# Patient Record
Sex: FEMALE | Race: WHITE | NOT HISPANIC OR LATINO | Employment: OTHER | URBAN - METROPOLITAN AREA
[De-identification: names, ages, dates, MRNs, and addresses within clinical notes are randomized per-mention and may not be internally consistent; named-entity substitution may affect disease eponyms.]

---

## 2023-01-22 PROBLEM — E78.5 DYSLIPIDEMIA: Status: ACTIVE | Noted: 2023-01-22

## 2023-01-22 PROBLEM — N89.8 VAGINAL LESION: Status: ACTIVE | Noted: 2023-01-22

## 2023-01-22 PROBLEM — H91.90 HEARING LOSS: Status: ACTIVE | Noted: 2023-01-22

## 2023-01-22 PROBLEM — R42 DIZZINESS: Status: ACTIVE | Noted: 2023-01-22

## 2023-01-22 PROBLEM — M25.561 RIGHT KNEE PAIN: Status: ACTIVE | Noted: 2023-01-22

## 2023-01-22 PROBLEM — R32 URINARY INCONTINENCE: Status: ACTIVE | Noted: 2023-01-22

## 2023-01-22 PROBLEM — J30.2 SEASONAL ALLERGIES: Status: ACTIVE | Noted: 2023-01-22

## 2023-01-22 PROBLEM — E66.01 CLASS 2 SEVERE OBESITY WITH SERIOUS COMORBIDITY AND BODY MASS INDEX (BMI) OF 35.0 TO 35.9 IN ADULT (MULTI): Status: ACTIVE | Noted: 2023-01-22

## 2023-01-22 PROBLEM — N81.10 VAGINAL PROLAPSE: Status: ACTIVE | Noted: 2023-01-22

## 2023-01-22 PROBLEM — I10 ESSENTIAL HYPERTENSION: Status: ACTIVE | Noted: 2023-01-22

## 2023-01-22 PROBLEM — R12 HEARTBURN: Status: ACTIVE | Noted: 2023-01-22

## 2023-01-22 PROBLEM — C50.919 BREAST CA (MULTI): Status: ACTIVE | Noted: 2023-01-22

## 2023-01-22 PROBLEM — E66.812 CLASS 2 SEVERE OBESITY WITH SERIOUS COMORBIDITY AND BODY MASS INDEX (BMI) OF 35.0 TO 35.9 IN ADULT: Status: ACTIVE | Noted: 2023-01-22

## 2023-01-22 PROBLEM — J39.2 PHARYNGEAL DISORDER: Status: ACTIVE | Noted: 2023-01-22

## 2023-01-22 PROBLEM — H40.9 GLAUCOMA: Status: ACTIVE | Noted: 2023-01-22

## 2023-01-22 PROBLEM — E11.9 CONTROLLED TYPE 2 DIABETES MELLITUS WITHOUT COMPLICATION, WITHOUT LONG-TERM CURRENT USE OF INSULIN (MULTI): Status: ACTIVE | Noted: 2023-01-22

## 2023-01-22 RX ORDER — EZETIMIBE 10 MG/1
10 TABLET ORAL DAILY
COMMUNITY
End: 2023-08-30 | Stop reason: SDUPTHER

## 2023-01-22 RX ORDER — MULTIVITAMIN/IRON/FOLIC ACID 18MG-0.4MG
1 TABLET ORAL DAILY
COMMUNITY
End: 2024-04-10 | Stop reason: ALTCHOICE

## 2023-01-22 RX ORDER — FAMOTIDINE 20 MG/1
1 TABLET, FILM COATED ORAL DAILY PRN
COMMUNITY
End: 2024-04-10 | Stop reason: SDUPTHER

## 2023-01-22 RX ORDER — BRIMONIDINE TARTRATE AND TIMOLOL MALEATE 2; 5 MG/ML; MG/ML
1 SOLUTION OPHTHALMIC 2 TIMES DAILY
COMMUNITY

## 2023-01-22 RX ORDER — CHOLECALCIFEROL (VITAMIN D3) 125 MCG
1 CAPSULE ORAL EVERY 6 HOURS PRN
COMMUNITY
End: 2024-04-10 | Stop reason: ALTCHOICE

## 2023-01-22 RX ORDER — MULTIVITAMIN
1 TABLET ORAL DAILY
COMMUNITY

## 2023-01-22 RX ORDER — BLOOD SUGAR DIAGNOSTIC
STRIP MISCELLANEOUS
COMMUNITY
End: 2024-05-06 | Stop reason: SDUPTHER

## 2023-01-22 RX ORDER — DICLOFENAC SODIUM 10 MG/G
4 GEL TOPICAL 2 TIMES DAILY
COMMUNITY
End: 2023-03-07 | Stop reason: SDUPTHER

## 2023-01-22 RX ORDER — SPIRONOLACTONE 50 MG/1
50 TABLET, FILM COATED ORAL DAILY
COMMUNITY
End: 2024-04-10 | Stop reason: ALTCHOICE

## 2023-02-21 RX ORDER — LIRAGLUTIDE 6 MG/ML
1.8 INJECTION SUBCUTANEOUS
COMMUNITY
Start: 2022-11-21 | End: 2023-04-17 | Stop reason: SDUPTHER

## 2023-03-07 ENCOUNTER — OFFICE VISIT (OUTPATIENT)
Dept: PRIMARY CARE | Facility: CLINIC | Age: 83
End: 2023-03-07
Payer: MEDICARE

## 2023-03-07 VITALS
DIASTOLIC BLOOD PRESSURE: 68 MMHG | WEIGHT: 185 LBS | SYSTOLIC BLOOD PRESSURE: 140 MMHG | HEART RATE: 92 BPM | HEIGHT: 62 IN | BODY MASS INDEX: 34.04 KG/M2

## 2023-03-07 DIAGNOSIS — R26.81 UNSTEADY GAIT: ICD-10-CM

## 2023-03-07 DIAGNOSIS — M25.59 PAIN IN OTHER JOINT: ICD-10-CM

## 2023-03-07 DIAGNOSIS — E11.9 CONTROLLED TYPE 2 DIABETES MELLITUS WITHOUT COMPLICATION, WITHOUT LONG-TERM CURRENT USE OF INSULIN (MULTI): Primary | ICD-10-CM

## 2023-03-07 DIAGNOSIS — I10 ESSENTIAL HYPERTENSION: ICD-10-CM

## 2023-03-07 DIAGNOSIS — Z23 ENCOUNTER FOR IMMUNIZATION: ICD-10-CM

## 2023-03-07 DIAGNOSIS — J39.2 PHARYNGEAL DISORDER: ICD-10-CM

## 2023-03-07 DIAGNOSIS — R42 DIZZINESS: ICD-10-CM

## 2023-03-07 DIAGNOSIS — H90.3 SENSORINEURAL HEARING LOSS (SNHL) OF BOTH EARS: ICD-10-CM

## 2023-03-07 PROCEDURE — 1160F RVW MEDS BY RX/DR IN RCRD: CPT | Performed by: STUDENT IN AN ORGANIZED HEALTH CARE EDUCATION/TRAINING PROGRAM

## 2023-03-07 PROCEDURE — 1036F TOBACCO NON-USER: CPT | Performed by: STUDENT IN AN ORGANIZED HEALTH CARE EDUCATION/TRAINING PROGRAM

## 2023-03-07 PROCEDURE — 3078F DIAST BP <80 MM HG: CPT | Performed by: STUDENT IN AN ORGANIZED HEALTH CARE EDUCATION/TRAINING PROGRAM

## 2023-03-07 PROCEDURE — 3077F SYST BP >= 140 MM HG: CPT | Performed by: STUDENT IN AN ORGANIZED HEALTH CARE EDUCATION/TRAINING PROGRAM

## 2023-03-07 PROCEDURE — G0009 ADMIN PNEUMOCOCCAL VACCINE: HCPCS | Performed by: STUDENT IN AN ORGANIZED HEALTH CARE EDUCATION/TRAINING PROGRAM

## 2023-03-07 PROCEDURE — 1159F MED LIST DOCD IN RCRD: CPT | Performed by: STUDENT IN AN ORGANIZED HEALTH CARE EDUCATION/TRAINING PROGRAM

## 2023-03-07 PROCEDURE — 99214 OFFICE O/P EST MOD 30 MIN: CPT | Performed by: STUDENT IN AN ORGANIZED HEALTH CARE EDUCATION/TRAINING PROGRAM

## 2023-03-07 PROCEDURE — 90677 PCV20 VACCINE IM: CPT | Performed by: STUDENT IN AN ORGANIZED HEALTH CARE EDUCATION/TRAINING PROGRAM

## 2023-03-07 RX ORDER — GLIMEPIRIDE 1 MG/1
1 TABLET ORAL
Qty: 90 TABLET | Refills: 3 | Status: SHIPPED | OUTPATIENT
Start: 2023-03-07 | End: 2024-03-04 | Stop reason: HOSPADM

## 2023-03-07 RX ORDER — DICLOFENAC SODIUM 10 MG/G
4 GEL TOPICAL 2 TIMES DAILY
Qty: 100 G | Refills: 1 | Status: SHIPPED | OUTPATIENT
Start: 2023-03-07 | End: 2023-05-15 | Stop reason: SDUPTHER

## 2023-03-07 RX ORDER — SITAGLIPTIN 50 MG/1
1 TABLET, FILM COATED ORAL DAILY
COMMUNITY
Start: 2022-03-11 | End: 2023-03-07 | Stop reason: SINTOL

## 2023-03-07 ASSESSMENT — ENCOUNTER SYMPTOMS
COUGH: 0
PALPITATIONS: 0
CHILLS: 0
NERVOUS/ANXIOUS: 0
DYSPHORIC MOOD: 0
SHORTNESS OF BREATH: 0
FEVER: 0
LIGHT-HEADEDNESS: 1

## 2023-03-07 NOTE — PROGRESS NOTES
"6 month check.     Subjective   Patient ID: Nancie Du is a 82 y.o. female who presents for Diabetes.    HPI     Regarding DM2, was evaluated by endocrinology (Dr. Meehan) but no longer following. Jardiance was discontinued due to exacerbation of dizziness. She is now managed on Victoza alone. Sometimes still taking glimepiride as needed - states only taking it when FBG is elevated >150. Is taking 1/2 of her 2mg tablet and requests refill of 1mg tablet. BG has improved with dietary modification, deborah cutting back on fruit.    Regarding dizziness, has noticed that she feels lightheaded when her sugar is elevated and when her sugar is low. Overall feeling much better at this time with better control of her DM2.    Regarding gait, states that she feels unsteady on her feet. Has to use a cane and sometimes worries that she might fall, deborah when she moves quickly.    Review of Systems   Constitutional:  Negative for chills and fever.   Respiratory:  Negative for cough and shortness of breath.    Cardiovascular:  Negative for chest pain and palpitations.   Skin:  Negative for rash.   Neurological:  Positive for light-headedness.   Psychiatric/Behavioral:  Negative for dysphoric mood. The patient is not nervous/anxious.        Objective   /68   Pulse 92   Ht 1.575 m (5' 2\")   Wt 83.9 kg (185 lb)   BMI 33.84 kg/m²     Physical Exam  Constitutional:       Appearance: Normal appearance.   HENT:      Head: Normocephalic and atraumatic.   Eyes:      General: No scleral icterus.     Conjunctiva/sclera: Conjunctivae normal.   Cardiovascular:      Rate and Rhythm: Normal rate and regular rhythm.      Heart sounds: No murmur heard.  Pulmonary:      Effort: Pulmonary effort is normal. No respiratory distress.      Breath sounds: Normal breath sounds.   Musculoskeletal:         General: No swelling. Normal range of motion.      Cervical back: Normal range of motion and neck supple.   Skin:     General: Skin is warm and " dry.      Findings: No rash.   Neurological:      General: No focal deficit present.      Mental Status: She is alert.   Psychiatric:         Mood and Affect: Mood normal.         Behavior: Behavior normal.         Assessment/Plan   Problem List Items Addressed This Visit          Circulatory    Essential hypertension     BP just about at goal. No medication changes today. Do not want to exacerbate her lightheadedness.         Relevant Orders    Follow Up In Primary Care       Endocrine/Metabolic    Controlled type 2 diabetes mellitus without complication, without long-term current use of insulin (CMS/[facility]) - Primary     Doing well with Victoza and dietary modification, which we reviewed. Discussed risks of hypoglycemia associated with sulfonylurea use, but patient prefers to take low dose glimepiride as needed if FBG >150 as she feels dizzy when it is above this level. Refill provided. Medication dosing and side effects reviewed. Will obtain labs as below and will fu with results. Follow up in 6mo for recheck, sooner if needed.         Relevant Medications    glimepiride (AmaryL) 1 mg tablet    Other Relevant Orders    Albumin , Urine Random    Comprehensive Metabolic Panel    Hemoglobin A1C    TSH with reflex to Free T4 if abnormal    Lipid Panel    CBC and Auto Differential    Follow Up In Primary Care       Other    Dizziness     Improved with tighter glucose control. Will continue to monitor.         Relevant Orders    Follow Up In Primary Care    Pharyngeal disorder     Due for next ENT eval 5/2023. Pt plans to call to schedule.         Relevant Orders    Follow Up In Primary Care    Hearing loss     Doing well with new hearing aids.         Relevant Orders    Follow Up In Primary Care    Unsteady gait     Having to use a cane. We will proceed with PT eval in hopes that her ambulatory confidence with improve.         Relevant Orders    Referral to Physical Therapy    Follow Up In Primary Care     Other Visit  Diagnoses       Encounter for immunization        Relevant Orders    Pneumococcal conjugate vaccine, 20-valent, adult (PREVNAR 20) (Completed)

## 2023-03-08 NOTE — ASSESSMENT & PLAN NOTE
BP just about at goal. No medication changes today. Do not want to exacerbate her lightheadedness.

## 2023-03-08 NOTE — ASSESSMENT & PLAN NOTE
Doing well with Victoza and dietary modification, which we reviewed. Discussed risks of hypoglycemia associated with sulfonylurea use, but patient prefers to take low dose glimepiride as needed if FBG >150 as she feels dizzy when it is above this level. Refill provided. Medication dosing and side effects reviewed. Will obtain labs as below and will fu with results. Follow up in 6mo for recheck, sooner if needed.

## 2023-03-16 ENCOUNTER — LAB (OUTPATIENT)
Dept: LAB | Facility: LAB | Age: 83
End: 2023-03-16
Payer: MEDICARE

## 2023-03-16 DIAGNOSIS — E11.9 CONTROLLED TYPE 2 DIABETES MELLITUS WITHOUT COMPLICATION, WITHOUT LONG-TERM CURRENT USE OF INSULIN (MULTI): ICD-10-CM

## 2023-03-16 LAB
ALANINE AMINOTRANSFERASE (SGPT) (U/L) IN SER/PLAS: 18 U/L (ref 7–45)
ALBUMIN (G/DL) IN SER/PLAS: 3.9 G/DL (ref 3.4–5)
ALKALINE PHOSPHATASE (U/L) IN SER/PLAS: 55 U/L (ref 33–136)
ANION GAP IN SER/PLAS: 12 MMOL/L (ref 10–20)
ASPARTATE AMINOTRANSFERASE (SGOT) (U/L) IN SER/PLAS: 14 U/L (ref 9–39)
BASOPHILS (10*3/UL) IN BLOOD BY AUTOMATED COUNT: 0.06 X10E9/L (ref 0–0.1)
BASOPHILS/100 LEUKOCYTES IN BLOOD BY AUTOMATED COUNT: 0.8 % (ref 0–2)
BILIRUBIN TOTAL (MG/DL) IN SER/PLAS: 0.8 MG/DL (ref 0–1.2)
CALCIUM (MG/DL) IN SER/PLAS: 9.2 MG/DL (ref 8.6–10.3)
CARBON DIOXIDE, TOTAL (MMOL/L) IN SER/PLAS: 28 MMOL/L (ref 21–32)
CHLORIDE (MMOL/L) IN SER/PLAS: 99 MMOL/L (ref 98–107)
CHOLESTEROL (MG/DL) IN SER/PLAS: 197 MG/DL (ref 0–199)
CHOLESTEROL IN HDL (MG/DL) IN SER/PLAS: 57 MG/DL
CHOLESTEROL/HDL RATIO: 3.5
CREATININE (MG/DL) IN SER/PLAS: 0.56 MG/DL (ref 0.5–1.05)
EOSINOPHILS (10*3/UL) IN BLOOD BY AUTOMATED COUNT: 0.33 X10E9/L (ref 0–0.4)
EOSINOPHILS/100 LEUKOCYTES IN BLOOD BY AUTOMATED COUNT: 4.6 % (ref 0–6)
ERYTHROCYTE DISTRIBUTION WIDTH (RATIO) BY AUTOMATED COUNT: 12.8 % (ref 11.5–14.5)
ERYTHROCYTE MEAN CORPUSCULAR HEMOGLOBIN CONCENTRATION (G/DL) BY AUTOMATED: 31.7 G/DL (ref 32–36)
ERYTHROCYTE MEAN CORPUSCULAR VOLUME (FL) BY AUTOMATED COUNT: 91 FL (ref 80–100)
ERYTHROCYTES (10*6/UL) IN BLOOD BY AUTOMATED COUNT: 4.82 X10E12/L (ref 4–5.2)
ESTIMATED AVERAGE GLUCOSE FOR HBA1C: 166 MG/DL
GFR FEMALE: 90 ML/MIN/1.73M2
GLUCOSE (MG/DL) IN SER/PLAS: 174 MG/DL (ref 74–99)
HEMATOCRIT (%) IN BLOOD BY AUTOMATED COUNT: 43.6 % (ref 36–46)
HEMOGLOBIN (G/DL) IN BLOOD: 13.8 G/DL (ref 12–16)
HEMOGLOBIN A1C/HEMOGLOBIN TOTAL IN BLOOD: 7.4 %
IMMATURE GRANULOCYTES/100 LEUKOCYTES IN BLOOD BY AUTOMATED COUNT: 0.1 % (ref 0–0.9)
LDL: 108 MG/DL (ref 0–99)
LEUKOCYTES (10*3/UL) IN BLOOD BY AUTOMATED COUNT: 7.2 X10E9/L (ref 4.4–11.3)
LYMPHOCYTES (10*3/UL) IN BLOOD BY AUTOMATED COUNT: 1.46 X10E9/L (ref 0.8–3)
LYMPHOCYTES/100 LEUKOCYTES IN BLOOD BY AUTOMATED COUNT: 20.2 % (ref 13–44)
MONOCYTES (10*3/UL) IN BLOOD BY AUTOMATED COUNT: 0.67 X10E9/L (ref 0.05–0.8)
MONOCYTES/100 LEUKOCYTES IN BLOOD BY AUTOMATED COUNT: 9.3 % (ref 2–10)
NEUTROPHILS (10*3/UL) IN BLOOD BY AUTOMATED COUNT: 4.7 X10E9/L (ref 1.6–5.5)
NEUTROPHILS/100 LEUKOCYTES IN BLOOD BY AUTOMATED COUNT: 65 % (ref 40–80)
PLATELETS (10*3/UL) IN BLOOD AUTOMATED COUNT: 231 X10E9/L (ref 150–450)
POTASSIUM (MMOL/L) IN SER/PLAS: 4.1 MMOL/L (ref 3.5–5.3)
PROTEIN TOTAL: 6 G/DL (ref 6.4–8.2)
SODIUM (MMOL/L) IN SER/PLAS: 135 MMOL/L (ref 136–145)
THYROTROPIN (MIU/L) IN SER/PLAS BY DETECTION LIMIT <= 0.05 MIU/L: 2.75 MIU/L (ref 0.44–3.98)
TRIGLYCERIDE (MG/DL) IN SER/PLAS: 161 MG/DL (ref 0–149)
UREA NITROGEN (MG/DL) IN SER/PLAS: 11 MG/DL (ref 6–23)
VLDL: 32 MG/DL (ref 0–40)

## 2023-03-16 PROCEDURE — 85025 COMPLETE CBC W/AUTO DIFF WBC: CPT

## 2023-03-16 PROCEDURE — 84443 ASSAY THYROID STIM HORMONE: CPT

## 2023-03-16 PROCEDURE — 80061 LIPID PANEL: CPT

## 2023-03-16 PROCEDURE — 36415 COLL VENOUS BLD VENIPUNCTURE: CPT

## 2023-03-16 PROCEDURE — 83036 HEMOGLOBIN GLYCOSYLATED A1C: CPT

## 2023-03-16 PROCEDURE — 80053 COMPREHEN METABOLIC PANEL: CPT

## 2023-03-16 PROCEDURE — 82043 UR ALBUMIN QUANTITATIVE: CPT

## 2023-03-16 PROCEDURE — 82570 ASSAY OF URINE CREATININE: CPT

## 2023-03-17 ENCOUNTER — TELEPHONE (OUTPATIENT)
Dept: PRIMARY CARE | Facility: CLINIC | Age: 83
End: 2023-03-17

## 2023-03-17 LAB
ALBUMIN (MG/L) IN URINE: 9 MG/L
ALBUMIN/CREATININE (UG/MG) IN URINE: 25.2 UG/MG CRT (ref 0–30)
CREATININE (MG/DL) IN URINE: 35.7 MG/DL (ref 20–320)

## 2023-03-17 NOTE — TELEPHONE ENCOUNTER
----- Message from Aspen Knapp DO sent at 3/17/2023  1:07 PM EDT -----  Please let patient know that her HbA1c is 7.4%, which is at our goal of <8%. The remainder of her labs (blood counts, electrolytes, liver, kidneys, cholesterol, thyroid) look good. Thank you

## 2023-04-17 DIAGNOSIS — E11.9 CONTROLLED TYPE 2 DIABETES MELLITUS WITHOUT COMPLICATION, WITHOUT LONG-TERM CURRENT USE OF INSULIN (MULTI): Primary | ICD-10-CM

## 2023-04-17 RX ORDER — LIRAGLUTIDE 6 MG/ML
1.8 INJECTION SUBCUTANEOUS
Qty: 3 EACH | Refills: 1 | Status: SHIPPED | OUTPATIENT
Start: 2023-04-17 | End: 2023-06-06 | Stop reason: SDUPTHER

## 2023-05-15 DIAGNOSIS — M25.59 PAIN IN OTHER JOINT: ICD-10-CM

## 2023-05-15 RX ORDER — DICLOFENAC SODIUM 10 MG/G
4 GEL TOPICAL 2 TIMES DAILY
Qty: 100 G | Refills: 1 | Status: SHIPPED | OUTPATIENT
Start: 2023-05-15 | End: 2023-08-02 | Stop reason: SDUPTHER

## 2023-05-17 DIAGNOSIS — E11.9 CONTROLLED TYPE 2 DIABETES MELLITUS WITHOUT COMPLICATION, WITHOUT LONG-TERM CURRENT USE OF INSULIN (MULTI): Primary | ICD-10-CM

## 2023-05-17 RX ORDER — PEN NEEDLE, DIABETIC 30 GX3/16"
1 NEEDLE, DISPOSABLE MISCELLANEOUS DAILY
Qty: 100 EACH | Refills: 0 | Status: SHIPPED | OUTPATIENT
Start: 2023-05-17 | End: 2023-08-30 | Stop reason: SDUPTHER

## 2023-06-06 DIAGNOSIS — E11.9 CONTROLLED TYPE 2 DIABETES MELLITUS WITHOUT COMPLICATION, WITHOUT LONG-TERM CURRENT USE OF INSULIN (MULTI): ICD-10-CM

## 2023-06-06 RX ORDER — LIRAGLUTIDE 6 MG/ML
1.8 INJECTION SUBCUTANEOUS
Qty: 3 EACH | Refills: 5 | Status: SHIPPED | OUTPATIENT
Start: 2023-06-06 | End: 2023-12-07 | Stop reason: SDUPTHER

## 2023-08-02 DIAGNOSIS — M25.59 PAIN IN OTHER JOINT: ICD-10-CM

## 2023-08-02 RX ORDER — DICLOFENAC SODIUM 10 MG/G
4 GEL TOPICAL 2 TIMES DAILY
Qty: 100 G | Refills: 1 | Status: SHIPPED | OUTPATIENT
Start: 2023-08-02 | End: 2023-10-17 | Stop reason: SDUPTHER

## 2023-08-30 DIAGNOSIS — E78.5 DYSLIPIDEMIA: ICD-10-CM

## 2023-08-30 DIAGNOSIS — E11.9 CONTROLLED TYPE 2 DIABETES MELLITUS WITHOUT COMPLICATION, WITHOUT LONG-TERM CURRENT USE OF INSULIN (MULTI): ICD-10-CM

## 2023-08-30 RX ORDER — EZETIMIBE 10 MG/1
10 TABLET ORAL DAILY
Qty: 90 TABLET | Refills: 1 | Status: SHIPPED | OUTPATIENT
Start: 2023-08-30 | End: 2024-02-13 | Stop reason: SDUPTHER

## 2023-08-30 RX ORDER — PEN NEEDLE, DIABETIC 30 GX3/16"
1 NEEDLE, DISPOSABLE MISCELLANEOUS DAILY
Qty: 100 EACH | Refills: 1 | Status: SHIPPED | OUTPATIENT
Start: 2023-08-30 | End: 2023-12-07 | Stop reason: SDUPTHER

## 2023-09-12 ENCOUNTER — OFFICE VISIT (OUTPATIENT)
Dept: PRIMARY CARE | Facility: CLINIC | Age: 83
End: 2023-09-12
Payer: MEDICARE

## 2023-09-12 ENCOUNTER — LAB (OUTPATIENT)
Dept: LAB | Facility: LAB | Age: 83
End: 2023-09-12
Payer: MEDICARE

## 2023-09-12 VITALS
DIASTOLIC BLOOD PRESSURE: 76 MMHG | HEART RATE: 88 BPM | BODY MASS INDEX: 32.61 KG/M2 | WEIGHT: 177.2 LBS | SYSTOLIC BLOOD PRESSURE: 144 MMHG | HEIGHT: 62 IN

## 2023-09-12 DIAGNOSIS — R42 DIZZINESS: ICD-10-CM

## 2023-09-12 DIAGNOSIS — J39.2 PHARYNGEAL DISORDER: ICD-10-CM

## 2023-09-12 DIAGNOSIS — C50.919 MALIGNANT NEOPLASM OF FEMALE BREAST, UNSPECIFIED ESTROGEN RECEPTOR STATUS, UNSPECIFIED LATERALITY, UNSPECIFIED SITE OF BREAST (MULTI): ICD-10-CM

## 2023-09-12 DIAGNOSIS — R09.82 PND (POST-NASAL DRIP): ICD-10-CM

## 2023-09-12 DIAGNOSIS — E11.9 TYPE 2 DIABETES MELLITUS WITHOUT COMPLICATION, WITHOUT LONG-TERM CURRENT USE OF INSULIN (MULTI): Primary | ICD-10-CM

## 2023-09-12 DIAGNOSIS — E11.9 CONTROLLED TYPE 2 DIABETES MELLITUS WITHOUT COMPLICATION, WITHOUT LONG-TERM CURRENT USE OF INSULIN (MULTI): ICD-10-CM

## 2023-09-12 LAB
ALANINE AMINOTRANSFERASE (SGPT) (U/L) IN SER/PLAS: 18 U/L (ref 7–45)
ALBUMIN (G/DL) IN SER/PLAS: 4 G/DL (ref 3.4–5)
ALKALINE PHOSPHATASE (U/L) IN SER/PLAS: 54 U/L (ref 33–136)
ANION GAP IN SER/PLAS: 11 MMOL/L (ref 10–20)
ASPARTATE AMINOTRANSFERASE (SGOT) (U/L) IN SER/PLAS: 14 U/L (ref 9–39)
BASOPHILS (10*3/UL) IN BLOOD BY AUTOMATED COUNT: 0.05 X10E9/L (ref 0–0.1)
BASOPHILS/100 LEUKOCYTES IN BLOOD BY AUTOMATED COUNT: 0.8 % (ref 0–2)
BILIRUBIN TOTAL (MG/DL) IN SER/PLAS: 0.4 MG/DL (ref 0–1.2)
CALCIUM (MG/DL) IN SER/PLAS: 9.1 MG/DL (ref 8.6–10.3)
CARBON DIOXIDE, TOTAL (MMOL/L) IN SER/PLAS: 28 MMOL/L (ref 21–32)
CHLORIDE (MMOL/L) IN SER/PLAS: 99 MMOL/L (ref 98–107)
CREATININE (MG/DL) IN SER/PLAS: 0.51 MG/DL (ref 0.5–1.05)
EOSINOPHILS (10*3/UL) IN BLOOD BY AUTOMATED COUNT: 0.25 X10E9/L (ref 0–0.4)
EOSINOPHILS/100 LEUKOCYTES IN BLOOD BY AUTOMATED COUNT: 3.8 % (ref 0–6)
ERYTHROCYTE DISTRIBUTION WIDTH (RATIO) BY AUTOMATED COUNT: 12.7 % (ref 11.5–14.5)
ERYTHROCYTE MEAN CORPUSCULAR HEMOGLOBIN CONCENTRATION (G/DL) BY AUTOMATED: 31.6 G/DL (ref 32–36)
ERYTHROCYTE MEAN CORPUSCULAR VOLUME (FL) BY AUTOMATED COUNT: 91 FL (ref 80–100)
ERYTHROCYTES (10*6/UL) IN BLOOD BY AUTOMATED COUNT: 4.86 X10E12/L (ref 4–5.2)
ESTIMATED AVERAGE GLUCOSE FOR HBA1C: 163 MG/DL
GFR FEMALE: >90 ML/MIN/1.73M2
GLUCOSE (MG/DL) IN SER/PLAS: 216 MG/DL (ref 74–99)
HEMATOCRIT (%) IN BLOOD BY AUTOMATED COUNT: 44.3 % (ref 36–46)
HEMOGLOBIN (G/DL) IN BLOOD: 14 G/DL (ref 12–16)
HEMOGLOBIN A1C/HEMOGLOBIN TOTAL IN BLOOD: 7.3 %
IMMATURE GRANULOCYTES/100 LEUKOCYTES IN BLOOD BY AUTOMATED COUNT: 0.2 % (ref 0–0.9)
LEUKOCYTES (10*3/UL) IN BLOOD BY AUTOMATED COUNT: 6.7 X10E9/L (ref 4.4–11.3)
LYMPHOCYTES (10*3/UL) IN BLOOD BY AUTOMATED COUNT: 1.36 X10E9/L (ref 0.8–3)
LYMPHOCYTES/100 LEUKOCYTES IN BLOOD BY AUTOMATED COUNT: 20.4 % (ref 13–44)
MONOCYTES (10*3/UL) IN BLOOD BY AUTOMATED COUNT: 0.62 X10E9/L (ref 0.05–0.8)
MONOCYTES/100 LEUKOCYTES IN BLOOD BY AUTOMATED COUNT: 9.3 % (ref 2–10)
NEUTROPHILS (10*3/UL) IN BLOOD BY AUTOMATED COUNT: 4.37 X10E9/L (ref 1.6–5.5)
NEUTROPHILS/100 LEUKOCYTES IN BLOOD BY AUTOMATED COUNT: 65.5 % (ref 40–80)
PLATELETS (10*3/UL) IN BLOOD AUTOMATED COUNT: 254 X10E9/L (ref 150–450)
POTASSIUM (MMOL/L) IN SER/PLAS: 3.9 MMOL/L (ref 3.5–5.3)
PROTEIN TOTAL: 6.2 G/DL (ref 6.4–8.2)
SODIUM (MMOL/L) IN SER/PLAS: 134 MMOL/L (ref 136–145)
THYROTROPIN (MIU/L) IN SER/PLAS BY DETECTION LIMIT <= 0.05 MIU/L: 1.14 MIU/L (ref 0.44–3.98)
UREA NITROGEN (MG/DL) IN SER/PLAS: 13 MG/DL (ref 6–23)

## 2023-09-12 PROCEDURE — 83036 HEMOGLOBIN GLYCOSYLATED A1C: CPT

## 2023-09-12 PROCEDURE — 1160F RVW MEDS BY RX/DR IN RCRD: CPT | Performed by: STUDENT IN AN ORGANIZED HEALTH CARE EDUCATION/TRAINING PROGRAM

## 2023-09-12 PROCEDURE — 36415 COLL VENOUS BLD VENIPUNCTURE: CPT

## 2023-09-12 PROCEDURE — 85025 COMPLETE CBC W/AUTO DIFF WBC: CPT

## 2023-09-12 PROCEDURE — 1159F MED LIST DOCD IN RCRD: CPT | Performed by: STUDENT IN AN ORGANIZED HEALTH CARE EDUCATION/TRAINING PROGRAM

## 2023-09-12 PROCEDURE — 3077F SYST BP >= 140 MM HG: CPT | Performed by: STUDENT IN AN ORGANIZED HEALTH CARE EDUCATION/TRAINING PROGRAM

## 2023-09-12 PROCEDURE — 3078F DIAST BP <80 MM HG: CPT | Performed by: STUDENT IN AN ORGANIZED HEALTH CARE EDUCATION/TRAINING PROGRAM

## 2023-09-12 PROCEDURE — 82570 ASSAY OF URINE CREATININE: CPT

## 2023-09-12 PROCEDURE — 82043 UR ALBUMIN QUANTITATIVE: CPT

## 2023-09-12 PROCEDURE — 1036F TOBACCO NON-USER: CPT | Performed by: STUDENT IN AN ORGANIZED HEALTH CARE EDUCATION/TRAINING PROGRAM

## 2023-09-12 PROCEDURE — 80053 COMPREHEN METABOLIC PANEL: CPT

## 2023-09-12 PROCEDURE — 99213 OFFICE O/P EST LOW 20 MIN: CPT | Performed by: STUDENT IN AN ORGANIZED HEALTH CARE EDUCATION/TRAINING PROGRAM

## 2023-09-12 PROCEDURE — 84443 ASSAY THYROID STIM HORMONE: CPT

## 2023-09-12 ASSESSMENT — ENCOUNTER SYMPTOMS
COUGH: 0
NERVOUS/ANXIOUS: 0
LIGHT-HEADEDNESS: 1
CHILLS: 0
FEVER: 0
SHORTNESS OF BREATH: 0
PALPITATIONS: 0
DYSPHORIC MOOD: 0

## 2023-09-12 NOTE — PROGRESS NOTES
"Subjective   Patient ID: Nancie Du is a 82 y.o. female who presents for 6 month check. Still having a lot of dizziness. Went to massage therapist and it helped back but not the dizziness.    HPI  DM2 - due for labs,  this morning, averaging low to mid 100s, when above 150 takes glimepiride 1mg, last eye check with Dr. Moyer 8/2023    Lightheadedness - \"PT can't do anything more for me\", not interested in seeing neurology, saw massage therapist, helped back but not lightheadedness, feels off balance, using a cane now, not drinking too much water and plans to work on increasing    Breast ca - scheduled for mammogram at [facility] 11/8/2023    Pharyngeal lesion - plan is for follow up with Dr. Lobato as needed    PND - better with otc Flonase, sleeps with humidifier    Review of Systems   Constitutional:  Negative for chills and fever.   Respiratory:  Negative for cough and shortness of breath.    Cardiovascular:  Negative for chest pain and palpitations.   Skin:  Negative for rash.   Neurological:  Positive for light-headedness.   Psychiatric/Behavioral:  Negative for dysphoric mood. The patient is not nervous/anxious.      Objective   /76   Pulse 88   Ht 1.575 m (5' 2\")   Wt 80.4 kg (177 lb 3.2 oz)   BMI 32.41 kg/m²     Physical Exam  Constitutional:       Appearance: Normal appearance.   HENT:      Head: Normocephalic and atraumatic.   Eyes:      General: No scleral icterus.     Conjunctiva/sclera: Conjunctivae normal.   Cardiovascular:      Rate and Rhythm: Normal rate and regular rhythm.      Heart sounds: No murmur heard.  Pulmonary:      Effort: Pulmonary effort is normal. No respiratory distress.      Breath sounds: Normal breath sounds.   Musculoskeletal:         General: No swelling. Normal range of motion.      Cervical back: Normal range of motion and neck supple.   Skin:     General: Skin is warm and dry.      Findings: No rash.   Neurological:      General: No focal deficit present.     "  Mental Status: She is alert.   Psychiatric:         Mood and Affect: Mood normal.         Behavior: Behavior normal.       Assessment/Plan   Problem List Items Addressed This Visit       Type 2 diabetes mellitus without complication, without long-term current use of insulin (CMS/[facility]) - Primary     Due for annual labs - will follow up with results when available. No changes today. Follow up in 3mo for recheck, sooner if needed.          Relevant Orders    CBC and Auto Differential (Completed)    Comprehensive Metabolic Panel (Completed)    TSH with reflex to Free T4 if abnormal (Completed)    Hemoglobin A1C (Completed)    Albumin , Urine Random (Completed)    Follow Up In Primary Care - Medicare Annual    PND (post-nasal drip)     Better with nasal steroid. Will continue.         Pharyngeal disorder     Will follow up with ENT as needed.         Dizziness     No real change with PT. She is not interested in further workup - will focus on BG control and water intake. Will continue to monitor. Return precautions reviewed.         Breast CA (CMS/[facility])     Scheduled for annual eval at [facility] 11/8/2023.

## 2023-09-13 LAB
ALBUMIN (MG/L) IN URINE: 9.1 MG/L
ALBUMIN/CREATININE (UG/MG) IN URINE: 47.4 UG/MG CRT (ref 0–30)
CREATININE (MG/DL) IN URINE: 19.2 MG/DL (ref 20–320)

## 2023-09-17 PROBLEM — E66.09 CLASS 1 OBESITY DUE TO EXCESS CALORIES WITH SERIOUS COMORBIDITY AND BODY MASS INDEX (BMI) OF 32.0 TO 32.9 IN ADULT: Status: ACTIVE | Noted: 2023-01-22

## 2023-09-17 PROBLEM — M25.561 RIGHT KNEE PAIN: Status: RESOLVED | Noted: 2023-01-22 | Resolved: 2023-09-17

## 2023-09-17 PROBLEM — E66.811 CLASS 1 OBESITY DUE TO EXCESS CALORIES WITH SERIOUS COMORBIDITY AND BODY MASS INDEX (BMI) OF 32.0 TO 32.9 IN ADULT: Status: ACTIVE | Noted: 2023-01-22

## 2023-09-17 PROBLEM — R09.82 PND (POST-NASAL DRIP): Status: ACTIVE | Noted: 2023-09-17

## 2023-09-17 NOTE — ASSESSMENT & PLAN NOTE
Due for annual labs - will follow up with results when available. No changes today. Follow up in 3mo for recheck, sooner if needed.

## 2023-09-17 NOTE — ASSESSMENT & PLAN NOTE
No real change with PT. She is not interested in further workup - will focus on BG control and water intake. Will continue to monitor. Return precautions reviewed.

## 2023-09-21 ENCOUNTER — TELEPHONE (OUTPATIENT)
Dept: PRIMARY CARE | Facility: CLINIC | Age: 83
End: 2023-09-21
Payer: MEDICARE

## 2023-09-21 NOTE — TELEPHONE ENCOUNTER
Aspen Knapp,   [id number] Clinical Support Staff  Please let patient know that her HbA1c is down to 7.3% and that the remainder of her labs look nice and stable. Thank you

## 2023-10-17 DIAGNOSIS — M25.59 PAIN IN OTHER JOINT: ICD-10-CM

## 2023-10-17 RX ORDER — DICLOFENAC SODIUM 10 MG/G
4 GEL TOPICAL 2 TIMES DAILY
Qty: 100 G | Refills: 1 | Status: SHIPPED | OUTPATIENT
Start: 2023-10-17 | End: 2024-01-08 | Stop reason: SDUPTHER

## 2023-12-07 DIAGNOSIS — E11.9 CONTROLLED TYPE 2 DIABETES MELLITUS WITHOUT COMPLICATION, WITHOUT LONG-TERM CURRENT USE OF INSULIN (MULTI): ICD-10-CM

## 2023-12-07 RX ORDER — LIRAGLUTIDE 6 MG/ML
1.8 INJECTION SUBCUTANEOUS DAILY
Qty: 3 ML | Refills: 5 | Status: SHIPPED | OUTPATIENT
Start: 2023-12-07

## 2023-12-07 RX ORDER — PEN NEEDLE, DIABETIC 30 GX3/16"
1 NEEDLE, DISPOSABLE MISCELLANEOUS DAILY
Qty: 100 EACH | Refills: 1 | Status: SHIPPED | OUTPATIENT
Start: 2023-12-07

## 2023-12-12 ENCOUNTER — APPOINTMENT (OUTPATIENT)
Dept: PRIMARY CARE | Facility: CLINIC | Age: 83
End: 2023-12-12
Payer: MEDICARE

## 2024-01-03 ENCOUNTER — OFFICE VISIT (OUTPATIENT)
Dept: PRIMARY CARE | Facility: CLINIC | Age: 84
End: 2024-01-03
Payer: MEDICARE

## 2024-01-03 ENCOUNTER — APPOINTMENT (OUTPATIENT)
Dept: PRIMARY CARE | Facility: CLINIC | Age: 84
End: 2024-01-03
Payer: MEDICARE

## 2024-01-03 VITALS
HEIGHT: 62 IN | BODY MASS INDEX: 32.65 KG/M2 | DIASTOLIC BLOOD PRESSURE: 78 MMHG | HEART RATE: 88 BPM | SYSTOLIC BLOOD PRESSURE: 130 MMHG | WEIGHT: 177.4 LBS

## 2024-01-03 DIAGNOSIS — L30.9 DERMATITIS: ICD-10-CM

## 2024-01-03 DIAGNOSIS — R42 DIZZINESS: Primary | ICD-10-CM

## 2024-01-03 PROCEDURE — 1036F TOBACCO NON-USER: CPT | Performed by: STUDENT IN AN ORGANIZED HEALTH CARE EDUCATION/TRAINING PROGRAM

## 2024-01-03 PROCEDURE — 1160F RVW MEDS BY RX/DR IN RCRD: CPT | Performed by: STUDENT IN AN ORGANIZED HEALTH CARE EDUCATION/TRAINING PROGRAM

## 2024-01-03 PROCEDURE — 99213 OFFICE O/P EST LOW 20 MIN: CPT | Performed by: STUDENT IN AN ORGANIZED HEALTH CARE EDUCATION/TRAINING PROGRAM

## 2024-01-03 PROCEDURE — 3078F DIAST BP <80 MM HG: CPT | Performed by: STUDENT IN AN ORGANIZED HEALTH CARE EDUCATION/TRAINING PROGRAM

## 2024-01-03 PROCEDURE — 1159F MED LIST DOCD IN RCRD: CPT | Performed by: STUDENT IN AN ORGANIZED HEALTH CARE EDUCATION/TRAINING PROGRAM

## 2024-01-03 PROCEDURE — 3075F SYST BP GE 130 - 139MM HG: CPT | Performed by: STUDENT IN AN ORGANIZED HEALTH CARE EDUCATION/TRAINING PROGRAM

## 2024-01-03 RX ORDER — TRIAMCINOLONE ACETONIDE 1 MG/G
CREAM TOPICAL 2 TIMES DAILY
Qty: 30 G | Refills: 0 | Status: SHIPPED | OUTPATIENT
Start: 2024-01-03

## 2024-01-03 ASSESSMENT — ENCOUNTER SYMPTOMS
DIZZINESS: 1
CHILLS: 0
NERVOUS/ANXIOUS: 0
DYSPHORIC MOOD: 0
FEVER: 0
LIGHT-HEADEDNESS: 1
SHORTNESS OF BREATH: 0
PALPITATIONS: 0
COUGH: 0

## 2024-01-03 NOTE — PROGRESS NOTES
"Subjective   Patient ID: Nancie Du is a 83 y.o. female who presents for 3 month check with labs. Dizziness has gotten worse over the last few months. Did have fall, didn't hit the ground but did hit the door.     HPI  Dizziness - getting worse, now stumbling to both sides and having to use walker at home and cane when out of the house, fell sideways into glass door last week after catching foot on throw rug at home, denies injury as she was able to catch herself on the doorframe prior to falling, states she is starting to notice some occasional room-spinning dizziness in additional to her lightheadedness, she is trying to stay hydrated but sometimes finds it difficult due to urinary frequency, has been doing neck exercises recommended by ENT but states they seem to make her dizziness worse, feels even worse when she hasn't eaten, eating dose help, she is now rarely taking the Amaryl, only taking when BG >150    Rash - itchy rash on shoulders that she first noticed a few weeks ago, thinks might be reacting to her hair dye, denies other rash, has been applying otc hydrocortisone without much improvement in itch/rash, denies other known new exposures    Review of Systems   Constitutional:  Negative for chills and fever.   Respiratory:  Negative for cough and shortness of breath.    Cardiovascular:  Negative for chest pain and palpitations.   Skin:  Negative for rash.   Neurological:  Positive for dizziness and light-headedness.   Psychiatric/Behavioral:  Negative for dysphoric mood. The patient is not nervous/anxious.      Objective   /78   Pulse 88   Ht 1.575 m (5' 2\")   Wt 80.5 kg (177 lb 6.4 oz)   BMI 32.45 kg/m²     Physical Exam  Constitutional:       Appearance: Normal appearance.   HENT:      Head: Normocephalic.   Eyes:      General: No scleral icterus.     Conjunctiva/sclera: Conjunctivae normal.   Pulmonary:      Effort: Pulmonary effort is normal. No respiratory distress.   Skin:     Findings: " Rash (maculopapular rash with excoriations superior aspect of shoulders L>R) present.   Neurological:      General: No focal deficit present.      Mental Status: She is alert and oriented to person, place, and time.   Psychiatric:         Mood and Affect: Mood normal.         Behavior: Behavior normal.       Assessment/Plan   Problem List Items Addressed This Visit             ICD-10-CM    Dizziness - Primary R42     Worsening with time. Discussed possible etiologies and usual workup. Recommend stopping the Amaryl completely and making sure she is staying hydrated and keeping up with small frequent meals/snacks. Discussed PT, but she declines and states it did not provide much improvement in symptoms in the past. Using shared decision making, we decided to proceed with neurology eval given worsening of symptoms which are now interfering with ADLs. Return precautions reviewed.          Relevant Orders    Referral to Neurology    Follow Up In Primary Care - Medicare Annual    Dermatitis L30.9     Rash on shoulders c/w contact dermatitis. Will tx with topical steroid. Medication dosing and side effects reviewed. Return precautions reviewed.          Relevant Medications    triamcinolone (Kenalog) 0.1 % cream    Other Relevant Orders    Follow Up In Primary Care - Medicare Annual   Follow up in 3mo for recheck, sooner if needed.

## 2024-01-04 NOTE — ASSESSMENT & PLAN NOTE
Worsening with time. Discussed possible etiologies and usual workup. Recommend stopping the Amaryl completely and making sure she is staying hydrated and keeping up with small frequent meals/snacks. Discussed PT, but she declines and states it did not provide much improvement in symptoms in the past. Using shared decision making, we decided to proceed with neurology eval given worsening of symptoms which are now interfering with ADLs. Return precautions reviewed.

## 2024-01-04 NOTE — ASSESSMENT & PLAN NOTE
Rash on shoulders c/w contact dermatitis. Will tx with topical steroid. Medication dosing and side effects reviewed. Return precautions reviewed.

## 2024-01-08 DIAGNOSIS — M25.59 PAIN IN OTHER JOINT: ICD-10-CM

## 2024-01-08 RX ORDER — DICLOFENAC SODIUM 10 MG/G
4 GEL TOPICAL 2 TIMES DAILY
Qty: 100 G | Refills: 1 | Status: SHIPPED | OUTPATIENT
Start: 2024-01-08 | End: 2024-05-10 | Stop reason: SDUPTHER

## 2024-02-13 DIAGNOSIS — E78.5 DYSLIPIDEMIA: ICD-10-CM

## 2024-02-13 RX ORDER — EZETIMIBE 10 MG/1
10 TABLET ORAL DAILY
Qty: 90 TABLET | Refills: 1 | Status: SHIPPED | OUTPATIENT
Start: 2024-02-13

## 2024-02-28 ENCOUNTER — HOSPITAL ENCOUNTER (EMERGENCY)
Facility: HOSPITAL | Age: 84
Discharge: OTHER INSTITUTION | End: 2024-02-29
Attending: EMERGENCY MEDICINE
Payer: MEDICARE

## 2024-02-28 ENCOUNTER — APPOINTMENT (OUTPATIENT)
Dept: RADIOLOGY | Facility: HOSPITAL | Age: 84
End: 2024-02-28
Payer: MEDICARE

## 2024-02-28 VITALS
BODY MASS INDEX: 31.71 KG/M2 | TEMPERATURE: 97.2 F | DIASTOLIC BLOOD PRESSURE: 71 MMHG | OXYGEN SATURATION: 96 % | SYSTOLIC BLOOD PRESSURE: 132 MMHG | RESPIRATION RATE: 18 BRPM | HEIGHT: 63 IN | WEIGHT: 179 LBS | HEART RATE: 98 BPM

## 2024-02-28 DIAGNOSIS — S09.90XA HEAD INJURY, INITIAL ENCOUNTER: ICD-10-CM

## 2024-02-28 DIAGNOSIS — S02.119A CLOSED FRACTURE OF OCCIPITAL BONE, UNSPECIFIED LATERALITY, UNSPECIFIED OCCIPITAL FRACTURE TYPE, INITIAL ENCOUNTER (MULTI): Primary | ICD-10-CM

## 2024-02-28 LAB — GLUCOSE BLD MANUAL STRIP-MCNC: 212 MG/DL (ref 74–99)

## 2024-02-28 PROCEDURE — 70486 CT MAXILLOFACIAL W/O DYE: CPT

## 2024-02-28 PROCEDURE — 90471 IMMUNIZATION ADMIN: CPT | Performed by: EMERGENCY MEDICINE

## 2024-02-28 PROCEDURE — 76377 3D RENDER W/INTRP POSTPROCES: CPT

## 2024-02-28 PROCEDURE — 70450 CT HEAD/BRAIN W/O DYE: CPT | Performed by: RADIOLOGY

## 2024-02-28 PROCEDURE — 70486 CT MAXILLOFACIAL W/O DYE: CPT | Performed by: RADIOLOGY

## 2024-02-28 PROCEDURE — 72125 CT NECK SPINE W/O DYE: CPT

## 2024-02-28 PROCEDURE — 99285 EMERGENCY DEPT VISIT HI MDM: CPT

## 2024-02-28 PROCEDURE — 2500000004 HC RX 250 GENERAL PHARMACY W/ HCPCS (ALT 636 FOR OP/ED): Performed by: EMERGENCY MEDICINE

## 2024-02-28 PROCEDURE — 82947 ASSAY GLUCOSE BLOOD QUANT: CPT

## 2024-02-28 PROCEDURE — 72131 CT LUMBAR SPINE W/O DYE: CPT

## 2024-02-28 PROCEDURE — 70450 CT HEAD/BRAIN W/O DYE: CPT

## 2024-02-28 PROCEDURE — 2500000001 HC RX 250 WO HCPCS SELF ADMINISTERED DRUGS (ALT 637 FOR MEDICARE OP): Performed by: EMERGENCY MEDICINE

## 2024-02-28 PROCEDURE — 72125 CT NECK SPINE W/O DYE: CPT | Performed by: RADIOLOGY

## 2024-02-28 PROCEDURE — 90715 TDAP VACCINE 7 YRS/> IM: CPT | Performed by: EMERGENCY MEDICINE

## 2024-02-28 RX ORDER — ACETAMINOPHEN 325 MG/1
650 TABLET ORAL ONCE
Status: COMPLETED | OUTPATIENT
Start: 2024-02-28 | End: 2024-02-28

## 2024-02-28 RX ORDER — CLONIDINE HYDROCHLORIDE 0.1 MG/1
0.1 TABLET ORAL ONCE
Status: COMPLETED | OUTPATIENT
Start: 2024-02-28 | End: 2024-02-28

## 2024-02-28 RX ADMIN — TETANUS TOXOID, REDUCED DIPHTHERIA TOXOID AND ACELLULAR PERTUSSIS VACCINE, ADSORBED 0.5 ML: 5; 2.5; 8; 8; 2.5 SUSPENSION INTRAMUSCULAR at 21:38

## 2024-02-28 RX ADMIN — ACETAMINOPHEN 650 MG: 325 TABLET ORAL at 20:47

## 2024-02-28 RX ADMIN — CLONIDINE HYDROCHLORIDE 0.1 MG: 0.1 TABLET ORAL at 20:36

## 2024-02-28 ASSESSMENT — PAIN DESCRIPTION - PROGRESSION: CLINICAL_PROGRESSION: GRADUALLY IMPROVING

## 2024-02-28 ASSESSMENT — PAIN SCALES - GENERAL
PAINLEVEL_OUTOF10: 2
PAINLEVEL_OUTOF10: 5 - MODERATE PAIN

## 2024-02-28 ASSESSMENT — COLUMBIA-SUICIDE SEVERITY RATING SCALE - C-SSRS
1. IN THE PAST MONTH, HAVE YOU WISHED YOU WERE DEAD OR WISHED YOU COULD GO TO SLEEP AND NOT WAKE UP?: NO
6. HAVE YOU EVER DONE ANYTHING, STARTED TO DO ANYTHING, OR PREPARED TO DO ANYTHING TO END YOUR LIFE?: NO
2. HAVE YOU ACTUALLY HAD ANY THOUGHTS OF KILLING YOURSELF?: NO

## 2024-02-28 ASSESSMENT — PAIN - FUNCTIONAL ASSESSMENT
PAIN_FUNCTIONAL_ASSESSMENT: 0-10
PAIN_FUNCTIONAL_ASSESSMENT: 0-10

## 2024-02-28 ASSESSMENT — PAIN DESCRIPTION - LOCATION: LOCATION: BACK

## 2024-02-28 NOTE — ED PROVIDER NOTES
HPI   No chief complaint on file.      Patient presents to the emergency department by squad after a mechanical fall.  The patient states that she was bringing in her garbage cans when a michael of wind knocked her to the ground.  She did strike the back of her head but did not lose consciousness.  Presents now secondary to pain to the back of her head as well as to her lower back.  States her last tetanus booster was within 5 years.      History provided by:  Patient and EMS personnel   used: No                        No data recorded                   Patient History   Past Medical History:   Diagnosis Date    Encounter for full-term uncomplicated delivery     Normal vaginal delivery    Other conditions influencing health status     Menstruation     Past Surgical History:   Procedure Laterality Date    OTHER SURGICAL HISTORY  06/15/2021    Ankle surgery    OTHER SURGICAL HISTORY  06/15/2021    Left mastectomy    OTHER SURGICAL HISTORY  09/17/2021    Hysteroscopy     Family History   Problem Relation Name Age of Onset    Heart disease Mother      Hypertension Mother      Hypertension Father       Social History     Tobacco Use    Smoking status: Never    Smokeless tobacco: Never   Substance Use Topics    Alcohol use: Not on file    Drug use: Not on file       Physical Exam   ED Triage Vitals   Temp Pulse Resp BP   -- -- -- --      SpO2 Temp src Heart Rate Source Patient Position   -- -- -- --      BP Location FiO2 (%)     -- --       Physical Exam  Vitals and nursing note reviewed.   Constitutional:       General: She is not in acute distress.     Appearance: Normal appearance. She is obese. She is not ill-appearing, toxic-appearing or diaphoretic.      Comments: Notably hard of hearing.  Able to answer questions appropriately however.   HENT:      Head: Normocephalic.      Comments: Patient has a circumferential area of approximately 4 cm over the posterior scalp just inferior to the cervical  collar.  This area is consistent with a hematoma with dried blood.  This will need to be further cleansed evaluate for laceration once her cervical spine has been cleared.     Right Ear: Tympanic membrane, ear canal and external ear normal. There is no impacted cerumen.      Left Ear: Tympanic membrane, ear canal and external ear normal. There is no impacted cerumen.      Ears:      Comments: No hemotympanum     Nose: Nose normal. No rhinorrhea.      Mouth/Throat:      Mouth: Mucous membranes are moist.      Pharynx: Oropharynx is clear. No oropharyngeal exudate or posterior oropharyngeal erythema.   Eyes:      Extraocular Movements: Extraocular movements intact.      Pupils: Pupils are equal, round, and reactive to light.   Neck:      Comments: Trachea is midline.  Patient arrived in a cervical collar and as I could not clear her by Nexus criteria this remained in place.  Cardiovascular:      Rate and Rhythm: Normal rate and regular rhythm.      Heart sounds: No murmur heard.  Pulmonary:      Effort: Pulmonary effort is normal.      Breath sounds: Normal breath sounds. No wheezing.   Abdominal:      General: Abdomen is flat. Bowel sounds are normal. There is no distension.      Palpations: Abdomen is soft.      Tenderness: There is no abdominal tenderness.   Musculoskeletal:         General: No deformity or signs of injury. Normal range of motion.      Comments: 5/5 muscle strength testing in all 4 extremities without deficit or elicited pain.   Skin:     General: Skin is warm and dry.      Findings: No rash.   Neurological:      General: No focal deficit present.      Mental Status: She is alert and oriented to person, place, and time. Mental status is at baseline.      Cranial Nerves: No cranial nerve deficit.   Psychiatric:         Mood and Affect: Mood normal.         Behavior: Behavior normal.         Thought Content: Thought content normal.         Judgment: Judgment normal.         ED Course & MDM         Medical Decision Making  Imaging studies are pending.  Patient was endorsed to the oncoming provider.  Please see their note for interpretation of the pending studies and final disposition        Procedure  Procedures     Jd Valdes, DO  02/28/24 1955

## 2024-02-29 ENCOUNTER — APPOINTMENT (OUTPATIENT)
Dept: CARDIOLOGY | Facility: HOSPITAL | Age: 84
DRG: 086 | End: 2024-02-29
Payer: MEDICARE

## 2024-02-29 ENCOUNTER — CLINICAL SUPPORT (OUTPATIENT)
Dept: EMERGENCY MEDICINE | Facility: HOSPITAL | Age: 84
DRG: 086 | End: 2024-02-29
Payer: MEDICARE

## 2024-02-29 ENCOUNTER — APPOINTMENT (OUTPATIENT)
Dept: RADIOLOGY | Facility: HOSPITAL | Age: 84
DRG: 086 | End: 2024-02-29
Payer: MEDICARE

## 2024-02-29 ENCOUNTER — HOSPITAL ENCOUNTER (INPATIENT)
Facility: HOSPITAL | Age: 84
LOS: 4 days | Discharge: NURSING, REHAB OR LONG-TERM CARE | DRG: 086 | End: 2024-03-04
Attending: EMERGENCY MEDICINE | Admitting: PHYSICIAN ASSISTANT
Payer: MEDICARE

## 2024-02-29 DIAGNOSIS — S02.119A CLOSED FRACTURE OF OCCIPITAL BONE, UNSPECIFIED LATERALITY, UNSPECIFIED OCCIPITAL FRACTURE TYPE, INITIAL ENCOUNTER (MULTI): ICD-10-CM

## 2024-02-29 DIAGNOSIS — R42 DIZZINESS: ICD-10-CM

## 2024-02-29 DIAGNOSIS — R09.89 OTHER SPECIFIED SYMPTOMS AND SIGNS INVOLVING THE CIRCULATORY AND RESPIRATORY SYSTEMS: ICD-10-CM
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
## 2024-02-29 DIAGNOSIS — R68.89 OTHER GENERAL SYMPTOMS AND SIGNS: ICD-10-CM

## 2024-02-29 LAB
25(OH)D3 SERPL-MCNC: 29 NG/ML (ref 30–100)
ABO GROUP (TYPE) IN BLOOD: NORMAL
ALBUMIN SERPL BCP-MCNC: 3.9 G/DL (ref 3.4–5)
ALP SERPL-CCNC: 53 U/L (ref 33–136)
ALT SERPL W P-5'-P-CCNC: 14 U/L (ref 7–45)
ANION GAP SERPL CALC-SCNC: 15 MMOL/L (ref 10–20)
ANION GAP SERPL CALC-SCNC: 15 MMOL/L (ref 10–20)
ANTIBODY SCREEN: NORMAL
AORTIC VALVE MEAN GRADIENT: 6 MMHG
AORTIC VALVE PEAK VELOCITY: 1.69 M/S
AST SERPL W P-5'-P-CCNC: 13 U/L (ref 9–39)
ATRIAL RATE: 105 BPM
AV PEAK GRADIENT: 11.4 MMHG
AVA (PEAK VEL): 2.14 CM2
AVA (VTI): 2.05 CM2
BASOPHILS # BLD AUTO: 0.04 X10*3/UL (ref 0–0.1)
BASOPHILS NFR BLD AUTO: 0.4 %
BILIRUB SERPL-MCNC: 0.9 MG/DL (ref 0–1.2)
BUN SERPL-MCNC: 10 MG/DL (ref 6–23)
BUN SERPL-MCNC: 13 MG/DL (ref 6–23)
CALCIUM SERPL-MCNC: 9.2 MG/DL (ref 8.6–10.6)
CALCIUM SERPL-MCNC: 9.4 MG/DL (ref 8.6–10.6)
CHLORIDE SERPL-SCNC: 95 MMOL/L (ref 98–107)
CHLORIDE SERPL-SCNC: 96 MMOL/L (ref 98–107)
CO2 SERPL-SCNC: 23 MMOL/L (ref 21–32)
CO2 SERPL-SCNC: 26 MMOL/L (ref 21–32)
CREAT SERPL-MCNC: 0.42 MG/DL (ref 0.5–1.05)
CREAT SERPL-MCNC: 0.56 MG/DL (ref 0.5–1.05)
EGFRCR SERPLBLD CKD-EPI 2021: 90 ML/MIN/1.73M*2
EGFRCR SERPLBLD CKD-EPI 2021: >90 ML/MIN/1.73M*2
EJECTION FRACTION APICAL 4 CHAMBER: 59.1
EJECTION FRACTION: 59 %
EOSINOPHIL # BLD AUTO: 0.11 X10*3/UL (ref 0–0.4)
EOSINOPHIL NFR BLD AUTO: 1.1 %
ERYTHROCYTE [DISTWIDTH] IN BLOOD BY AUTOMATED COUNT: 12.2 % (ref 11.5–14.5)
EST. AVERAGE GLUCOSE BLD GHB EST-MCNC: 163 MG/DL
ETHANOL SERPL-MCNC: <10 MG/DL
GLUCOSE BLD MANUAL STRIP-MCNC: 175 MG/DL (ref 74–99)
GLUCOSE BLD MANUAL STRIP-MCNC: 178 MG/DL (ref 74–99)
GLUCOSE BLD MANUAL STRIP-MCNC: 181 MG/DL (ref 74–99)
GLUCOSE BLD MANUAL STRIP-MCNC: 218 MG/DL (ref 74–99)
GLUCOSE SERPL-MCNC: 167 MG/DL (ref 74–99)
GLUCOSE SERPL-MCNC: 203 MG/DL (ref 74–99)
HBA1C MFR BLD: 7.3 %
HCT VFR BLD AUTO: 42.2 % (ref 36–46)
HGB BLD-MCNC: 14.6 G/DL (ref 12–16)
IMM GRANULOCYTES # BLD AUTO: 0.03 X10*3/UL (ref 0–0.5)
IMM GRANULOCYTES NFR BLD AUTO: 0.3 % (ref 0–0.9)
INR PPP: 1.1 (ref 0.9–1.1)
LACTATE SERPL-SCNC: 1.4 MMOL/L (ref 0.4–2)
LEFT VENTRICLE INTERNAL DIMENSION DIASTOLE: 3.86 CM (ref 3.5–6)
LEFT VENTRICULAR OUTFLOW TRACT DIAMETER: 2 CM
LYMPHOCYTES # BLD AUTO: 1.59 X10*3/UL (ref 0.8–3)
LYMPHOCYTES NFR BLD AUTO: 16.5 %
MCH RBC QN AUTO: 29.3 PG (ref 26–34)
MCHC RBC AUTO-ENTMCNC: 34.6 G/DL (ref 32–36)
MCV RBC AUTO: 85 FL (ref 80–100)
MITRAL VALVE E/A RATIO: 0.61
MITRAL VALVE E/E' RATIO: 1.12
MONOCYTES # BLD AUTO: 1.06 X10*3/UL (ref 0.05–0.8)
MONOCYTES NFR BLD AUTO: 11 %
NEUTROPHILS # BLD AUTO: 6.83 X10*3/UL (ref 1.6–5.5)
NEUTROPHILS NFR BLD AUTO: 70.7 %
NRBC BLD-RTO: 0 /100 WBCS (ref 0–0)
P AXIS: 38 DEGREES
P OFFSET: 191 MS
P ONSET: 133 MS
PLATELET # BLD AUTO: 179 X10*3/UL (ref 150–450)
POTASSIUM SERPL-SCNC: 4 MMOL/L (ref 3.5–5.3)
POTASSIUM SERPL-SCNC: 4.1 MMOL/L (ref 3.5–5.3)
PR INTERVAL: 184 MS
PROT SERPL-MCNC: 6.4 G/DL (ref 6.4–8.2)
PROTHROMBIN TIME: 12 SECONDS (ref 9.8–12.8)
Q ONSET: 225 MS
QRS COUNT: 17 BEATS
QRS DURATION: 90 MS
QT INTERVAL: 362 MS
QTC CALCULATION(BAZETT): 478 MS
QTC FREDERICIA: 436 MS
R AXIS: -7 DEGREES
RBC # BLD AUTO: 4.99 X10*6/UL (ref 4–5.2)
RH FACTOR (ANTIGEN D): NORMAL
RIGHT VENTRICLE FREE WALL PEAK S': 12.6 CM/S
SODIUM SERPL-SCNC: 130 MMOL/L (ref 136–145)
SODIUM SERPL-SCNC: 132 MMOL/L (ref 136–145)
T AXIS: 51 DEGREES
T OFFSET: 406 MS
TRICUSPID ANNULAR PLANE SYSTOLIC EXCURSION: 2 CM
VENTRICULAR RATE: 105 BPM
WBC # BLD AUTO: 9.7 X10*3/UL (ref 4.4–11.3)

## 2024-02-29 PROCEDURE — 80053 COMPREHEN METABOLIC PANEL: CPT | Performed by: STUDENT IN AN ORGANIZED HEALTH CARE EDUCATION/TRAINING PROGRAM

## 2024-02-29 PROCEDURE — 2500000004 HC RX 250 GENERAL PHARMACY W/ HCPCS (ALT 636 FOR OP/ED): Performed by: PHYSICIAN ASSISTANT

## 2024-02-29 PROCEDURE — 2500000005 HC RX 250 GENERAL PHARMACY W/O HCPCS: Performed by: PHYSICIAN ASSISTANT

## 2024-02-29 PROCEDURE — 2500000004 HC RX 250 GENERAL PHARMACY W/ HCPCS (ALT 636 FOR OP/ED)

## 2024-02-29 PROCEDURE — 82947 ASSAY GLUCOSE BLOOD QUANT: CPT

## 2024-02-29 PROCEDURE — 99223 1ST HOSP IP/OBS HIGH 75: CPT | Performed by: INTERNAL MEDICINE

## 2024-02-29 PROCEDURE — 97162 PT EVAL MOD COMPLEX 30 MIN: CPT | Mod: GP | Performed by: PHYSICAL THERAPIST

## 2024-02-29 PROCEDURE — 1200000002 HC GENERAL ROOM WITH TELEMETRY DAILY

## 2024-02-29 PROCEDURE — 72131 CT LUMBAR SPINE W/O DYE: CPT

## 2024-02-29 PROCEDURE — 36415 COLL VENOUS BLD VENIPUNCTURE: CPT | Performed by: STUDENT IN AN ORGANIZED HEALTH CARE EDUCATION/TRAINING PROGRAM

## 2024-02-29 PROCEDURE — 86900 BLOOD TYPING SEROLOGIC ABO: CPT | Mod: 91 | Performed by: STUDENT IN AN ORGANIZED HEALTH CARE EDUCATION/TRAINING PROGRAM

## 2024-02-29 PROCEDURE — 71045 X-RAY EXAM CHEST 1 VIEW: CPT | Performed by: RADIOLOGY

## 2024-02-29 PROCEDURE — 70450 CT HEAD/BRAIN W/O DYE: CPT | Performed by: RADIOLOGY

## 2024-02-29 PROCEDURE — 97530 THERAPEUTIC ACTIVITIES: CPT | Mod: GP | Performed by: PHYSICAL THERAPIST

## 2024-02-29 PROCEDURE — 93010 ELECTROCARDIOGRAM REPORT: CPT | Performed by: NURSE PRACTITIONER

## 2024-02-29 PROCEDURE — 2500000004 HC RX 250 GENERAL PHARMACY W/ HCPCS (ALT 636 FOR OP/ED): Performed by: EMERGENCY MEDICINE

## 2024-02-29 PROCEDURE — 99223 1ST HOSP IP/OBS HIGH 75: CPT | Performed by: PHYSICIAN ASSISTANT

## 2024-02-29 PROCEDURE — 71045 X-RAY EXAM CHEST 1 VIEW: CPT

## 2024-02-29 PROCEDURE — 85610 PROTHROMBIN TIME: CPT | Performed by: STUDENT IN AN ORGANIZED HEALTH CARE EDUCATION/TRAINING PROGRAM

## 2024-02-29 PROCEDURE — 99285 EMERGENCY DEPT VISIT HI MDM: CPT | Mod: 25

## 2024-02-29 PROCEDURE — G0316 PR PROLONGED INPATIENT/OBSERVATION EM SVC EA 15 MIN: HCPCS | Performed by: INTERNAL MEDICINE

## 2024-02-29 PROCEDURE — 80048 BASIC METABOLIC PNL TOTAL CA: CPT | Mod: CCI | Performed by: PHYSICIAN ASSISTANT

## 2024-02-29 PROCEDURE — 83036 HEMOGLOBIN GLYCOSYLATED A1C: CPT | Performed by: PHYSICIAN ASSISTANT

## 2024-02-29 PROCEDURE — 72170 X-RAY EXAM OF PELVIS: CPT

## 2024-02-29 PROCEDURE — 93005 ELECTROCARDIOGRAM TRACING: CPT

## 2024-02-29 PROCEDURE — 96372 THER/PROPH/DIAG INJ SC/IM: CPT | Performed by: PHYSICIAN ASSISTANT

## 2024-02-29 PROCEDURE — 83605 ASSAY OF LACTIC ACID: CPT | Performed by: STUDENT IN AN ORGANIZED HEALTH CARE EDUCATION/TRAINING PROGRAM

## 2024-02-29 PROCEDURE — 74177 CT ABD & PELVIS W/CONTRAST: CPT

## 2024-02-29 PROCEDURE — 82077 ASSAY SPEC XCP UR&BREATH IA: CPT | Performed by: STUDENT IN AN ORGANIZED HEALTH CARE EDUCATION/TRAINING PROGRAM

## 2024-02-29 PROCEDURE — 82306 VITAMIN D 25 HYDROXY: CPT | Performed by: PHYSICIAN ASSISTANT

## 2024-02-29 PROCEDURE — 72128 CT CHEST SPINE W/O DYE: CPT | Mod: RCN

## 2024-02-29 PROCEDURE — 93306 TTE W/DOPPLER COMPLETE: CPT

## 2024-02-29 PROCEDURE — 99285 EMERGENCY DEPT VISIT HI MDM: CPT | Performed by: EMERGENCY MEDICINE

## 2024-02-29 PROCEDURE — 2550000001 HC RX 255 CONTRASTS: Performed by: EMERGENCY MEDICINE

## 2024-02-29 PROCEDURE — 93306 TTE W/DOPPLER COMPLETE: CPT | Performed by: STUDENT IN AN ORGANIZED HEALTH CARE EDUCATION/TRAINING PROGRAM

## 2024-02-29 PROCEDURE — 72125 CT NECK SPINE W/O DYE: CPT

## 2024-02-29 PROCEDURE — 82947 ASSAY GLUCOSE BLOOD QUANT: CPT | Mod: 91

## 2024-02-29 PROCEDURE — 85025 COMPLETE CBC W/AUTO DIFF WBC: CPT | Performed by: STUDENT IN AN ORGANIZED HEALTH CARE EDUCATION/TRAINING PROGRAM

## 2024-02-29 PROCEDURE — G0390 TRAUMA RESPONS W/HOSP CRITI: HCPCS

## 2024-02-29 PROCEDURE — 70450 CT HEAD/BRAIN W/O DYE: CPT

## 2024-02-29 PROCEDURE — 72170 X-RAY EXAM OF PELVIS: CPT | Performed by: RADIOLOGY

## 2024-02-29 PROCEDURE — 72125 CT NECK SPINE W/O DYE: CPT | Performed by: RADIOLOGY

## 2024-02-29 RX ORDER — TRIAMCINOLONE ACETONIDE 1 MG/G
CREAM TOPICAL 2 TIMES DAILY PRN
Status: CANCELLED | OUTPATIENT
Start: 2024-02-29

## 2024-02-29 RX ORDER — INSULIN LISPRO 100 [IU]/ML
0-5 INJECTION, SOLUTION INTRAVENOUS; SUBCUTANEOUS
Status: DISCONTINUED | OUTPATIENT
Start: 2024-02-29 | End: 2024-03-01

## 2024-02-29 RX ORDER — ONDANSETRON HYDROCHLORIDE 2 MG/ML
INJECTION, SOLUTION INTRAVENOUS
Status: COMPLETED
Start: 2024-02-29 | End: 2024-02-29

## 2024-02-29 RX ORDER — LIDOCAINE 560 MG/1
1 PATCH PERCUTANEOUS; TOPICAL; TRANSDERMAL DAILY
Status: DISCONTINUED | OUTPATIENT
Start: 2024-02-29 | End: 2024-03-04 | Stop reason: HOSPADM

## 2024-02-29 RX ORDER — MECLIZINE HYDROCHLORIDE 25 MG/1
12.5 TABLET ORAL
COMMUNITY
End: 2024-04-10 | Stop reason: ALTCHOICE

## 2024-02-29 RX ORDER — DEXTROSE MONOHYDRATE 100 MG/ML
0.3 INJECTION, SOLUTION INTRAVENOUS ONCE AS NEEDED
Status: DISCONTINUED | OUTPATIENT
Start: 2024-02-29 | End: 2024-03-04 | Stop reason: HOSPADM

## 2024-02-29 RX ORDER — BISMUTH SUBSALICYLATE 262 MG
1 TABLET,CHEWABLE ORAL DAILY
Status: CANCELLED | OUTPATIENT
Start: 2024-02-29

## 2024-02-29 RX ORDER — DEXTROSE 50 % IN WATER (D50W) INTRAVENOUS SYRINGE
25
Status: DISCONTINUED | OUTPATIENT
Start: 2024-02-29 | End: 2024-03-04 | Stop reason: HOSPADM

## 2024-02-29 RX ORDER — ACETAMINOPHEN 325 MG/1
975 TABLET ORAL EVERY 8 HOURS
Status: DISCONTINUED | OUTPATIENT
Start: 2024-02-29 | End: 2024-03-03

## 2024-02-29 RX ORDER — SPIRONOLACTONE 25 MG/1
50 TABLET ORAL DAILY
Status: CANCELLED | OUTPATIENT
Start: 2024-02-29

## 2024-02-29 RX ORDER — ONDANSETRON HYDROCHLORIDE 2 MG/ML
4 INJECTION, SOLUTION INTRAVENOUS ONCE
Status: COMPLETED | OUTPATIENT
Start: 2024-02-29 | End: 2024-02-29

## 2024-02-29 RX ORDER — MECLIZINE HCL 12.5 MG 12.5 MG/1
12.5 TABLET ORAL DAILY
Status: CANCELLED | OUTPATIENT
Start: 2024-02-29

## 2024-02-29 RX ORDER — EZETIMIBE 10 MG/1
10 TABLET ORAL DAILY
Status: CANCELLED | OUTPATIENT
Start: 2024-02-29

## 2024-02-29 RX ORDER — POLYETHYLENE GLYCOL 3350 17 G/17G
17 POWDER, FOR SOLUTION ORAL DAILY
Status: DISCONTINUED | OUTPATIENT
Start: 2024-02-29 | End: 2024-03-03

## 2024-02-29 RX ORDER — AMOXICILLIN 250 MG
2 CAPSULE ORAL 2 TIMES DAILY
Status: DISCONTINUED | OUTPATIENT
Start: 2024-02-29 | End: 2024-03-03

## 2024-02-29 RX ORDER — DICLOFENAC SODIUM 10 MG/G
4 GEL TOPICAL 2 TIMES DAILY PRN
Status: CANCELLED | OUTPATIENT
Start: 2024-02-29

## 2024-02-29 RX ORDER — BRIMONIDINE TARTRATE AND TIMOLOL MALEATE 2; 5 MG/ML; MG/ML
1 SOLUTION OPHTHALMIC 2 TIMES DAILY
Status: CANCELLED | OUTPATIENT
Start: 2024-02-29

## 2024-02-29 RX ORDER — ENOXAPARIN SODIUM 100 MG/ML
30 INJECTION SUBCUTANEOUS EVERY 12 HOURS
Status: DISCONTINUED | OUTPATIENT
Start: 2024-02-29 | End: 2024-03-04 | Stop reason: HOSPADM

## 2024-02-29 RX ORDER — FAMOTIDINE 40 MG/1
20 TABLET, FILM COATED ORAL DAILY PRN
Status: CANCELLED | OUTPATIENT
Start: 2024-02-29

## 2024-02-29 RX ADMIN — LIDOCAINE 1 PATCH: 4 PATCH TOPICAL at 18:18

## 2024-02-29 RX ADMIN — ENOXAPARIN SODIUM 30 MG: 100 INJECTION SUBCUTANEOUS at 20:42

## 2024-02-29 RX ADMIN — PERFLUTREN 3 ML OF DILUTION: 6.52 INJECTION, SUSPENSION INTRAVENOUS at 14:05

## 2024-02-29 RX ADMIN — ONDANSETRON 4 MG: 2 INJECTION INTRAMUSCULAR; INTRAVENOUS at 02:22

## 2024-02-29 RX ADMIN — ONDANSETRON 4 MG: 2 INJECTION INTRAMUSCULAR; INTRAVENOUS at 04:00

## 2024-02-29 RX ADMIN — ONDANSETRON 4 MG: 2 INJECTION, SOLUTION INTRAMUSCULAR; INTRAVENOUS at 02:47

## 2024-02-29 RX ADMIN — ONDANSETRON HYDROCHLORIDE 4 MG: 2 INJECTION, SOLUTION INTRAVENOUS at 02:22

## 2024-02-29 RX ADMIN — ACETAMINOPHEN 975 MG: 325 TABLET ORAL at 18:18

## 2024-02-29 RX ADMIN — ONDANSETRON HYDROCHLORIDE 4 MG: 2 INJECTION, SOLUTION INTRAVENOUS at 04:00

## 2024-02-29 RX ADMIN — ENOXAPARIN SODIUM 30 MG: 100 INJECTION SUBCUTANEOUS at 07:52

## 2024-02-29 RX ADMIN — ONDANSETRON HYDROCHLORIDE 4 MG: 2 INJECTION, SOLUTION INTRAVENOUS at 02:47

## 2024-02-29 RX ADMIN — IOHEXOL 100 ML: 350 INJECTION, SOLUTION INTRAVENOUS at 03:02

## 2024-02-29 SDOH — SOCIAL STABILITY: SOCIAL INSECURITY: HAVE YOU HAD THOUGHTS OF HARMING ANYONE ELSE?: NO

## 2024-02-29 SDOH — SOCIAL STABILITY: SOCIAL INSECURITY: ARE THERE ANY APPARENT SIGNS OF INJURIES/BEHAVIORS THAT COULD BE RELATED TO ABUSE/NEGLECT?: NO

## 2024-02-29 SDOH — SOCIAL STABILITY: SOCIAL INSECURITY: HAS ANYONE EVER THREATENED TO HURT YOUR FAMILY OR YOUR PETS?: NO

## 2024-02-29 SDOH — SOCIAL STABILITY: SOCIAL INSECURITY: ABUSE: ADULT

## 2024-02-29 SDOH — SOCIAL STABILITY: SOCIAL INSECURITY: DO YOU FEEL UNSAFE GOING BACK TO THE PLACE WHERE YOU ARE LIVING?: NO

## 2024-02-29 SDOH — SOCIAL STABILITY: SOCIAL INSECURITY: WERE YOU ABLE TO COMPLETE ALL THE BEHAVIORAL HEALTH SCREENINGS?: YES

## 2024-02-29 SDOH — SOCIAL STABILITY: SOCIAL INSECURITY: ARE YOU OR HAVE YOU BEEN THREATENED OR ABUSED PHYSICALLY, EMOTIONALLY, OR SEXUALLY BY ANYONE?: NO

## 2024-02-29 SDOH — SOCIAL STABILITY: SOCIAL INSECURITY: DO YOU FEEL ANYONE HAS EXPLOITED OR TAKEN ADVANTAGE OF YOU FINANCIALLY OR OF YOUR PERSONAL PROPERTY?: NO

## 2024-02-29 SDOH — SOCIAL STABILITY: SOCIAL INSECURITY: DOES ANYONE TRY TO KEEP YOU FROM HAVING/CONTACTING OTHER FRIENDS OR DOING THINGS OUTSIDE YOUR HOME?: NO

## 2024-02-29 ASSESSMENT — ENCOUNTER SYMPTOMS
LIGHT-HEADEDNESS: 1
WHEEZING: 0
HEADACHES: 1
CHEST TIGHTNESS: 0
BRUISES/BLEEDS EASILY: 0
VOMITING: 1
NECK STIFFNESS: 0
CHILLS: 0
BACK PAIN: 1
FREQUENCY: 0
SHORTNESS OF BREATH: 0
SEIZURES: 0
DIZZINESS: 1
JOINT SWELLING: 0
NAUSEA: 1
NERVOUS/ANXIOUS: 0
NECK PAIN: 1
WEAKNESS: 0
ACTIVITY CHANGE: 0
DIARRHEA: 0
WOUND: 0
FACIAL SWELLING: 0
EYE PAIN: 0
ABDOMINAL PAIN: 0
FEVER: 0
HALLUCINATIONS: 0
DIFFICULTY URINATING: 0
APPETITE CHANGE: 0
COUGH: 0
MYALGIAS: 0
ABDOMINAL DISTENTION: 0

## 2024-02-29 ASSESSMENT — ACTIVITIES OF DAILY LIVING (ADL)
ASSISTIVE_DEVICE: HEARING AID - RIGHT;HEARING AID - LEFT
HEARING - LEFT EAR: HEARING AID
HEARING - RIGHT EAR: HEARING AID

## 2024-02-29 ASSESSMENT — COLUMBIA-SUICIDE SEVERITY RATING SCALE - C-SSRS
6. HAVE YOU EVER DONE ANYTHING, STARTED TO DO ANYTHING, OR PREPARED TO DO ANYTHING TO END YOUR LIFE?: NO
2. HAVE YOU ACTUALLY HAD ANY THOUGHTS OF KILLING YOURSELF?: NO
1. IN THE PAST MONTH, HAVE YOU WISHED YOU WERE DEAD OR WISHED YOU COULD GO TO SLEEP AND NOT WAKE UP?: NO

## 2024-02-29 ASSESSMENT — LIFESTYLE VARIABLES
SUBSTANCE_ABUSE_PAST_12_MONTHS: NO
HOW OFTEN DO YOU HAVE A DRINK CONTAINING ALCOHOL: NEVER
EVER HAD A DRINK FIRST THING IN THE MORNING TO STEADY YOUR NERVES TO GET RID OF A HANGOVER: NO
HAVE YOU EVER FELT YOU SHOULD CUT DOWN ON YOUR DRINKING: NO
HOW OFTEN DO YOU HAVE 6 OR MORE DRINKS ON ONE OCCASION: NEVER
HOW MANY STANDARD DRINKS CONTAINING ALCOHOL DO YOU HAVE ON A TYPICAL DAY: PATIENT DOES NOT DRINK
SKIP TO QUESTIONS 9-10: 1
EVER FELT BAD OR GUILTY ABOUT YOUR DRINKING: NO
PRESCIPTION_ABUSE_PAST_12_MONTHS: NO
AUDIT-C TOTAL SCORE: 0
AUDIT-C TOTAL SCORE: 0
HAVE PEOPLE ANNOYED YOU BY CRITICIZING YOUR DRINKING: NO

## 2024-02-29 ASSESSMENT — PAIN SCALES - GENERAL
PAINLEVEL_OUTOF10: 4
PAINLEVEL_OUTOF10: 5 - MODERATE PAIN

## 2024-02-29 ASSESSMENT — COGNITIVE AND FUNCTIONAL STATUS - GENERAL
MOVING FROM LYING ON BACK TO SITTING ON SIDE OF FLAT BED WITH BEDRAILS: A LOT
MOVING TO AND FROM BED TO CHAIR: A LITTLE
MOBILITY SCORE: 15
CLIMB 3 TO 5 STEPS WITH RAILING: A LOT
TURNING FROM BACK TO SIDE WHILE IN FLAT BAD: A LOT
WALKING IN HOSPITAL ROOM: A LITTLE
STANDING UP FROM CHAIR USING ARMS: A LITTLE

## 2024-02-29 ASSESSMENT — PATIENT HEALTH QUESTIONNAIRE - PHQ9
SUM OF ALL RESPONSES TO PHQ9 QUESTIONS 1 & 2: 2
1. LITTLE INTEREST OR PLEASURE IN DOING THINGS: SEVERAL DAYS
2. FEELING DOWN, DEPRESSED OR HOPELESS: SEVERAL DAYS

## 2024-02-29 ASSESSMENT — PAIN DESCRIPTION - PAIN TYPE: TYPE: ACUTE PAIN

## 2024-02-29 ASSESSMENT — PAIN - FUNCTIONAL ASSESSMENT
PAIN_FUNCTIONAL_ASSESSMENT: 0-10

## 2024-02-29 NOTE — H&P
[facility]  TRAUMA SERVICE - HISTORY AND PHYSICAL / CONSULT    Patient Name: Nancie Du  MRN: [id number]  Admit Date: 229  : 1940  AGE: 83 y.o.   GENDER: female  ==============================================================================  MECHANISM OF INJURY / CHIEF COMPLAINT:   Ground level fall    82 yo F presenting as a LIMITED activation after sustaining a fall from ground level. Was taking out the trash when a michael of wind knocked her to the ground. Struck her head but denies LOC. Has been having episodes of dizziness at home for quite some time. Has been seeing a neurologist.     Initially presented to [facility] whereupon she underwent CT Head, C-Spine, Face, and L-Spine and was found to have an occipital non-displaced fracture. Transferred to [facility] for further evaluation. Here, mildly tachycardic. Reports neck and low back pain, though her low back pain has been ongoing.    LOC (yes/no?): Denies  Anticoagulant / Anti-platelet Rx? (for what dx?): Denies  Referring Facility Name (N/A for scene EMR run): Transfer from [facility]    INJURIES:   Non-displaced occipital fracture    OTHER MEDICAL PROBLEMS:  Near Syncope; dizziness  T2DM  HLD      INCIDENTAL FINDINGS:  Degenerative lumbar spine changes  Heavily calcified neck vasculature    ==============================================================================  ADMISSION PLAN OF CARE:  -admit to trauma floor   - maintain cervical collar due to ongoing midline tenderness  - consult Neurosurgery for occipital fracture  -->NTD    ==============================================================================  PAST MEDICAL HISTORY:   PMH:  T2DM  Dizziness, ?vertigo  Breast cancer    PSH:  Ankle surgery  L mastectomy  Hysteroscopy  Facial basal cell carcinoma resection    FH:  HTN - mother, father  Heart disease - mother    SOCIAL HISTORY:    Smoking: never    Alcohol: denies    Drug use:  "denies    MEDICATIONS:  Prior to Admission medications    Medication Sig Start Date End Date Taking? Authorizing Provider   b complex 0.4 mg tablet Super    Historical Provider, MD   blood sugar diagnostic (Accu-Chek Sheree Plus test strp) strip In vitro; Use 1 strip twice a day    Historical Provider, MD   brimonidine-timoloL (Combigan) 0.2-0.5 % ophthalmic solution Administer 1 drop into both eyes 2 times a day.    Historical Provider, MD   diclofenac sodium (Voltaren) 1 % gel gel Apply 1 Application topically 2 times a day. Apply to knee 1/8/24   Aspen Knapp, DO   ezetimibe (Zetia) 10 mg tablet Take 1 tablet (10 mg) by mouth once daily. 2/13/24   Aspen Knapp DO   famotidine (Pepcid) 20 mg tablet     Historical Provider, MD   glimepiride (AmaryL) 1 mg tablet Take 1 tablet (1 mg) by mouth once daily in the morning. Take before meals. 3/7/23 3/6/24  sApen Knapp DO   lifitegrast (XIIDRA OPHT) Administer 1 drop into both eyes in the morning and at bedtime. 5%    Historical Provider, MD   liraglutide (Victoza 2-Christian) 0.6 mg/0.1 mL (18 mg/3 mL) injection Inject 0.3 mL (1.8 mg) under the skin once daily. 12/7/23   Aspen Knapp, DO   multivitamin tablet Take 1 tablet by mouth once daily.    Historical Provider, MD   naproxen sodium 220 mg capsule     Historical Provider, MD   pen needle, diabetic (BD Ultra-Fine Mini Pen Needle) 31 gauge x 3/16\" needle 1 each once daily. 31G x 5mm; Inject 1 each as directed daily 12/7/23   Aspen Knapp DO   spironolactone (Aldactone) 50 mg tablet Take 1 tablet (50 mg) by mouth once daily.    Historical Provider, MD   triamcinolone (Kenalog) 0.1 % cream Apply topically 2 times a day. Apply to affected area 1-2 times daily as needed. Avoid face and groin. 1/3/24   Aspen Knapp DO     ALLERGIES:  Allergies   Allergen Reactions    Adhesive Tape-Silicones Unknown and Hives     Steri strips    Fosamax [Alendronate] Unknown    Statins-Hmg-Coa Reductase Inhibitors Unknown    "  Muscle weakness and pain       REVIEW OF SYSTEMS:  Review of Systems   Constitutional:  Negative for activity change, appetite change, chills and fever.   HENT:  Negative for congestion, ear pain and facial swelling.    Eyes:  Negative for pain.   Respiratory:  Negative for cough, chest tightness, shortness of breath and wheezing.    Cardiovascular:  Negative for chest pain.   Gastrointestinal:  Positive for nausea and vomiting. Negative for abdominal distention, abdominal pain and diarrhea.        En route from [facility]    Genitourinary:  Negative for difficulty urinating and frequency.   Musculoskeletal:  Positive for back pain and neck pain. Negative for joint swelling, myalgias and neck stiffness.        Chronic   Skin:  Negative for wound.   Neurological:  Positive for dizziness, light-headedness and headaches. Negative for seizures, syncope and weakness.   Hematological:  Does not bruise/bleed easily.   Psychiatric/Behavioral:  Negative for hallucinations. The patient is not nervous/anxious.      PHYSICAL EXAM:  PRIMARY SURVEY:  Airway  Airway is patent.     Breathing  Breathing is normal. Right breath sounds include wheezes. Left breath sounds include wheezes.     Circulation  Cardiac rhythm is regular. Rate is regular. There is no murmur present. There is no pericardial rub present.   Pulses  Radial: 2+ on the right; 2+ on the left.  Femoral: 2+ on the right; 2+ on the left.  Pedal: 2+ on the right; 2+ on the left.    Disability  Jose Coma Score  Eye:4   Verbal:5   Motor:6      15  Pupils  Right Pupil:   round and reactive        Left Pupil:   round and reactive           Motor Strength   strength:  5/5 on the right  5/5 on the left  Dorsiflex strength:  5/5 on the right  5/5 on the left  Plantarflex strength:  5/5 on the right  5/5 on the left  The patient does not have a sensory deficit.       SECONDARY SURVEY/PHYSICAL EXAM:  Physical Exam  Constitutional:       General: She is not in acute  distress.     Appearance: She is not ill-appearing or toxic-appearing.   HENT:      Head: Normocephalic.      Comments: Abrasions and ecchymosis to occiput    Surgical incision to R mandible c/d/i     Left Ear: External ear normal.      Ears:      Comments: R ear with abrasion to helix     Nose: Nose normal.      Mouth/Throat:      Mouth: Mucous membranes are moist.   Eyes:      Extraocular Movements: Extraocular movements intact.      Pupils: Pupils are equal, round, and reactive to light.   Cardiovascular:      Rate and Rhythm: Normal rate and regular rhythm.      Pulses: Normal pulses.      Heart sounds: Normal heart sounds. No murmur heard.     No friction rub. No gallop.   Pulmonary:      Effort: Pulmonary effort is normal. No respiratory distress.      Breath sounds: Wheezing present. No rhonchi or rales.   Chest:      Chest wall: No tenderness.      Comments: L mastectomy  Abdominal:      General: There is no distension.      Palpations: Abdomen is soft.      Tenderness: There is no abdominal tenderness. There is no guarding.   Musculoskeletal:         General: No swelling, tenderness, deformity or signs of injury. Normal range of motion.      Cervical back: Normal range of motion. No tenderness.   Skin:     General: Skin is warm and dry.      Capillary Refill: Capillary refill takes less than 2 seconds.   Neurological:      General: No focal deficit present.      Mental Status: She is alert and oriented to person, place, and time.      Motor: No weakness.   Psychiatric:         Mood and Affect: Mood normal.         Behavior: Behavior normal.       IMAGING SUMMARY:    CT Head/Face: non displaced occipital fx  CT C-Spine: neg  CT Chest/Abd/Pelvis: Endometrial thickening measuring up to 9 mm. Prominent aortic valve calcifications   CXR/PXR: -  Other(s): r wrist    LABS:  Results from last 7 days   Lab Units 02/29/24  0223   WBC AUTO x10*3/uL 9.7   HEMOGLOBIN g/dL 14.6   HEMATOCRIT % 42.2   PLATELETS AUTO  x10*3/uL 179   NEUTROS PCT AUTO % 70.7   LYMPHS PCT AUTO % 16.5   MONOS PCT AUTO % 11.0   EOS PCT AUTO % 1.1     Results from last 7 days   Lab Units 02/29/24 0223   INR  1.1     Results from last 7 days   Lab Units 02/29/24 0223   SODIUM mmol/L 132*   POTASSIUM mmol/L 4.1   CHLORIDE mmol/L 95*   CO2 mmol/L 26   BUN mg/dL 10   CREATININE mg/dL 0.42*   CALCIUM mg/dL 9.4   PROTEIN TOTAL g/dL 6.4   BILIRUBIN TOTAL mg/dL 0.9   ALK PHOS U/L 53   ALT U/L 14   AST U/L 13   GLUCOSE mg/dL 167*     Results from last 7 days   Lab Units 02/29/24 0223   BILIRUBIN TOTAL mg/dL 0.9           I have reviewed all laboratory and imaging results ordered/pertinent for this encounter.       Pt discussed with Dr. Coburn.    Jakob Barrios PA-C  Trauma, Critical Care, and Acute Care Surgery  Ext. Floor [id number]; ICU 22899

## 2024-02-29 NOTE — PROGRESS NOTES
Limited Trauma: Fall    Pt is an 83 year old female presenting to the ED following a fall. Per report, pt was taking her trash cans out when  the wind blew, causing pt to fall onto the concrete. Pt did strike her head, -LOC. Pt was found to have a an occipital fracture at the OSH. Pt presenting with an abrasion to the back of the head and lower back pain.   Pt lives at home alone. Pt's daughter lives about 15 minutes away from pt. Pt ambulates with a cane daily and is able to drive short distances. Pt has two steps to enter the home. Pt has no active home care serves and pt's children and grandchildren help at home and in the community. Pt's daughter Orquidea Walker [phone] is aware of pt's transfer.     Plan: pending    Jessica Weaver [facility]     Secure Chat  [phone]

## 2024-02-29 NOTE — PROGRESS NOTES
Pharmacy Medication History Review    Nancie Du is a 83 y.o. female admitted for Fall, initial encounter. Pharmacy reviewed the patient's [id number] medications and allergies for accuracy.    The list below reflects the updated PTA list. Comments regarding how patient may be taking medications differently can be found in the Admit Orders Activity  Prior to Admission Medications   Prescriptions Last Dose Informant Patient Reported?   b complex 0.4 mg tablet 2/28/2024 Self Yes   Sig: Take 1 tablet by mouth daily   blood sugar diagnostic (Accu-Chek Sheree Plus test strp) strip Unknown Self Yes   Sig: In vitro; Use 1 strip twice a day   brimonidine-timoloL (Combigan) 0.2-0.5 % ophthalmic solution 2/28/2024 Self Yes   Sig: Administer 1 drop into both eyes 2 times a day.   diclofenac sodium (Voltaren) 1 % gel gel 2/28/2024 Self No   Sig: Apply 1 Application topically 2 times a day. Apply to knee   ezetimibe (Zetia) 10 mg tablet 2/28/2024 Self No   Sig: Take 1 tablet (10 mg) by mouth once daily.   famotidine (Pepcid) 20 mg tablet  Sig: Take 1 tablet by mouth once daily as needed Past Month Self Yes   glimepiride (AmaryL) 1 mg tablet  at patient only takes this medication when blood sugar is over 150 Self No   Sig: Take 1 tablet (1 mg) by mouth once daily in the morning. Take before meals.   lifitegrast (XIIDRA OPHT) 2/28/2024 Self Yes   Sig: Administer 1 drop into both eyes in the morning and at bedtime. 5%   liraglutide (Victoza 2-Christian) 0.6 mg/0.1 mL (18 mg/3 mL) injection 2/28/2024 Self No   Sig: Inject 0.3 mL (1.8 mg) under the skin once daily.   meclizine (Antivert) 25 mg tablet 2/28/2024 Self Yes   Sig: Take 0.5 tablets (12.5 mg) by mouth once daily for 14 days.   multivitamin tablet 2/28/2024 Self Yes   Sig: Take 1 tablet by mouth once daily.   naproxen sodium 220 mg capsule  Sig: Take 1 tablet by mouth every 6 hours as needed 2/28/2024 Self Yes   pen needle, diabetic (BD Ultra-Fine Mini Pen Needle) 31 gauge  "x 3/16\" needle Unknown Self No   Si each once daily. 31G x 5mm; Inject 1 each as directed daily   spironolactone (Aldactone) 50 mg tablet Past Month at patient has surplus of this medication - patient takes this medication prn Self Yes   Sig: Take 1 tablet (50 mg) by mouth once daily.   triamcinolone (Kenalog) 0.1 % cream 2024 Self No   Sig: Apply topically 2 times a day. Apply to affected area 1-2 times daily as needed. Avoid face and groin.      Facility-Administered Medications: None        The list below reflects the updated allergy list. Please review each documented allergy for additional clarification and justification.  Allergies  Reviewed by Serjio Simental CPhT on 2024        Severity Reactions Comments    Adhesive Tape-silicones Not Specified Unknown, Hives Steri strips    Fosamax [alendronate] Not Specified Unknown     Statins-hmg-coa Reductase Inhibitors Not Specified Unknown Muscle weakness and pain            Patient accepts M2B at discharge. Pharmacy has been updated to [facility].    Sources used to complete the med history include   Allergy list from epic   Epic dispense history  [facility] ( none )   Patient interview   2024 office visit progress note ( ahmad )    Below are additional concerns with the patient's PTA list.  Patient is a good historian - patient knows medication name, dose, and frequency   The patient takes spironolactone and glimepiride as needed; no fill history for spironolactone, but patient confirms she has a surplus at home    Serjio Simental CPhT  Transitions of Care Pharmacy Technician  [facility]  Please reach out via [employer] Secure Chat for questions, or if no response call [phone] or [employer] “MedRec”      "

## 2024-02-29 NOTE — CONSULTS
"Consults    Reason For Consult  RO fracture    History Of Present Illness  Nancie Du is a 83 y.o. female with h/o breast ca, HTN, HLD, T2DM, glaucoma, hearing loss, s/p GLF, CTH non displaced RO fracture, CT C spine neg    Denies headache, change in vision, weakness, change in sensation, or any other focal neurologic deficits.    Patient is not on any AC/AP.    Imaging is personally reviewed and the is notable for CT head with nondisplaced right occipital fracture, negative for acute intracranial pathology, including bleeding.  CT C-spine is negative for acute fracture.     Past Medical History  She has a past medical history of Diabetes mellitus (CMS/[facility]), Encounter for full-term uncomplicated delivery, and Other conditions influencing health status.    Surgical History  She has a past surgical history that includes Other surgical history (06/15/2021); Other surgical history (06/15/2021); and Other surgical history (09/17/2021).     Social History  She reports that she has never smoked. She has never used smokeless tobacco. No history on file for alcohol use and drug use.    Family History  Family History   Problem Relation Name Age of Onset    Heart disease Mother      Hypertension Mother      Hypertension Father          Allergies  Adhesive tape-silicones, Fosamax [alendronate], and Statins-hmg-coa reductase inhibitors    Review of Systems   Review of systems was reviewed and otherwise negative other than what was listed in the HPI.    Physical Exam  NAD, A&Ox3  PERRL, EOMI, Face symmetric, Facial SILT, Palate/Tongue midline and symmetric, shoulder shrugs symmetric, hearing intact to finger rubs bilaterally  Motor: 5/5, no pronator drift  Sensation: SILT throughout all extremities  DTRS: 2+ Throughout, No Hoffmans or Clonus    Last Recorded Vitals  Blood pressure 135/75, pulse 96, temperature 36.5 °C (97.7 °F), temperature source Temporal, resp. rate 18, height 1.6 m (5' 3\"), weight 81.2 kg (179 lb 0.2 oz), " SpO2 96 %.    Relevant Results  Results for orders placed or performed during the hospital encounter of 02/29/24 (from the past 24 hour(s))   CBC and Auto Differential   Result Value Ref Range    WBC 9.7 4.4 - 11.3 x10*3/uL    nRBC 0.0 0.0 - 0.0 /100 WBCs    RBC 4.99 4.00 - 5.20 x10*6/uL    Hemoglobin 14.6 12.0 - 16.0 g/dL    Hematocrit 42.2 36.0 - 46.0 %    MCV 85 80 - 100 fL    MCH 29.3 26.0 - 34.0 pg    MCHC 34.6 32.0 - 36.0 g/dL    RDW 12.2 11.5 - 14.5 %    Platelets 179 150 - 450 x10*3/uL    Neutrophils % 70.7 40.0 - 80.0 %    Immature Granulocytes %, Automated 0.3 0.0 - 0.9 %    Lymphocytes % 16.5 13.0 - 44.0 %    Monocytes % 11.0 2.0 - 10.0 %    Eosinophils % 1.1 0.0 - 6.0 %    Basophils % 0.4 0.0 - 2.0 %    Neutrophils Absolute 6.83 (H) 1.60 - 5.50 x10*3/uL    Immature Granulocytes Absolute, Automated 0.03 0.00 - 0.50 x10*3/uL    Lymphocytes Absolute 1.59 0.80 - 3.00 x10*3/uL    Monocytes Absolute 1.06 (H) 0.05 - 0.80 x10*3/uL    Eosinophils Absolute 0.11 0.00 - 0.40 x10*3/uL    Basophils Absolute 0.04 0.00 - 0.10 x10*3/uL   Comprehensive Metabolic Panel   Result Value Ref Range    Glucose 167 (H) 74 - 99 mg/dL    Sodium 132 (L) 136 - 145 mmol/L    Potassium 4.1 3.5 - 5.3 mmol/L    Chloride 95 (L) 98 - 107 mmol/L    Bicarbonate 26 21 - 32 mmol/L    Anion Gap 15 10 - 20 mmol/L    Urea Nitrogen 10 6 - 23 mg/dL    Creatinine 0.42 (L) 0.50 - 1.05 mg/dL    eGFR >90 >60 mL/min/1.73m*2    Calcium 9.4 8.6 - 10.6 mg/dL    Albumin 3.9 3.4 - 5.0 g/dL    Alkaline Phosphatase 53 33 - 136 U/L    Total Protein 6.4 6.4 - 8.2 g/dL    AST 13 9 - 39 U/L    Bilirubin, Total 0.9 0.0 - 1.2 mg/dL    ALT 14 7 - 45 U/L   Lactate   Result Value Ref Range    Lactate 1.4 0.4 - 2.0 mmol/L   Protime-INR   Result Value Ref Range    Protime 12.0 9.8 - 12.8 seconds    INR 1.1 0.9 - 1.1   POCT GLUCOSE   Result Value Ref Range    POCT Glucose 178 (H) 74 - 99 mg/dL            Assessment/Plan     Nancie Du is a 83 y.o. female with h/o breast  ca, HTN, HLD, T2DM, glaucoma, hearing loss, s/p GLF, CTH non displaced RO fracture, CT C spine neg    Patient has stable neuro exam. No acute intracranial pathology. The fracture is nondisplaced.  No acute neurosurgical interventions needed. No follow up needed with neurosurgery. Neurosurgery is signing off       Note not final until signed by attending physician

## 2024-02-29 NOTE — ED PROVIDER NOTES
History of Present Illness     History provided by: Patient and EMS  Limitations to History: Trauma Activation    HPI:  Nancie Du is a 83 y.o. female presenting to the ED as a Limited Trauma Activation after fall from ground-level.  She was taking out the trash, was blown over by the wind, struck her head but did not lose consciousness.  To presented to outside hospital where CT head demonstrated nondisplaced occipital fracture with no intracranial pathology.  Transferred here for further evaluation.    Physical Exam   Arrival Vitals:  T 36.5 °C (97.7 °F)    /76  RR 18  O2 96 % Supplemental oxygen    Primary Survey:  Airway: Intact & patent  Breathing: Equal breath sounds bilaterally  Circulation: 2+ radial and DP pulses bilaterally  Disability: GCS 15.  Gross motor and sensation intact in the bilateral upper and lower extremities.  Exposure: Patient fully exposed, warm blankets applied    Secondary Survey:  HEENT: Abrasions and ecchymoses to the occiput no orbital ridge bony step-offs, or tenderness.  Midface is stable.  No mandibular tenderness or dental malocclusion's.  There is no nasal bone tenderness or deformity.  Pupils equal, round, and reactive to light bilaterally.  Neck: Cervical collar in place. There is C-spine midline tenderness to palpation without step-offs, or deformities.  Trachea is midline.  Chest: Scattered wheezing bilaterally.  No chest wall tenderness palpation, crepitus, flail segments noted.  No bruising or abrasions noted to the anterior chest wall.  Cardiovascular: Regular rate, rhythm  Abdomen: Soft, nontender, nondistended.  No bruising or lacerations noted.  Not peritonitic.  Pelvis: Stable to compression  : Normal external genitalia, no blood at the urethral meatus  Back/spine: No midline T or L-spine tenderness palpation, step-offs, or deformities.  No lacerations, abrasions, or bruising noted.  Extremities: No gross bony deformities, no bony tenderness  palpation.  Full range of motion all in 4 extremities.  Skin: No lacerations, bruises, abrasions noted.  Neuro: Alert and oriented to person, place, time.  Face symmetric, speech fluent.  Gross motor and sensory function intact in the bilateral upper and lower extremities.    ED Course/Treatment/Medical Decision Making     ED Course:  ED Course as of 03/01/24 1722   Thu Feb 29, 2024   0235 POCT GLUCOSE(!)  Normal BGL [SH]   0237 CBC and Auto Differential(!)  Normal WBC, hemoglobin, platelets [SH]   0245 XR chest 1 view  Chest x-ray reviewed in trauma bay, no evidence of acute infiltrate, no pneumothorax. [SH]   0245 XR pelvis 1-2 views  Pelvis x-ray reviewed in trauma bay, pelvic ring intact, no visible fracture. [SH]   0248 Protime-INR  INR normal [SH]   0444 Comprehensive Metabolic Panel(!)  Slight hyponatremia, hypochloremia, otherwise slight hyperglycemia, normal renal function, normal LFTs [SH]   0445 Lactate  Normal lactate [SH]   0445 Alcohol  Normal ethanol [SH]   0448 CT chest abdomen pelvis w IV contrast  CT chest on pelvis acute traumatic injury [SH]   0448 CT head W O contrast trauma protocol  CT head with known occipital skull fracture.  No other changes compared to prior. [SH]      ED Course User Index  [SH] Martinez De Leon MD         Diagnoses as of 03/01/24 1722   Fall, initial encounter   Closed fracture of occipital bone, unspecified laterality, unspecified occipital fracture type, initial encounter (CMS/[facility])       MDM:  83 y.o. female presenting to the ED as a Limited Trauma Activation after ground-level fall and being found to have an occipital skull fracture at outside hospital..  On arrival to the ED, the patient was immediately brought to the resuscitation bay where the trauma and the emergency department teams were awaiting the patient.  Tenderness over cervical spinal tenderness.  Patient in cervical collar which was maintained.  Chest and pelvis x-rays obtained in trauma bay, largely within  normal limits.  Taken to CT scanner for completion of pan scan.    Patient remained stable in the ED.  Did not require any interventions for analgesia.  We maintained her in a cervical collar due to persistent midline C-spine tenderness despite negative imaging.  Discussion with trauma team, we will admit her for further management and possible syncope workup.    Disposition   As a result of their workup, the patient will require admission to the hospital.  The patient was informed of their diagnosis.  Patient was given the opportunity to ask questions and answered them.  Patient agreed to be admitted to the hospital.    Procedures   Procedures    Patient seen and discussed with ED attending physician.    Zackary De Leon MD  PGY 3 Emergency Medicine           Martinez De Leon MD  Resident  03/01/24 0308

## 2024-02-29 NOTE — ED TRIAGE NOTES
Patient presents to the ED as a limited trauma activation as a transfer from [facility]. Patient was taking her trash cans in and the wind caused her to fall. She did hit the back of her head. She denies any LOC or blood thinner usage. Patients GCS is 15.

## 2024-02-29 NOTE — CONSULTS
Consults    Primary Team: Trauma    Admit Date: 2/29/2024    Emergency Contact:   Extended Emergency Contact Information  Primary Emergency Contact: LEONARDO FRANCIS  Address: [address] [place] [id number] [place]  Home Phone: [phone]  Mobile Phone: [phone]  Relation: Daughter     Reason For Consult: Fall    History Of Present Illness:  Nancie Du is a 83 y.o. 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 She was taking out the trash when a michael of wind knocked her to the ground. She struck the back of her head but denies LOC. Notably she has been having episodes of dizziness at home for quite some time and been seeing a neurologist for this issue.    History per patient:  The patient reports that while she was taking out the trash, a michael of wind knocked her over, causing her to fall flat on her rear and subsequently backward, resulting in hitting her head. She reports feeling slightly dizzy at this time but is not sure if this was the primary cause of her fall. She mentions that her neighbors assisted her back into the house and called for an ambulance. She confirms no loss of consciousness during the incident. Additionally, she mentions taking meclizine the night before and wonders if it might have played a role in her fall. She denies experiencing any other falls of similar nature but acknowledges persistent dizziness over the past four years. Importantly, she denies any history of hypoglycemia and states she has not recently taken glimepiride, and only takes it when her BG is >150.      History per family/caregiver: (obtained in addition due to patient’s fall)  The patient's youngest daughter, Mikal Francis, who is also the POA, corroborats her mother's story and does not express overt concern regarding her mother having memory loss or an inability to perform her ADL's.  However, she does express concerns about her mother's depressive symptoms since her father's passing (the patient's late ) four years ago. She has previously discussed this concern with her mother, who is hesitant to consider any pharmacological interventions.      What matters most to the patient:  Per Ms. Du, what matters most to her is her continued independence. She desires to continue living on her own and wishes she could drive but is unable to because of her dizziness/vertigo.    Prior to Admission Meds  Prior to Admission Medications   Prescriptions Last Dose Informant Patient Reported? Taking?   b complex 0.4 mg tablet 2024 Self Yes No   Sig: Take 1 tablet by mouth once daily. Super   blood sugar diagnostic (Accu-Chek Sheree Plus test strp) strip Unknown Self Yes No   Sig: In vitro; Use 1 strip twice a day   brimonidine-timoloL (Combigan) 0.2-0.5 % ophthalmic solution 2024 Self Yes No   Sig: Administer 1 drop into both eyes 2 times a day.   diclofenac sodium (Voltaren) 1 % gel gel 2024 Self No No   Sig: Apply 1 Application topically 2 times a day. Apply to knee   ezetimibe (Zetia) 10 mg tablet 2024 Self No No   Sig: Take 1 tablet (10 mg) by mouth once daily.   famotidine (Pepcid) 20 mg tablet Past Month Self Yes No   Sig: Take 1 tablet (20 mg) by mouth once daily as needed for heartburn or indigestion.   glimepiride (AmaryL) 1 mg tablet  at patient only takes this medication when blood sugar is over 150 Self No No   Sig: Take 1 tablet (1 mg) by mouth once daily in the morning. Take before meals.   lifitegrast (XIIDRA OPHT) 2024 Self Yes No   Sig: Administer 1 drop into both eyes in the morning and at bedtime. 5%   liraglutide (Victoza 2-Christian) 0.6 mg/0.1 mL (18 mg/3 mL) injection 2024 Self No No   Sig: Inject 0.3 mL (1.8 mg) under the skin once daily.   meclizine (Antivert) 25 mg tablet 2024 Self Yes No   Si.5 tablets (12.5 mg). Take by mouth once daily for 14  "days.   multivitamin tablet 2024 Self Yes No   Sig: Take 1 tablet by mouth once daily.   naproxen sodium 220 mg capsule 2024 Self Yes No   Sig: Take 1 tablet by mouth every 6 hours if needed (pain).   pen needle, diabetic (BD Ultra-Fine Mini Pen Needle) 31 gauge x 3/16\" needle Unknown Self No No   Si each once daily. 31G x 5mm; Inject 1 each as directed daily   spironolactone (Aldactone) 50 mg tablet Past Month at patient has surplus of this medication - patient takes this medication prn Self Yes No   Sig: Take 1 tablet (50 mg) by mouth once daily.   triamcinolone (Kenalog) 0.1 % cream 2024 Self No No   Sig: Apply topically 2 times a day. Apply to affected area 1-2 times daily as needed. Avoid face and groin.      Facility-Administered Medications: None        Current Meds in Hospital  Current Facility-Administered Medications   Medication Dose Route Frequency Provider Last Rate Last Admin    acetaminophen (Tylenol) tablet 975 mg  975 mg oral q8h Jakob Barrios PA-C        dextrose 10 % in water (D10W) infusion  0.3 g/kg/hr intravenous Once PRN Delilah Higginbotham PA-C        dextrose 50 % injection 25 g  25 g intravenous q15 min PRN Delilah Higginbotham PA-C        enoxaparin (Lovenox) syringe 30 mg  30 mg subcutaneous q12h Jakob Barrios PA-C   30 mg at 24 0752    glucagon (Glucagen) injection 1 mg  1 mg intramuscular q15 min PRN Delilah Higginbotham PA-C        insulin lispro (HumaLOG) injection 0-5 Units  0-5 Units subcutaneous TID with meals Delilah Higginbotham PA-C        iohexol (OMNIPaque) 350 mg iodine/mL solution 100 mL  100 mL intravenous Once in imaging Rogelio Mclaughlin MD [facility]        polyethylene glycol (Glycolax, Miralax) packet 17 g  17 g oral Daily Jakob Barrios PA-C        sennosides-docusate sodium (Sara-Colace) 8.6-50 mg per tablet 2 tablet  2 tablet oral BID Jakob Barrios PA-C         Current Outpatient Medications   Medication Sig Dispense Refill    b complex 0.4 mg tablet " "Take 1 tablet by mouth once daily. Super      blood sugar diagnostic (Accu-Chek Sheree Plus test strp) strip In vitro; Use 1 strip twice a day      brimonidine-timoloL (Combigan) 0.2-0.5 % ophthalmic solution Administer 1 drop into both eyes 2 times a day.      diclofenac sodium (Voltaren) 1 % gel gel Apply 1 Application topically 2 times a day. Apply to knee 100 g 1    ezetimibe (Zetia) 10 mg tablet Take 1 tablet (10 mg) by mouth once daily. 90 tablet 1    famotidine (Pepcid) 20 mg tablet Take 1 tablet (20 mg) by mouth once daily as needed for heartburn or indigestion.      glimepiride (AmaryL) 1 mg tablet Take 1 tablet (1 mg) by mouth once daily in the morning. Take before meals. 90 tablet 3    lifitegrast (XIIDRA OPHT) Administer 1 drop into both eyes in the morning and at bedtime. 5%      liraglutide (Victoza 2-Christian) 0.6 mg/0.1 mL (18 mg/3 mL) injection Inject 0.3 mL (1.8 mg) under the skin once daily. 3 mL 5    meclizine (Antivert) 25 mg tablet 0.5 tablets (12.5 mg). Take by mouth once daily for 14 days.      multivitamin tablet Take 1 tablet by mouth once daily.      naproxen sodium 220 mg capsule Take 1 tablet by mouth every 6 hours if needed (pain).      pen needle, diabetic (BD Ultra-Fine Mini Pen Needle) 31 gauge x 3/16\" needle 1 each once daily. 31G x 5mm; Inject 1 each as directed daily 100 each 1    spironolactone (Aldactone) 50 mg tablet Take 1 tablet (50 mg) by mouth once daily.      triamcinolone (Kenalog) 0.1 % cream Apply topically 2 times a day. Apply to affected area 1-2 times daily as needed. Avoid face and groin. 30 g 0        The patient's outpatient electronic medical record (EMR) is reviewed:  -Appears to have been receiving majority of care at [facility]  -Has -associated PCP in [place] (Dr. Aspen Knapp), last saw in January of 2024  -Recently referred to community neurologist in [facility] (Dr. Tito Ahmad) for ongoing vertigo  -Per PCP notes, concern for possible orthostatic hypotension, as well " as vertigo thought 2/2 BPPV. Neurologist documents that patient had in-clinic [place]-Hallpike maneuver and Epley maneuver in past, which she seems to have benefited from. Prescribed meclizine at this visit (2/19/24)      Past Medical History  Past Medical History:   Diagnosis Date    Diabetes mellitus (CMS/[facility])     Encounter for full-term uncomplicated delivery     Normal vaginal delivery    Other conditions influencing health status     Menstruation        Surgical History  Past Surgical History:   Procedure Laterality Date    OTHER SURGICAL HISTORY  06/15/2021    Ankle surgery    OTHER SURGICAL HISTORY  06/15/2021    Left mastectomy    OTHER SURGICAL HISTORY  09/17/2021    Hysteroscopy        Family History  Her family history includes Heart disease in her mother; Hypertension in her father and mother. A fall with a hip fracture in her brother who may have also been diagnosed with dementia.     Social History  She reports that she has never smoked. She has never used smokeless tobacco. No history on file for alcohol use and drug use.  -Alcohol use: No  -Tobacco use: No  -Illicit drug use: No  -Exercise: Walks outside and gardens when the weather permits.  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  Highest Level of Education: College Graduate  Community Resources: none  : No  Current living environment: One story house with a basement, two stairs up to front door with railing (reports that she never goes into basement, she has all her necessary amenities on the 1st floor)    Activities of Daily Living:  Basic ADLs: (I= independent, A= assistance, D= dependent)  Bathing: I, Dressing: I, Toileting: I, Transferring: I, Continence:  Urge incontinence , Feeding: I    Alvarez Index: 5    Instrumental ADLs: (I= independent, A= assistance, D= dependent)  Ability to use  phone: I, Shopping: D  (daughter purchases groceries for patient as she has not been driving), Cooking: I (patient does not frequently cook large meals but able to prep things for herself), Housekeeping: I, Laundry: I, Transportation: D  (previously able to drive, denies any prior accidents; currently hasn't been driving due to dizziness), Medications: I (though, of note, last PCP note states that patient should stop taking her sulfonylurea-- however, she appears to still be doing so), Handle Finances: I- daughter manages savings from 's life insurance, but patient independent in writing checks and paying bills on time     Roland Scale:  6    Allergies  Adhesive tape-silicones, Fosamax [alendronate], and Statins-hmg-coa reductase inhibitors    Review of Systems  Review of System:  Constitutional:   -Night sweats/fever: No     -Weight change: Lost 20 lbs since her  passed away 4 years ago, attributes to decrease in appetite    Integumentary: recent BCC removal on R. lower cheek    Ears, Nose, Throat:  -Hearing loss: Bilateral hearing aids, reports declining function greater in R. ear        Eyes:  -Vision loss: Endorses hx of glaucoma but no recent change in vision    Cardiovascular:  -Chest Pain: denies  -Orthopnea: denies      -Paroxysmal nocturnal dyspnea: denies  -Edema: Occasional, uses spironolactone    Respiratory:   -Dyspnea: no  -Wheezing: no    Gastrointestinal:           -Appetite: Has an established daily eating routine/diet  -Difficulty chewing/ swallowing: no  -Heartburn: no  -Bowels: occasional diarrhea (last 2 days ago), otherwise regular every couple of days; denies hematochezia or melena    Genitourinary:   -Incontinence: reports occasional urge incontinence  -Nocturia: 2x per night    Musculoskeletal:  -Mobility: Endorses use of cane and walker when having dizzy spells  -Pain: Chronic lower back/lumbar pain    Neurological:  -Confusion: No  -Falls: None aside from current  presentation  -Gait: Intact, cane/walker use with dizziness  -Memory: Occasionally forgets things, no short term memory loss  -Tremor: No    Psychiatric:  -Mood: Sad at times when reminded of , wishes she cuold go to her timeshare in [place] but has no one to go with her, otherwise reports good mood  -Sleep: Good sleep 1-2am - 11am    Endocrine: Denies heat or cold intolerance    Hematologic/ Lymphatic: none    Allergic/ Immunologic: none     Documents on file and valid:  Advance Directive/Living Will: Daughter reports she does  Health Care Power of :  Yes, youngest daughter, Orquidea Francis  Other: n/a  Code Status: DNR/DNI      Confusion Assessment Method (CAM)  Not on file.    [place] Cognitive Assessment (MoCA)  Not on file.    Geriatric Depression Scale (GDS)  Not on file.    Mini-Cog (Early Dementia Detection)  Not on file.    F[place]stein Mini-Mental State Exam (MMSE)  Not on file.    Patient Health Questionnaire (PHQ-9)  Not on file.     Physical Exam  Constitutional:       General: She is awake.      Appearance: Normal appearance. She is obese.   Eyes:      Extraocular Movements: Extraocular movements intact.      Conjunctiva/sclera: Conjunctivae normal.      Pupils: Pupils are equal, round, and reactive to light.      Comments: No saccades appreciated with EOMI   Cardiovascular:      Rate and Rhythm: Normal rate and regular rhythm.      Pulses: Normal pulses.      Heart sounds: Normal heart sounds.   Pulmonary:      Effort: Pulmonary effort is normal.      Breath sounds: Normal breath sounds.   Abdominal:      General: Bowel sounds are normal.      Palpations: Abdomen is soft.   Musculoskeletal:         General: Normal range of motion.      Right lower leg: No edema.      Left lower leg: No edema.   Skin:     General: Skin is warm and dry.      Findings: No rash.      Comments: Bandage in place over right cheek where patient reportedly had BCC removed ~1 week ago   Neurological:       "General: No focal deficit present.      Mental Status: She is alert and oriented to person, place, and time. Mental status is at baseline.      Cranial Nerves: Cranial nerves 2-12 are intact.      Sensory: Sensation is intact.      Comments: 5/5 bilateral upper extremity flexion and extension, 4-5/5 bilateral hip flexion, 5/5 bilateral knee flexion/extension, 5/5 bilateral plantar/dorsiflexion. 5/ 5  strength   Psychiatric:         Attention and Perception: Attention normal.         Mood and Affect: Mood normal.         Speech: Speech normal.         Behavior: Behavior normal. Behavior is cooperative.         Thought Content: Thought content normal.         Last Recorded Vitals      2/29/2024     2:14 AM 2/29/2024     2:20 AM 2/29/2024     2:22 AM 2/29/2024     2:27 AM 2/29/2024     5:00 AM 2/29/2024     7:15 AM 2/29/2024     7:30 AM   Vitals   Systolic 168  179 135 153 131 133   Diastolic 99  106 75 86 73 76   Heart Rate 100  107 96 96 98 100   Resp 18  18 18  9 14   Height (in)  1.6 m (5' 3\")        Weight (lb)  179.01        BMI  31.71 kg/m2        BSA (m2)  1.9 m2           Vitals:    02/29/24 0220   Weight: 81.2 kg (179 lb 0.2 oz)        Confusion Assessment Method(CAM) for diagnosis of delirium:    1.  Acute onset or fluctuating course: absent/present: Absent  2.  Inattention: absent/present: Absent  3.  Disorganized thinking: absent/present: Absent  4.  Altered level of consciousness: absent/present: Absent  CAM: negative    AT Score For Assessment of Delirium and Cognitive Impairment:    Alertness: 4  Normal(fully alert,but not agitated, throughout assessment)=0  Mild sleepiness for <10 seconds after walking, then normal=0  Clearly abnormal=4  2.  AMT4: 0  No mistakes=0  One mistake=1  Two or more mistakes/untestable=2  3.  Attention: 0  Achieves seven months or more correctly=0  Starts but scores <7 months/ refuses to start=1  Untestable(cannot start because unwell, drowsy, inattentive)=2  4.  Acute: " 0  No=0  Yes=4    Total Score: 0  4 or above: Possible delirium +/- cognitive impairment  1-3: Possible cognitive impairment  0: Delirium or severe cognitive impairment unlikely(but delirium still possible if (4) information incomplete)     Relevant Results  Lab Results   Component Value Date    TSH 1.14 09/12/2023    HGBA1C 7.3 (A) 09/12/2023     Results from last 7 days   Lab Units 02/29/24  0223   WBC AUTO x10*3/uL 9.7   HEMOGLOBIN g/dL 14.6   HEMATOCRIT % 42.2   ALT U/L 14   AST U/L 13   SODIUM mmol/L 132*   POTASSIUM mmol/L 4.1   CHLORIDE mmol/L 95*   CREATININE mg/dL 0.42*   BUN mg/dL 10   CO2 mmol/L 26   INR  1.1       Recent Imaging Results      CT chest abdomen pelvis w IV contrast, CT thoracic spine wo IV contrast, CT lumbar spine wo IV contrast  Narrative: Interpreted By:  Parminder Villanueva and Liller Gregory   STUDY:  CT CHEST ABDOMEN PELVIS W IV CONTRAST; CT LUMBAR SPINE WO IV  CONTRAST; CT THORACIC SPINE WO IV CONTRAST;  2/29/2024 3:01 am      INDICATION:  Signs/Symptoms:Trauma; Signs/Symptoms:Fall      COMPARISON:  None.      ACCESSION NUMBER(S):  [id number]; [id number]; [id number]      ORDERING CLINICIAN:  TITI STAHL      TECHNIQUE:  CT of the chest, abdomen, and pelvis was performed.  Contiguous axial images were obtained at  5 mm slice thickness  through the chest, and at  3 mm through the abdomen and pelvis.  Coronal and sagittal reconstructions at  3 mm slice thickness were  performed.  100 ml of contrast material Omnipaque 350 were administered  intravenously without immediate complication.      FINDINGS:  CHEST:      LUNG/PLEURA/LARGE AIRWAYS:  No air space opacity, focal consolidation, pleural  effusion/hemothorax, or pneumothorax are appreciated. Biapical  scarring is noted. Bibasilar linear atelectasis/scarring. There are  no discrete pulmonary nodules.      VESSELS:  No traumatic aortic injury is appreciated within the limitations of  this non-EKG gated study.  The thoracic aorta is  of normal course and  caliber.  Main pulmonary artery and its branches are normal in  caliber.  Severe coronary artery calcifications are seen. The study  is not optimized for evaluation of coronary arteries.      HEART:  The heart is diffusely enlarged. There is no pericardial effusion.  Aortic valve calcifications are noted correlate with concern for  aortic valve stenosis. Mitral annulus calcifications noted.      MEDIASTINUM AND KALEN:  No pneumomediastinum, abnormal mediastinal fluid collection or  mediastinal hematoma are appreciated.  No mediastinal, hilar or  biaxillary adenopathy is present.  Debris is noted within the  esophagus.      CHEST WALL AND LOWER NECK:  No acute fracture or dislocation of the included osseous structures  are appreciated.  No suspicious osseous lesions are identified.  The  thoracic wall soft tissues are within normal limits. Patient is  status post left hemithyroidectomy.      ABDOMEN:      LIVER:  No focal perfusion abnormality of the liver is appreciated to suggest  contusion or laceration. There is no subcapsular hematoma, no  perihepatic fluid collection.      GALLBLADDER:  The gallbladder is nondistended without evidence of radiopaque stone.      BILE DUCTS:  The intahepatic and extrahepatic bile ducts are not dilated.      PANCREAS:  There is mild fatty infiltration and atrophy of the pancreas. No  focal lesion or ductal dilation. No traumatic injury.      SPLEEN:  No parenchymal perfusion deficit of the spleen is appreciated to  suggest contusion or laceration. There is no subcapsular hematoma, no  perisplenic fluid collection.      ADRENAL GLANDS:  The bilateral adrenal glands are unremarkable in appearance.      KIDNEYS AND URETERS:  No parenchymal perfusion deficit is appreciated in bilateral kidneys  to suggest contusion or laceration. There is no subcapsular hematoma,  no perinephric fluid collection.  No hydroureteronephrosis or  nephroureterolithiasis is present.  There is mild nonspecific  bilateral perinephric fat stranding.      PELVIS:      BLADDER:  The urinary bladder appears within normal limits.      REPRODUCTIVE ORGANS:  Uterus is present. There is a small amount of fluid within the  endometrial canal..      BOWEL:  The stomach is unremarkable.  The small bowel is normal in caliber  without evidence of focal wall thickening or obstruction.  There is  no evidence of focal wall thickening or dilatation of the large  bowel.  The appendix is not definitely visualized, although there are  no pericecal inflammatory changes to suggest acute appendicitis.      VESSELS:  The aorta and IVC are within normal limits.  The principal  vasculature of the abdomen and pelvis is patent.    There is moderate  to severe atherosclerotic calcification of the abdominal aorta and  its branches.      PERITONEUM/RETROPERITONEUM/LYMPH NODES:  There is no evidence of intra- or retroperitoneal hematoma.  There is  no free or loculated fluid collection, no free intraperitoneal air.  No abdominopelvic lymphadenopathy is present.      BONES AND ABDOMINAL WALL:  No evidence of acute fracture or dislocation of the included osseous  structures.  No suspicious osseous lesions are identified.  There is  diastasis of the rectus abdominus musculature measuring up to 6.3 cm  with mesenteric fat containing ventral wall hernia.      Thoracic spine:  Prevertebral soft tissues are unremarkable.  Alignment is maintained.  There is mild wedging and irregularity of the anterior inferior  endplate of a T12. No surrounding soft tissue edema. There is  anteriorly flowing osteophytosis spanning more than 4 vertebral  segments likely indicating component of diffuse idiopathic skeletal  hyperostosis. Intervertebral disc heights are diffusely narrowed.  No high-grade spinal canal or neural foraminal stenosis.      Lumbar spine:  Moderate dextroscoliotic curve of the lumbar spine. There is grade 1  anterolisthesis of L4  on L5. Prevertebral soft tissues are  unremarkable. No acute fracture.  Diffuse loss of intervertebral disc height most significant in the  superior lumbar spine where there is severe endplate sclerosis and  osteophytosis. Bulky anteriorly projecting osteophytosis is noted.  Posterior projecting disc osteophyte complexes at L1-L2, L2-L3, and  L3-L4 of abuts the ventral thecal sac resulting in at least mild  spinal canal stenosis. At least moderate spinal canal stenosis at  L3-L4. Moderate neural foraminal stenosis at L2-L3 and L3-L4 on the  left. Mild neural foraminal stenosis on the right at L1-L2.      Impression: 1. No acute traumatic pathology noted within the chest, abdomen, or  pelvis.  2. Anteroinferior endplate irregularity at T12 which is age  indeterminate. This may represent sequela of degenerative change,  fracture, or possibly chronic disc osteomyelitis. Correlate for point  tenderness on exam. MRI may be performed for further assessment as  clinically indicated.  3. Multilevel spondylitic changes as described above with at least  moderate spinal canal stenosis at L3-L4.  4. There is a small amount of fluid within the endometrial canal.  Pelvic ultrasound may be performed for further assessment as  indicated.  5. Prominent aortic valve calcifications noted. Correlate with  concern for aortic valve stenosis.  6. Additional findings as above.      I personally reviewed the images/study and I agree with the findings  as stated above by resident physician, Dr. Shahid Matt.      MACRO:  None      Signed by: Parminder Villanueva 2/29/2024 3:58 AM  Dictation workstation:   [id number]  CT head W O contrast trauma protocol, CT cervical spine wo IV contrast  Narrative: Interpreted By:  Parminder Villanueva and Liller Gregory   STUDY:  CT HEAD W/O CONTRAST TRAUMA PROTOCOL; CT CERVICAL SPINE WO IV  CONTRAST;  2/29/2024 2:59 am      INDICATION:  Signs/Symptoms:occipital fx; Signs/Symptoms:trauma, fall       COMPARISON:  Same-day CT head and cervical spine 6:38 p.m.      ACCESSION NUMBER(S):  [id number]; [id number]      ORDERING CLINICIAN:  DOM RABAGO      TECHNIQUE:  Axial noncontrast CT images of head with coronal and sagittal  reconstructed images. Axial noncontrast CT images of the cervical  spine with coronal and sagittal reconstructed images.      FINDINGS:  CT HEAD:      BRAIN PARENCHYMA: Assessment of the posterior fossa and occipital  lobes limited by streak artifact from dental amalgam. No evidence of  acute intraparenchymal hemorrhage or parenchymal evidence of acute  large territory ischemic infarct. No mass-effect, midline shift or  effacement of cerebral sulci. Gray-white matter distinction is  preserved. Periventricular and subcortical white matter hypodensities  are seen and favored to represent sequela of chronic microvascular  ischemic change.      VENTRICLES and EXTRA-AXIAL SPACES:  No acute extra-axial or  intraventricular hemorrhage within the above limitations. Ventricles  and sulci are age-concordant.      PARANASAL SINUSES/MASTOIDS:  No hemorrhage or air-fluid levels within  the visualized paranasal sinuses. The mastoid air cells are  well-aerated.      CALVARIUM/ORBITS: Redemonstration of nondisplaced occipital bone  fracture extending inferiorly to the level of the foramen magnum.  The orbits and globes are intact to the extent visualized.      EXTRACRANIAL SOFT TISSUES: Small soft tissue swelling/scalp hematoma  overlying the fracture site.          CT CERVICAL SPINE:      PREVERTEBRAL SOFT TISSUES: Within normal limits.      CRANIOCERVICAL JUNCTION: Intact.      ALIGNMENT:  No traumatic malalignment or traumatic facet widening.      VERTEBRAE:  No acute fracture. Vertebral body heights are maintained.      SPINAL CANAL/INTERVERTEBRAL DISCS: No high-grade spinal canal  stenosis. No significant disc height loss. Varying degrees of  degenerative disc disease, notable for anterior  projecting  osteophytes at C2-C3 and C3-C4.      NEURAL FORAMINA: No high-grade neural foraminal stenosis.      OTHER: Mild interlobular septal and apical scarring are noted.  Otherwise, the lungs are clear. Bulky atherosclerotic calcification  of the carotid arteries. Nodular appearance of the right lobe of the  thyroid gland.      Impression: CT HEAD:  1. Unchanged nondisplaced occipital bone fracture extending to the  foramen magnum without associated intracranial pathology. Please note  assessment of the posterior fossa and occipital lobes is somewhat  limited by streak artifact from dental amalgam.  2. Similar small overlying scalp hematoma/swelling.      CT CERVICAL SPINE:  1. No acute fracture or traumatic malalignment of the cervical spine.  2. Multilevel spondylotic changes of the cervical spine as detailed  above.      I personally reviewed the images/study and I agree with the findings  as stated above by resident physician, Dr. Shahid Matt.      MACRO:  none      Signed by: Parminder Villanueva 2/29/2024 3:41 AM  Dictation workstation:   [id number]  XR chest 1 view, XR pelvis 1-2 views  Narrative: Interpreted By:  Parminder Villanueva and Liller Gregory   STUDY:  Chest, single portable AP view.  Pelvis, single portable view.      INDICATION:  Signs/Symptoms:Trauma.      COMPARISON:  None.      ACCESSION NUMBER(S):  [id number]; [id number]      ORDERING CLINICIAN:  TITI STAHL      FINDINGS:  Chest:  Heart is normal in size.  Calcifications of the aortic arch and tortuosity of the descending  thoracic aorta likely accentuated by patient rotation. Interstitial  prominence. No focal consolidation, large pleural fluid, or  discernible pneumothorax. Elevation of the right hemidiaphragm.  No displaced fracture identified.      Pelvis:  Of note the examination is limited due to only partial exclusion of  the right hip from the field of view. No acute fracture or  malalignment. Enthesopathy of the left iliac  crest.  No radiopaque foreign body or soft tissue gas      Impression: 1. Mild interstitial prominence which may reflect chronic parenchymal  changes or mild edema.  2. No acute traumatic osseous abnormality identified in the chest or  pelvis within the above limitations.      I personally reviewed the images/study and I agree with the findings  as stated above by resident physician, Dr. Shahid Matt.      MACRO:  none      Signed by: Parminder Villanueva 2/29/2024 2:42 AM  Dictation workstation:   [id number]      Head/Brain Imaging  === Results for orders placed during the hospital encounter of 02/28/24 ===    CT head wo IV contrast [LNC307] 02/28/2024    Status: Normal  Findings compatible with a nondisplaced calvarial fracture seen in  the occipital bone. No depression. Volume loss seen in the brain. No  definite intracranial hemorrhage. Given skull fracture consider  short-term CT follow-up    No evidence of acute cervical spine fracture. Robust vascular  calcification. No acute facial bone fracture detected.    Signed by: Oumar Caballero 2/28/2024 7:44 PM  Dictation workstation:   [id number]  No results found for this or any previous visit.        DATA:  EKG: QTC  No results found for this or any previous visit (from the past 4464 hour(s)).  Anti-psychotics in 48 hours:  Opioids/Benzodiazepines in 48 hours:  Anticholinergics on board:No  Restraints:No  Indwelling catheters:No (external catheter)  Last BM: 2 days ago  UO in 24 hours: not recorded   Activity in the past 24 hours: in chair  Need for ambulatory devices: yes    Assessment/Plan   This is a/an 83 y.o. year old female, with past medical history relevant for ongoing dizziness/vertigo for the past four years, T2DM, bilateral hearing loss, and HLD, presenting after a fall from standing w/o LOC, in which she sustained a closed occipital skull fracture.  She is being seen in geriatric consultation for evaluation after her fall.    Problem list:    Vertigo, suspected BPPV  Concern for orthostatic hypotension  HLD  T2DM  Bilateral hearing loss  Acute fall c/b occipital fracture    1. Acute fall  -Unclear etiology, may be related to underlying vertigo/orthostatic hypotension  -Recommend obtaining orthostatic vitals and add-on vitamin D level  -Recommend vestibular therapy with PT/OT (both inpatient and on discharge)  -If no evidence of cervical fracture and otherwise safe to do so, would remove cervical collar for patient comfort     2. Occipital fx, lumbar pain  -Pain management: agree with scheduled Tylenol, recommend lidocaine patch to lower back    3. Polypharmacy  -Please continue to hold, and do NOT resume on discharge, home meclizine and glimepiride. Patient at high risk for both hypoglycemia and anti-cholinergic side-effects and hasn't noticed in improvement in her dizziness with the meclizine.     4. Diabetes  -Last A1C 7.3 (9/2023)  -Recommend add-on HbA1C     5. Concern for possible mood disorder  -Per daughter, patient's mood and level of engagement in her hobbies has been low since the patient's  passed four years ago. States that the patient has been evaluated for depression/anxiety before but declined any pharmacotherapy  -Will plan for bedside geriatric depression tomorrow      Medication Evaluation:   High risk medications: Meclizine (anti-cholinergic, pt report no benefit and hx of dizziness/vertigo), Glimepiride (concern for hypoglycemia)  Other concerning issues regarding medications: PCP recommended stopping glimeperide during their last visit, though appears patient has still been taking it    Care Transitions:  -Recommended level for discharge: PT/OT following  -Home going considerations: pending PT/OT  -Primary care physician: Aspen Knapp, DO     Goals of Care:  -Primary goals: continued independent living  -Health care power of : daughter Orqu[place] (paperwork not on file, daughter to bring a copy later this  afternoon)  -Living will: daughter to bring copy later this afternoon  Code status: DNR/DNI    4M AGE-FRIENDLY INITIATIVE:  What matters most to patient: Continued independent living  Medications: meclizine and glimepiride high-risk, holding; recommend discontinuation on discharge  Mentation: A&O x5  Mobility: Intact w/ assistive device      Geriatric medicine will continue to follow the patient. Thank you for allowing geriatric medicine to be involved in the care of your patient. Geriatric medicine consultation team is available during work hours Monday through Friday. For any emergency issues requiring immediate assistance over the weekend, please page Geriatrics pager [phone]        Consult Billing Time  Prep time on date of patient encounter(minutes): 30  Time directly with patient/family/caregiver(minutes): 45  Documentation time(minutes): 45  TOTAL TIME(minutes): 120    Michael Pardo MS3, [facility]    I saw and evaluated the patient.  I personally obtained the key and critical portions of the history and physical exam or was physically present for key and critical portions performed by the resident. I reviewed the resident’s documentation and discussed the patient with the resident.  I agree with the resident’s medical decision making as documented in their note    Ghislaine Gutierrez MD

## 2024-02-29 NOTE — PROGRESS NOTES
[facility]  TRAUMA SERVICE - PROGRESS NOTE    Patient Name: Nancie Du  MRN: [id number]  Admit Date: 229  : 1940  AGE: 83 y.o.   GENDER: female  ==============================================================================  MECHANISM OF INJURY:   Ground level fall, recent episodes of syncope and h/o vertigo    LOC (yes/no?): Denies  Anticoagulant / Anti-platelet Rx? (for what dx?): Denies  Referring Facility Name (N/A for scene EMR run): Transfer from [facility]     INJURIES:   Non-displaced occipital fracture     OTHER MEDICAL PROBLEMS:  Near Syncope  Vertigo  Hyponatremia  H/o T2DM, HLD    INCIDENTAL FINDINGS:  Degenerative lumbar spine changes  T12 subacute fracture   Heavily calcified neck vasculature and aortic valve  Endometrial fluid    PROCEDURES:  N/A    ==============================================================================  TODAY'S ASSESSMENT AND PLAN OF CARE:  #Occipital fracture, C-spine pain, degenerative spine changes  -NSGY consulted  -->no acute interventions  -Repeat CT head/spine unchanged  -APAP 975mg q8hrs for acute pain  -C-collar removed with negative CT scan and muscular tenderness, none midline  -Q4hr neurochecks  -PT/OT    #Syncope vs. vertigo #aortic valve calcifications   -Echocardiogram ordered  -EKG showing sinus tachycardia, otherwise unremarkable  -Orthostatic vitals when able  -Stop home meclizine    #Hyponatremia  -132 upon admission, has been mildly hyponatremic in the past  -Repeat BMP daily    #Comorbidities  -Continue home Combigan eye drops, ezetimibe, famotidine, spironolactone  -Holding home lifitegrast eye drops (not in formulary)  -Consult Geriatrics team and appreciate recs    #FEN/GI  -SSI #1  -Holding home glimepiride (do not resume upon discharge), liraglutide  -Carb controlled diet  -BR with senna, docusate    #DVT Ppx  -Enoxaparin 30mg BID  -SCDs    Seen and discussed with Dr. Busch.    25 minutes spent with  "patient reviewing VSS/medications, obtaining subjective information, performing physical exam, discussing plan of care;  with greater than 50% of that time spent in patient room.    Delilah Higginbotham PA-C  Trauma Surgery  Team Phone: [phone]    ==============================================================================  CHIEF COMPLAINT / OVERNIGHT EVENTS:   84 y/o s/p near syncopal fall with nondisplaced occipital fracture    MEDICAL HISTORY / ROS:  Admission history and ROS reviewed. Pertinent changes as follows:  Pt does note mid back pain but notes this is her baseline and she uses voltaren gel over this area at home. Neck pain is to lateral sternocleidomastoid.     PHYSICAL EXAM:  Heart Rate:  []   Temperature:  [36.2 °C (97.2 °F)-36.5 °C (97.7 °F)]   Respirations:  [9-18]   BP: (131-179)/()   Height:  [160 cm (5' 3\")]   Weight:  [81.2 kg (179 lb)-81.2 kg (179 lb 0.2 oz)]   Pulse Ox:  [93 %-97 %]   Physical Exam  Vitals reviewed.   Constitutional:       General: She is awake. She is not in acute distress.     Appearance: She is well-developed. She is not ill-appearing or toxic-appearing.      Comments: Elderly female sitting up in chair in NAD   HENT:      Head: Normocephalic and atraumatic.      Comments: Bandaid from home to right cheek     Nose: Nose normal.      Mouth/Throat:      Mouth: Mucous membranes are moist.   Eyes:      Extraocular Movements:      Right eye: Normal extraocular motion.      Left eye: Normal extraocular motion.      Pupils: Pupils are equal, round, and reactive to light.   Cardiovascular:      Rate and Rhythm: Regular rhythm. Tachycardia present.      Pulses:           Radial pulses are 2+ on the right side and 2+ on the left side.        Dorsalis pedis pulses are 2+ on the right side and 2+ on the left side.   Pulmonary:      Effort: Pulmonary effort is normal.      Breath sounds: Normal breath sounds. No decreased breath sounds.   Abdominal:      General: There is no " distension.      Tenderness: There is no abdominal tenderness.   Musculoskeletal:      Cervical back: Normal range of motion. Tenderness present. Muscular tenderness (left paraspinal) present. Normal range of motion.      Thoracic back: Tenderness (mid, baseline) present. Normal range of motion.      Lumbar back: No tenderness.      Right lower leg: No edema.      Left lower leg: No edema.   Skin:     General: Skin is warm and dry.      Capillary Refill: Capillary refill takes less than 2 seconds.      Comments: Thin hair   Neurological:      General: No focal deficit present.      Mental Status: She is alert and oriented to person, place, and time.      GCS: GCS eye subscore is 4. GCS verbal subscore is 5. GCS motor subscore is 6.      Sensory: Sensation is intact.      Motor: Motor function is intact.      Comments: Hard of hearing   Psychiatric:         Mood and Affect: Mood and affect normal.         Behavior: Behavior is cooperative.         IMAGING SUMMARY:  (summary of new imaging findings, not a copy of dictation)  No new images    LABS:  Results from last 7 days   Lab Units 02/29/24 0223   WBC AUTO x10*3/uL 9.7   HEMOGLOBIN g/dL 14.6   HEMATOCRIT % 42.2   PLATELETS AUTO x10*3/uL 179   NEUTROS PCT AUTO % 70.7   LYMPHS PCT AUTO % 16.5   MONOS PCT AUTO % 11.0   EOS PCT AUTO % 1.1     Results from last 7 days   Lab Units 02/29/24 0223   INR  1.1     Results from last 7 days   Lab Units 02/29/24 0223   SODIUM mmol/L 132*   POTASSIUM mmol/L 4.1   CHLORIDE mmol/L 95*   CO2 mmol/L 26   BUN mg/dL 10   CREATININE mg/dL 0.42*   CALCIUM mg/dL 9.4   PROTEIN TOTAL g/dL 6.4   BILIRUBIN TOTAL mg/dL 0.9   ALK PHOS U/L 53   ALT U/L 14   AST U/L 13   GLUCOSE mg/dL 167*     Results from last 7 days   Lab Units 02/29/24 0223   BILIRUBIN TOTAL mg/dL 0.9           I have reviewed all medications, laboratory results, and imaging pertinent for today's encounter.

## 2024-02-29 NOTE — PROGRESS NOTES
Physical Therapy    Physical Therapy Evaluation    Patient Name: Nancie Du  MRN: [id number]  Today's Date: 2/29/2024   Time Calculation  Start Time: 1030  Stop Time: 1100  Time Calculation (min): 30 min    Assessment/Plan   PT Assessment  PT Assessment Results: Pain, Decreased strength, Decreased range of motion, Decreased endurance, Impaired balance, Decreased mobility, Decreased coordination  Rehab Prognosis: Good  End of Session Communication: Bedside nurse  Assessment Comment: pt is an 83 year old admitted after fall with non-displaced occipital fx. pt with some imbalance in standing and would benefit from skilled services to help pt return to PLOF  End of Session Patient Position: Up in chair  IP OR SWING BED PT PLAN  Inpatient or Swing Bed: Inpatient  PT Plan  PT Plan: Skilled PT  PT Frequency: 5 times per week  PT Discharge Recommendations: Other (comment) (pt would benefit from high intensity therapy but refusing. pt stated that she does not want to go anywhere but home. Pt instructed to use ww and to be very careful. will reccomend low intensity therapy for home going.)  PT Recommended Transfer Status: Assist x1  PT - OK to Discharge: Yes      Subjective   General Visit Information:  General  Reason for Referral: pt admitted for fall with non-displaced occipital fx.  Past Medical History Relevant to Rehab: pt with h/o syncope, DM2, HLD.  Family/Caregiver Present: No  Prior to Session Communication: Bedside nurse  Patient Position Received: Bed, 2 rail up (with cervical collar (aspen on))  General Comment: pt supine with aspen collar on and purwick . pt willing and cooperative as back hurting from lying down.  Home Living:  Home Living  Type of Home: House  Lives With: Alone (with not much help)  Home Adaptive Equipment: Cane, Walker rolling or standard  Home Layout: One level  Home Access: Stairs to enter with rails  Entrance Stairs-Number of Steps: 2 in  Prior Level of Function:  Prior Function Per  Pt/Caregiver Report  Level of Stone: Independent with ADLs and functional transfers, Independent with homemaking with ambulation (was bringing the garbage in when she fell)  Precautions:  Precautions  Medical Precautions: Fall precautions, Spinal precautions  Vital Signs:  Vital Signs  Heart Rate: (!) 106  Heart Rate Source: Monitor  Pulse Ox: 96 %  BP: 132/79 (134/78 supine< 126/76  sitting)  BP Location: Left arm  BP Method: Automatic  Patient Position: Sitting    Objective   Pain:  Pain Assessment  Pain Assessment: 0-10  Pain Score:  (pt would not rate pain. but complaining of pain in her lower back)  Cognition:  Cognition  Overall Cognitive Status: Within Functional Limits  Orientation Level: Oriented X4    General Assessments:  Activity Tolerance  Endurance: Tolerates 10 - 20 min exercise with multiple rests    Sensation  Light Touch: No apparent deficits    Postural Control  Postural Control: Within Functional Limits    Static Sitting Balance  Static Sitting-Balance Support: Feet supported  Static Sitting-Level of Assistance: Contact guard    Static Standing Balance  Static Standing-Balance Support: Bilateral upper extremity supported  Static Standing-Level of Assistance: Contact guard  Functional Assessments:  Bed Mobility  Bed Mobility: Yes  Bed Mobility 1  Bed Mobility 1: Supine to sitting  Level of Assistance 1: Moderate assistance    Transfers  Transfer: Yes  Transfer 1  Transfer From 1: Sit to, Stand to  Transfer to 1: Stand, Sit  Technique 1: Sit to stand, Stand to sit  Transfer Device 1: Walker  Transfer Level of Assistance 1: Minimum assistance  Trials/Comments 1: 3 trials  Transfers 2  Transfer From 2: Bed to (bed pan in chair)  Transfer to 2:  (bedpain in chair to stand)  Technique 2: Sit to stand, Stand to sit  Transfer Device 2: Walker  Transfer Level of Assistance 2: Minimum assistance  Trials/Comments 2: pt required assist due to dizziness but wanting to pee in the commode  Transfers  3  Transfer From 3: Commode-standard to  Transfer to 3: Chair with arms  Technique 3: Sit to stand, Stand to sit  Transfer Device 3: Walker  Transfer Level of Assistance 3: Minimum assistance    Ambulation/Gait Training  Ambulation/Gait Training Performed: Yes  Ambulation/Gait Training 1  Surface 1: Level tile  Device 1: Rolling walker  Assistance 1: Contact guard  Quality of Gait 1: Narrow base of support, Inconsistent stride length, Decreased step length  Comments/Distance (ft) 1: pt able to ambulate 5 ft backwards and then 10 ft forward to the chair. pt with slight posterior lean       Extremity/Trunk Assessments:  RLE   RLE : Exceptions to WFL  Strength RLE  RLE Overall Strength: Greater than or equal to 3/5 as evidenced by functional mobility  LLE   LLE : Exceptions to WFL  Strength LLE  LLE Overall Strength: Greater than or equal to 3/5 as evidenced by functional mobility  Outcome Measures:  [facility] Basic Mobility  Turning from your back to your side while in a flat bed without using bedrails: A lot  Moving from lying on your back to sitting on the side of a flat bed without using bedrails: A lot  Moving to and from bed to chair (including a wheelchair): A little  Standing up from a chair using your arms (e.g. wheelchair or bedside chair): A little  To walk in hospital room: A little  Climbing 3-5 steps with railing: A lot  Basic Mobility - Total Score: 15    Encounter Problems       Encounter Problems (Active)       Balance       STG - Maintains dynamic standing balance with upper extremity support with ww and SBA for 2 mins without LOB  (Progressing)       Start:  02/29/24    Expected End:  03/14/24       INTERVENTIONS:  1. Practice standing with minimal support.  2. Educate patient about standing tolerance.  3. Educate patient about independence with gait, transfers, and ADL's.  4. Educate patient about use of assistive device.  5. Educate patient about self-directed care.            Mobility       STG -  Patient will ambulate 100 ft with ww and SBA  (Progressing)       Start:  02/29/24    Expected End:  03/14/24            STG - Patient will ascend and descend two stairs with CGA  (Progressing)       Start:  02/29/24    Expected End:  03/14/24               Transfers       STG - Transfer from bed to chair with CGA and ww  (Progressing)       Start:  02/29/24    Expected End:  03/14/24            STG - Patient will perform bed mobility with SBA  (Progressing)       Start:  02/29/24    Expected End:  03/14/24                   Education Documentation  Precautions, taught by Lisa Scales PT at 2/29/2024 12:14 PM.  Learner: Patient  Readiness: Acceptance  Method: Explanation  Response: Needs Reinforcement  Comment: pt educated on importance of rehab also spinal precuations    Home Exercise Program, taught by Lisa Scales PT at 2/29/2024 12:14 PM.  Learner: Patient  Readiness: Acceptance  Method: Explanation  Response: Needs Reinforcement  Comment: pt educated on importance of rehab also spinal precuations    Mobility Training, taught by Lisa Scales PT at 2/29/2024 12:14 PM.  Learner: Patient  Readiness: Acceptance  Method: Explanation  Response: Needs Reinforcement  Comment: pt educated on importance of rehab also spinal precuations    Education Comments  No comments found.

## 2024-02-29 NOTE — CARE PLAN
The patient's goals for the shift include pain control    The clinical goals for the shift include pain control

## 2024-02-29 NOTE — ED PROVIDER NOTES
Emergency Medicine Transition of Care Note.    I received Nancie Du in signout from Dr. Valdes.  Please see the previous ED provider note for all HPI, PE and MDM up to the time of signout at 1900. This is in addition to the primary record.  The patient states that she feels the wind blew her down causing the injury.  I did go over the CT findings with the patient.  Patient is requesting to go to a  facility in [place].  Patient has remained stable while here in the department.  The patient was given Catapres 0.1 mg p.o. for her hypertension.  I did speak to the trauma attending Dr. Coburn at [facility] [place] [facility] and he will accept the patient.  I also spoke to the ED attending about the patient.  Patient is stable upon discharge.    In brief Nancie Du is an 83 y.o. female presenting for   Chief Complaint   Patient presents with    Fall     Brought to ED per [place] squad with C-collar in place from home after a fall in driveway while bringing in her trash can. She fell backward and hit her head. Denies any LOC and is not on any blood thinners. She c/o low back pain and lac to back of head.      At the time of signout we were awaiting: CT scans.    Diagnoses as of 02/28/24 2007   Closed fracture of occipital bone, unspecified laterality, unspecified occipital fracture type, initial encounter (CMS/[facility])   Head injury, initial encounter     CT maxillofacial bones wo IV contrast   Final Result   Findings compatible with a nondisplaced calvarial fracture seen in   the occipital bone. No depression. Volume loss seen in the brain. No   definite intracranial hemorrhage. Given skull fracture consider   short-term CT follow-up        No evidence of acute cervical spine fracture. Robust vascular   calcification. No acute facial bone fracture detected.        Signed by: Oumar Caballero 2/28/2024 7:44 PM   Dictation workstation:   [id number]      CT head wo IV contrast   Final Result   Findings compatible with a nondisplaced calvarial  fracture seen in   the occipital bone. No depression. Volume loss seen in the brain. No   definite intracranial hemorrhage. Given skull fracture consider   short-term CT follow-up        No evidence of acute cervical spine fracture. Robust vascular   calcification. No acute facial bone fracture detected.        Signed by: Oumar Caballero 2/28/2024 7:44 PM   Dictation workstation:   [id number]      CT cervical spine wo IV contrast   Final Result   Findings compatible with a nondisplaced calvarial fracture seen in   the occipital bone. No depression. Volume loss seen in the brain. No   definite intracranial hemorrhage. Given skull fracture consider   short-term CT follow-up        No evidence of acute cervical spine fracture. Robust vascular   calcification. No acute facial bone fracture detected.        Signed by: Oumar Caballero 2/28/2024 7:44 PM   Dictation workstation:   [id number]      CT lumbar spine wo IV contrast   Final Result   1. Moderate to advanced multilevel degenerative changes with areas of   moderate to advanced central canal stenosis and neural foraminal   narrowing. No acute fracture seen.   2. Endplate irregularities across the L1-2 level with subchondral   sclerosis probably degenerative in nature. Chronic discitis could have   a similar appearance but less likely.   Signed by Alon Oscar MD      CT 3D reconstruction   Final Result   1. Moderate to advanced multilevel degenerative changes with areas of   moderate to advanced central canal stenosis and neural foraminal   narrowing. No acute fracture seen.   2. Endplate irregularities across the L1-2 level with subchondral   sclerosis probably degenerative in nature. Chronic discitis could have   a similar appearance but less likely.   Signed by Alon Oscar MD         Medical Decision Making      Final diagnoses:   [S02.119A] Closed fracture of occipital bone, unspecified laterality, unspecified occipital fracture type, initial  encounter (CMS/[facility])   [S09.90XA] Head injury, initial encounter           Procedure  Procedures  [place] [facility]  [place] Brandon Galindo DO  03/02/24 1925

## 2024-03-01 ENCOUNTER — APPOINTMENT (OUTPATIENT)
Dept: RADIOLOGY | Facility: HOSPITAL | Age: 84
DRG: 086 | End: 2024-03-01
Payer: MEDICARE

## 2024-03-01 ENCOUNTER — APPOINTMENT (OUTPATIENT)
Dept: VASCULAR MEDICINE | Facility: HOSPITAL | Age: 84
DRG: 086 | End: 2024-03-01
Payer: MEDICARE

## 2024-03-01 LAB
ALBUMIN SERPL BCP-MCNC: 3.2 G/DL (ref 3.4–5)
ANION GAP SERPL CALC-SCNC: 15 MMOL/L (ref 10–20)
BUN SERPL-MCNC: 17 MG/DL (ref 6–23)
CALCIUM SERPL-MCNC: 8.9 MG/DL (ref 8.6–10.6)
CHLORIDE SERPL-SCNC: 99 MMOL/L (ref 98–107)
CO2 SERPL-SCNC: 24 MMOL/L (ref 21–32)
CREAT SERPL-MCNC: 0.63 MG/DL (ref 0.5–1.05)
EGFRCR SERPLBLD CKD-EPI 2021: 88 ML/MIN/1.73M*2
GLUCOSE BLD MANUAL STRIP-MCNC: 128 MG/DL (ref 74–99)
GLUCOSE BLD MANUAL STRIP-MCNC: 146 MG/DL (ref 74–99)
GLUCOSE BLD MANUAL STRIP-MCNC: 269 MG/DL (ref 74–99)
GLUCOSE BLD MANUAL STRIP-MCNC: 283 MG/DL (ref 74–99)
GLUCOSE SERPL-MCNC: 112 MG/DL (ref 74–99)
PHOSPHATE SERPL-MCNC: 3.9 MG/DL (ref 2.5–4.9)
POTASSIUM SERPL-SCNC: 3.5 MMOL/L (ref 3.5–5.3)
SODIUM SERPL-SCNC: 134 MMOL/L (ref 136–145)

## 2024-03-01 PROCEDURE — 2500000005 HC RX 250 GENERAL PHARMACY W/O HCPCS: Performed by: PHYSICIAN ASSISTANT

## 2024-03-01 PROCEDURE — 97530 THERAPEUTIC ACTIVITIES: CPT | Mod: GP,CQ

## 2024-03-01 PROCEDURE — 70496 CT ANGIOGRAPHY HEAD: CPT | Performed by: RADIOLOGY

## 2024-03-01 PROCEDURE — 97116 GAIT TRAINING THERAPY: CPT | Mod: GP,CQ

## 2024-03-01 PROCEDURE — 82947 ASSAY GLUCOSE BLOOD QUANT: CPT

## 2024-03-01 PROCEDURE — 70498 CT ANGIOGRAPHY NECK: CPT

## 2024-03-01 PROCEDURE — 97165 OT EVAL LOW COMPLEX 30 MIN: CPT | Mod: GO

## 2024-03-01 PROCEDURE — 2550000001 HC RX 255 CONTRASTS: Performed by: EMERGENCY MEDICINE

## 2024-03-01 PROCEDURE — 2500000004 HC RX 250 GENERAL PHARMACY W/ HCPCS (ALT 636 FOR OP/ED): Performed by: PHYSICIAN ASSISTANT

## 2024-03-01 PROCEDURE — 99233 SBSQ HOSP IP/OBS HIGH 50: CPT | Performed by: PHYSICIAN ASSISTANT

## 2024-03-01 PROCEDURE — 80069 RENAL FUNCTION PANEL: CPT | Performed by: PHYSICIAN ASSISTANT

## 2024-03-01 PROCEDURE — 1200000002 HC GENERAL ROOM WITH TELEMETRY DAILY

## 2024-03-01 PROCEDURE — 99232 SBSQ HOSP IP/OBS MODERATE 35: CPT | Performed by: INTERNAL MEDICINE

## 2024-03-01 PROCEDURE — 93880 EXTRACRANIAL BILAT STUDY: CPT | Performed by: INTERNAL MEDICINE

## 2024-03-01 PROCEDURE — 36415 COLL VENOUS BLD VENIPUNCTURE: CPT | Performed by: PHYSICIAN ASSISTANT

## 2024-03-01 PROCEDURE — 2500000002 HC RX 250 W HCPCS SELF ADMINISTERED DRUGS (ALT 637 FOR MEDICARE OP, ALT 636 FOR OP/ED): Performed by: PHYSICIAN ASSISTANT

## 2024-03-01 PROCEDURE — 70498 CT ANGIOGRAPHY NECK: CPT | Performed by: RADIOLOGY

## 2024-03-01 PROCEDURE — 93880 EXTRACRANIAL BILAT STUDY: CPT

## 2024-03-01 RX ORDER — INSULIN LISPRO 100 [IU]/ML
0-10 INJECTION, SOLUTION INTRAVENOUS; SUBCUTANEOUS
Status: DISCONTINUED | OUTPATIENT
Start: 2024-03-02 | End: 2024-03-04 | Stop reason: HOSPADM

## 2024-03-01 RX ORDER — POTASSIUM CHLORIDE 20 MEQ/1
20 TABLET, EXTENDED RELEASE ORAL ONCE
Status: COMPLETED | OUTPATIENT
Start: 2024-03-01 | End: 2024-03-01

## 2024-03-01 RX ADMIN — ACETAMINOPHEN 975 MG: 325 TABLET ORAL at 15:38

## 2024-03-01 RX ADMIN — ACETAMINOPHEN 975 MG: 325 TABLET ORAL at 08:18

## 2024-03-01 RX ADMIN — ENOXAPARIN SODIUM 30 MG: 100 INJECTION SUBCUTANEOUS at 08:18

## 2024-03-01 RX ADMIN — LIDOCAINE 1 PATCH: 4 PATCH TOPICAL at 08:18

## 2024-03-01 RX ADMIN — POTASSIUM CHLORIDE 20 MEQ: 1500 TABLET, EXTENDED RELEASE ORAL at 11:07

## 2024-03-01 RX ADMIN — ACETAMINOPHEN 975 MG: 325 TABLET ORAL at 23:58

## 2024-03-01 RX ADMIN — ENOXAPARIN SODIUM 30 MG: 100 INJECTION SUBCUTANEOUS at 20:57

## 2024-03-01 RX ADMIN — IOHEXOL 90 ML: 350 INJECTION, SOLUTION INTRAVENOUS at 14:43

## 2024-03-01 ASSESSMENT — ACTIVITIES OF DAILY LIVING (ADL)
ADL_ASSISTANCE: INDEPENDENT
LACK_OF_TRANSPORTATION: NO

## 2024-03-01 ASSESSMENT — COGNITIVE AND FUNCTIONAL STATUS - GENERAL
CLIMB 3 TO 5 STEPS WITH RAILING: TOTAL
HELP NEEDED FOR BATHING: A LITTLE
DRESSING REGULAR LOWER BODY CLOTHING: A LITTLE
STANDING UP FROM CHAIR USING ARMS: A LITTLE
DRESSING REGULAR UPPER BODY CLOTHING: A LITTLE
DRESSING REGULAR UPPER BODY CLOTHING: A LITTLE
WALKING IN HOSPITAL ROOM: A LOT
EATING MEALS: A LITTLE
DAILY ACTIVITIY SCORE: 19
MOVING TO AND FROM BED TO CHAIR: A LITTLE
MOVING FROM LYING ON BACK TO SITTING ON SIDE OF FLAT BED WITH BEDRAILS: A LITTLE
MOVING TO AND FROM BED TO CHAIR: A LITTLE
WALKING IN HOSPITAL ROOM: A LITTLE
TURNING FROM BACK TO SIDE WHILE IN FLAT BAD: A LITTLE
MOBILITY SCORE: 15
DRESSING REGULAR LOWER BODY CLOTHING: A LITTLE
STANDING UP FROM CHAIR USING ARMS: A LITTLE
DAILY ACTIVITIY SCORE: 17
CLIMB 3 TO 5 STEPS WITH RAILING: A LOT
MOBILITY SCORE: 17
TURNING FROM BACK TO SIDE WHILE IN FLAT BAD: A LITTLE
HELP NEEDED FOR BATHING: A LOT
TOILETING: A LITTLE
TOILETING: A LITTLE
MOVING FROM LYING ON BACK TO SITTING ON SIDE OF FLAT BED WITH BEDRAILS: A LITTLE
PERSONAL GROOMING: A LITTLE
PERSONAL GROOMING: A LITTLE

## 2024-03-01 ASSESSMENT — PAIN - FUNCTIONAL ASSESSMENT
PAIN_FUNCTIONAL_ASSESSMENT: 0-10

## 2024-03-01 ASSESSMENT — PAIN SCALES - GENERAL
PAINLEVEL_OUTOF10: 0 - NO PAIN

## 2024-03-01 NOTE — PROGRESS NOTES
Subjective   Patient was awake and standing with physical therapy in the room when I saw her. She was pleasant and cooperative. Today denies pain and reports the lidocaine patches are working. This morning she refused tylenol because she did not have any pain. Patient denies CP, SOB or mood changes, although she sometimes gets sad for brief moments when she thinks about her late  who  4 years ago.      Objective     Current Facility-Administered Medications   Medication Dose Route Frequency Provider Last Rate Last Admin    acetaminophen (Tylenol) tablet 975 mg  975 mg oral q8h Jakob Barrios PA-C   975 mg at 24 0818    dextrose 10 % in water (D10W) infusion  0.3 g/kg/hr intravenous Once PRN Sabael NeftalyAMAYA perez        dextrose 50 % injection 25 g  25 g intravenous q15 min PRN Delilah Higginbotham PA-C        enoxaparin (Lovenox) syringe 30 mg  30 mg subcutaneous q12h Jakob Barrios PA-C   30 mg at 24 0818    glucagon (Glucagen) injection 1 mg  1 mg intramuscular q15 min PRN Delilah Higginbotham PA-C        insulin lispro (HumaLOG) injection 0-5 Units  0-5 Units subcutaneous TID with meals Delilah Higginbotham PA-C        iohexol (OMNIPaque) 350 mg iodine/mL solution 100 mL  100 mL intravenous Once in imaging Rogelio Mclaughlin MD [facility]        lidocaine 4 % patch 1 patch  1 patch transdermal Daily Delilahgabriel Higginbotham PA-C   1 patch at 24 0818    polyethylene glycol (Glycolax, Miralax) packet 17 g  17 g oral Daily Jakob Barrios PA-C        sennosides-docusate sodium (Sara-Colace) 8.6-50 mg per tablet 2 tablet  2 tablet oral BID Jakob Barrios PA-C         Physical Exam  Constitutional:       Appearance: Normal appearance.   HENT:      Head: Normocephalic and atraumatic.   Cardiovascular:      Rate and Rhythm: Normal rate and regular rhythm.   Pulmonary:      Effort: Pulmonary effort is normal.      Breath sounds: Normal breath sounds.   Abdominal:      General: Bowel sounds are normal.       Palpations: Abdomen is soft.   Neurological:      Mental Status: She is alert and oriented to person, place, and time.   Psychiatric:         Mood and Affect: Mood normal.         Behavior: Behavior normal.         Thought Content: Thought content normal.       Confusion Assessment Method(CAM) for diagnosis of delirium:    1.  Acute onset or fluctuating course: absent/present: Absent  2.  Inattention: absent/present: Absent  3.  Disorganized thinking: absent/present: Absent  4.  Altered level of consciousness: absent/present: Absent  CAM: negative    AT Score For Assessment of Delirium and Cognitive Impairment:    Alertness: 0  Normal(fully alert,but not agitated, throughout assessment)=0  Mild sleepiness for <10 seconds after walking, then normal=0  Clearly abnormal=4  2.  AMT4: 0  No mistakes=0  One mistake=1  Two or more mistakes/untestable=2  3.  Attention: 0  Achieves seven months or more correctly=0  Starts but scores <7 months/ refuses to start=1  Untestable(cannot start because unwell, drowsy, inattentive)=2  4.  Acute: 0  No=0  Yes=4    Total Score: 0  4 or above: Possible delirium +/- cognitive impairment  1-3: Possible cognitive impairment  0: Delirium or severe cognitive impairment unlikely(but delirium still possible if (4) information incomplete)    Last Recorded Vitals      2/29/2024    10:30 AM 2/29/2024     3:00 PM 2/29/2024     5:39 PM 2/29/2024     9:00 PM 2/29/2024    11:00 PM 3/1/2024     3:00 AM 3/1/2024     7:55 AM   Vitals   Systolic 132 138 133 106 101 104 115   Diastolic 79 72 75 66 61 63 57   Heart Rate 106 98 91 91 87 79 80   Temp   36.7 °C (98.1 °F) 37.2 °C (99 °F) 38.5 °C (101.3 °F) 36.5 °C (97.7 °F) 36.9 °C (98.4 °F)   Resp  33 18 17 18 17 16      Vitals:    02/29/24 0220   Weight: 81.2 kg (179 lb 0.2 oz)      Relevant Results  Lab Results   Component Value Date    TSH 1.14 09/12/2023    VITD25 29 (L) 02/29/2024    HGBA1C 7.3 (H) 02/29/2024     Results from last 7 days   Lab Units  03/01/24  0804 02/29/24  1636 02/29/24  0223   WBC AUTO x10*3/uL  --   --  9.7   HEMOGLOBIN g/dL  --   --  14.6   HEMATOCRIT %  --   --  42.2   ALT U/L  --   --  14   AST U/L  --   --  13   SODIUM mmol/L 134* 130* 132*   POTASSIUM mmol/L 3.5 4.0 4.1   CHLORIDE mmol/L 99 96* 95*   CREATININE mg/dL 0.63 0.56 0.42*   BUN mg/dL 17 13 10   CO2 mmol/L 24 23 26   INR   --   --  1.1   HEMOGLOBIN A1C %  --   --  7.3*     DATA:  EKG: QTC  Encounter Date: 02/29/24   ECG 12 Lead   Result Value    Ventricular Rate 105    Atrial Rate 105    UT Interval 184    QRS Duration 90    QT Interval 362    QTC Calculation(Bazett) 478    P Axis 38    R Axis -7    T Axis 51    QRS Count 17    Q Onset 225    P Onset 133    P Offset 191    T Offset 406    QTC Fredericia 436    Narrative    Sinus tachycardia  Otherwise normal ECG  No previous ECGs available      See ED provider note for full interpretation and clinical correlation  Confirmed by Emili Cage ([id number]) on 2/29/2024 10:11:57 PM      Anti-psychotics in 48 hours: none  Opioids/Benzodiazepines in 48 hours: none  Anticholinergics on board:No  Restraints:No  Indwelling catheters:No  Last BM: not recorded  UO in 24 hours: not recorded  Activity in the past 24 hours: none  Need for ambulatory devices: yes          Assessment/Plan   This is a/an 83 y.o. year old female, with past medical history relevant for ongoing dizziness/vertigo for the past four years, T2DM, bilateral hearing loss, and HLD, presenting after a fall from standing w/o LOC, in which she sustained a closed occipital skull fracture.  She is being seen in geriatric consultation for evaluation after her fall.     Principal Problem:    Fall, initial encounter    # acute fall - unclear mechanism, may be related to orthostatic hypotension or h/o BPPV.  - Orthostatic vitals normal on 3/1/2024. Subjectively felt dizzy this morning when standing up.   - Recommend vestibular therapy with PT/OT (both inpatient and on discharge)      #occipital skull fracture/lumbar pain - pain under control today, refused AM scheduled tylenol.    - Continue lidocaine patches for lumbar pain and scheduled Tylenol for pain control.     #polypharmacy   - Continue to hold home meclizine.     #diabetes   - Hemoglobin A1C 7.3 on 2/29/24. Unchanged from 9/12/23.               -Glucose 269 today, start ISS. If blood sugar over the next days continue to be   elevated, consider starting low dose lantus with ISS.     - On home victoza, can continue at discharge.     #concern for possible mood disorder.    - GDS score today was 2. Low concern for depression.     Medication Evaluation:  High risk medications: none  Other concerning issues regarding medications: none            4M AGE-FRIENDLY INITIATIVE:  What matters most to patient: to get better and be discharged.   Medications: none  Mentation: CAM-neg 4AT-0, oriented x3.   Mobility: PT/OT rec high intensity therapy, but patient refused. Rec low intensity therapy assist x1 with wheeled walker.     Geriatric medicine will continue to follow the patient. Thank you for allowing geriatric medicine to be involved in the care of your patient. Geriatric medicine consultation team is available during work hours Monday through Friday. For any emergency issues requiring immediate assistance over the weekend, please page Geriatrics pager [phone]    MARY ROSA, MS4    I saw and evaluated the patient.  I personally obtained the key and critical portions of the history and physical exam or was physically present for key and critical portions performed by the medical student. I reviewed the medical student’s documentation and discussed the patient with the medical student.  I agree with the medical student’s medical decision making as documented in their note    Ghislaine Gutierrez MD

## 2024-03-01 NOTE — CONSULTS
[facility]  Vascular Surgery  H&P/Consult Note  Nancie Du  [id number]  1940      Reson for Admission:  GLF    Reason For Consult:  Incidental finding - possible L ICA plaque ulceration     HPI  Ms. Nancie Du is a 82 YO female with PMH of T2DM, bilateral hearing loss, HLD, 4-year history of indeterminant dizziness/vertigo who presented to the [facility] as a trauma patient following ground-level fall from standing.  She sustained a closed occipital skull fracture.  Trauma team is evaluating for possible syncope, ongoing workup for history of dizziness/vertigo.        Vascular surgery was consulted for incidental finding of possible L ICA plaque ulceration on carotid duplex.    Patient denies any history of stroke, TIA like symptoms, amaurosis fugax.       Review of Systems  No headaches, lightheadedness, dizziness, amaurosis, chest pain, shortness of breath, abdominal pain, extremity weakness/pain/sensory loss, no fevers or chills.  Remainder of 12 point ROS are negative.    PMH:  Past Medical History:   Diagnosis Date    Diabetes mellitus (CMS/[facility])     Encounter for full-term uncomplicated delivery     Normal vaginal delivery    Other conditions influencing health status     Menstruation       Shx:  Past Surgical History:   Procedure Laterality Date    OTHER SURGICAL HISTORY  06/15/2021    Ankle surgery    OTHER SURGICAL HISTORY  06/15/2021    Left mastectomy    OTHER SURGICAL HISTORY  09/17/2021    Hysteroscopy        FMHx:  family history includes Heart disease in her mother; Hypertension in her father and mother.    Social Hx:  Smoker: Lifelong non-smoker  Alcohol consumption: None  Illicit drug use: None  Occupation: Retired nurse  Living Situation: , lives at home alone, has a cat, adult children in the area  POA-daughter Mikal Francis     Objective:     Last Recorded Vitals  Vitals:    03/01/24 1500   BP: (!) 149/93   Pulse: 75   Resp: 17   Temp: 36.8 °C (98.2 °F)    SpO2: 96%         I/O last 2 completed shifts:  In: 120 (1.5 mL/kg) [P.O.:120]  Out: - (0 mL/kg)   Weight: 81.2 kg      Physical Exam  General: NAD. Nontoxic, frail  HEENT: Atraumatic. MMM. EOMI. 2+ palpable carotids bilaterally, no discernible bruit auscultated.  Band-Aid over right lower cheek.  Card: RRR  Pulm: Nonlabored breathing on room air  Chest: No bruising or seatbelt sign. Chest is nontender.  Equal chest wall rise  Abdomen: Soft, nontender, nondistended. No bruising.  Extremities: MAEx4. Warm and well-perfused.  2+ pitting edema bilaterally.  Neuro: No focal deficits.  Pulses:  Globally 2+ palpable    Relevant Results  Lab Results   Component Value Date    GLUCOSE 112 (H) 03/01/2024    CALCIUM 8.9 03/01/2024     (L) 03/01/2024    K 3.5 03/01/2024    CO2 24 03/01/2024    CL 99 03/01/2024    BUN 17 03/01/2024    CREATININE 0.63 03/01/2024     Lab Results   Component Value Date    GLUCOSE 112 (H) 03/01/2024    CALCIUM 8.9 03/01/2024     (L) 03/01/2024    K 3.5 03/01/2024    CO2 24 03/01/2024    CL 99 03/01/2024    BUN 17 03/01/2024    CREATININE 0.63 03/01/2024     (03/01/240 Bilateral Carotid Artery Duplex  CONCLUSIONS:  Right Carotid: Findings are consistent with less than 50% stenosis of the right proximal internal carotid artery. Right external carotid artery appears patent with no evidence of stenosis. No evidence of hemodynamically significant stenosis of the right common carotid artery. The right vertebral artery is patent with antegrade flow. No evidence of hemodynamically significant stenosis in the right subclavian artery.  Left Carotid: Findings are consistent with less than 50% stenosis of the left proximal internal carotid artery. Left external carotid artery appears patent with no evidence of stenosis. No evidence of hemodynamically significant stenosis of the left common carotid artery. The left vertebral artery is patent with antegrade flow. No evidence of hemodynamically  significant stenosis in the left subclavian artery.     Imaging & Doppler Findings:  Right Plaque Morph: The proximal right internal carotid artery demonstrates heterogenous and irregular plaque. The right carotid bulb demonstrates heterogenous and irregular plaque.  Left Plaque Morph: The proximal left internal carotid artery demonstrates heterogenous and highly,irregular/ ?ulcerated plaque that might need further imaging. The left carotid bulb demonstrates calcified plaque.      Right                        Left    PSV      EDV                PSV      EDV  82 cm/s            CCA P    75 cm/s  65 cm/s            CCA D    60 cm/s  102 cm/s 18 cm/s   ICA P    60 cm/s  12 cm/s  116 cm/s 21 cm/s   ICA M    70 cm/s  23 cm/s  69 cm/s  13 cm/s   ICA D    63 cm/s  19 cm/s  111 cm/s            ECA     97 cm/s  37 cm/s  7 cm/s  Vertebral  37 cm/s  9 cm/s  119 cm/s         Subclavian 102 cm/s                   Right Left  ICA/CCA Ratio  1.6  1.0    --------------------------------  (03/01/2024) CTA head and Neck:  IMPRESSION:  Atherosclerotic disease of bilateral intracranial internal carotid  arteries with mild narrowing of the right ophthalmic segment. There  is no evidence of major vessel cut off.      Atherosclerotic disease of bilateral carotid bifurcations and  proximal internal carotid arteries in the neck, right greater than  left. The right-sided carotid bifurcation and proximal internal  carotid artery plaque is heavily calcified and irregular and causes  up to 50% stenosis as per the NASCET criteria. The left-sided plaque  is not associated with hemodynamically significant stenosis. If there  is need for assessment of plaque morphology, high-resolution plaque  imaging with contrast-enhanced MRI for vessel wall imaging is  recommended.     Assessment/Plan   Ms. Nancie Du is a 82 YO female with PMH of T2DM, bilateral hearing loss, HLD, 4-year history of indeterminant dizziness/vertigo who presented to the [facility] as  a trauma patient following ground-level fall from standing.  Patient is not symptomatic from carotid stenosis was noted on imaging.  No surgical intervention required at this time for questionable plaque ulceration seen on duplex.     Plan/Recommendations:  -No surgical intervention from vascular surgery at this time  -Educated patient on signs and symptoms of symptomatic carotid stenosis        Discussed with fellow Dr. Bradley.     Tory Webber MD  Vascular Surgery, PGY1  Team Pager: [phone]  Available via [employer]      Fellow Addendum:    Patient was seen and examined. I aggree with the above documentation, except as modified.    In brief, Ms. Nancie Du is a 83 y.o. female presenting after a ground level fall and incidentally found to have bilateral carotid artery stenosis on duplex and CTA. Patient has 50% CASEY on CTA and <50% based on duplex. She has no history of TIA, amaurosis fugax or stroke.  Dizziness is an uncommon presentation for symptomatic carotid disease, and furthermore she has less than 50% stenosis by CTA and duplex.    No vascular surgical intervention is warranted at this time.  The patient can follow-up with as an outpatient in 1 year with a repeat carotid artery duplex.  The patient should be on 81 mg of aspirin daily and high intensity statin for risk factor modification and best medical therapy.    Vascular surgery will sign off at this time. Please do not hesitate to contact us with any questions, concerns, or changes in the patient's clinical condition. Thank you for the consultation and the opportunity to participate in the patient's care.     This patient was discussed with the attending of record, Dr. Cristina.    Rosendo Bradley MD  Vascular Surgery Fellow  Team Pager [phone]  Available on [employer]

## 2024-03-01 NOTE — PROGRESS NOTES
Physical Therapy    Physical Therapy Treatment    Patient Name: Nancie Du  MRN: [id number]  Today's Date: 3/1/2024  Time Calculation  Start Time: 0938  Stop Time: 0956  Time Calculation (min): 18 min       Assessment/Plan   PT Assessment  PT Assessment Results: Decreased strength, Decreased endurance, Impaired balance, Decreased safety awareness  Rehab Prognosis: Good  Strengths: Attitude of self, Housing layout, Living arrangement secure  Barriers to Participation: Insight into problems, Support of Caregivers  End of Session Communication: Bedside nurse  Assessment Comment: Patient required increased cues for safety during trans and gait. Per patient she lives alone and would not be able to have family assist during the day due to family members working. Would benefit from HIGH intensity PT as patient remains a falls risk. Per patient she will be returning home and is refusing placement. If home going patient will need home PT arranged.  End of Session Patient Position: Up in chair, Alarm on  PT Plan  Inpatient/Swing Bed or Outpatient: Inpatient  PT Plan  PT Plan: Skilled PT  PT Frequency: 5 times per week  PT Discharge Recommendations: Other (comment) (pt would benefit from high intensity therapy but refusing. pt stated that she does not want to go anywhere but home. Pt instructed to use ww and to be very careful. will reccomend low intensity therapy for home going.)  PT Recommended Transfer Status: Assist x1  PT - OK to Discharge: Yes      General Visit Information:   PT  Visit  PT Received On: 03/01/24  Response to Previous Treatment: Patient with no complaints from previous session.  General  Family/Caregiver Present: No  Co-Treatment: OT  Co-Treatment Reason: Partial cotx with OT in order to facilitate safe d/c planning.  Prior to Session Communication: Bedside nurse  Patient Position Received: Bed, 3 rail up, Alarm on  Preferred Learning Style: verbal, visual  General Comment: Patient supine in bed upon  therapy arrival. Motivated to participate in therapy session. (MD entered room post amb to chair at bedside. Therapist stepped out and returned 9:38-9:56.)    Subjective   Precautions:  Precautions  Hearing/Visual Limitations: [place] (hayley hearing aids in place)  Medical Precautions: Fall precautions, Spinal precautions  Vital Signs:  Vital Signs  Heart Rate:  (supine:82bpm, sittinbpm)  Heart Rate Source: Monitor  SpO2:  (supine: 94%, sitting EOB: 94%)  BP:  (supine: 130/73, sitting EOB: 143/77, standin/72)  BP Location: Left arm  BP Method: Automatic    Objective   Pain:  Pain Assessment  Pain Assessment: 0-10  Pain Score: 0 - No pain  Cognition:  Cognition  Overall Cognitive Status: Within Functional Limits  Orientation Level: Oriented X4  Postural Control:  Static Sitting Balance  Static Sitting-Balance Support: Feet supported, No upper extremity supported  Static Sitting-Level of Assistance: Close supervision  Dynamic Sitting Balance  Dynamic Sitting-Balance Support: Feet supported  Dynamic Sitting-Balance: Forward lean, Reaching across midline  Dynamic Sitting-Comments: close sup  Static Standing Balance  Static Standing-Balance Support: Bilateral upper extremity supported (on FWW)  Static Standing-Level of Assistance: Contact guard  Dynamic Standing Balance  Dynamic Standing-Balance Support: No upper extremity supported  Dynamic Standing-Balance: Turning  Dynamic Standing-Comments: Min A x 1 due to unsteadiness with turns in hallway  Treatments:  Therapeutic Exercise  Therapeutic Exercise Performed: Yes  Therapeutic Exercise Activity 1: Standing hayley LE: heel raises x 10, marches in place x 12 total         Bed Mobility  Bed Mobility: Yes  Bed Mobility 1  Bed Mobility 1: Supine to sitting  Level of Assistance 1: Contact guard  Bed Mobility Comments 1: HOB elevated    Ambulation/Gait Training  Ambulation/Gait Training Performed: Yes  Ambulation/Gait Training 1  Surface 1: Level tile  Device 1: Rolling  walker  Assistance 1: Contact guard, Maximum verbal cues (required Min A x 1 during turns and increased cues for FWW safety and sequencing with poor carryover)  Quality of Gait 1: Narrow base of support, Inconsistent stride length, Decreased step length  Comments/Distance (ft) 1: 5 ft, 10 ft, 40 ft  Transfers  Transfer: Yes  Transfer 1  Transfer From 1: Bed to  Transfer to 1: Stand  Technique 1: Sit to stand  Transfer Device 1: Walker  Transfer Level of Assistance 1: Contact guard, Moderate verbal cues (cues for hayley UE hand placement)  Transfers 2  Transfer From 2: Stand to  Transfer to 2: Chair with arms  Technique 2: Stand to sit  Transfer Device 2: Walker  Transfer Level of Assistance 2: Contact guard, Moderate verbal cues (cues for hayley UE placement)  Trials/Comments 2: x2 trials  Transfers 3  Transfer From 3: Chair with arms to  Transfer to 3: Stand  Technique 3: Sit to stand  Transfer Device 3: Walker  Transfer Level of Assistance 3: Contact guard, Moderate verbal cues (cues for hand placement)  Transfers 4  Transfer From 4: Stand to  Transfer to 4: Toilet  Technique 4: Stand to sit  Transfer Device 4: Walker  Transfer Level of Assistance 4: Contact guard, Moderate verbal cues (cues for hand placement)  Transfers 5  Transfer From 5: Toilet to  Transfer to 5: Stand  Technique 5: Sit to stand  Transfer Device 5: Walker  Transfer Level of Assistance 5: Contact guard, Moderate verbal cues (required x 2 attempts to arise)    Outcome Measures:  [facility] Basic Mobility  Turning from your back to your side while in a flat bed without using bedrails: A little  Moving from lying on your back to sitting on the side of a flat bed without using bedrails: A little  Moving to and from bed to chair (including a wheelchair): A little  Standing up from a chair using your arms (e.g. wheelchair or bedside chair): A little  To walk in hospital room: A little  Climbing 3-5 steps with railing: A lot  Basic Mobility - Total Score:  17    Education Documentation  Precautions, taught by Deja Araujo PTA at 3/1/2024 10:58 AM.  Learner: Patient  Readiness: Eager  Method: Explanation  Response: Needs Reinforcement    Home Exercise Program, taught by Deja Araujo PTA at 3/1/2024 10:58 AM.  Learner: Patient  Readiness: Eager  Method: Explanation  Response: Needs Reinforcement    Mobility Training, taught by Deja Araujo PTA at 3/1/2024 10:58 AM.  Learner: Patient  Readiness: Eager  Method: Explanation  Response: Needs Reinforcement    Education Comments  No comments found.        OP EDUCATION:       Encounter Problems       Encounter Problems (Active)       Balance       STG - Maintains dynamic standing balance with upper extremity support with ww and SBA for 2 mins without LOB  (Progressing)       Start:  02/29/24    Expected End:  03/14/24       INTERVENTIONS:  1. Practice standing with minimal support.  2. Educate patient about standing tolerance.  3. Educate patient about independence with gait, transfers, and ADL's.  4. Educate patient about use of assistive device.  5. Educate patient about self-directed care.            Mobility       STG - Patient will ambulate 100 ft with ww and SBA  (Progressing)       Start:  02/29/24    Expected End:  03/14/24            STG - Patient will ascend and descend two stairs with CGA  (Progressing)       Start:  02/29/24    Expected End:  03/14/24               Transfers       STG - Transfer from bed to chair with CGA and ww  (Progressing)       Start:  02/29/24    Expected End:  03/14/24            STG - Patient will perform bed mobility with SBA  (Progressing)       Start:  02/29/24    Expected End:  03/14/24

## 2024-03-01 NOTE — CARE PLAN
The patient's goals for the shift include safety and pain control    The clinical goals for the shift include patient will remain HDS and pain will be controlled throughout the shift.      Problem: Pain  Goal: My pain/discomfort is manageable  Outcome: Progressing     Problem: Safety  Goal: Patient will be injury free during hospitalization  Outcome: Progressing  Goal: I will remain free of falls  Outcome: Progressing     Problem: Daily Care  Goal: Daily care needs are met  Outcome: Progressing     Problem: Psychosocial Needs  Goal: Demonstrates ability to cope with hospitalization/illness  Outcome: Progressing  Goal: Collaborate with me, my family, and caregiver to identify my specific goals  Outcome: Progressing

## 2024-03-01 NOTE — NURSING NOTE
Orthostatics done at 0902 by PT/OT    supine: 130/73, sitting EOB: 143/77, standin/72     See flowsheet

## 2024-03-01 NOTE — PROGRESS NOTES
Patient seen by PT and was told she could benefit from high intensity therapy upon discharge.  Patient refused and PT agreed to home with low intensity therapy with wheeled walker.  SW will continue to follow to assist with safe discharge plan.

## 2024-03-01 NOTE — PROGRESS NOTES
[facility]  TRAUMA SERVICE - PROGRESS NOTE    Patient Name: Nancie Du  MRN: [id number]  Admit Date: 229  : 1940  AGE: 83 y.o.   GENDER: female  ==============================================================================  MECHANISM OF INJURY:   Ground level fall, recent episodes of syncope and h/o vertigo    LOC (yes/no?): Denies  Anticoagulant / Anti-platelet Rx? (for what dx?): Denies  Referring Facility Name (N/A for scene EMR run): Transfer from [facility]     INJURIES:   Non-displaced occipital fracture     OTHER MEDICAL PROBLEMS:  Near Syncope  Vertigo  Hyponatremia  H/o T2DM, HLD    INCIDENTAL FINDINGS:  Degenerative lumbar spine changes  T12 subacute fracture   Heavily calcified neck vasculature and aortic valve  Endometrial fluid    PROCEDURES:  N/A    ==============================================================================  TODAY'S ASSESSMENT AND PLAN OF CARE:  #Occipital fracture, degenerative spine changes  -NSGY consulted  -->no acute interventions  -Repeat CT head/spine unchanged  -APAP 975mg q8hrs for acute pain  -PT/OT    #Syncope vs. vertigo #aortic valve calcifications   -Echocardiogram : EF 55 to 60%, impaired left ventricular diastolic filling  -EKG showing sinus tachycardia, otherwise unremarkable  -Orthostatics negative  -Stop home meclizine   -Geriatrics recommends outpatient vestibular therapy  -Carotid duplex 3/1: b/l plaque buildup with ulcerated plaque to LCA   -->CTA neck 3/1: b/l atherosclerotic disease with up to 50% stenosis   -->Vascular surgery consulted: No acute intervention. Will f/up outpatient    #Hyponatremia  -132 upon admission, has been mildly hyponatremic in the past  -Repeat BMP daily    #Comorbidities  -Continue home Combigan eye drops, ezetimibe, famotidine, spironolactone  -Holding home lifitegrast eye drops (not in formulary)  -Geriatrics team following     #FEN/GI  -SSI increased to #2  -Holding home  glimepiride (do not resume upon discharge, per Es), liraglutide  -Carb controlled diet  -BR with senna, docusate    #DVT Ppx  -Enoxaparin 30mg BID  -SCDs    Seen and discussed with Dr. Castrejon.     45 minutes spent with patient reviewing VSS/medications, obtaining subjective information, performing physical exam, discussing plan of care;  with greater than 50% of that time spent in patient room.    Delilah Higginbotham PA-C  Trauma Surgery  Team Phone: [phone]    ==============================================================================  CHIEF COMPLAINT / OVERNIGHT EVENTS:   82 y/o s/p near syncopal fall with nondisplaced occipital fracture. No acute overnight events.     MEDICAL HISTORY / ROS:  Admission history and ROS reviewed. Pertinent changes as follows:  No new complaints.     PHYSICAL EXAM:  Heart Rate:  [75-91]   Temp:  [36.5 °C (97.7 °F)-38.5 °C (101.3 °F)]   Resp:  [16-18]   BP: (101-149)/(57-93)   SpO2:  [92 %-96 %]   Physical Exam  Vitals reviewed.   Constitutional:       General: She is awake. She is not in acute distress.     Appearance: She is well-developed. She is not ill-appearing or toxic-appearing.      Comments: Elderly female sitting up in chair in NAD   HENT:      Head: Normocephalic and atraumatic.      Comments: Bandaid to right cheek     Nose: Nose normal.      Mouth/Throat:      Mouth: Mucous membranes are moist.   Eyes:      Extraocular Movements:      Right eye: Normal extraocular motion.      Left eye: Normal extraocular motion.      Pupils: Pupils are equal, round, and reactive to light.   Cardiovascular:      Rate and Rhythm: Normal rate and regular rhythm.      Pulses:           Radial pulses are 2+ on the right side and 2+ on the left side.        Dorsalis pedis pulses are 2+ on the right side and 2+ on the left side.   Pulmonary:      Effort: Pulmonary effort is normal.      Breath sounds: Normal breath sounds. No decreased breath sounds.   Abdominal:      General: There is no  distension.      Tenderness: There is no abdominal tenderness.   Musculoskeletal:      Cervical back: Normal range of motion. Tenderness present. Muscular tenderness present. Normal range of motion.      Thoracic back: Tenderness (mid, baseline) present. Normal range of motion.      Lumbar back: No tenderness.      Right lower leg: No edema.      Left lower leg: No edema.   Skin:     General: Skin is warm and dry.      Capillary Refill: Capillary refill takes less than 2 seconds.      Comments: Thin hair   Neurological:      General: No focal deficit present.      Mental Status: She is alert and oriented to person, place, and time.      GCS: GCS eye subscore is 4. GCS verbal subscore is 5. GCS motor subscore is 6.      Sensory: Sensation is intact.      Motor: Motor function is intact.      Comments: Hard of hearing   Psychiatric:         Mood and Affect: Mood and affect normal.         Behavior: Behavior is cooperative.         IMAGING SUMMARY:  (summary of new imaging findings, not a copy of dictation)  Echocardiogram 2/29: EF 55 to 60%, impaired left ventricular diastolic filling    LABS:  Results from last 7 days   Lab Units 02/29/24 0223   WBC AUTO x10*3/uL 9.7   HEMOGLOBIN g/dL 14.6   HEMATOCRIT % 42.2   PLATELETS AUTO x10*3/uL 179   NEUTROS PCT AUTO % 70.7   LYMPHS PCT AUTO % 16.5   MONOS PCT AUTO % 11.0   EOS PCT AUTO % 1.1     Results from last 7 days   Lab Units 02/29/24 0223   INR  1.1     Results from last 7 days   Lab Units 03/01/24  0804 02/29/24  1636 02/29/24 0223   SODIUM mmol/L 134* 130* 132*   POTASSIUM mmol/L 3.5 4.0 4.1   CHLORIDE mmol/L 99 96* 95*   CO2 mmol/L 24 23 26   BUN mg/dL 17 13 10   CREATININE mg/dL 0.63 0.56 0.42*   CALCIUM mg/dL 8.9 9.2 9.4   PROTEIN TOTAL g/dL  --   --  6.4   BILIRUBIN TOTAL mg/dL  --   --  0.9   ALK PHOS U/L  --   --  53   ALT U/L  --   --  14   AST U/L  --   --  13   GLUCOSE mg/dL 112* 203* 167*     Results from last 7 days   Lab Units 02/29/24 0223    BILIRUBIN TOTAL mg/dL 0.9           I have reviewed all medications, laboratory results, and imaging pertinent for today's encounter.

## 2024-03-01 NOTE — PROGRESS NOTES
03/01/24 1431   Discharge Planning   Living Arrangements Alone   Support Systems Children   Assistance Needed Per patient independent in ALDs prior to admission   Type of Residence Private residence   Number of Stairs to Enter Residence 2   Number of Stairs Within Residence 0  (Ranch)   Do you have animals or pets at home? Yes   Type of Animals or Pets 1 cat   Who is requesting discharge planning? Provider   Home or Post Acute Services Post acute facilities (Rehab/SNF/etc)   Type of Post Acute Facility Services Rehab   Patient expects to be discharged to: Acute Rehab vs home with family support   Does the patient need discharge transport arranged? Yes   Financial Resource Strain   How hard is it for you to pay for the very basics like food, housing, medical care, and heating? Not hard   Housing Stability   In the last 12 months, was there a time when you were not able to pay the mortgage or rent on time? N   In the last 12 months, how many places have you lived? 1   In the last 12 months, was there a time when you did not have a steady place to sleep or slept in a shelter (including now)? N   Transportation Needs   In the past 12 months, has lack of transportation kept you from medical appointments or from getting medications? no   In the past 12 months, has lack of transportation kept you from meetings, work, or from getting things needed for daily living? No   Patient Choice   Provider Choice list and CMS website (https://medicare.gov/care-compare#search) for post-acute Quality and Resource Measure Data were provided and reviewed with: Patient;Family   Patient / Family choosing to utilize agency / facility established prior to hospitalization No         Transitional Care Coordinator Note: TCC met with patient introduced self and role to complete assessment (see above) and discuss discharge plan. Patient confirmed demographics:     Address: [address] [place] [address] [place] [address]  Alternate/Emergency Contact:  "Orquidea Francis (daughter) [phone] and/or Vickie Du (grand daughter) [phone]  Patient Contact: [phone]  Insurance: Medicare     Per patient lives alone in ranch style home. Patient owns cane (uses daily), walker and wheelchair. Per patient granddaughter come by weekly to clean home and children stop by through out the week. Per medical team patient is not medically ready, pending syncope work up. Patient evaluated by therapy rec High Intensity. TCC informed and educated patient on therapy recommendations. Patient stated she wanted to discharge home. TCC inquired if family may be able to assist with 24/7 care at home post discharge. Patient stated \"I don't think so\". TCC inquired if TCC could call patient's daughter to discuss discharge plan for home with 24/7 support or AR. Patient agreeable to discharge planning team calling her daughter Orquidea.     Trauma SW contacted patient's daughter. Per SW family and patient are agreeable to discharge plan of AR. Choices provided to SW, SANDI to build and send referral.       Byron Cedeno RN BSN   Transitional Care Coordinator     "

## 2024-03-01 NOTE — PROGRESS NOTES
SW called daughter Orquidea to determine family supports at time of dc as patient is refusing AR. Left message for return call. SW will continue to follow for dc planning needs.     Sw spoke with daughter Orquidea- daughter gave several dc scenarios. [facility] or patient dc to daughters home for 24/7 supervision. She said patient lives alone. She is alone all day while family is at work. They are only able to assist in the evening. Daughter to call patient to discuss options. SW will continue to follow for dc planning assistance.     SANDI built and sent referral to [facility] to determine if facility can accept for home with family vs AR dc.

## 2024-03-01 NOTE — PROGRESS NOTES
Occupational Therapy    Evaluation    Patient Name: Nancie Du  MRN: [id number]  Today's Date: 3/1/2024  Time Calculation  Start Time: 0938  Stop Time: 0956  Time Calculation (min): 18 min    Assessment  IP OT Assessment  OT Assessment: Pt present with decreased balance, endurance, and poor safety awareness increasing fall risk and impairing occupational performance of ADL and functional mobility  Evaluation/Treatment Tolerance: Patient tolerated treatment well  End of Session Communication: Bedside nurse  End of Session Patient Position: Up in chair, Alarm on  Plan:  Treatment Interventions: ADL retraining, Functional transfer training  OT Frequency: 3 times per week  OT Discharge Recommendations: High intensity level of continued care  OT - OK to Discharge: Yes (indicates completed eval and d/c recommendation)    Subjective   Current Problem:  1. Fall, initial encounter  Vascular US carotid artery duplex bilateral    Vascular US carotid artery duplex bilateral      2. Closed fracture of occipital bone, unspecified laterality, unspecified occipital fracture type, initial encounter (CMS/[facility])        3. Dizziness  Transthoracic Echo (TTE) Complete    Transthoracic Echo (TTE) Complete    Vascular US carotid artery duplex bilateral    Vascular US carotid artery duplex bilateral      4. Other general symptoms and signs  Transthoracic Echo (TTE) Complete    Transthoracic Echo (TTE) Complete      5. Other specified symptoms and signs involving the circulatory and respiratory systems  Vascular US carotid artery duplex bilateral        General:  Reason for Referral: s/p GLF with Non-displaced occipital fracture  Past Medical History Relevant to Rehab: h/o breast ca, HTN, HLD, T2DM, glaucoma, hearing loss, syncope  Co-Treatment: PT  Co-Treatment Reason: facilitate safe d/c planning  Prior to Session Communication: Bedside nurse  Patient Position Received: Up in chair, Alarm on  General Comment: pleasant and cooperative,  "agreeable to OT   Precautions:  Hearing/Visual Limitations: [place]  Medical Precautions: Fall precautions, Spinal precautions    Pain:  Pain Assessment  Pain Assessment: 0-10  Pain Score: 0 - No pain  Lines/Tubes/Drains:  External Urinary Catheter Female (Active)   Number of days: 1         Objective   Cognition:  Overall Cognitive Status: Within Functional Limits  Orientation Level: Oriented X4  Safety Judgment: Decreased awareness of need for safety precautions (Pt required continuous VC for safety awareness throughout functional tasks during session)  Memory Comments: Pt reported \"I am forgetful of things\"  Complex Functional Tasks: Moderate  Routine Tasks: Moderate  Unable to Self-Monitor and Self-Correct Consistently: Moderate  Insight: Mild  Impulsive: Moderately  Planning: Reduced planning skills  Cognition Test Scores  Cognition Tests: Cognition Test Performed  SBT Test Score: 4/28 indicates normal cognition (Pt with mild deficits in attention and memory recall)        Home Living:  Type of Home: House  Lives With: Alone  Home Adaptive Equipment: Cane, Walker rolling or standard  Home Layout: One level  Home Access: Level entry  Bathroom Shower/Tub: Tub/shower unit  Bathroom Toilet: Standard  Bathroom Equipment: Grab bars in shower, Shower chair with back   Prior Function:  Level of [place]: Independent with ADLs and functional transfers, Needs assistance with homemaking  Receives Help From: Family  ADL Assistance: Independent  Homemaking Assistance:  (adult children assist with grocery shopping, driving, and light housework)  Ambulatory Assistance:  (MOD I cane)  Hand Dominance: Right  IADL History:  Current License: No  Mode of Transportation: Family  Occupation: Retired  ADL:  Eating Deficit:  (setup only)  Grooming Deficit:  (CGA standing while performing grooming tasks at sink)  Bathing Deficit:  (CGA at least)  UE Dressing Deficit:  (SBA with setup seated at least)  LE Dressing Deficit:  (CGA at " least 2/2 clothing management and functional mobility)  Toileting Deficit:  (Pt performed toileting CGA, setup up for pericare care required)  Functional Deficit:  (Pt performed functional mobility mod household distance from chair to bathroom to 1/4 hallway length back to chair with FWW CGA, min A for turns; max VC for safety, AE positioning,)  Activity Tolerance:  Endurance: Decreased tolerance for upright activites    Bed Mobility/Transfers: Bed Mobility  Bed Mobility: No   and Transfers  Transfer: Yes  Transfer 1  Transfer From 1: Chair with arms to  Transfer to 1: Stand  Technique 1: Sit to stand  Transfer Device 1: Walker  Transfer Level of Assistance 1: Contact guard, Moderate verbal cues  Trials/Comments 1: 1x; mod VC for safety  Transfers 2  Transfer From 2: Stand to  Transfer to 2: Toilet  Technique 2: Stand to sit  Transfer Device 2: Walker  Transfer Level of Assistance 2: Contact guard, Moderate verbal cues  Trials/Comments 2: 1x; VC for hand placement, safety  Transfers 3  Transfer From 3: Toilet to  Transfer to 3: Stand  Technique 3: Stand to sit  Transfer Device 3: Walker  Transfer Level of Assistance 3: Contact guard, Moderate verbal cues  Trials/Comments 3: 1x; VC for hand placement and safety  Transfers 4  Transfer From 4: Stand to  Transfer to 4: Chair with arms  Technique 4: Stand to sit  Transfer Device 4: Walker  Transfer Level of Assistance 4: Contact guard, Moderate verbal cues  Trials/Comments 4: 1x; VC for safety  IADL's:   Current License: No  Mode of Transportation: Family  Occupation: Retired  Vision: Vision - Basic Assessment  Current Vision: Wears glasses all the time  Visual History: Glaucoma    Sensation:  Light Touch: No apparent deficits     Hand Function:  Hand Function  Gross Grasp: Functional  Coordination: Functional  Extremities: RUE   RUE : Within Functional Limits, LUE   LUE: Within Functional Limits,  , and      Outcome Measures: [facility] Daily Activity  Putting on and taking  off regular lower body clothing: A little  Bathing (including washing, rinsing, drying): A lot  Putting on and taking off regular upper body clothing: A little  Toileting, which includes using toilet, bedpan or urinal: A little  Taking care of personal grooming such as brushing teeth: A little  Eating Meals: A little  Daily Activity - Total Score: 17         ,     OT Adult Other Outcome Measures  4AT: 4AT-    Education Documentation  Precautions, taught by Sabine Zavala OT at 3/1/2024  1:10 PM.  Learner: Patient  Readiness: Acceptance  Method: Explanation  Response: Needs Reinforcement    ADL Training, taught by Sabine Zavala OT at 3/1/2024  1:10 PM.  Learner: Patient  Readiness: Acceptance  Method: Explanation  Response: Needs Reinforcement    Goals:     Encounter Problems       Encounter Problems (Active)       ADLs       Patient with complete lower body dressing with independent level of assistance  (Progressing)       Start:  03/01/24    Expected End:  03/15/24            Patient will complete toileting including hygiene clothing management/hygiene with modified independent level of assistance and DME prn. (Progressing)       Start:  03/01/24    Expected End:  03/15/24               BALANCE       Pt will maintain dynamic standing balance during ADL task with modified independent level of assistance in order to demonstrate decreased risk of falling and improved postural control. (Progressing)       Start:  03/01/24    Expected End:  03/15/24               COGNITION/SAFETY       Pt will perform all ADLs and functional mobility with good safety awareness and no VC (Progressing)       Start:  03/01/24    Expected End:  03/15/24               MOBILITY       Patient will perform Functional mobility max Household distances/Community Distances with modified independent level of assistance and least restrictive device in order to improve safety and functional mobility. (Progressing)       Start:  03/01/24     Expected End:  03/15/24               TRANSFERS       Patient will perform bed mobility independent level of assistance in order to improve safety and independence with mobility (Progressing)       Start:  03/01/24    Expected End:  03/15/24            Patient will complete functional transfers with least restrictive device with modified independent level of assistance. (Progressing)       Start:  03/01/24    Expected End:  03/15/24                   03/01/24 at 1:10 PM   Sabine Zavala, OT   Rehab Office: [phone]

## 2024-03-02 LAB
ANION GAP SERPL CALC-SCNC: 14 MMOL/L (ref 10–20)
BUN SERPL-MCNC: 12 MG/DL (ref 6–23)
CALCIUM SERPL-MCNC: 9.1 MG/DL (ref 8.6–10.6)
CHLORIDE SERPL-SCNC: 101 MMOL/L (ref 98–107)
CO2 SERPL-SCNC: 23 MMOL/L (ref 21–32)
CREAT SERPL-MCNC: 0.4 MG/DL (ref 0.5–1.05)
EGFRCR SERPLBLD CKD-EPI 2021: >90 ML/MIN/1.73M*2
GLUCOSE BLD MANUAL STRIP-MCNC: 150 MG/DL (ref 74–99)
GLUCOSE BLD MANUAL STRIP-MCNC: 264 MG/DL (ref 74–99)
GLUCOSE SERPL-MCNC: 151 MG/DL (ref 74–99)
MAGNESIUM SERPL-MCNC: 1.88 MG/DL (ref 1.6–2.4)
POTASSIUM SERPL-SCNC: 4 MMOL/L (ref 3.5–5.3)
SODIUM SERPL-SCNC: 134 MMOL/L (ref 136–145)

## 2024-03-02 PROCEDURE — 83735 ASSAY OF MAGNESIUM: CPT | Performed by: PHYSICIAN ASSISTANT

## 2024-03-02 PROCEDURE — 2500000004 HC RX 250 GENERAL PHARMACY W/ HCPCS (ALT 636 FOR OP/ED): Performed by: PHYSICIAN ASSISTANT

## 2024-03-02 PROCEDURE — 1200000002 HC GENERAL ROOM WITH TELEMETRY DAILY

## 2024-03-02 PROCEDURE — 82947 ASSAY GLUCOSE BLOOD QUANT: CPT

## 2024-03-02 PROCEDURE — 2500000005 HC RX 250 GENERAL PHARMACY W/O HCPCS: Performed by: PHYSICIAN ASSISTANT

## 2024-03-02 PROCEDURE — 36415 COLL VENOUS BLD VENIPUNCTURE: CPT | Performed by: PHYSICIAN ASSISTANT

## 2024-03-02 PROCEDURE — 80048 BASIC METABOLIC PNL TOTAL CA: CPT | Performed by: PHYSICIAN ASSISTANT

## 2024-03-02 PROCEDURE — 99232 SBSQ HOSP IP/OBS MODERATE 35: CPT | Performed by: PHYSICIAN ASSISTANT

## 2024-03-02 RX ADMIN — ENOXAPARIN SODIUM 30 MG: 100 INJECTION SUBCUTANEOUS at 08:09

## 2024-03-02 RX ADMIN — ENOXAPARIN SODIUM 30 MG: 100 INJECTION SUBCUTANEOUS at 20:26

## 2024-03-02 RX ADMIN — ACETAMINOPHEN 975 MG: 325 TABLET ORAL at 17:17

## 2024-03-02 RX ADMIN — LIDOCAINE 1 PATCH: 4 PATCH TOPICAL at 08:12

## 2024-03-02 ASSESSMENT — PAIN SCALES - GENERAL
PAINLEVEL_OUTOF10: 0 - NO PAIN
PAINLEVEL_OUTOF10: 2

## 2024-03-02 ASSESSMENT — PAIN - FUNCTIONAL ASSESSMENT: PAIN_FUNCTIONAL_ASSESSMENT: 0-10

## 2024-03-02 NOTE — CARE PLAN
The patient's goals for the shift include pain management     The clinical goals for the shift include pt will be free from falls

## 2024-03-02 NOTE — PROGRESS NOTES
TCC spoke to pt's dtr, Orquidea and made aware [facility] can accept pt. Per Orquidea would like referral sent to [facility] as well and if they can accept then [facility] would be [facility]. Pt's dtr also would like a medical update-TCC will make medical team aware.

## 2024-03-02 NOTE — CARE PLAN
The patient's goals for the shift include pain control    The clinical goals for the shift include pt will remain free from falls throughout the shift      Problem: Pain  Goal: My pain/discomfort is manageable  Outcome: Progressing     Problem: Safety  Goal: Patient will be injury free during hospitalization  Outcome: Progressing  Goal: I will remain free of falls  Outcome: Progressing     Problem: Daily Care  Goal: Daily care needs are met  Outcome: Progressing     Problem: Psychosocial Needs  Goal: Demonstrates ability to cope with hospitalization/illness  Outcome: Progressing  Goal: Collaborate with me, my family, and caregiver to identify my specific goals  Outcome: Progressing

## 2024-03-03 LAB
ALBUMIN SERPL BCP-MCNC: 3.3 G/DL (ref 3.4–5)
ANION GAP SERPL CALC-SCNC: 12 MMOL/L (ref 10–20)
BUN SERPL-MCNC: 12 MG/DL (ref 6–23)
CALCIUM SERPL-MCNC: 9 MG/DL (ref 8.6–10.6)
CHLORIDE SERPL-SCNC: 102 MMOL/L (ref 98–107)
CO2 SERPL-SCNC: 26 MMOL/L (ref 21–32)
CREAT SERPL-MCNC: 0.53 MG/DL (ref 0.5–1.05)
EGFRCR SERPLBLD CKD-EPI 2021: >90 ML/MIN/1.73M*2
GLUCOSE BLD MANUAL STRIP-MCNC: 136 MG/DL (ref 74–99)
GLUCOSE BLD MANUAL STRIP-MCNC: 153 MG/DL (ref 74–99)
GLUCOSE BLD MANUAL STRIP-MCNC: 263 MG/DL (ref 74–99)
GLUCOSE BLD MANUAL STRIP-MCNC: 267 MG/DL (ref 74–99)
GLUCOSE SERPL-MCNC: 142 MG/DL (ref 74–99)
MAGNESIUM SERPL-MCNC: 1.81 MG/DL (ref 1.6–2.4)
PHOSPHATE SERPL-MCNC: 3.7 MG/DL (ref 2.5–4.9)
POTASSIUM SERPL-SCNC: 4.4 MMOL/L (ref 3.5–5.3)
SODIUM SERPL-SCNC: 136 MMOL/L (ref 136–145)

## 2024-03-03 PROCEDURE — 2500000004 HC RX 250 GENERAL PHARMACY W/ HCPCS (ALT 636 FOR OP/ED): Performed by: PHYSICIAN ASSISTANT

## 2024-03-03 PROCEDURE — 83735 ASSAY OF MAGNESIUM: CPT | Performed by: PHYSICIAN ASSISTANT

## 2024-03-03 PROCEDURE — 1200000002 HC GENERAL ROOM WITH TELEMETRY DAILY

## 2024-03-03 PROCEDURE — 36415 COLL VENOUS BLD VENIPUNCTURE: CPT | Performed by: PHYSICIAN ASSISTANT

## 2024-03-03 PROCEDURE — 99232 SBSQ HOSP IP/OBS MODERATE 35: CPT | Performed by: PHYSICIAN ASSISTANT

## 2024-03-03 PROCEDURE — 2500000001 HC RX 250 WO HCPCS SELF ADMINISTERED DRUGS (ALT 637 FOR MEDICARE OP): Performed by: PHYSICIAN ASSISTANT

## 2024-03-03 PROCEDURE — 2500000005 HC RX 250 GENERAL PHARMACY W/O HCPCS: Performed by: PHYSICIAN ASSISTANT

## 2024-03-03 PROCEDURE — 82947 ASSAY GLUCOSE BLOOD QUANT: CPT

## 2024-03-03 PROCEDURE — 2500000002 HC RX 250 W HCPCS SELF ADMINISTERED DRUGS (ALT 637 FOR MEDICARE OP, ALT 636 FOR OP/ED): Performed by: PHYSICIAN ASSISTANT

## 2024-03-03 PROCEDURE — 80069 RENAL FUNCTION PANEL: CPT | Performed by: PHYSICIAN ASSISTANT

## 2024-03-03 RX ORDER — MAGNESIUM CHLORIDE 64 MG
64 TABLET, DELAYED RELEASE (ENTERIC COATED) ORAL DAILY
Status: DISCONTINUED | OUTPATIENT
Start: 2024-03-03 | End: 2024-03-04 | Stop reason: HOSPADM

## 2024-03-03 RX ORDER — NAPROXEN SODIUM 220 MG/1
81 TABLET, FILM COATED ORAL DAILY
Status: DISCONTINUED | OUTPATIENT
Start: 2024-03-03 | End: 2024-03-04 | Stop reason: HOSPADM

## 2024-03-03 RX ORDER — ACETAMINOPHEN 325 MG/1
650 TABLET ORAL EVERY 6 HOURS PRN
Status: DISCONTINUED | OUTPATIENT
Start: 2024-03-03 | End: 2024-03-04 | Stop reason: HOSPADM

## 2024-03-03 RX ADMIN — INSULIN LISPRO 6 UNITS: 100 INJECTION, SOLUTION INTRAVENOUS; SUBCUTANEOUS at 14:01

## 2024-03-03 RX ADMIN — ENOXAPARIN SODIUM 30 MG: 100 INJECTION SUBCUTANEOUS at 08:22

## 2024-03-03 RX ADMIN — INSULIN LISPRO 2 UNITS: 100 INJECTION, SOLUTION INTRAVENOUS; SUBCUTANEOUS at 09:26

## 2024-03-03 RX ADMIN — ACETAMINOPHEN 650 MG: 325 TABLET ORAL at 22:21

## 2024-03-03 RX ADMIN — LIDOCAINE 1 PATCH: 4 PATCH TOPICAL at 08:22

## 2024-03-03 RX ADMIN — MAGNESIUM 64 MG (MAGNESIUM CHLORIDE) TABLET,DELAYED RELEASE 64 MG: at 12:16

## 2024-03-03 RX ADMIN — ENOXAPARIN SODIUM 30 MG: 100 INJECTION SUBCUTANEOUS at 22:21

## 2024-03-03 RX ADMIN — ACETAMINOPHEN 975 MG: 325 TABLET ORAL at 01:05

## 2024-03-03 ASSESSMENT — PAIN - FUNCTIONAL ASSESSMENT
PAIN_FUNCTIONAL_ASSESSMENT: 0-10
PAIN_FUNCTIONAL_ASSESSMENT: 0-10

## 2024-03-03 ASSESSMENT — PAIN SCALES - GENERAL
PAINLEVEL_OUTOF10: 0 - NO PAIN
PAINLEVEL_OUTOF10: 0 - NO PAIN
PAINLEVEL_OUTOF10: 6

## 2024-03-03 NOTE — CARE PLAN
Problem: Pain  Goal: My pain/discomfort is manageable  Outcome: Progressing     Problem: Safety  Goal: Patient will be injury free during hospitalization  Outcome: Progressing

## 2024-03-03 NOTE — HOSPITAL COURSE
Nancie Du is a 82 yo F presenting as a LIMITED activation after sustaining a fall from ground level on 2/29. Was taking out the trash when a michael of wind knocked her to the ground. Struck her head but denies LOC. Has been having episodes of dizziness at home for quite some time. Has been seeing a neurologist. Pt was transferred to the [facility] 3/1, no acute interventions for her occipital fx, and undergoing workup for syncope/vertigo. PT/OT recommending high intensity therapy on discharge however pt states she only wants to go home at discharge at that time 3/1. 3/2 pt remained HDS, Cta of the neck had show bilaterally  atherosclerotic disease, vascular surgery involved and rec'd outpatient follow up.  Pt remained stable on 3/3, and plan to take aspirin 81mg daily for management.    At the time of discharge, patient's pain was controlled with oral analgesia, patient was urinating, having BMs, sleeping, and eating well. Based on PT/OT's recommendation, patient was discharged to SNF on 03/04/2024  with scripts and follow up appointments. Discharge plan was discussed with the patient and all of the patient's questions were answered.

## 2024-03-03 NOTE — PROGRESS NOTES
[facility]  TRAUMA SERVICE - PROGRESS NOTE    Patient Name: Nancie Du  MRN: [id number]  Admit Date: 229  : 1940  AGE: 83 y.o.   GENDER: female  ==============================================================================  MECHANISM OF INJURY:   Ground level fall, recent episodes of syncope and h/o vertigo     INJURIES:   Non-displaced occipital fracture     OTHER MEDICAL PROBLEMS:  Near Syncope  Vertigo  Hypomagnesemia  H/o T2DM, HLD    INCIDENTAL FINDINGS:  Degenerative lumbar spine changes  T12 subacute fracture   Heavily calcified neck vasculature and aortic valve  Endometrial fluid    PROCEDURES:  N/A    ==============================================================================  TODAY'S ASSESSMENT AND PLAN OF CARE:  #Occipital fracture, degenerative spine changes  -NSGY consulted  -->no acute interventions  -APAP 975mg q8hrs PRN for acute pain  -PT/OT recommend SNF    #Syncope vs. vertigo #aortic valve calcifications   -Echocardiogram : EF 55 to 60%, impaired left ventricular diastolic filling  -EKG showing sinus tachycardia, otherwise unremarkable  -Orthostatics negative  -Stop home meclizine   -Geriatrics recommends outpatient vestibular therapy  -Carotid duplex 3/1: b/l plaque buildup with ulcerated plaque to LCA   -->CTA neck 3/1: b/l atherosclerotic disease with up to 50% stenosis   -->Vascular surgery consulted: The patient should be on 81 mg of aspirin daily and high intensity statin for risk factor modification and best medical therapy. Will f/up outpatient    #Comorbidities  -Continue home Combigan eye drops, ezetimibe, famotidine, spironolactone  -Holding home lifitegrast eye drops (not in formulary)  -Geriatrics team following     #FEN/GI #Hypomagnesemia  -SSI increased to #2  -Holding home glimepiride (do not resume upon discharge, per Es), liraglutide  -Carb controlled diet  -BR with senna, docusate  -Replete electrolytes as indicated      #DVT Ppx  -Enoxaparin 30mg BID  -SCDs    Seen and discussed with Dr. Castrejon.     30 minutes spent with patient reviewing VSS/medications, obtaining subjective information, performing physical exam, discussing plan of care;  with greater than 50% of that time spent in patient room.    Delilah Higginbotham PA-C  Trauma Surgery  Team Phone: [phone]    ==============================================================================  CHIEF COMPLAINT / OVERNIGHT EVENTS:   82 y/o s/p near syncopal fall with nondisplaced occipital fracture. No acute overnight events.     MEDICAL HISTORY / ROS:  Admission history and ROS reviewed. Pertinent changes as follows:  No new complaints.     PHYSICAL EXAM:  Heart Rate:  [82-91]   Temp:  [36.3 °C (97.3 °F)-37.2 °C (99 °F)]   Resp:  [18]   BP: (118-145)/(58-77)   SpO2:  [95 %-96 %]   Physical Exam  Vitals reviewed.   Constitutional:       General: She is awake. She is not in acute distress.     Appearance: She is well-developed. She is not ill-appearing or toxic-appearing.      Comments: Elderly female sitting up in chair in NAD   HENT:      Head: Normocephalic and atraumatic.      Comments: Bandaid to right cheek     Nose: Nose normal.      Mouth/Throat:      Mouth: Mucous membranes are moist.   Eyes:      Extraocular Movements:      Right eye: Normal extraocular motion.      Left eye: Normal extraocular motion.      Pupils: Pupils are equal, round, and reactive to light.   Cardiovascular:      Rate and Rhythm: Normal rate and regular rhythm.      Pulses:           Radial pulses are 2+ on the right side and 2+ on the left side.        Dorsalis pedis pulses are 2+ on the right side and 2+ on the left side.   Pulmonary:      Effort: Pulmonary effort is normal.      Breath sounds: Normal breath sounds. No decreased breath sounds.   Abdominal:      General: There is no distension.      Tenderness: There is no abdominal tenderness.   Musculoskeletal:      Cervical back: Normal range of  motion. Normal range of motion.      Thoracic back: Normal range of motion.      Right lower leg: No edema.      Left lower leg: No edema.   Skin:     General: Skin is warm and dry.      Capillary Refill: Capillary refill takes less than 2 seconds.      Comments: Thin hair   Neurological:      General: No focal deficit present.      Mental Status: She is alert and oriented to person, place, and time.      GCS: GCS eye subscore is 4. GCS verbal subscore is 5. GCS motor subscore is 6.      Sensory: Sensation is intact.      Motor: Motor function is intact.      Comments: Hard of hearing   Psychiatric:         Mood and Affect: Mood and affect normal.         Behavior: Behavior is cooperative.         IMAGING SUMMARY:  (summary of new imaging findings, not a copy of dictation)  N/A    LABS:  Results from last 7 days   Lab Units 02/29/24 0223   WBC AUTO x10*3/uL 9.7   HEMOGLOBIN g/dL 14.6   HEMATOCRIT % 42.2   PLATELETS AUTO x10*3/uL 179   NEUTROS PCT AUTO % 70.7   LYMPHS PCT AUTO % 16.5   MONOS PCT AUTO % 11.0   EOS PCT AUTO % 1.1     Results from last 7 days   Lab Units 02/29/24 0223   INR  1.1     Results from last 7 days   Lab Units 03/03/24  0652 03/02/24  0947 03/01/24  0804 02/29/24  1636 02/29/24 0223   SODIUM mmol/L 136 134* 134*   < > 132*   POTASSIUM mmol/L 4.4 4.0 3.5   < > 4.1   CHLORIDE mmol/L 102 101 99   < > 95*   CO2 mmol/L 26 23 24   < > 26   BUN mg/dL 12 12 17   < > 10   CREATININE mg/dL 0.53 0.40* 0.63   < > 0.42*   CALCIUM mg/dL 9.0 9.1 8.9   < > 9.4   PROTEIN TOTAL g/dL  --   --   --   --  6.4   BILIRUBIN TOTAL mg/dL  --   --   --   --  0.9   ALK PHOS U/L  --   --   --   --  53   ALT U/L  --   --   --   --  14   AST U/L  --   --   --   --  13   GLUCOSE mg/dL 142* 151* 112*   < > 167*    < > = values in this interval not displayed.     Results from last 7 days   Lab Units 02/29/24  0223   BILIRUBIN TOTAL mg/dL 0.9           I have reviewed all medications, laboratory results, and imaging pertinent  for today's encounter.

## 2024-03-03 NOTE — PROGRESS NOTES
[facility]  TRAUMA SERVICE - PROGRESS NOTE    Patient Name: Nancie Du  MRN: [id number]  Admit Date: 229  : 1940  AGE: 83 y.o.   GENDER: female  ==============================================================================  MECHANISM OF INJURY:   Ground level fall, recent episodes of syncope and h/o vertigo    LOC (yes/no?): Denies  Anticoagulant / Anti-platelet Rx? (for what dx?): Denies  Referring Facility Name (N/A for scene EMR run): Transfer from [facility]     INJURIES:   Non-displaced occipital fracture     OTHER MEDICAL PROBLEMS:  Near Syncope  Vertigo  Hyponatremia  H/o T2DM, HLD    INCIDENTAL FINDINGS:  Degenerative lumbar spine changes  T12 subacute fracture   Heavily calcified neck vasculature and aortic valve  Endometrial fluid    PROCEDURES:  N/A    ==============================================================================  TODAY'S ASSESSMENT AND PLAN OF CARE:  #Occipital fracture, degenerative spine changes  -NSGY consulted  -->no acute interventions  -Repeat CT head/spine unchanged  -APAP 975mg q8hrs for acute pain  -PT/OT    #Syncope vs. vertigo #aortic valve calcifications   -Echocardiogram : EF 55 to 60%, impaired left ventricular diastolic filling  -EKG showing sinus tachycardia, otherwise unremarkable  -Orthostatics negative  -Stop home meclizine   -Geriatrics recommends outpatient vestibular therapy  -Carotid duplex 3/1: b/l plaque buildup with ulcerated plaque to LCA   -->CTA neck 3/1: b/l atherosclerotic disease with up to 50% stenosis   -->Vascular surgery consulted: No acute intervention. Will f/up outpatient    #Hyponatremia  -132 upon admission, has been mildly hyponatremic in the past  -Repeat BMP daily    #Comorbidities  -Continue home Combigan eye drops, ezetimibe, famotidine, spironolactone  -Holding home lifitegrast eye drops (not in formulary)  -Geriatrics team following     #FEN/GI  -SSI increased to #2  -Holding home  glimepiride (do not resume upon discharge, per Es), liraglutide  -Carb controlled diet  -BR with senmarlee docusate    #DVT Ppx  -Enoxaparin 30mg BID  -SCDs    Seen and discussed with Dr. Castrejon.     20 minutes spent with patient reviewing VSS/medications, obtaining subjective information, performing physical exam, discussing plan of care;  with greater than 50% of that time spent in patient room.    Delilah Higginbotham PA-C  Trauma Surgery  Team Phone: [phone]    ==============================================================================  CHIEF COMPLAINT / OVERNIGHT EVENTS:   84 y/o s/p near syncopal fall with nondisplaced occipital fracture. No acute overnight events.     MEDICAL HISTORY / ROS:  Admission history and ROS reviewed. Pertinent changes as follows:  No new complaints.     PHYSICAL EXAM:  Heart Rate:  [84-89]   Temp:  [36.3 °C (97.3 °F)-37.2 °C (99 °F)]   Resp:  [16-18]   BP: (121-145)/(56-77)   SpO2:  [92 %-96 %]   Physical Exam  Vitals reviewed.   Constitutional:       General: She is awake. She is not in acute distress.     Appearance: She is well-developed. She is not ill-appearing or toxic-appearing.      Comments: Elderly female sitting up in chair in NAD   HENT:      Head: Normocephalic and atraumatic.      Comments: Bandaid to right cheek     Nose: Nose normal.      Mouth/Throat:      Mouth: Mucous membranes are moist.   Eyes:      Extraocular Movements:      Right eye: Normal extraocular motion.      Left eye: Normal extraocular motion.      Pupils: Pupils are equal, round, and reactive to light.   Cardiovascular:      Rate and Rhythm: Normal rate and regular rhythm.      Pulses:           Radial pulses are 2+ on the right side and 2+ on the left side.        Dorsalis pedis pulses are 2+ on the right side and 2+ on the left side.   Pulmonary:      Effort: Pulmonary effort is normal.      Breath sounds: Normal breath sounds. No decreased breath sounds.   Abdominal:      General: There is no  distension.      Tenderness: There is no abdominal tenderness.   Musculoskeletal:      Cervical back: Normal range of motion. Normal range of motion.      Thoracic back: Normal range of motion.      Right lower leg: No edema.      Left lower leg: No edema.   Skin:     General: Skin is warm and dry.      Capillary Refill: Capillary refill takes less than 2 seconds.      Comments: Thin hair   Neurological:      General: No focal deficit present.      Mental Status: She is alert and oriented to person, place, and time.      GCS: GCS eye subscore is 4. GCS verbal subscore is 5. GCS motor subscore is 6.      Sensory: Sensation is intact.      Motor: Motor function is intact.      Comments: Hard of hearing   Psychiatric:         Mood and Affect: Mood and affect normal.         Behavior: Behavior is cooperative.         IMAGING SUMMARY:  (summary of new imaging findings, not a copy of dictation)  N/A    LABS:  Results from last 7 days   Lab Units 02/29/24 0223   WBC AUTO x10*3/uL 9.7   HEMOGLOBIN g/dL 14.6   HEMATOCRIT % 42.2   PLATELETS AUTO x10*3/uL 179   NEUTROS PCT AUTO % 70.7   LYMPHS PCT AUTO % 16.5   MONOS PCT AUTO % 11.0   EOS PCT AUTO % 1.1     Results from last 7 days   Lab Units 02/29/24 0223   INR  1.1     Results from last 7 days   Lab Units 03/02/24  0947 03/01/24  0804 02/29/24  1636 02/29/24 0223   SODIUM mmol/L 134* 134* 130* 132*   POTASSIUM mmol/L 4.0 3.5 4.0 4.1   CHLORIDE mmol/L 101 99 96* 95*   CO2 mmol/L 23 24 23 26   BUN mg/dL 12 17 13 10   CREATININE mg/dL 0.40* 0.63 0.56 0.42*   CALCIUM mg/dL 9.1 8.9 9.2 9.4   PROTEIN TOTAL g/dL  --   --   --  6.4   BILIRUBIN TOTAL mg/dL  --   --   --  0.9   ALK PHOS U/L  --   --   --  53   ALT U/L  --   --   --  14   AST U/L  --   --   --  13   GLUCOSE mg/dL 151* 112* 203* 167*     Results from last 7 days   Lab Units 02/29/24 0223   BILIRUBIN TOTAL mg/dL 0.9           I have reviewed all medications, laboratory results, and imaging pertinent for today's  encounter.

## 2024-03-03 NOTE — CARE PLAN
The patient's goals for the shift include discharge planning     The clinical goals for the shift include pt will be HD stable

## 2024-03-04 ENCOUNTER — HOSPITAL ENCOUNTER (INPATIENT)
Age: 84
LOS: 12 days | Discharge: HOME HEALTH | DRG: 560 | End: 2024-03-16
Payer: MEDICARE

## 2024-03-04 VITALS — BODY MASS INDEX: 30.9 KG/M2 | BODY MASS INDEX: 29.6 KG/M2 | BODY MASS INDEX: 31.1 KG/M2

## 2024-03-04 VITALS
SYSTOLIC BLOOD PRESSURE: 137 MMHG | HEART RATE: 95 BPM | RESPIRATION RATE: 18 BRPM | TEMPERATURE: 97.52 F | OXYGEN SATURATION: 97 % | DIASTOLIC BLOOD PRESSURE: 74 MMHG

## 2024-03-04 VITALS
SYSTOLIC BLOOD PRESSURE: 133 MMHG | DIASTOLIC BLOOD PRESSURE: 72 MMHG | OXYGEN SATURATION: 96 % | TEMPERATURE: 97.88 F | RESPIRATION RATE: 16 BRPM | HEART RATE: 91 BPM

## 2024-03-04 VITALS
TEMPERATURE: 98.6 F | HEIGHT: 63 IN | WEIGHT: 179.01 LBS | SYSTOLIC BLOOD PRESSURE: 133 MMHG | BODY MASS INDEX: 31.72 KG/M2 | HEART RATE: 87 BPM | RESPIRATION RATE: 18 BRPM | OXYGEN SATURATION: 95 % | DIASTOLIC BLOOD PRESSURE: 70 MMHG

## 2024-03-04 DIAGNOSIS — Z79.899: ICD-10-CM

## 2024-03-04 DIAGNOSIS — G47.33: ICD-10-CM

## 2024-03-04 DIAGNOSIS — G89.29: ICD-10-CM

## 2024-03-04 DIAGNOSIS — E86.0: ICD-10-CM

## 2024-03-04 DIAGNOSIS — E11.39: ICD-10-CM

## 2024-03-04 DIAGNOSIS — Z79.82: ICD-10-CM

## 2024-03-04 DIAGNOSIS — K58.2: ICD-10-CM

## 2024-03-04 DIAGNOSIS — B95.7: ICD-10-CM

## 2024-03-04 DIAGNOSIS — F32.A: ICD-10-CM

## 2024-03-04 DIAGNOSIS — N30.00: ICD-10-CM

## 2024-03-04 DIAGNOSIS — Z91.81: ICD-10-CM

## 2024-03-04 DIAGNOSIS — H91.90: ICD-10-CM

## 2024-03-04 DIAGNOSIS — E78.5: ICD-10-CM

## 2024-03-04 DIAGNOSIS — E11.59: ICD-10-CM

## 2024-03-04 DIAGNOSIS — Z23: ICD-10-CM

## 2024-03-04 DIAGNOSIS — R09.02: ICD-10-CM

## 2024-03-04 DIAGNOSIS — Z79.84: ICD-10-CM

## 2024-03-04 DIAGNOSIS — C44.309: ICD-10-CM

## 2024-03-04 DIAGNOSIS — H40.9: ICD-10-CM

## 2024-03-04 DIAGNOSIS — I65.23: ICD-10-CM

## 2024-03-04 DIAGNOSIS — S02.119D: Primary | ICD-10-CM

## 2024-03-04 DIAGNOSIS — R42: ICD-10-CM

## 2024-03-04 DIAGNOSIS — M48.061: ICD-10-CM

## 2024-03-04 DIAGNOSIS — I87.2: ICD-10-CM

## 2024-03-04 DIAGNOSIS — E55.9: ICD-10-CM
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
## 2024-03-04 DIAGNOSIS — E11.65: ICD-10-CM

## 2024-03-04 DIAGNOSIS — S32.009D: ICD-10-CM

## 2024-03-04 LAB
GLUCOSE BLD MANUAL STRIP-MCNC: 164 MG/DL (ref 74–99)
GLUCOSE BLD MANUAL STRIP-MCNC: 287 MG/DL (ref 74–99)

## 2024-03-04 PROCEDURE — 99239 HOSP IP/OBS DSCHRG MGMT >30: CPT | Performed by: STUDENT IN AN ORGANIZED HEALTH CARE EDUCATION/TRAINING PROGRAM

## 2024-03-04 PROCEDURE — 97166 OT EVAL MOD COMPLEX 45 MIN: CPT

## 2024-03-04 PROCEDURE — 84100 ASSAY OF PHOSPHORUS: CPT

## 2024-03-04 PROCEDURE — 97116 GAIT TRAINING THERAPY: CPT

## 2024-03-04 PROCEDURE — 94762 N-INVAS EAR/PLS OXIMTRY CONT: CPT

## 2024-03-04 PROCEDURE — 80053 COMPREHEN METABOLIC PANEL: CPT

## 2024-03-04 PROCEDURE — 97530 THERAPEUTIC ACTIVITIES: CPT

## 2024-03-04 PROCEDURE — 85025 COMPLETE CBC W/AUTO DIFF WBC: CPT

## 2024-03-04 PROCEDURE — 97162 PT EVAL MOD COMPLEX 30 MIN: CPT

## 2024-03-04 PROCEDURE — 87186 SC STD MICRODIL/AGAR DIL: CPT

## 2024-03-04 PROCEDURE — 2500000004 HC RX 250 GENERAL PHARMACY W/ HCPCS (ALT 636 FOR OP/ED): Performed by: PHYSICIAN ASSISTANT

## 2024-03-04 PROCEDURE — 82962 GLUCOSE BLOOD TEST: CPT

## 2024-03-04 PROCEDURE — 82947 ASSAY GLUCOSE BLOOD QUANT: CPT

## 2024-03-04 PROCEDURE — 82306 VITAMIN D 25 HYDROXY: CPT

## 2024-03-04 PROCEDURE — 36415 COLL VENOUS BLD VENIPUNCTURE: CPT

## 2024-03-04 PROCEDURE — 87086 URINE CULTURE/COLONY COUNT: CPT

## 2024-03-04 PROCEDURE — 87088 URINE BACTERIA CULTURE: CPT

## 2024-03-04 PROCEDURE — 2500000001 HC RX 250 WO HCPCS SELF ADMINISTERED DRUGS (ALT 637 FOR MEDICARE OP): Performed by: PHYSICIAN ASSISTANT

## 2024-03-04 PROCEDURE — 80048 BASIC METABOLIC PNL TOTAL CA: CPT

## 2024-03-04 PROCEDURE — 97802 MEDICAL NUTRITION INDIV IN: CPT

## 2024-03-04 PROCEDURE — 97535 SELF CARE MNGMENT TRAINING: CPT

## 2024-03-04 PROCEDURE — 92523 SPEECH SOUND LANG COMPREHEN: CPT

## 2024-03-04 PROCEDURE — 85027 COMPLETE CBC AUTOMATED: CPT

## 2024-03-04 PROCEDURE — 87077 CULTURE AEROBIC IDENTIFY: CPT

## 2024-03-04 PROCEDURE — 2500000005 HC RX 250 GENERAL PHARMACY W/O HCPCS: Performed by: PHYSICIAN ASSISTANT

## 2024-03-04 PROCEDURE — 97803 MED NUTRITION INDIV SUBSEQ: CPT

## 2024-03-04 PROCEDURE — 97130 THER IVNTJ EA ADDL 15 MIN: CPT

## 2024-03-04 PROCEDURE — 92507 TX SP LANG VOICE COMM INDIV: CPT

## 2024-03-04 PROCEDURE — 97129 THER IVNTJ 1ST 15 MIN: CPT

## 2024-03-04 PROCEDURE — 97110 THERAPEUTIC EXERCISES: CPT

## 2024-03-04 PROCEDURE — 83735 ASSAY OF MAGNESIUM: CPT

## 2024-03-04 PROCEDURE — G0008 ADMIN INFLUENZA VIRUS VAC: HCPCS

## 2024-03-04 PROCEDURE — 81001 URINALYSIS AUTO W/SCOPE: CPT

## 2024-03-04 RX ORDER — LIDOCAINE 560 MG/1
1 PATCH PERCUTANEOUS; TOPICAL; TRANSDERMAL DAILY
Qty: 20 PATCH | Refills: 0
Start: 2024-03-05 | End: 2024-03-15

## 2024-03-04 RX ORDER — NAPROXEN SODIUM 220 MG/1
81 TABLET, FILM COATED ORAL DAILY
Qty: 30 TABLET | Refills: 1
Start: 2024-03-04 | End: 2024-05-03

## 2024-03-04 RX ORDER — ACETAMINOPHEN 325 MG/1
650 TABLET ORAL EVERY 6 HOURS PRN
Qty: 30 TABLET | Refills: 0
Start: 2024-03-04 | End: 2024-03-18

## 2024-03-04 RX ADMIN — INSULIN LISPRO 6 UNITS: 100 INJECTION, SOLUTION INTRAVENOUS; SUBCUTANEOUS at 14:05

## 2024-03-04 RX ADMIN — INSULIN LISPRO 2 UNITS: 100 INJECTION, SOLUTION INTRAVENOUS; SUBCUTANEOUS at 09:49

## 2024-03-04 RX ADMIN — LIDOCAINE 1 PATCH: 4 PATCH TOPICAL at 08:05

## 2024-03-04 RX ADMIN — ACETAMINOPHEN 650 MG: 325 TABLET ORAL at 15:51

## 2024-03-04 RX ADMIN — ENOXAPARIN SODIUM 30 MG: 100 INJECTION SUBCUTANEOUS at 08:04

## 2024-03-04 RX ADMIN — MAGNESIUM 64 MG (MAGNESIUM CHLORIDE) TABLET,DELAYED RELEASE 64 MG: at 08:04

## 2024-03-04 ASSESSMENT — PAIN SCALES - GENERAL
PAINLEVEL_OUTOF10: 3
PAINLEVEL_OUTOF10: 0 - NO PAIN
PAINLEVEL_OUTOF10: 2

## 2024-03-04 ASSESSMENT — PAIN - FUNCTIONAL ASSESSMENT
PAIN_FUNCTIONAL_ASSESSMENT: 0-10
PAIN_FUNCTIONAL_ASSESSMENT: 0-10

## 2024-03-04 ASSESSMENT — PAIN DESCRIPTION - LOCATION: LOCATION: HEAD

## 2024-03-04 NOTE — CARE PLAN
The patient's goals for the shift include pain control    The clinical goals for the shift include no falls

## 2024-03-04 NOTE — PROGRESS NOTES
Patient will dc to [facility]. # for N2N [phone]. Daughter Orquidea aware of dc. IMM completed. Copy signed and in paper chart

## 2024-03-04 NOTE — DISCHARGE SUMMARY
Discharge Diagnosis  Fall, initial encounter    Issues Requiring Follow-Up  - routine post-admission follow-up with trauma surgery  - routine follow-up in one year for surveillance of 50% carotid artery stenosis.    Test Results Pending At Discharge  Pending Labs       No current pending labs.        Hospital Course  Nancie Du is a 84 yo F presenting as a LIMITED activation after sustaining a fall from ground level on 2/29. Was taking out the trash when a michael of wind knocked her to the ground. Struck her head but denies LOC. Has been having episodes of dizziness at home for quite some time. Has been seeing a neurologist. Pt was transferred to the [facility] 3/1, no acute interventions for her occipital fx, and undergoing workup for syncope/vertigo. PT/OT recommending high intensity therapy on discharge however pt states she only wants to go home at discharge at that time 3/1. 3/2 pt remained HDS, Cta of the neck had show bilaterally  atherosclerotic disease, vascular surgery involved and rec'd outpatient follow up.  Pt remained stable on 3/3, and plan to take aspirin 81mg daily for management.    At the time of discharge, patient's pain was controlled with oral analgesia, patient was urinating, having BMs, sleeping, and eating well. Based on PT/OT's recommendation, patient was discharged to SNF on 03/04/2024  with scripts and follow up appointments. Discharge plan was discussed with the patient and all of the patient's questions were answered.       Pertinent Physical Exam At Time of Discharge  Vitals:    03/04/24 1159   BP: 131/78   Pulse: 95   Resp: 18   Temp: 37.2 °C (99 °F)   SpO2: 96%     Physical Exam  Vitals reviewed.   Constitutional:       General: She is awake. She is not in acute distress.     Appearance: She is well-developed. She is not ill-appearing or toxic-appearing.      Comments: Elderly female sitting up in chair in NAD   HENT:      Head: Normocephalic and atraumatic.      Comments: Bandaid to right  cheek     Nose: Nose normal.      Mouth/Throat:      Mouth: Mucous membranes are moist.   Eyes:      Extraocular Movements:      Right eye: Normal extraocular motion.      Left eye: Normal extraocular motion.      Pupils: Pupils are equal, round, and reactive to light.   Cardiovascular:      Rate and Rhythm: Normal rate and regular rhythm.      Pulses:           Radial pulses are 2+ on the right side and 2+ on the left side.        Dorsalis pedis pulses are 2+ on the right side and 2+ on the left side.   Pulmonary:      Effort: Pulmonary effort is normal.      Breath sounds: Normal breath sounds. No decreased breath sounds.   Abdominal:      General: There is no distension.      Tenderness: There is no abdominal tenderness.   Musculoskeletal:      Cervical back: Normal range of motion. Normal range of motion.      Thoracic back: Normal range of motion.      Right lower leg: No edema.      Left lower leg: No edema.   Skin:     General: Skin is warm and dry.      Capillary Refill: Capillary refill takes less than 2 seconds.      Comments: Thin hair   Neurological:      General: No focal deficit present.      Mental Status: She is alert and oriented to person, place, and time.      GCS: GCS eye subscore is 4. GCS verbal subscore is 5. GCS motor subscore is 6.      Sensory: Sensation is intact.      Motor: Motor function is intact.      Comments: Hard of hearing   Psychiatric:         Mood and Affect: Mood and affect normal.         Behavior: Behavior is cooperative.     Home Medications     Medication List      START taking these medications     acetaminophen 325 mg tablet; Commonly known as: Tylenol; Take 2 tablets   (650 mg) by mouth every 6 hours if needed for mild pain (1 - 3) for up to   14 days.   aspirin 81 mg chewable tablet; Chew 1 tablet (81 mg) once daily.   lidocaine 4 % patch; Place 1 patch over 12 hours on the skin once daily   for 10 days. Remove & discard patch within 12 hours or as directed by MD.   Do  "not start before March 5, 2024.; Start taking on: March 5, 2024     CONTINUE taking these medications     Accu-Chek Sheree Plus test strp strip; Generic drug: blood sugar   diagnostic   b complex 0.4 mg tablet   brimonidine-timoloL 0.2-0.5 % ophthalmic solution; Commonly known as:   Combigan   diclofenac sodium 1 % gel; Commonly known as: Voltaren; Apply 1   Application topically 2 times a day. Apply to knee   ezetimibe 10 mg tablet; Commonly known as: Zetia; Take 1 tablet (10 mg)   by mouth once daily.   famotidine 20 mg tablet; Commonly known as: Pepcid   meclizine 25 mg tablet; Commonly known as: Antivert   multivitamin tablet   naproxen sodium 220 mg capsule   pen needle, diabetic 31 gauge x 3/16\" needle; Commonly known as: BD   Ultra-Fine Mini Pen Needle; 1 each once daily. 31G x 5mm; Inject 1 each as   directed daily   spironolactone 50 mg tablet; Commonly known as: Aldactone   triamcinolone 0.1 % cream; Commonly known as: Kenalog; Apply topically 2   times a day. Apply to affected area 1-2 times daily as needed. Avoid face   and groin.   Victoza 2-Christian 0.6 mg/0.1 mL (18 mg/3 mL) injection; Generic drug:   liraglutide; Inject 0.3 mL (1.8 mg) under the skin once daily.   XIIDRA OPHT     STOP taking these medications     glimepiride 1 mg tablet; Commonly known as: AmaryL       Outpatient Follow-Up  Future Appointments   Date Time Provider Department Center   4/3/2024  1:20 PM Aspen Knapp DO [id number] Akin Webber MD  PGY1 Trauma Surgery  [id number]  Available via secure chat  "

## 2024-03-04 NOTE — DISCHARGE INSTRUCTIONS
[facility]  DISCHARGE INSTRUCTIONS    You were admitted to [facility] from 02/29/24 - 03/04/24 for treatment and evaluation after a fall. You sustained the following injuries: non-displaced occipital fracture. You did not require surgery during this hospital stay.    GENERAL INSTRUCTIONS  1) Diet: You may resume your regular home diet     2) Activity:   - Move around as you are able  - Avoiding driving when your pain is not well controlled. If your pain would hinder you from slamming the breaks or turning your head from side to side, avoid driving  - Refrain from driving if you have used any narcotic medication in the last 48-72 hours. These medications can slow down your reaction time and impair your judgment.  -Avoid strenuous activities including     3) Medications:   - Take tylenol 650 mg every 6 hours as needed for your pain.   - You can apply 1 lidocaine patches  as needed for pain. Remember to have a period of 12 hours where you ARE NOT wearing a lidocaine patch BEFORE  you reapply another patch.   - You will now start taking Asprin 81 mg daily  - you will resume all of your other home medications that you were taking before being admitted to the hospital  - Stop taking Glimepiride.    4) Return precautions --   - Fever > 100.5 F (>38 C), chills  -uncontrolled nausea and/or vomiting  -Chest pain  -new or worsening shortness of breathe  -uncontrolled diarrhea   -you stop passing gas   -have new bruising, rashes, blistering on your body  -new numbness/tingling, loss of feeling in any part of your body  -new or worsening swelling  -uncontrolled pain  - feeling of black curtain blocking your vision in 1 eye      IF YOU THINK THIS IS AN EMERGENCY, PLEASE CALL 911 OR VISIT YOUR NEAREST EMERGENCY DEPARTMENT.    5) Incentive spirometer use: please try to use the incentive spirometer every hour that you are awake.  A good idea is to use this device  10-15 times each hour (if the TV is on, 5-6 times per commercial will help you meet this goal). Your goal should be to reach 2000 on the spirometer over the week following your discharge.      ADDITIONAL INSTRUCTIONS    FOLLOW-UP APPOINTMENTS:  Trauma Surgery Clinic  You will follow-up with this clinic in 1 month after discharge. If you do not hear from them within 1 week of discharge with appointment information, please call [phone] to schedule your appointment.      Vascular Surgery Clinic  You will follow-up with this clinic in 1 year after discharge. If you do not hear from them within 1 week of discharge with appointment information, please call [phone] to schedule your appointment. This is follow-up on the narrowing of arteries in your neck (carotid arteries)    - primary care physician follow-up  Please set up a follow-up visit with you PCP. Also ask them about a good medication option for alternatives to a statin given you have an allergy to this class of medication.

## 2024-03-05 VITALS — RESPIRATION RATE: 17 BRPM | OXYGEN SATURATION: 98 % | HEART RATE: 97 BPM

## 2024-03-05 VITALS
DIASTOLIC BLOOD PRESSURE: 75 MMHG | OXYGEN SATURATION: 981 % | HEART RATE: 97 BPM | RESPIRATION RATE: 17 BRPM | SYSTOLIC BLOOD PRESSURE: 144 MMHG | TEMPERATURE: 97.9 F

## 2024-03-05 VITALS — OXYGEN SATURATION: 97 % | HEART RATE: 85 BPM

## 2024-03-05 VITALS
HEART RATE: 82 BPM | OXYGEN SATURATION: 96 % | RESPIRATION RATE: 16 BRPM | DIASTOLIC BLOOD PRESSURE: 73 MMHG | TEMPERATURE: 97.7 F | SYSTOLIC BLOOD PRESSURE: 138 MMHG

## 2024-03-05 VITALS — DIASTOLIC BLOOD PRESSURE: 61 MMHG | HEART RATE: 84 BPM | SYSTOLIC BLOOD PRESSURE: 134 MMHG

## 2024-03-05 LAB
ALANINE AMINOTRANSFER ALT/SGPT: 20 U/L (ref 13–56)
ALBUMIN SERPL-MCNC: 3.1 G/DL (ref 3.2–5)
ALKALINE PHOSPHATASE: 55 U/L (ref 45–117)
ANION GAP: 8 (ref 5–15)
AST(SGOT): 12 U/L (ref 15–37)
BUN SERPL-MCNC: 11 MG/DL (ref 7–18)
BUN/CREAT RATIO: 18.3 RATIO (ref 10–20)
CALCIUM SERPL-MCNC: 9.1 MG/DL (ref 8.5–10.1)
CARBON DIOXIDE: 28 MMOL/L (ref 21–32)
CHLORIDE: 102 MMOL/L (ref 98–107)
DEPRECATED RDW RBC: 41.6 FL (ref 35.1–43.9)
ERYTHROCYTE [DISTWIDTH] IN BLOOD: 12.7 % (ref 11.6–14.6)
EST GLOM FILT RATE - AFR AMER: 122 ML/MIN (ref 60–?)
GLOBULIN: 3.1 G/DL (ref 2.2–4.2)
GLUCOSE: 168 MG/DL (ref 74–106)
HCT VFR BLD AUTO: 39.6 % (ref 37–47)
HGB BLD-MCNC: 13 G/DL (ref 12–15)
IMMATURE GRANULOCYTES COUNT: 0.02 X10^3/UL (ref 0–0)
MAGNESIUM: 2 MG/DL (ref 1.6–2.6)
MCV RBC: 89.6 FL (ref 81–99)
MEAN CORP HGB CONC: 32.8 G/DL (ref 32–36)
MEAN PLATELET VOL.: 10.4 FL (ref 6.2–12)
NRBC FLAGGED BY ANALYZER: 0 % (ref 0–5)
PLATELET # BLD: 225 K/MM3 (ref 150–450)
POTASSIUM: 3.8 MMOL/L (ref 3.5–5.1)
RBC # BLD AUTO: 4.42 M/MM3 (ref 4.2–5.4)
VITAMIN D,25 HYDROXY: 23.2 NG/ML
WBC # BLD AUTO: 7.3 K/MM3 (ref 4.4–11)

## 2024-03-05 NOTE — REHABEVAL_ITS
Admission Information    
Primary Diagnosis:: Skull fracture    
Status Changes from Prescreening?: No changes Identified    
Actual Problem List:: Falls, Skin Intergrity, Pain, ALteration in Cmfrt,   
Mobility Impaired, Self Care Deficit, Diabetes, Hyperglycemia and Alteration-  
Leisure Activ.    
Potential Problem List:: DVT, Bleeding, Infection, UTI, Aspiration, Falls, Skin   
Integrity and Depression    
    
Risk of Complications    
DVT: LMWH and NEO Hose    
Bleeding: Monitor Lab Values, Nursing to Teach Precautions for anti-coagulation   
therapy., Wound, if applicable, to be assessed every shift. and Stroke patients   
assessed for lethargy or change in status.    
Infection: Clinical Staff to Monitor for S/S of infection: and S/S of infection   
include fever, redness, warmth, etc.    
Urinary Tract Infection: Monitor for frequency, burning, discomfort, or   
incontinence. and Nursing will obtain urine sample for urinalysis and C&S when   
ordered.    
Aspiration: Clinical staff will monitor for coughing, drooling, congestion.,   
Speech will evaluate swallowing and dsyphasia. and Nursing will monitor patient   
swallowing during meals.    
Falls: Patient will be evaluated for Fall Precautions and Patient will be placed  
on Fall Precautions as indicated per protocol.    
Skin Breakdown: Nursing will assess skin daily using assessment tool. and   
Nursing will place on Skin Breakdown Precautions as indicated.    
Pain: Clinical staff will assess patient's pain level per protocol., Medications  
will be given, if needed, and the pain level reassessed. and Other methods:   
Massage, distraction, decrease stimulus, etc. used PRN.    
    
Plan of Care    
Patient requires physician specializing in physical medicine and rehab oversight  
to provide close medical supervision of rehab issues including: Pain Management,  
Sleep Problems, Bowel and Bladder, Medical and co-morbidity Management, DVT   
prophylaxis, Rehabilitation Leadership and Coordination of treatment team    
Patient needs Physical Therapy: For a minimum of 1 hour and At least 5 out of 7   
days    
Patient needs Physical Therapy to improve:: Mobility, Strengthening, Transfers,   
Stretching, ROM, Endurance, Stairs, Gait and Balance    
Patient needs Occupational Therapy: For a minimum of 1 hour and At least 5 out   
of 7 days    
Patient needs Occupational Therapy to improve ADL's incl.: Eating, Grooming,   
Bathing, Dressing, Toileting, Toilet transfers, Community Reintegration, Higher   
functioning activities, Household tasks, Adaptive Equipment, Splinting and Other  
activities as determined    
Patient requires 24/7 Rehabilitation Nursing for: Pain Issues, Identifying and   
preventing risk factors, Monitoring and reporting current medical conditions,   
Assisting with ambulation, transfer, and all ADL's, Teaching patients about   
disease process and medications, Family teaching, Providing safe environment,   
Bowel and Bladder Issues, Skin integrity and Medication Management    
Patient needs / Case Management for: Discharge Planning,   
Arranging Home Equipment or Services and Family Interventions    
Patient needs Dietary and Nutrition Services for: Adequate Nutrition,   
Nutritional Supplements and Nutritional Education

## 2024-03-05 NOTE — HP.PCM_ITS
HPI - General    
General    
Date of Admission: 24    
Date of Service: 24    
Chief Complaint: Skull fracture due to fall    
HPI Narrative    
     CATHI TAMEZ, is a 84 YO F with a PMH of DM II, HLD, BPPV (X4 years), DJD,   
presbycusis (has bilateral hearing aids), glaucoma, chronic back pain,   
gallbladder dysfunction and hx of breast CA who had a ground level fall on   
24.  She struck her head but, she did not lose consciousness.  She   
sustained a non-displaced occipital fx of the skull.  She was admitted to   
[facility].  C spine imaging was negative for fracture or   
dislocation. CT of C/A/P showed endometrial thickening measuring up to 9 mm with  
no other pathology.  CTA of the neck on 3/1/2024 showed bilateral atheroscle  
rotic disease up to 50% stenosis.     
CT scan of the lumbar spine shows multilevel spondylitic changes with at least   
moderate spinal canal stenosis at L3-L4.  There was an ulcerated plaque in the   
left carotid artery.  Vascular surgery recommended 81 mg of aspirin daily and a   
high intensity statin for risk factor modification.  Lab at admission to    
showed a low sodium at 132 and low chloride at 95.  The BUN was 10 with a   
creatinine of 0.42.  Glucose was elevated at 167.  TSH was normal.  Hemoglobin   
A1c is 7.3%.  She is on Victoza for the past 3 years and says she takes 1.8 mg   
weekly.   While at  she was seen by PT/OT and acute rehab was recommended.    
Cathi was transferred to the acute inpt rehab unit at [facility] on 3/4/24 for 3 hours   
of therapy daily to restore function/independence at or near her level prior to   
the fall.      
    
    
She sees a neurologist for vertigo, Dr. Rivera Henderson    
PCP is Dr. Zulay Michael in [place]       
she has declined therapy for anxiety/depression in the past.   passed 4   
years ago........interesting that this is when the vertigo started.     
    
Cathi tells me that she had vertigo after her   and she went to   
therapy and it resolved.  Recently she has been c/o a  head sensation  that   
seems to get worse when she is up and walking about after her breakfast.  She   
denies having vertigo and denies spinning.  She only recently got back on   
Meclizine, from the neurologist, and took it for 1 week and it did not   
help......it actually made her worse.  She tells me that she had depression   
after her  passed but, she went to counselling at [facility] and she says it   
helped.  She denies feeling depressed at this time and she also denies anxiety.   
She denies problems sleeping.  she falls asleep on the couch at night and then   
gets up after a while and goes to the kitchen for a snack of a pudding cup and   
some fruit and then she goes to bed.......this is usually around 2 AM.  She   
denies ever being diagnosed with sleep apnea.    
She has had a hysteroscopy within the past 4 years because she had some vaginal   
bleeding.  She is not able to recall the name of the doctor she saw but, she had  
a hysteroscopy and it was negative for malignancy.  She tells me that she had an  
IUD for many years.      
    
Cathi tells me that she takes Aldactone only when she has increased swelling in   
her legs.....she does not often take this because  it makes me pee too much .      
    
All lab work done this morning was personally reviewed.  CBC is unremarkable.    
Sodium is 138 and the potassium is 3.8.  BUN is 11 with a creatinine of 0.6.    
LFTs are unremarkable.  Magnesium, phosphorus and calcium are within normal   
limits.  Vitamin D was ordered and is pending.    
    
Echocardiogram shows ejection fraction of 55 to 60%.  There is impaired   
relaxation pattern of the left ventricular diastolic filling.  The atria are   
normal size.  The aortic valve is mildly thickened.  The mitral valve is mildly   
thickened.  She has a trace of tricuspid and mitral regurgitation.      
    
    
    
    
[facility]    
Medical History (Updated 24 @ 12:55 by Dr. Dionne Valencia DO)    
    
Cancer    
Cancer    
Chronic back pain    
Diabetes mellitus, type 2    
Dysfunctional gallbladder    
Episodic recurrent vertigo    
Glaucoma    
Hyperlipidemia    
Lumbar canal stenosis    
Presbycusis of both ears    
Skull fracture without loss of consciousness    
Thickened endometrium    
Vertebral fracture, closed    
    
    
                                Home Medications    
    
acetaminophen 500 mg capsule 1,000 mg PO Q8H PRN pain 24 [History Last   
Taken Unknown]    
aspirin 81 mg capsule 81 mg PO DAILY heart health 24 [History Last Taken   
Unknown]    
brimonidine 0.2 %-timolol 0.5 % eye drops drp ophthalmic (eye) BID glaucoma   
24 [History Last Taken Unknown]    
diclofenac sodium 1 % topical gel topical BID pain to knee 24 [History   
Last Taken Unknown]    
ezetimibe 10 mg tablet 10 mg PO DAILY cholesterol inhibitor 24 [History   
Last Taken Unknown]    
lidocaine 4 % topical patch (Lidocaine Pain Relief) 1 patch topical Q12H pain to  
lower mid back 24 [History Last Taken Unknown]    
multivitamin (Daily Multi-Vitamin tablet) 1 tab PO DAILY supplement 24   
[History Last Taken Unknown]    
naproxen 500 mg tablet,delayed release (EC-Naprosyn) 250 mg PO .Q6H/PRN PAIN   
24 [History Last Taken Unknown]    
spironolactone 50 mg tablet (Aldactone) 50 mg PO DAILY HTN 24 [History   
Last Taken Unknown]    
liraglutide 0.6 mg/0.1 mL (18 mg/3 mL) subcutaneous pen injector (Victoza 2-Jose Juan)  
0.3 mg subcut DAILY glucose 24 [History Last Taken Unknown]    
    
    
                                            
    
    
    
Allergy/AdvReac Type Severity Reaction Status Date / Time    
     
Statins-HMG-CoA Reductase Allergy Mild unknown Verified 24 20:24    
    
Inhibitor         
     
alendronate sodium AdvReac Intermediate burning in Verified 24 20:24    
    
[From Fosamax]   stomach      
     
adhesive tape AdvReac Mild blisters Verified 24 20:24    
    
    
    
Family History (Updated 24 @ 10:15 by Dr. Dionne Valencia DO)    
Mother   Heart disease    
   Hypertension    
Father   Hypertension    
    
    
Surgical History (Updated 24 @ 10:20 by Dr. Dionne Valencia DO)    
    
H/O left mastectomy    
History of ankle surgery    
History of hysteroscopy    
    
    
Social History (Updated 24 @ 11:38 by Dr. Dionne Valencia DO)    
household members:  none     
housing:  house     
number of children:  3     
current occupational status:  retired and other details: Retired RN.     
pets and animals:  Yes (1 cat Named Danielle) pets and animals: cat(s)     
leisure activities:  reading     
history of recent travel:  No     
sexually active:  No     
Smoking Status:  Never smoker     
alcohol intake:  never     
substance use type:  does not use     
    
    
    
ROS    
Review of Systems    
ROS Unobtainable: Denies due to encephalopathy, due to endotracheal tube, due to  
mental condition or due to mental status    
Constitutional    
Constitutional: Denies anorexia, change in weight, chills, fatigue, fever(s),   
headache(s), night sweats, poor appetite or weakness    
Eyes    
Eyes: Reports dry eyes; Denies blurry vision, change in vision, diplopia,   
discharge from eye(s), eye pain or loss of vision    
ENT    
HEENT: Reports abnormal hearing and hearing loss; Denies dysphagia, headache(s),  
nasal congestion or sore throat    
Cardiovascular    
Cardiovascular: Reports edema and lightheadedness; Denies chest pain, dyspnea on  
exertion, orthopnea, palpitations, paroxysmal nocturnal dyspnea or syncope    
Respiratory/Chest    
Respiratory/Chest: Denies cough, dyspnea, shortness of breath at rest, shortness  
of breath with exertion or wheezing    
Gastrointestinal    
Gastrointestinal: Reports diarrhea, dyspepsia and other Details: Diarrhea is   
occasional and the dyspepsia happens only with  fatty foods .   ;     
Denies abdominal pain, constipation, hematemesis, hematochezia, nausea or   
vomiting    
Genitourinary    
Genitourinary: Reports urinary incontinence and other Details: Occasional urge   
incontinence.    
Musculoskeletal    
Musculoskeletal: Reports back pain, joint pain, joint stiffness and other   
Details: she has difficulty ambulating and uses a device but, this is due to   
loss of balance and not so much weakness.   ;     
Denies joint swelling, neck pain, numbness or tingling    
Integumentary    
Integumentary: Reports alopecia and other Details: she has a healing wound aolng  
the R jaw line from recent excision of a skin cancer.  ;     
Denies jaundice    
Neurologic    
Neurologic: Denies confusion, disequilibrium, dizziness, focal weakness,   
headache(s), paresthesias, radicular pain, seizures or tremor(s)    
Psychiatric    
Psychiatric: Denies abnormal sleep pattern, anhedonia, anxiety, change in   
appetite, depression, homicidal ideation, hopelessness, irritability, mood   
swings or suicidal ideation    
Endocrine    
Endocrinology: Denies change in body appearance, polydipsia or polyuria    
Hematologic/Lymphatic    
Hematologic/Lymphatic: Reports easy bruising; Denies easy bleeding or   
lymphadenopathy    
Allergic/Immunologic    
Allergic/Immunologic: Denies rhinitis, eczemia or asthma    
    
Vital Signs    
Vital Signs    
Vital Signs:     
                                            
    
    
    
 24    
20:00 24    
22:00 24    
22:00    
     
Temperature 97.5 F L 97.9 F     
     
Temperature Source Temporal Temporal     
     
Pulse Rate 95 91 91    
     
Pulse Strength       
     
Respiratory Rate 18 16 15    
     
Respiratory Effort   Normal    
Non-Labored    
     
Respiratory Depth   Normal    
     
Respiratory Pattern   Normal    
     
Blood Pressure 137/74 H 133/72 H     
     
Blood Pressure Mean 95 92     
     
Blood Pressure Source Monitor Monitor     
     
Blood Pressure Position Semi-Fowlers Semi-Fowlers     
     
Blood Pressure Location Right Arm Right Arm     
     
Pulse Ox 97 95 96    
     
Oxygen Delivery Method Room Air Room Air Room Air    
    
    
    
    
 24    
08:42 24    
08:43 24    
08:40    
     
Temperature   97.7 F L    
     
Temperature Source   Temporal    
     
Pulse Rate   82    
     
Pulse Strength Normal (2+)      
     
Respiratory Rate   16    
     
Respiratory Effort  Normal    
Non-Labored     
     
Respiratory Depth  Normal     
     
Respiratory Pattern  Normal     
     
Blood Pressure   138/73 H    
     
Blood Pressure Mean   94    
     
Blood Pressure Source   Monitor    
     
Blood Pressure Position   Semi-Fowlers    
     
Blood Pressure Location   Right Arm    
     
Pulse Ox   96    
     
Oxygen Delivery Method  Room Air Room Air    
    
    
                                     Weight    
    
    
    
Weight:                        161 lb                                             
    
     
Body Mass Index (BMI)          29.6                                               
    
    
    
    
    
    
Physical Exam    
Const    
alert and no apparent distress    
Constitutional Narrative:     
forgetful   [place]....I have to repeat myself even though she has her hearing aids   
in     
General Appearance: cooperative, comfortable, well kempt and well developed    
HEENT    
normocephalic    
HEENT Narrative:     
Mucous membranes are dry.. She has Bandaid over the R side of the jaw.....she   
had recent excision of a sking cancer.  There is no franck-incisional erythema, no  
discharge from the wound and no significant swelling around the incision.  No   
cervical adenopathy    
Nose: external nose normal    
External Ear: external ears normal and other Other Details: Bilateral hearing   
aids are present    
Eyes    
PERRL, EOMs intact bilaterally, conjunctivae normal and no scleral icterus    
Eyes Narrative:     
No mattering of the eyelashes or discharge from the eye.    
General Eye: normal appearance of both eyes    
Neck    
supple, no JVD, No nodes and no carotid bruits    
Chest    
Chest Narrative:     
Has had a left mastectomy.    
Chest: symmetrical chest wall rise    
Resp    
normal respiratory effort, normal air movement and clear to auscultation   
bilaterally    
Effort and Inspection: able to speak in complete sentences    
Cardio    
regular rate, regular rhythm, no murmurs, no rub and no gallops    
Cardio Narrative:     
No ectopy    
GI    
normal to inspection, nondistended, normoactive bowel sounds, soft to palpation   
and non-tender    
GI Narrative:     
No guarding with palpation.  No organomegaly appreciated and no masses.    
Back/Spine    
Lumbar Spine / Lower Back: normal to inspection    
Extremity    
no calf tenderness    
Extremity Narrative:     
Trace pitting edema of the ankles.     
Skin    
no jaundice    
General Skin Exam: no breakdown    
Wound Narrative:     
The fascial incision over the right jaw is healing and there is no franck-  
incisional erythema, no discharge and no significant swelling around the inc  
ision.    
Neuro    
oriented x3, CN's II-XII intact bilaterally, moves all extremities and no   
sensory deficits noted    
Neuro Narrative:     
Forgetful    
Psych    
mental status grossly normal, cooperative, affect normal, denies hallucinations   
and denies suicidal ideation    
Appearance: grossly normal, appropriate and well kempt    
Attitude: calm    
Activity / Motor Behavior: appropriate eye contact    
Speech: normal speech    
    
Results    
Lab / Micro Data    
    
                                                        24 05:44              
    
                                                        24 05:44              
    
Labs:     
                         Laboratory Results - last 24 hr    
    
24 21:47: POC Glucose 267 H    
24 05:44: WBC 7.3, RBC 4.42, Hgb 13.0, Hct 39.6, MCV 89.6, MCH 29.4, MCHC   
32.8, RDW Std Deviation 41.6, RDW Coeff of [place] 12.7, Plt Count 225, MPV 10.4,   
Immature Gran % (Auto) 0.300, Neut % (Auto) 59.9, Lymph % (Auto) 18.8 L, Mono %   
(Auto) 13.3 H, Eos % (Auto) 6.9 H, Baso % (Auto) 0.8, Absolute Neuts (auto) 4.4,  
Absolute Lymphs (auto) 1.37, Nucleated RBC % 0, Sodium 138, Potassium 3.8, C  
hloride 102, Carbon Dioxide 28.0, Anion Gap 8, BUN 11, Creatinine 0.60, Est GFR   
(MDRD) Af Amer 122, Est GFR (MDRD) Non-Af 101, BUN/Creatinine Ratio 18.3,   
Glucose 168 H, Calcium 9.1, Phosphorus 3.7, Magnesium 2.0, Total Bilirubin 0.50,  
AST 12 L, ALT 20, Alkaline Phosphatase 55, Total Protein 6.2 L, Albumin 3.1 L,   
Globulin 3.1, Albumin/Globulin Ratio 1.0    
24 07:07: POC Glucose 171 H    
24 08:30: POC Glucose 245 H    
    
    
    
Assessment & Plan    
Assessment/Plan    
(1) Physical debility:     
(2) History of recent fall:     
(3) Skull fracture without loss of consciousness:     
QUALIFIERS:               Encounter type: subsequent encounter  Fracture type:   
closed    
(4) Episodic recurrent vertigo:     
PLAN: Not really having vertigo.  What she is c/o is LOB and feeling lightheaded  
when standing.  she was dehydrated with a low sodium at the time of presentation  
to the ED.  She was taking Aldactone 50 mg PRN for ankle swelling prior to the   
fall.  EF is normal and no dx of pulmonary HTN.  Likely etiology of the ankle   
edema is obesity and venous insufficiency. Will continue with NEO hose.      
(5) Diabetes mellitus, type 2:     
PLAN:     
Plan    
PLAN    
    
PT for gait stability    
OT for ADL's    
Speech therapy for cognitive assessment due to poor memory and recent head   
trauma    
Analgesics as needed    
Bowel protocol    
Fall precautions    
Assess for Anxiety/Depression    
GI prophylaxis with famotidine-ordered as needed once daily for dyspepsia    
DVT prophylaxis with Lovenox 40 mg subcu daily    
Follow up  with PCP, vascular surgery (to monitor for progression of carotid   
stenosis) and neurology following DC from [facility]    
AM lab including CMP, CBC, Mag and Phos - personally reviewed.      
    
Overnight trending pulse ox    
Check a vitamin D level    
Obtain copy of TTE done at [facility]    
Discontinue daily spironolactone    
Her daughter Aracelis is bringing in Victoza which we will start this afternoon.    
Famotidine 20 mg daily as needed dyspepsia/nausea    
Lovenox 40 mg subcu daily    
Orthostatic vital signs    
Obtain records from PCP and neurology    
    
    
    
Charges/Coding    
Visit Charges    
Inpatient E&M: 44529 Init Hosp L2

## 2024-03-05 NOTE — PCM.HP.STD
HPI - General
General
Date of Admission: 24
Date of Service: 24
Chief Complaint: Skull fracture due to fall
HPI Narrative
     CATHI TAMEZ, is a 82 YO F with a PMH of DM II, HLD, BPPV (X4 years), DJD, presbycusis (has bilateral hearing aids), glaucoma, chronic back pain, gallbladder dysfunction and hx of breast CA who had a ground level fall on 24.  She struck her 
head but, she did not lose consciousness.  She sustained a non-displaced occipital fx of the skull.  She was admitted to [facility].  C spine imaging was negative for fracture or dislocation. CT of C/A/P showed endometrial 
thickening measuring up to 9 mm with no other pathology.  CTA of the neck on 3/1/2024 showed bilateral atherosclerotic disease up to 50% stenosis. 
CT scan of the lumbar spine shows multilevel spondylitic changes with at least moderate spinal canal stenosis at L3-L4.  There was an ulcerated plaque in the left carotid artery.  Vascular surgery recommended 81 mg of aspirin daily and a high 
intensity statin for risk factor modification.  Lab at admission to  showed a low sodium at 132 and low chloride at 95.  The BUN was 10 with a creatinine of 0.42.  Glucose was elevated at 167.  TSH was normal.  Hemoglobin A1c is 7.3%.  She is on 
Victoza for the past 3 years and says she takes 1.8 mg weekly.   While at  she was seen by PT/OT and acute rehab was recommended.  Cathi was transferred to the acute inpt rehab unit at [facility] on 3/4/24 for 3 hours of therapy daily to restore 
function/independence at or near her level prior to the fall.  


She sees a neurologist for vertigo, Dr. Rivera Henderson
PCP is Dr. Zulay Michael in [place]   
she has declined therapy for anxiety/depression in the past.   passed 4 years ago........interesting that this is when the vertigo started. 

Cathi tells me that she had vertigo after her   and she went to therapy and it resolved.  Recently she has been c/o a  head sensation  that seems to get worse when she is up and walking about after her breakfast.  She denies having 
vertigo and denies spinning.  She only recently got back on Meclizine, from the neurologist, and took it for 1 week and it did not help......it actually made her worse.  She tells me that she had depression after her  passed but, she went to 
counselling at [facility] and she says it helped.  She denies feeling depressed at this time and she also denies anxiety.  She denies problems sleeping.  she falls asleep on the couch at night and then gets up after a while and goes to the kitchen for a 
snack of a pudding cup and some fruit and then she goes to bed.......this is usually around 2 AM.  She denies ever being diagnosed with sleep apnea.
She has had a hysteroscopy within the past 4 years because she had some vaginal bleeding.  She is not able to recall the name of the doctor she saw but, she had a hysteroscopy and it was negative for malignancy.  She tells me that she had an IUD for 
many years.  

Cathi tells me that she takes Aldactone only when she has increased swelling in her legs.....she does not often take this because  it makes me pee too much .  

All lab work done this morning was personally reviewed.  CBC is unremarkable.  Sodium is 138 and the potassium is 3.8.  BUN is 11 with a creatinine of 0.6.  LFTs are unremarkable.  Magnesium, phosphorus and calcium are within normal limits.  Vitamin 
D was ordered and is pending.

Echocardiogram shows ejection fraction of 55 to 60%.  There is impaired relaxation pattern of the left ventricular diastolic filling.  The atria are normal size.  The aortic valve is mildly thickened.  The mitral valve is mildly thickened.  She has 
a trace of tricuspid and mitral regurgitation.  




Formerly [facility], later [facility]
Medical History (Updated 24 @ 12:55 by Dr. Dionne Valencia, )

Cancer
Cancer
Chronic back pain
Diabetes mellitus, type 2
Dysfunctional gallbladder
Episodic recurrent vertigo
Glaucoma
Hyperlipidemia
Lumbar canal stenosis
Presbycusis of both ears
Skull fracture without loss of consciousness
Thickened endometrium
Vertebral fracture, closed


Home Medications

acetaminophen 500 mg capsule 1,000 mg PO Q8H PRN pain 24 [History Last Taken Unknown]
aspirin 81 mg capsule 81 mg PO DAILY heart health 24 [History Last Taken Unknown]
brimonidine 0.2 %-timolol 0.5 % eye drops drp ophthalmic (eye) BID glaucoma 24 [History Last Taken Unknown]
diclofenac sodium 1 % topical gel topical BID pain to knee 24 [History Last Taken Unknown]
ezetimibe 10 mg tablet 10 mg PO DAILY cholesterol inhibitor 24 [History Last Taken Unknown]
lidocaine 4 % topical patch (Lidocaine Pain Relief) 1 patch topical Q12H pain to lower mid back 24 [History Last Taken Unknown]
multivitamin (Daily Multi-Vitamin tablet) 1 tab PO DAILY supplement 24 [History Last Taken Unknown]
naproxen 500 mg tablet,delayed release (EC-Naprosyn) 250 mg PO .Q6H/PRN PAIN 24 [History Last Taken Unknown]
spironolactone 50 mg tablet (Aldactone) 50 mg PO DAILY HTN 24 [History Last Taken Unknown]
liraglutide 0.6 mg/0.1 mL (18 mg/3 mL) subcutaneous pen injector (Victoza 2-Jose Juan) 0.3 mg subcut DAILY glucose 24 [History Last Taken Unknown]




Allergy/AdvReac Type Severity Reaction Status Date / Time
Statins-HMG-CoA Reductase Allergy Mild unknown Verified 24 20:24
Inhibitor     
alendronate sodium AdvReac Intermediate burning in Verified 24 20:24
[From Fosamax]   stomach  
adhesive tape AdvReac Mild blisters Verified 24 20:24


Family History (Updated 24 @ 10:15 by Dr. Dionne Valencia DO)
Mother
 Heart disease
 Hypertension
Father
 Hypertension


Surgical History (Updated 24 @ 10:20 by Dr. Dionne Valencia DO)

H/O left mastectomy
History of ankle surgery
History of hysteroscopy


Social History (Updated 24 @ 11:38 by Dr. Dionne Valencia DO)
household members:  none 
housing:  house 
number of children:  3 
current occupational status:  retired and other details: Retired RN. 
pets and animals:  Yes (1 cat Named Danielle) pets and animals: cat(s) 
leisure activities:  reading 
history of recent travel:  No 
sexually active:  No 
Smoking Status:  Never smoker 
alcohol intake:  never 
substance use type:  does not use 



ROS
Review of Systems
ROS Unobtainable: Denies due to encephalopathy, due to endotracheal tube, due to mental condition or due to mental status
Constitutional
Constitutional: Denies anorexia, change in weight, chills, fatigue, fever(s), headache(s), night sweats, poor appetite or weakness
Eyes
Eyes: Reports dry eyes; Denies blurry vision, change in vision, diplopia, discharge from eye(s), eye pain or loss of vision
ENT
HEENT: Reports abnormal hearing and hearing loss; Denies dysphagia, headache(s), nasal congestion or sore throat
Cardiovascular
Cardiovascular: Reports edema and lightheadedness; Denies chest pain, dyspnea on exertion, orthopnea, palpitations, paroxysmal nocturnal dyspnea or syncope
Respiratory/Chest
Respiratory/Chest: Denies cough, dyspnea, shortness of breath at rest, shortness of breath with exertion or wheezing
Gastrointestinal
Gastrointestinal: Reports diarrhea, dyspepsia and other Details: Diarrhea is occasional and the dyspepsia happens only with  fatty foods .   ; 
Denies abdominal pain, constipation, hematemesis, hematochezia, nausea or vomiting
Genitourinary
Genitourinary: Reports urinary incontinence and other Details: Occasional urge incontinence.
Musculoskeletal
Musculoskeletal: Reports back pain, joint pain, joint stiffness and other Details: she has difficulty ambulating and uses a device but, this is due to loss of balance and not so much weakness.   ; 
Denies joint swelling, neck pain, numbness or tingling
Integumentary
Integumentary: Reports alopecia and other Details: she has a healing wound aolng the R jaw line from recent excision of a skin cancer.  ; 
Denies jaundice
Neurologic
Neurologic: Denies confusion, disequilibrium, dizziness, focal weakness, headache(s), paresthesias, radicular pain, seizures or tremor(s)
Psychiatric
Psychiatric: Denies abnormal sleep pattern, anhedonia, anxiety, change in appetite, depression, homicidal ideation, hopelessness, irritability, mood swings or suicidal ideation
Endocrine
Endocrinology: Denies change in body appearance, polydipsia or polyuria
Hematologic/Lymphatic
Hematologic/Lymphatic: Reports easy bruising; Denies easy bleeding or lymphadenopathy
Allergic/Immunologic
Allergic/Immunologic: Denies rhinitis, eczemia or asthma

Vital Signs
Vital Signs
Vital Signs: 


 24
20:00 24
22:00 24
22:00
Temperature 97.5 F L 97.9 F 
Temperature Source Temporal Temporal 
Pulse Rate 95 91 91
Pulse Strength   
Respiratory Rate 18 16 15
Respiratory Effort   Normal
Non-Labored
Respiratory Depth   Normal
Respiratory Pattern   Normal
Blood Pressure 137/74 H 133/72 H 
Blood Pressure Mean 95 92 
Blood Pressure Source Monitor Monitor 
Blood Pressure Position Semi-Fowlers Semi-Fowlers 
Blood Pressure Location Right Arm Right Arm 
Pulse Ox 97 95 96
Oxygen Delivery Method Room Air Room Air Room Air

 24
08:42 24
08:43 24
08:40
Temperature   97.7 F L
Temperature Source   Temporal
Pulse Rate   82
Pulse Strength Normal (2+)  
Respiratory Rate   16
Respiratory Effort  Normal
Non-Labored 
Respiratory Depth  Normal 
Respiratory Pattern  Normal 
Blood Pressure   138/73 H
Blood Pressure Mean   94
Blood Pressure Source   Monitor
Blood Pressure Position   Semi-Fowlers
Blood Pressure Location   Right Arm
Pulse Ox   96
Oxygen Delivery Method  Room Air Room Air

Weight

Weight:                        161 lb                                            
Body Mass Index (BMI)          29.6                                              




Physical Exam
Const
alert and no apparent distress
Constitutional Narrative: 
forgetful   [place]....I have to repeat myself even though she has her hearing aids in 
General Appearance: cooperative, comfortable, well kempt and well developed
HEENT
normocephalic
HEENT Narrative: 
Mucous membranes are dry.. She has Bandaid over the R side of the jaw.....she had recent excision of a sking cancer.  There is no franck-incisional erythema, no discharge from the wound and no significant swelling around the incision.  No cervical 
adenopathy
Nose: external nose normal
External Ear: external ears normal and other Other Details: Bilateral hearing aids are present
Eyes
PERRL, EOMs intact bilaterally, conjunctivae normal and no scleral icterus
Eyes Narrative: 
No mattering of the eyelashes or discharge from the eye.
General Eye: normal appearance of both eyes
Neck
supple, no JVD, No nodes and no carotid bruits
Chest
Chest Narrative: 
Has had a left mastectomy.
Chest: symmetrical chest wall rise
Resp
normal respiratory effort, normal air movement and clear to auscultation bilaterally
Effort and Inspection: able to speak in complete sentences
Cardio
regular rate, regular rhythm, no murmurs, no rub and no gallops
Cardio Narrative: 
No ectopy
GI
normal to inspection, nondistended, normoactive bowel sounds, soft to palpation and non-tender
GI Narrative: 
No guarding with palpation.  No organomegaly appreciated and no masses.
Back/Spine
Lumbar Spine / Lower Back: normal to inspection
Extremity
no calf tenderness
Extremity Narrative: 
Trace pitting edema of the ankles. 
Skin
no jaundice
General Skin Exam: no breakdown
Wound Narrative: 
The fascial incision over the right jaw is healing and there is no franck-incisional erythema, no discharge and no significant swelling around the incision.
Neuro
oriented x3, CN's II-XII intact bilaterally, moves all extremities and no sensory deficits noted
Neuro Narrative: 
Forgetful
Psych
mental status grossly normal, cooperative, affect normal, denies hallucinations and denies suicidal ideation
Appearance: grossly normal, appropriate and well kempt
Attitude: calm
Activity / Motor Behavior: appropriate eye contact
Speech: normal speech

Results
Lab / Micro Data

24 05:44          

24 05:44          

Labs: 
Laboratory Results - last 24 hr

24 21:47: POC Glucose 267 H
24 05:44: WBC 7.3, RBC 4.42, Hgb 13.0, Hct 39.6, MCV 89.6, MCH 29.4, MCHC 32.8, RDW Std Deviation 41.6, RDW Coeff of [place] 12.7, Plt Count 225, MPV 10.4, Immature Gran % (Auto) 0.300, Neut % (Auto) 59.9, Lymph % (Auto) 18.8 L, Mono % (Auto) 13.3 
H, Eos % (Auto) 6.9 H, Baso % (Auto) 0.8, Absolute Neuts (auto) 4.4, Absolute Lymphs (auto) 1.37, Nucleated RBC % 0, Sodium 138, Potassium 3.8, Chloride 102, Carbon Dioxide 28.0, Anion Gap 8, BUN 11, Creatinine 0.60, Est GFR (MDRD) Af Amer 122, Est 
GFR (MDRD) Non-Af 101, BUN/Creatinine Ratio 18.3, Glucose 168 H, Calcium 9.1, Phosphorus 3.7, Magnesium 2.0, Total Bilirubin 0.50, AST 12 L, ALT 20, Alkaline Phosphatase 55, Total Protein 6.2 L, Albumin 3.1 L, Globulin 3.1, Albumin/Globulin Ratio 1.0
24 07:07: POC Glucose 171 H
24 08:30: POC Glucose 245 H



Assessment & Plan
Assessment/Plan
(1) Physical debility: 
(2) History of recent fall: 
(3) Skull fracture without loss of consciousness: 
QUALIFIERS:               Encounter type: subsequent encounter  Fracture type: closed
(4) Episodic recurrent vertigo: 
PLAN: Not really having vertigo.  What she is c/o is LOB and feeling lightheaded when standing.  she was dehydrated with a low sodium at the time of presentation to the ED.  She was taking Aldactone 50 mg PRN for ankle swelling prior to the fall.  
EF is normal and no dx of pulmonary HTN.  Likely etiology of the ankle edema is obesity and venous insufficiency. Will continue with NEO hose.  
(5) Diabetes mellitus, type 2: 
PLAN: 
Plan
PLAN

PT for gait stability
OT for ADL's
Speech therapy for cognitive assessment due to poor memory and recent head trauma
Analgesics as needed
Bowel protocol
Fall precautions
Assess for Anxiety/Depression
GI prophylaxis with famotidine-ordered as needed once daily for dyspepsia
DVT prophylaxis with Lovenox 40 mg subcu daily
Follow up  with PCP, vascular surgery (to monitor for progression of carotid stenosis) and neurology following DC from [facility]
AM lab including CMP, CBC, Mag and Phos - personally reviewed.  

Overnight trending pulse ox
Check a vitamin D level
Obtain copy of TTE done at [facility]
Discontinue daily spironolactone
Her daughter Aracelis is bringing in Victoza which we will start this afternoon.
Famotidine 20 mg daily as needed dyspepsia/nausea
Lovenox 40 mg subcu daily
Orthostatic vital signs
Obtain records from PCP and neurology



Charges/Coding
Visit Charges
Inpatient E&M: 38735 Init Hosp L2

## 2024-03-05 NOTE — PCM.RU.PYE
Admission Information
Primary Diagnosis:: Skull fracture
Status Changes from Prescreening?: No changes Identified
Actual Problem List:: Falls, Skin Intergrity, Pain, ALteration in Cmfrt, Mobility Impaired, Self Care Deficit, Diabetes, Hyperglycemia and Alteration-Leisure Activ.
Potential Problem List:: DVT, Bleeding, Infection, UTI, Aspiration, Falls, Skin Integrity and Depression

Risk of Complications
DVT: LMWH and NEO Hose
Bleeding: Monitor Lab Values, Nursing to Teach Precautions for anti-coagulation therapy., Wound, if applicable, to be assessed every shift. and Stroke patients assessed for lethargy or change in status.
Infection: Clinical Staff to Monitor for S/S of infection: and S/S of infection include fever, redness, warmth, etc.
Urinary Tract Infection: Monitor for frequency, burning, discomfort, or incontinence. and Nursing will obtain urine sample for urinalysis and C&S when ordered.
Aspiration: Clinical staff will monitor for coughing, drooling, congestion., Speech will evaluate swallowing and dsyphasia. and Nursing will monitor patient swallowing during meals.
Falls: Patient will be evaluated for Fall Precautions and Patient will be placed on Fall Precautions as indicated per protocol.
Skin Breakdown: Nursing will assess skin daily using assessment tool. and Nursing will place on Skin Breakdown Precautions as indicated.
Pain: Clinical staff will assess patient's pain level per protocol., Medications will be given, if needed, and the pain level reassessed. and Other methods: Massage, distraction, decrease stimulus, etc. used PRN.

Plan of Care
Patient requires physician specializing in physical medicine and rehab oversight to provide close medical supervision of rehab issues including: Pain Management, Sleep Problems, Bowel and Bladder, Medical and co-morbidity Management, DVT 
prophylaxis, Rehabilitation Leadership and Coordination of treatment team
Patient needs Physical Therapy: For a minimum of 1 hour and At least 5 out of 7 days
Patient needs Physical Therapy to improve:: Mobility, Strengthening, Transfers, Stretching, ROM, Endurance, Stairs, Gait and Balance
Patient needs Occupational Therapy: For a minimum of 1 hour and At least 5 out of 7 days
Patient needs Occupational Therapy to improve ADL's incl.: Eating, Grooming, Bathing, Dressing, Toileting, Toilet transfers, Community Reintegration, Higher functioning activities, Household tasks, Adaptive Equipment, Splinting and Other activities 
as determined
Patient requires 24/7 Rehabilitation Nursing for: Pain Issues, Identifying and preventing risk factors, Monitoring and reporting current medical conditions, Assisting with ambulation, transfer, and all ADL's, Teaching patients about disease process 
and medications, Family teaching, Providing safe environment, Bowel and Bladder Issues, Skin integrity and Medication Management
Patient needs / Case Management for: Discharge Planning, Arranging Home Equipment or Services and Family Interventions
Patient needs Dietary and Nutrition Services for: Adequate Nutrition, Nutritional Supplements and Nutritional Education

Goals
Goals
Patient will remain: free from falls
Patient will perform eating at: MOD I level of assist.
Patient will perform bed mobility at: MOD I level of assist.
Patient will complete transfers from bed to chair at: MOD I level of assist.
Patient will ambulate: - (350 feet with least restrictive device at standby assist on various surfaces to allow her to return home/community)
Patient will complete upper body dressing at: MOD I level of assist.
Patient will complete lower body dressing at: MOD I level of assist.
Patient will complete toilet transfer at: MOD I level of assist.
Patient will complete toileting at: MOD I level of assist.
Patient will perform bathing at: MOD I level of assist.
Patient will perform Tub/Shower transfer at: MOD I level of assist.
Patient will complete grooming at: MOD I level of assist.
Patient will complete home management skills at: MOD I level of assist.
Patient will achieve: - (2 steps with 1 handrail at standby assist to allow entrance to her home.)
Patient will have pain level of: of 3 or less
Patient's skin will: remain intact
Patient will receive: adequate nutrition.

Discharge Planning
Estimated Length of stay (days): 21
Anticipated D/C Destination: Home (Home health care versus outpatient therapy to be determined closer to the discharge date.)
Was Preadmission Assessment Accurate?: Yes

## 2024-03-06 VITALS
HEART RATE: 79 BPM | SYSTOLIC BLOOD PRESSURE: 126 MMHG | DIASTOLIC BLOOD PRESSURE: 60 MMHG | OXYGEN SATURATION: 97 % | TEMPERATURE: 98.24 F | RESPIRATION RATE: 16 BRPM

## 2024-03-06 VITALS
SYSTOLIC BLOOD PRESSURE: 138 MMHG | OXYGEN SATURATION: 96 % | TEMPERATURE: 97.7 F | HEART RATE: 87 BPM | RESPIRATION RATE: 20 BRPM | DIASTOLIC BLOOD PRESSURE: 62 MMHG

## 2024-03-06 VITALS — HEART RATE: 87 BPM | RESPIRATION RATE: 20 BRPM | OXYGEN SATURATION: 96 %

## 2024-03-06 LAB
LEUKOCYTE ESTERASE UR QL STRIP: 100 /UL
MUCOUS THREADS URNS QL MICRO: (no result) /HPF
RBC UR QL: (no result) /HPF (ref 0–5)
SP GR UR: 1 (ref 1–1.03)
SQUAMOUS URNS QL MICRO: (no result) /HPF (ref 5–10)
URINE PRESERVATIVE: (no result)

## 2024-03-06 NOTE — PN_ITS
Subjective    
Subjective    
Afebrile    
VSS-systolic blood pressure is a tad high but the blood pressure this a.m. was   
126/60.  Heart rate is within normal limits.  Orthostatic vital signs done   
yesterday were normal however there was only 1 minute allowed between but just   
some changes.    
Maintaining appropriate oxygen saturation on RA-95 to 98%.    
Oral intake -  FOOD good    FLUIDS fair    
    
Discussed with nursing - no problems that need addressed.  Slept well las night.    
Reviewed the THERAPY  notes    
Medication list reviewed.    
    
vitamin D level is low at 23.      
The overnight trending pulse ox was abnormal with hypoxemia and 1 desaturation   
event.       
    
    
Objective Data    
Objective Data    
Vital Signs:     
Vital Signs    
    
    
    
Temp Pulse Resp BP Pulse Ox O2 Del Method FiO2    
     
 98.3 F   79   16   126/60 H  97   Room Air   21     
     
 03/06/24 10:00  03/06/24 10:00  03/06/24 10:00  03/06/24 10:00  03/06/24 10:00   
03/06/24 10:00  03/05/24 22:20    
    
    
    
    
Oxygen Delivery Method           Room Air                                         
       
Weight:                          168 lb 3.015 oz                                  
       
Body Mass Index (BMI)            30.9                                             
       
    
    
Intake & Output:     
                       Intake and Output for Last 24 Hours    
    
    
    
 03/04/24 03/05/24 03/06/24    
    
 23:59 23:59 23:59    
     
Intake Total  1200 / 1200 780 / 780    
     
Output Total  2050 / 2050 600 / 600    
     
Balance  -850 / -850 180 / 180    
    
    
    
Lab / Micro Data    
    
                                                        03/05/24 05:44              
    
                                                        03/05/24 05:44              
    
Labs:     
                         Laboratory Results - last 24 hr    
    
03/05/24 16:00: POC Glucose 210 H    
03/05/24 22:12: POC Glucose 203 H    
03/06/24 06:33: POC Glucose 159 H    
03/06/24 11:59: POC Glucose 187 H

## 2024-03-06 NOTE — CASEMGMT
Sw met with patient to complete initial assessment. Introduced self and role. Sw verified patient's contacts. Patient confirmed code status to be full code. Patient states that she has completed advanced directives, and has asked her daughter to 
bring in copies to be scanned into her file. Patient was educated to insurance benefits. Patient states that her goal is to be discharged to home when medically ready. Sw will remain involved and available throughout current hospitalization and will 
assist with discharge planning. 

Kavita Fuller, MSW, LSW

## 2024-03-06 NOTE — PCM.PROGNOTE
Subjective
Subjective
Afebrile
VSS-systolic blood pressure is a tad high but the blood pressure this a.m. was 126/60.  Heart rate is within normal limits.  Orthostatic vital signs done yesterday were normal however there was only 1 minute allowed between but just some changes.
Maintaining appropriate oxygen saturation on RA-95 to 98%.
Oral intake -  FOOD good    FLUIDS fair
Has been incontinent of urine
The blood sugar record was reviewed.  She took her first dose of Victoza this morning.  Fasting was 159 and the blood sugar prior to lunch was 187. Refused the SSI in the morning.  

Discussed with nursing - no problems that need addressed.  Slept well las night.
Reviewed the THERAPY  notes
Medication list reviewed.

vitamin D level is low at 23.  
The overnight trending pulse ox was abnormal with hypoxemia and 1 desaturation event.   Pulse ox < 90% 4.8% of the time with `1 desaturation event.  neck circumference is 38 cm.  She snores and has daytime somnolence.  Can fall asleep in the car if 
driving for more than 1 hour and routinely falls asleep watching TV.  Having memory difficulties. Naps during the day.  Has never had a sleep study.  

Cathi tells me that she slept well last night.  She denies cephalgia, neck pain, chest pain, shortness of breath, cough, palpitations, N/V/abd pain. calf tenderness and dysuria.  she denies restless legs at night but, she tells me that the SCD's 
were bothersome last night.   


Objective Data
Objective Data
Vital Signs: 
Vital Signs

Temp Pulse Resp BP Pulse Ox O2 Del Method FiO2
 98.3 F   79   16   126/60 H  97   Room Air   21 
 03/06/24 10:00 03/06/24 10:00 03/06/24 10:00 03/06/24 10:00 03/06/24 10:00 03/06/24 10:00 03/05/24 22:20



Oxygen Delivery Method         Room Air                                         
Weight:                        168 lb 3.015 oz                                  
Body Mass Index (BMI)          30.9                                             


Intake & Output: 
Intake and Output for Last 24 Hours

 03/04/24 03/05/24 03/06/24
 23:59 23:59 23:59
Intake Total  1200 / 1200 780 / 780
Output Total  2050 / 2050 600 / 600
Balance  -850 / -850 180 / 180


Lab / Micro Data

03/05/24 05:44          

03/05/24 05:44          

Labs: 
Laboratory Results - last 24 hr

03/05/24 16:00: POC Glucose 210 H
03/05/24 22:12: POC Glucose 203 H
03/06/24 06:33: POC Glucose 159 H
03/06/24 11:59: POC Glucose 187 H



Physical Exam
Const
alert and no apparent distress
Constitutional Narrative: 
forgetful   [place]....I have to repeat myself even though she has her hearing aids in 
HEENT
HEENT Narrative: 
Mucous membranes are dry.. She has Bandaid over the R side of the jaw.....she had recent excision of a sking cancer.  There is no franck-incisional erythema, no discharge from the wound and no significant swelling around the incision.  No cervical 
adenopathy
Resp
normal respiratory effort, normal air movement and clear to auscultation bilaterally
Effort and Inspection: able to speak in complete sentences
Cardio
regular rate, regular rhythm, no murmurs, no rub and no gallops
Cardio Narrative: 
No ectopy
GI
normal to inspection, nondistended, normoactive bowel sounds, soft to palpation and non-tender
Extremity
no calf tenderness
Extremity Narrative: 
the ankle edema she had yesterday has resolved with NEO hose. 
Skin
no jaundice
General Skin Exam: no breakdown
Wound Narrative: 
The fascial incision over the right jaw is healing and there is no franck-incisional erythema, no discharge and no significant swelling around the incision.

Assessment & Plan
Assessment/Plan
(1) Vitamin D deficiency: 
(2) Physical debility: 
(3) Skull fracture without loss of consciousness: 
QUALIFIERS:               Encounter type: subsequent encounter  Fracture type: closed
(4) Memory loss: 
PLAN: Chronic? Acute? or acute on chronic due to recent fall/TBI.  She realizes that her memory is not what it used to be.  Will need to discuss with her dtr Aracelis to see if she has noticed any memory problems prior to the fall/skull fx/TBI
(5) Diabetes mellitus, type 2: 
PLAN: Will continue to monitor the BS's AC and HS for a few more days.  Would like to see the BS's consistently < 180 with no hypoglycemia.  
(6) Episodic recurrent vertigo: 
PLAN: Orthostatics were negative yesterday however there was only 1 minute allowed between position changes.  The  head sensation  only seems to happen when she is standing.
PLAN: 
Plan
1.  Continue therapy
2.  Continue ACHS blood sugars.  Dc the SSI since she had her Victoza today.  If the BS's are > 180 will need to add and oral agent.  did not tolerate 1,000 mg of Glucophage in the past but, may tolerate a lower dose and there would be less risk of 
hypoglycemia than Amaryl
3.  Start a vitamin D supplement
4.  Consult speech therapy for cognitive evaluation.
5.  Recommend an overnight sleep study at [place].  
6.  Encouraged increased fluid intake

Charges/Coding
Visit Charges
Inpatient E&M: 63959 Subs Hosp L1

## 2024-03-07 VITALS
OXYGEN SATURATION: 94 % | TEMPERATURE: 98.6 F | SYSTOLIC BLOOD PRESSURE: 128 MMHG | HEART RATE: 92 BPM | DIASTOLIC BLOOD PRESSURE: 65 MMHG | RESPIRATION RATE: 17 BRPM

## 2024-03-07 VITALS
SYSTOLIC BLOOD PRESSURE: 135 MMHG | HEART RATE: 80 BPM | TEMPERATURE: 97.9 F | OXYGEN SATURATION: 94 % | RESPIRATION RATE: 17 BRPM | DIASTOLIC BLOOD PRESSURE: 72 MMHG

## 2024-03-07 NOTE — NURSING
Patient c/o itching since   I got that new med today , mostly ears, legs and back. Pt did state that she has sensitive skin and maybe the sheets are causing it.  notified and 1x order for benadryl obtained.

## 2024-03-07 NOTE — PCM.PROGNOTE
Subjective
Subjective
Cathi was seen on team rounds.  Her daughter Aracelis participated by phone.

Afebrile
VSS -systolics are more often than not mildly elevated and ranged from 134-144.  Heart rate is within normal limits.
Maintaining appropriate oxygen saturation on RA
Oral intake -  FOOD good    FLUIDS poor..... She only took 980 yesterday and the fluid balance was -980.  Overnight she was -410.
The blood sugar record was reviewed.  Blood sugar at 4 PM yesterday was 235.  At at bedtime it was 160 and this morning it was 162.

Discussed with nursing -she reported to night nursing that she got dizzy when getting up from a chair or laying down.  Nurses notes say that oxygen was applied but this is not reflected in the graphic chart.
Reviewed the THERAPY  notes
Medication list reviewed.

Urine yesterday showed 0 RBCs per high-power field and 0-5 WBCs per high-powered field.  There was 1+ bacteria.  It was nitrite negative.  She is afebrile with a normal white blood cell count.  She denies dysuria.  No need for a urine culture at 
this time.

Still c/o  dizziness.  She also admits to feeling anxious....this likely does not help with the dizziness.  She denies cephalgia, chest pain, shortness of breath, cough, nausea/vomiting/abdominal pain, dysuria and calf pain.

Objective Data
Objective Data
Vital Signs: 
Vital Signs

Temp Pulse Resp BP Pulse Ox O2 Del Method FiO2
 97.9 F   80   17   135/72 H  94   Room Air   21 
 03/07/24 08:55  03/07/24 08:55  03/07/24 08:55  03/07/24 08:55  03/07/24 08:55  03/07/24 08:55  03/05/24 22:20



Oxygen Delivery Method         Room Air                                         
Weight:                        168 lb 3.015 oz                                  
Body Mass Index (BMI)          30.9                                             


Intake & Output: 
Intake and Output for Last 24 Hours

 03/05/24 03/06/24 03/07/24
 23:59 23:59 23:59
Intake Total 1200 / 1200 980 / 980 340 / 340
Output Total 2050 / 2050 1960 / 1960 750 / 750
Balance -850 / -850 -980 / -980 -410 / -410


Lab / Micro Data

03/05/24 05:44          

03/05/24 05:44          

Labs: 
Laboratory Results - last 24 hr

03/06/24 11:59: POC Glucose 187 H
03/06/24 16:05: POC Glucose 235 H
03/06/24 21:15: Urine Color Straw, Urine Clarity Clear, Urine pH 7.0, Ur Specific Gravity 1.005, Urine Protein Negative, Urine Glucose (UA) Normal, Urine Ketones Negative, Urine Occult Blood Negative, Urine Nitrite Negative, Urine Bilirubin 
Negative, Urine Urobilinogen Normal, Ur Leukocyte Esterase 100 H, Urine RBC 0 SEEN, Urine WBC 0-5 SEEN, Ur Squamous Epith Cells 0 SEEN, Urine Bacteria 1+, Urine Mucus 0 SEEN
03/06/24 21:28: POC Glucose 160 H
03/07/24 06:17: POC Glucose 162 H



Physical Exam
Const
alert
Constitutional Narrative: 
Hard of hearing, even with the hearing aids in
General Appearance: cooperative
HEENT
normocephalic
Mouth: dry mucous membranes
Eyes
PERRL, EOMs intact bilaterally, conjunctivae normal and no scleral icterus
Eyes Narrative: 
No mattering of the eyelashes or discharge from the eye.
General Eye: normal appearance of both eyes
Neck
supple
Chest
Chest Narrative: 
Has had a left mastectomy.
Chest: symmetrical chest wall rise
Resp
normal respiratory effort and clear to auscultation bilaterally
Resp Narrative: 
Able to speak in complete sentences.
Effort and Inspection: able to speak in complete sentences
Cardio
regular rate, regular rhythm, no murmurs and no gallops
Cardio Narrative: 
No ectopy
GI
normal to inspection, nondistended, normoactive bowel sounds, soft to palpation and non-tender
GI Narrative: 
No guarding with palpation.  No organomegaly appreciated and no masses.
Back/Spine
Lumbar Spine / Lower Back: normal to inspection
Extremity
no calf tenderness
Extremity Narrative: 
Ankle edema is controlled with NEO hose.
Skin
no jaundice
General Skin Exam: no breakdown
Rashes: no rashes
Wound Narrative: 
The fascial incision over the right jaw is healing and there is no franck-incisional erythema, no discharge and no significant swelling around the incision.
Neuro
oriented x3, CN's II-XII intact bilaterally, moves all extremities and no sensory deficits noted
Neuro Narrative: 
Forgetful
Psych
mental status grossly normal, cooperative, affect normal, denies hallucinations and denies suicidal ideation
Appearance: grossly normal, appropriate and well kempt
Attitude: calm
Activity / Motor Behavior: appropriate eye contact
Speech: normal speech

Assessment & Plan
Assessment/Plan
(1) Vitamin D deficiency: 
(2) Physical debility: 
(3) Skull fracture without loss of consciousness: 
QUALIFIERS:               Encounter type: subsequent encounter  Fracture type: closed
(4) Memory loss: 
(5) Diabetes mellitus, type 2: 
(6) Episodic recurrent vertigo: 
PLAN: 
Plan
1.  Continue therapy
2.  Start Cozaar 25 mg p.o. daily
3.  Start Glucophage 500 mg daily........may need to hold stool softeners. 
4.  PT mentioned that her shoulders and neck muscles are very tight.  She holds her head at an angle when walking and working with therapy.  She suggested trying to treat the musculoskeletal problems to see if the  dizziness  improves.  Still no 
nystagmus.  She does have hearing loss and occasional ringing in the ears. Meniere's is a possibility BUT, staring a diuretic with her is not going to be good ....... she is chronically volume depleted due to poor oral intake.  I suspect the 
 dizziness  is multifactorial. Will try treating the musculoskeletal dysfunction in the next and shoulders with a cream containing Voltaren, baclofen and lidocaine.
5.  Start sertraline 25 mg daily for anxiety/depression.  Cathi is agreeable to this.  

Patient has been driving despite having  dizziness .  Will need to address restricting her driving privileges if she remains  dizzy  at Dc from rehab.  

Charges/Coding
Visit Charges
Inpatient E&M: 25846 Subs Hosp L2

## 2024-03-07 NOTE — CASEMGMT
Social Work

IDT met with patient and dtr via conference call for Team meeting. Discussed patient's progress in PT/OT/ST/SN. Educated to Medicare approval of 13 days with DC 3/17. Pt's goal is to return home alone. Pt did agree to Dr to start pt on antianxiety 
medication. Pt is also started on overnight O2. Will ReTeam next week. SW will continue to follow for DC planning.

Liana Barnes, MSW LSW

## 2024-03-07 NOTE — PN_ITS
Subjective    
Subjective    
Cathi was seen on team rounds.  Her daughter Aracelis participated by phone.    
    
Afebrile    
VSS -systolics are more often than not mildly elevated and ranged from 134-144.   
Heart rate is within normal limits.    
Maintaining appropriate oxygen saturation on RA    
Oral intake -  FOOD good    FLUIDS poor..... She only took 980 yesterday and the  
fluid balance was -980.  Overnight she was -410.    
The blood sugar record was reviewed.  Blood sugar at 4 PM yesterday was 235.  At  
at bedtime it was 160 and this morning it was 162.    
    
Discussed with nursing -she reported to night nursing that she got dizzy when   
getting up from a chair or laying down.  Nurses notes say that oxygen was   
applied but this is not reflected in the graphic chart.    
Reviewed the THERAPY  notes    
Medication list reviewed.    
    
Urine yesterday showed 0 RBCs per high-power field and 0-5 WBCs per high-powered  
field.  There was 1+ bacteria.  It was nitrite negative.  She is afebrile with a  
normal white blood cell count.  She denies dysuria.  No need for a urine culture  
at this time.    
    
Still c/o  dizziness.  The     
    
Objective Data    
Objective Data    
Vital Signs:     
Vital Signs    
    
    
    
Temp Pulse Resp BP Pulse Ox O2 Del Method FiO2    
     
 97.9 F   80   17   135/72 H  94   Room Air   21     
     
 03/07/24 08:55  03/07/24 08:55  03/07/24 08:55  03/07/24 08:55  03/07/24 08:55   
03/07/24 08:55  03/05/24 22:20    
    
    
    
    
Oxygen Delivery Method           Room Air                                         
       
Weight:                          168 lb 3.015 oz                                  
       
Body Mass Index (BMI)            30.9                                             
       
    
    
Intake & Output:     
                       Intake and Output for Last 24 Hours    
    
    
    
 03/05/24 03/06/24 03/07/24    
    
 23:59 23:59 23:59    
     
Intake Total 1200 / 1200 980 / 980 340 / 340    
     
Output Total 2050 / 2050 1960 / 1960 750 / 750    
     
Balance -850 / -850 -980 / -980 -410 / -410    
    
    
    
Lab / Micro Data    
    
                                                        03/05/24 05:44              
    
                                                        03/05/24 05:44              
    
Labs:     
                         Laboratory Results - last 24 hr    
    
03/06/24 11:59: POC Glucose 187 H    
03/06/24 16:05: POC Glucose 235 H    
03/06/24 21:15: Urine Color Straw, Urine Clarity Clear, Urine pH 7.0, Ur   
Specific Gravity 1.005, Urine Protein Negative, Urine Glucose (UA) Normal, Urine  
Ketones Negative, Urine Occult Blood Negative, Urine Nitrite Negative, Urine   
Bilirubin Negative, Urine Urobilinogen Normal, Ur Leukocyte Esterase 100 H,   
Urine RBC 0 SEEN, Urine WBC 0-5 SEEN, Ur Squamous Epith Cells 0 SEEN, Urine   
Bacteria 1+, Urine Mucus 0 SEEN    
03/06/24 21:28: POC Glucose 160 H    
03/07/24 06:17: POC Glucose 162 H    
    
    
    
Assessment & Plan    
Assessment/Plan    
PLAN:     
Plan    
1.  Continue therapy    
2.  Start Cozaar 25 mg p.o. daily    
3.  Start Glucophage 500 mg daily........may need to hold stool softeners.     
4.  PT mentioned that her shoulders and neck muscles are very tight.  She holds   
her head at an angle when walking and working with therapy.  She suggested   
trying to treat the musculoskeletal problems to see if the  dizziness  improves.  
 Still no nystagmus.  She does have hearing loss and occasional ringing in the   
ears. Meniere's is a possibility BUT, staring a diuretic with her is not going   
to be good ....... she is chronically volume depleted due to poor oral intake.    
I suspect the  dizziness  is multifactorial. Will try treating the   
musculoskeletal dysfunction in the next and shoulders with a cream containing   
Voltaren, baclofen and lidocaine.

## 2024-03-08 VITALS
OXYGEN SATURATION: 92 % | TEMPERATURE: 98.42 F | HEART RATE: 83 BPM | RESPIRATION RATE: 16 BRPM | SYSTOLIC BLOOD PRESSURE: 146 MMHG | DIASTOLIC BLOOD PRESSURE: 70 MMHG

## 2024-03-08 VITALS
TEMPERATURE: 98.24 F | SYSTOLIC BLOOD PRESSURE: 128 MMHG | HEART RATE: 84 BPM | RESPIRATION RATE: 15 BRPM | OXYGEN SATURATION: 96 % | DIASTOLIC BLOOD PRESSURE: 67 MMHG

## 2024-03-08 NOTE — PCM.PROGNOTE
Subjective
Subjective
Afebrile
VSS-she was started on Cozaar 25 mg daily yesterday and the blood pressure this morning is 128/67.  Blood pressure at at bedtime was 128/65.  Heart rate is within normal limits.
Maintaining appropriate oxygen saturation on RA
Oral intake -  FOOD good    FLUIDS she did better with fluids yesterday was able to take 1560 p.o.
The blood sugar record was reviewed.  Blood sugar was 198 at suppertime yesterday and 176 at at bedtime.  The fasting sugar today is 129.  No hypoglycemia.  She was started on Glucophage 500 mg once daily and she received her first dose yesterday.

Discussed with nursing -   She had  itching  of the back and arms and ears last night.  She got 1 dose of Benadryl 12.5 mg at 22:40 last evening and today she denies itching.  She tells me that she thinks the pruritus was related to the hospital 
gown and when she changed out of the gown the pruritus resolved.  She denies any pruritus today.  
Reviewed the THERAPY  notes
Medication list reviewed.

Cathi tells me that she is less  dizzy  today.  She has no vertigo.  She tells me when she first sits up in bed in the morning she feels lightheaded but after she is up and moving about it seems to improve.  She denies cephalgia, palpitations, chest 
pain, shortness of breath, nausea/vomiting/abdominal pain/diarrhea, dysuria and calf pain.  She is very cooperative with the therapists.  Her only real complaint is that her hearing aids are not working properly and she is not hearing as well as she 
used to.  She has been adjusting them with no success.  

Objective Data
Objective Data
Vital Signs: 
Vital Signs

Temp Pulse Resp BP Pulse Ox O2 Del Method FiO2
 98.3 F   84   15   128/67 H  96   Room Air   21 
 03/08/24 08:43  03/08/24 08:43  03/08/24 08:43  03/08/24 08:43  03/08/24 08:43  03/08/24 08:43  03/05/24 22:20



Oxygen Delivery Method         Room Air                                         
Weight:                        168 lb 3.015 oz                                  
Body Mass Index (BMI)          30.9                                             


Intake & Output: 
Intake and Output for Last 24 Hours

 03/06/24 03/07/24 03/08/24
 23:59 23:59 23:59
Intake Total 980 / 980 1560 / 1560 480 / 480
Output Total 1960 / 1960 1850 / 1850 1100 / 1100
Balance -980 / -980 -290 / -290 -620 / -620


Lab / Micro Data

03/05/24 05:44          

03/05/24 05:44          

Labs: 
Laboratory Results - last 24 hr

03/07/24 11:48: POC Glucose 176 H
03/07/24 16:43: POC Glucose 190 H
03/07/24 22:30: POC Glucose 176 H
03/08/24 07:11: POC Glucose 129 H


Micro: 
Microbiology

03/06/24 21:15   Urine Catheter - Catheter   Urine Culture - Preliminary
                            Staphylococcus species



Physical Exam
Const
alert
Constitutional Narrative: 
forgetful and repeats herself a lot.  
General Appearance: cooperative
HEENT
HEENT Narrative: 
No pain with palpation of the occiput.  Neck is supple.  MM are more moist today 
Resp
normal respiratory effort and clear to auscultation bilaterally
Resp Narrative: 
Able to speak in complete sentences.
Effort and Inspection: Negative for tachypneic
Cardio
regular rate, regular rhythm, no murmurs and no gallops
GI
normal to inspection, nondistended, normoactive bowel sounds, soft to palpation and non-tender
GI Narrative: 
mildly tympanic.  No guarding with palpation.  No cramping and no heartburn. 
Extremity
no calf tenderness
Extremity Narrative: 
Jg hose are in place.  
General Extremity: Negative for edema
Skin
General Skin Exam: no breakdown
Rashes: no rashes
Psych
Psych Narrative: 

Gets anxious at times.  Started  on Sertraline 25 mg daily.  DTR Aracelis feels that she still has some depression ever since her  passed away 4 years ago.  Has some tension with her oldest dtr whom Cathi feels treats her like a child.  She is 
very talkative.  
Appearance: appropriate

Assessment & Plan
Assessment/Plan
(1) Physical debility: 
(2) Skull fracture without loss of consciousness: 
QUALIFIERS:               Encounter type: subsequent encounter  Fracture type: closed
(3) Memory loss: 
(4) Diabetes mellitus, type 2: 
(5) Episodic recurrent vertigo: 
PLAN: Not really having vertigo.  No saccades.  More of a lightheadedness........this is likely multifactorial and also likely recently exacerbated due to head trauma/TBI
(6) Closed TBI (traumatic brain injury): 
PLAN: Due to a fall resulting in a occipital skull fracture.  
(7) Carotid stenosis, non-symptomatic: 
PLAN: 
Plan
1.  Continue therapy
2.  No changes to the drug regimen today.  Continue to monitor blood pressure closely.  Goal blood pressure is less than 130/80.
3.  She has disease and narrowing in the BL carotids but, it is less than 50%.  continue with risk factor management.  Goal BP < 130/80, LDL < 70, HGBA1C < 7.5 (avoid hypoglycemia).   Continue ASA 81 mg daily.  Should follow up with vascular surgery 
yearly to monitor for progression of carotid stenosis.  
4.  she is forgetful and having difficulty with higher level executive functioning.  I suspect some of this is chronic but, recent TBI is likely exacerbating.  This coupled with the  dizziness  concerns me because she does still drive, when I am not 
dizzy .  Will recommend to Cathi and her family that she not be allowed to drive until she attends a driving school for evaluation to determine if it is safe for her to drive.  

Charges/Coding
Visit Charges
Inpatient E&M: 74986 Subs Hosp L1

## 2024-03-08 NOTE — PN_ITS
Subjective    
Subjective    
Afebrile    
VSS-she was started on Cozaar 25 mg daily yesterday and the blood pressure this   
morning is 128/67.  Blood pressure at at bedtime was 128/65.  Heart rate is   
within normal limits.    
Maintaining appropriate oxygen saturation on RA    
Oral intake -  FOOD good    FLUIDS she did better with fluids yesterday was able  
to take 1560 p.o.    
The blood sugar record was reviewed.  Blood sugar was 198 at suppertime   
yesterday and 176 at at bedtime.  The fasting sugar today is 129.  No   
hypoglycemia.  She was started on Glucophage 500 mg once daily and she received   
her first dose yesterday.    
    
Discussed with nursing -   She had  itching  of the back and arms and ears last   
night.  She got 1 dose of Benadryl 12.5 mg at 22:40 last evening and today she   
denies itching.  She tells me that she thinks the pruritus was related to the   
hospital gown and when she changed out of the gown the pruritus resolved.  She   
denies any pruritus today.      
Reviewed the THERAPY  notes    
Medication list reviewed.    
    
Cathi tells me that she is less  dizzy  today.  She has no vertigo.  She tells   
me when she first sits up in bed in the morning she feels lightheaded but after   
she is up and moving about it seems to improve.  She denies cephalgia, pal  
pitations, chest pain, shortness of breath, nausea/vomiting/abdominal   
pain/diarrhea, dysuria and calf pain.  She is very cooperative with the   
therapists.  Her only real complaint is that her hearing aids are not working   
properly and she is not hearing as well as she used to.  She has been adjusting   
them with no success.      
    
Objective Data    
Objective Data    
Vital Signs:     
Vital Signs    
    
    
    
Temp Pulse Resp BP Pulse Ox O2 Del Method FiO2    
     
 98.3 F   84   15   128/67 H  96   Room Air   21     
     
 03/08/24 08:43  03/08/24 08:43  03/08/24 08:43  03/08/24 08:43  03/08/24 08:43   
03/08/24 08:43  03/05/24 22:20    
    
    
    
    
Oxygen Delivery Method           Room Air                                         
       
Weight:                          168 lb 3.015 oz                                  
       
Body Mass Index (BMI)            30.9                                             
       
    
    
Intake & Output:     
                       Intake and Output for Last 24 Hours    
    
    
    
 03/06/24 03/07/24 03/08/24    
    
 23:59 23:59 23:59    
     
Intake Total 980 / 980 1560 / 1560 480 / 480    
     
Output Total 1960 / 1960 1850 / 1850 1100 / 1100    
     
Balance -980 / -980 -290 / -290 -620 / -620    
    
    
    
Lab / Micro Data    
    
                                                        03/05/24 05:44              
    
                                                        03/05/24 05:44              
    
Labs:     
                         Laboratory Results - last 24 hr    
    
03/07/24 11:48: POC Glucose 176 H    
03/07/24 16:43: POC Glucose 190 H    
03/07/24 22:30: POC Glucose 176 H    
03/08/24 07:11: POC Glucose 129 H    
    
    
Micro:     
                                  Microbiology    
    
03/06/24 21:15   Urine Catheter - Catheter   Urine Culture - Preliminary    
                            Staphylococcus species    
    
    
    
Physical Exam    
Const    
alert    
Constitutional Narrative:     
forgetful and repeats herself a lot.      
General Appearance: cooperative    
HEENT    
HEENT Narrative:     
No pain with palpation of the occiput.  Neck is supple.  MM are more moist today  
    
Resp    
normal respiratory effort and clear to auscultation bilaterally    
Resp Narrative:     
Able to speak in complete sentences.    
Effort and Inspection: Negative for tachypneic    
Cardio    
regular rate, regular rhythm, no murmurs and no gallops    
GI    
normal to inspection, nondistended, normoactive bowel sounds, soft to palpation   
and non-tender    
GI Narrative:     
mildly tympanic.  No guarding with palpation.  No cramping and no heartburn.     
Extremity    
no calf tenderness    
Extremity Narrative:     
Jg hose are in place.      
General Extremity: Negative for edema    
Skin    
General Skin Exam: no breakdown    
Rashes: no rashes    
Psych    
Psych Narrative:     
    
Gets anxious at times.  Started  on Sertraline 25 mg daily.  DTR Aracelis feels   
that she still has some depression ever since her  passed away 4 years   
ago.  Has some tension with her oldest dtr whom Cathi feels treats her like a   
child.  She is very talkative.      
Appearance: appropriate    
    
Assessment & Plan    
Assessment/Plan    
(1) Physical debility:     
(2) Skull fracture without loss of consciousness:     
QUALIFIERS:               Encounter type: subsequent encounter  Fracture type:   
closed    
(3) Memory loss:     
(4) Diabetes mellitus, type 2:     
(5) Episodic recurrent vertigo:     
PLAN: Not really having vertigo.  No saccades.  More of a   
lightheadedness........this is likely multifactorial and also likely recently   
exacerbated due to head trauma/TBI    
(6) Closed TBI (traumatic brain injury):     
PLAN: Due to a fall resulting in a occipital skull fracture.      
(7) Carotid stenosis, non-symptomatic:     
PLAN:     
Plan    
1.  Continue therapy    
2.  No changes to the drug regimen today.  Continue to monitor blood pressure   
closely.  Goal blood pressure is less than 130/80.    
3.  She has disease and narrowing in the BL carotids but, it is less than 50%.    
continue with risk factor management.  Goal BP < 130/80, LDL < 70, HGBA1C < 7.5   
(avoid hypoglycemia).   Continue ASA 81 mg daily.  Should follow up with   
vascular surgery yearly to monitor for progression of carotid stenosis.      
4.  she is forgetful and having difficulty with higher level executive   
functioning.  I suspect some of this is chronic but, recent TBI is likely   
exacerbating.  This coupled with the  dizziness  concerns me because she does   
still drive, when I am not dizzy .  Will recommend to Cathi and her family that   
she not be allowed to drive until she attends a driving school for evaluation to  
determine if it is safe for her to drive.      
    
Charges/Coding    
Visit Charges    
Inpatient E&M: 45141 Subs Hosp L1

## 2024-03-09 VITALS
RESPIRATION RATE: 16 BRPM | SYSTOLIC BLOOD PRESSURE: 144 MMHG | HEART RATE: 82 BPM | OXYGEN SATURATION: 94 % | TEMPERATURE: 97.16 F | DIASTOLIC BLOOD PRESSURE: 70 MMHG

## 2024-03-09 VITALS — OXYGEN SATURATION: 94 % | RESPIRATION RATE: 16 BRPM | HEART RATE: 82 BPM

## 2024-03-09 VITALS
HEART RATE: 83 BPM | OXYGEN SATURATION: 94 % | SYSTOLIC BLOOD PRESSURE: 128 MMHG | RESPIRATION RATE: 16 BRPM | TEMPERATURE: 98.06 F | DIASTOLIC BLOOD PRESSURE: 67 MMHG

## 2024-03-10 VITALS
OXYGEN SATURATION: 93 % | DIASTOLIC BLOOD PRESSURE: 69 MMHG | RESPIRATION RATE: 17 BRPM | TEMPERATURE: 97.88 F | HEART RATE: 84 BPM | SYSTOLIC BLOOD PRESSURE: 147 MMHG

## 2024-03-10 VITALS
HEART RATE: 80 BPM | OXYGEN SATURATION: 95 % | SYSTOLIC BLOOD PRESSURE: 131 MMHG | DIASTOLIC BLOOD PRESSURE: 67 MMHG | TEMPERATURE: 98.42 F | RESPIRATION RATE: 16 BRPM

## 2024-03-11 VITALS
RESPIRATION RATE: 17 BRPM | SYSTOLIC BLOOD PRESSURE: 119 MMHG | TEMPERATURE: 97.4 F | OXYGEN SATURATION: 94 % | HEART RATE: 76 BPM | DIASTOLIC BLOOD PRESSURE: 52 MMHG

## 2024-03-11 VITALS
HEART RATE: 86 BPM | SYSTOLIC BLOOD PRESSURE: 150 MMHG | OXYGEN SATURATION: 95 % | TEMPERATURE: 98.1 F | DIASTOLIC BLOOD PRESSURE: 64 MMHG | RESPIRATION RATE: 15 BRPM

## 2024-03-11 NOTE — PCM.PROGNOTE
Subjective
Subjective
Day 2 of 7 for urinary tract infection/cystitis.
Cathi was seen on TEAM rounds today.  Her dtr Aracelis participated by phone.  All questions were answered to their satisfaction.  

Afebrile
VSS-blood pressure the past 3 days has ranged from 119/52 to 147/69.
Maintaining appropriate oxygen saturation on RA
Oral intake -  FOOD good    FLUIDS fair to good
The blood sugar record was reviewed.  Blood sugars are under excellent control with no hypoglycemia.  No blood sugars over 200 and most are less than 180 with rare exception and then it was 190.  

Discussed with nursing - no problems that need addressed
Reviewed the THERAPY  notes
Medication list reviewed.  Was started on Cozaar 25 mg daily last Thursday for mildly elevated blood pressure and to preserve kidney function.

Denies dysuria, lightheadedness, chest pain, shortness of breath, cough, sore throat, nausea/vomiting/abdominal pain, calf pain.

Objective Data
Objective Data
Vital Signs: 
Vital Signs

Temp Pulse Resp BP Pulse Ox O2 Del Method FiO2
 97.4 F L  76   17   119/52 L  94   Room Air   21 
 03/11/24 07:37  03/11/24 07:37  03/11/24 07:37  03/11/24 07:37  03/11/24 07:37  03/11/24 07:37  03/05/24 22:20



Oxygen Delivery Method         Room Air                                         
Weight:                        168 lb 3.015 oz                                  
Body Mass Index (BMI)          30.9                                             


Intake & Output: 
Intake and Output for Last 24 Hours

 03/09/24 03/10/24 03/11/24
 22:59 23:59 23:59
Intake Total   540 / 540
Output Total   200 / 200
Balance   340 / 340


Lab / Micro Data

03/05/24 05:44          

03/05/24 05:44          

Labs: 
Laboratory Results - last 24 hr

03/10/24 11:41: POC Glucose 138 H
03/10/24 17:22: POC Glucose 139 H
03/10/24 21:48: POC Glucose 156 H
03/11/24 06:34: POC Glucose 143 H


Micro: 
Microbiology

03/06/24 21:15   Urine Catheter - Catheter   Urine Culture - Final
                            Staphylococcus hominis hominis



Physical Exam
Const
alert
General Appearance: cooperative
Resp
normal respiratory effort and clear to auscultation bilaterally
Resp Narrative: 
Able to speak in complete sentences.
Effort and Inspection: Negative for tachypneic
Cardio
regular rate, regular rhythm, no murmurs and no gallops
GI
normal to inspection, nondistended, normoactive bowel sounds, soft to palpation and non-tender
Extremity
no calf tenderness
Extremity Narrative: 
Jg hose are in place.  
General Extremity: Negative for edema
Skin
General Skin Exam: no breakdown
Rashes: no rashes

Assessment & Plan
Assessment/Plan
(1) Physical debility: 
(2) Skull fracture without loss of consciousness: 
QUALIFIERS:               Encounter type: subsequent encounter  Fracture type: closed
(3) Memory loss: 
(4) Diabetes mellitus, type 2: 
(5) Episodic recurrent vertigo: 
(6) Closed TBI (traumatic brain injury): 
(7) Carotid stenosis, non-symptomatic: 
PLAN: 
Plan
1.  Continue therapy
2.  BMP Thursday a.m.
3.  Decrease Accu-Cheks to twice daily


Charges/Coding
Visit Charges
Inpatient E&M: 34289 Subs Hosp L2

## 2024-03-11 NOTE — PN_ITS
Subjective    
Subjective    
Day 2 of 7 for urinary tract infection/cystitis.    
Cathi was seen on TEAM rounds today.  Her dtr Aracelis participated by phone.  All  
questions were answered to their satisfaction.      
    
Afebrile    
VSS-blood pressure the past 3 days has ranged from 119/52 to 147/69.    
Maintaining appropriate oxygen saturation on RA    
Oral intake -  FOOD good    FLUIDS fair to good    
The blood sugar record was reviewed.  Blood sugars are under excellent control   
with no hypoglycemia.  No blood sugars over 200 and most are less than 180 with   
rare exception and then it was 190.      
    
Discussed with nursing - no problems that need addressed    
Reviewed the THERAPY  notes    
Medication list reviewed.  Was started on Cozaar 25 mg daily last Thursday for   
mildly elevated blood pressure and to preserve kidney function.    
    
Denies dysuria, lightheadedness, chest pain, shortness of breath, cough, sore   
throat, nausea/vomiting/abdominal pain, calf pain.    
    
Objective Data    
Objective Data    
Vital Signs:     
Vital Signs    
    
    
    
Temp Pulse Resp BP Pulse Ox O2 Del Method FiO2    
     
 97.4 F L  76   17   119/52 L  94   Room Air   21     
     
 03/11/24 07:37  03/11/24 07:37  03/11/24 07:37  03/11/24 07:37  03/11/24 07:37   
03/11/24 07:37  03/05/24 22:20    
    
    
    
    
Oxygen Delivery Method           Room Air                                         
       
Weight:                          168 lb 3.015 oz                                  
       
Body Mass Index (BMI)            30.9                                             
       
    
    
Intake & Output:     
                       Intake and Output for Last 24 Hours    
    
    
    
 03/09/24 03/10/24 03/11/24    
    
 22:59 23:59 23:59    
     
Intake Total   540 / 540    
     
Output Total   200 / 200    
     
Balance   340 / 340    
    
    
    
Lab / Micro Data    
    
                                                        03/05/24 05:44              
    
                                                        03/05/24 05:44              
    
Labs:     
                         Laboratory Results - last 24 hr    
    
03/10/24 11:41: POC Glucose 138 H    
03/10/24 17:22: POC Glucose 139 H    
03/10/24 21:48: POC Glucose 156 H    
03/11/24 06:34: POC Glucose 143 H    
    
    
Micro:     
                                  Microbiology    
    
03/06/24 21:15   Urine Catheter - Catheter   Urine Culture - Final    
                            Staphylococcus hominis hominis    
    
    
    
Physical Exam    
Const    
alert    
General Appearance: cooperative    
Resp    
normal respiratory effort and clear to auscultation bilaterally    
Resp Narrative:     
Able to speak in complete sentences.    
Effort and Inspection: Negative for tachypneic    
Cardio    
regular rate, regular rhythm, no murmurs and no gallops    
GI    
normal to inspection, nondistended, normoactive bowel sounds, soft to palpation   
and non-tender    
Extremity    
no calf tenderness    
Extremity Narrative:     
Jg hose are in place.      
General Extremity: Negative for edema    
Skin    
General Skin Exam: no breakdown    
Rashes: no rashes    
    
Assessment & Plan    
Assessment/Plan    
(1) Physical debility:     
(2) Skull fracture without loss of consciousness:     
QUALIFIERS:               Encounter type: subsequent encounter  Fracture type:   
closed    
(3) Memory loss:     
(4) Diabetes mellitus, type 2:     
(5) Episodic recurrent vertigo:     
(6) Closed TBI (traumatic brain injury):     
(7) Carotid stenosis, non-symptomatic:     
PLAN:     
Plan    
1.  Continue therapy    
2.  BMP Thursday a.m.    
3.  Decrease Accu-Cheks to twice daily    
    
    
Charges/Coding    
Visit Charges    
Inpatient E&M: 02083 Subs Hosp L2

## 2024-03-12 VITALS
TEMPERATURE: 98.24 F | OXYGEN SATURATION: 97 % | SYSTOLIC BLOOD PRESSURE: 132 MMHG | DIASTOLIC BLOOD PRESSURE: 73 MMHG | HEART RATE: 86 BPM | RESPIRATION RATE: 16 BRPM

## 2024-03-12 VITALS
DIASTOLIC BLOOD PRESSURE: 73 MMHG | SYSTOLIC BLOOD PRESSURE: 145 MMHG | HEART RATE: 81 BPM | OXYGEN SATURATION: 98 % | TEMPERATURE: 97.3 F | RESPIRATION RATE: 18 BRPM

## 2024-03-13 VITALS
TEMPERATURE: 97.3 F | RESPIRATION RATE: 17 BRPM | DIASTOLIC BLOOD PRESSURE: 53 MMHG | SYSTOLIC BLOOD PRESSURE: 121 MMHG | OXYGEN SATURATION: 96 % | HEART RATE: 83 BPM

## 2024-03-13 VITALS
TEMPERATURE: 98 F | RESPIRATION RATE: 16 BRPM | DIASTOLIC BLOOD PRESSURE: 67 MMHG | HEART RATE: 86 BPM | OXYGEN SATURATION: 95 % | SYSTOLIC BLOOD PRESSURE: 134 MMHG

## 2024-03-13 NOTE — PN_ITS
Subjective    
Subjective    
Afebrile    
VSS    
Maintaining appropriate oxygen saturation on RA    
Oral intake -  FOOD good    FLUIDS good    
Blood sugars are well-controlled with no hypoglycemia.    
    
Discussed with nursing - no problems that need addressed    
Reviewed the THERAPY  notes    
Medication list reviewed.    
    
Cathi is complaining of feeling tired today and is also complaining of diarrhea.  
 She associates this with metformin but she is only taking 500 mg once daily.    
She is also taking doxycycline for Staph hominis cystitis and this may be a   
cause of some GI disturbance.  She has not been taking the senna.  Diarrhea is   
not reflected in the nursing documentation.  Cathi denies epigastric pain,   
heartburn, nausea, vomiting and abdominal pain.  Joint pains are better with the  
addition of Celebrex 100 mg daily to her drug regimen.    
    
Objective Data    
Objective Data    
Vital Signs:     
Vital Signs    
    
    
    
Temp Pulse Resp BP Pulse Ox O2 Del Method FiO2    
     
 97.3 F L  83   17   121/53 H  96   Room Air   21     
     
 03/13/24 09:16  03/13/24 09:16  03/13/24 09:16  03/13/24 09:16  03/13/24 09:16   
03/13/24 09:16  03/05/24 22:20    
    
    
    
    
Oxygen Delivery Method           Room Air                                         
       
Weight:                          170 lb 3.2 oz                                    
       
Body Mass Index (BMI)            31.1                                             
       
    
    
Intake & Output:     
                       Intake and Output for Last 24 Hours    
    
    
    
 03/11/24 03/12/24 03/13/24    
    
 23:59 23:59 23:59    
     
Intake Total 1860 / 1860 1200 / 1200 960 / 960    
     
Output Total 1650 / 1650 800 / 1500 1800 / 1800    
     
Balance 210 / 210 400 / -300 -840 / -840    
    
    
    
Lab / Micro Data    
    
                                                        03/05/24 05:44              
    
                                                        03/05/24 05:44              
    
Labs:     
                         Laboratory Results - last 24 hr    
    
03/12/24 16:31: POC Glucose 188 H    
03/13/24 05:34: POC Glucose 141 H    
    
    
Micro:     
                                  Microbiology    
    
03/06/24 21:15   Urine Catheter - Catheter   Urine Culture - Final    
                            Staphylococcus hominis hominis    
    
    
    
Physical Exam    
Const    
alert and no apparent distress    
General Appearance: cooperative    
HEENT    
Mouth: dry mucous membranes    
Resp    
normal respiratory effort and clear to auscultation bilaterally    
Resp Narrative:     
Able to speak in complete sentences.    
Effort and Inspection: Negative for tachypneic    
Cardio    
regular rate, regular rhythm, no murmurs and no gallops    
GI    
normal to inspection, nondistended, normoactive bowel sounds, soft to palpation   
and non-tender    
Extremity    
no calf tenderness    
Extremity Narrative:     
Jg hose are in place.      
General Extremity: Negative for edema    
Skin    
General Skin Exam: no breakdown    
Rashes: no rashes    
    
Assessment & Plan    
Assessment/Plan    
(1) Physical debility:     
(2) Skull fracture without loss of consciousness:     
QUALIFIERS:               Encounter type: subsequent encounter  Fracture type:   
closed    
(3) Memory loss:     
(4) Diabetes mellitus, type 2:     
(5) Episodic recurrent vertigo:     
(6) Closed TBI (traumatic brain injury):     
(7) Cystitis:     
PLAN: Due to Staph hominis which may be a contaminant however it was a straight   
cath specimen that was sent to the lab.    
PLAN:     
Plan    
1.  Continue therapy    
2.  Continue metformin    
3.  Finish 7 days of treatment for cystitis    
4.  BMP and CBC.    
    
Charges/Coding    
Visit Charges    
Inpatient E&M: 15778 Subs Hosp L1

## 2024-03-13 NOTE — PCM.PROGNOTE
Subjective
Subjective
Afebrile
VSS
Maintaining appropriate oxygen saturation on RA
Oral intake -  FOOD good    FLUIDS good
Blood sugars are well-controlled with no hypoglycemia.

Discussed with nursing - no problems that need addressed
Reviewed the THERAPY  notes
Medication list reviewed.

Cathi is complaining of feeling tired today and is also complaining of diarrhea.  She associates this with metformin but she is only taking 500 mg once daily.  She is also taking doxycycline for Staph hominis cystitis and this may be a cause of some 
GI disturbance.  She has not been taking the senna.  Diarrhea is not reflected in the nursing documentation.  Cathi denies epigastric pain, heartburn, nausea, vomiting and abdominal pain.  Joint pains are better with the addition of Celebrex 100 mg 
daily to her drug regimen.

Objective Data
Objective Data
Vital Signs: 
Vital Signs

Temp Pulse Resp BP Pulse Ox O2 Del Method FiO2
 97.3 F L  83   17   121/53 H  96   Room Air   21 
 03/13/24 09:16  03/13/24 09:16  03/13/24 09:16  03/13/24 09:16  03/13/24 09:16  03/13/24 09:16  03/05/24 22:20



Oxygen Delivery Method         Room Air                                         
Weight:                        170 lb 3.2 oz                                    
Body Mass Index (BMI)          31.1                                             


Intake & Output: 
Intake and Output for Last 24 Hours

 03/11/24 03/12/24 03/13/24
 23:59 23:59 23:59
Intake Total 1860 / 1860 1200 / 1200 960 / 960
Output Total 1650 / 1650 800 / 1500 1800 / 1800
Balance 210 / 210 400 / -300 -840 / -840


Lab / Micro Data

03/05/24 05:44          

03/05/24 05:44          

Labs: 
Laboratory Results - last 24 hr

03/12/24 16:31: POC Glucose 188 H
03/13/24 05:34: POC Glucose 141 H


Micro: 
Microbiology

03/06/24 21:15   Urine Catheter - Catheter   Urine Culture - Final
                            Staphylococcus hominis hominis



Physical Exam
Const
alert and no apparent distress
General Appearance: cooperative
HEENT
Mouth: dry mucous membranes
Resp
normal respiratory effort and clear to auscultation bilaterally
Resp Narrative: 
Able to speak in complete sentences.
Effort and Inspection: Negative for tachypneic
Cardio
regular rate, regular rhythm, no murmurs and no gallops
GI
normal to inspection, nondistended, normoactive bowel sounds, soft to palpation and non-tender
Extremity
no calf tenderness
Extremity Narrative: 
Jg hose are in place.  
General Extremity: Negative for edema
Skin
General Skin Exam: no breakdown
Rashes: no rashes

Assessment & Plan
Assessment/Plan
(1) Physical debility: 
(2) Skull fracture without loss of consciousness: 
QUALIFIERS:               Encounter type: subsequent encounter  Fracture type: closed
(3) Memory loss: 
(4) Diabetes mellitus, type 2: 
(5) Episodic recurrent vertigo: 
(6) Closed TBI (traumatic brain injury): 
(7) Cystitis: 
PLAN: Due to Staph hominis which may be a contaminant however it was a straight cath specimen that was sent to the lab.
PLAN: 
Plan
1.  Continue therapy
2.  Continue metformin
3.  Finish 7 days of treatment for cystitis
4.  BMP and CBC.

Charges/Coding
Visit Charges
Inpatient E&M: 43245 Subs Hosp L1

## 2024-03-14 VITALS
TEMPERATURE: 98.8 F | HEART RATE: 85 BPM | RESPIRATION RATE: 16 BRPM | OXYGEN SATURATION: 95 % | SYSTOLIC BLOOD PRESSURE: 130 MMHG | DIASTOLIC BLOOD PRESSURE: 61 MMHG

## 2024-03-14 VITALS — HEART RATE: 79 BPM | OXYGEN SATURATION: 95 %

## 2024-03-14 VITALS — SYSTOLIC BLOOD PRESSURE: 146 MMHG | DIASTOLIC BLOOD PRESSURE: 59 MMHG | HEART RATE: 89 BPM

## 2024-03-14 VITALS
DIASTOLIC BLOOD PRESSURE: 62 MMHG | TEMPERATURE: 97 F | RESPIRATION RATE: 17 BRPM | HEART RATE: 81 BPM | SYSTOLIC BLOOD PRESSURE: 125 MMHG | OXYGEN SATURATION: 96 %

## 2024-03-14 LAB
ANION GAP: 9 (ref 5–15)
BUN SERPL-MCNC: 14 MG/DL (ref 7–18)
BUN/CREAT RATIO: 27.1 RATIO (ref 10–20)
CALCIUM SERPL-MCNC: 8.7 MG/DL (ref 8.5–10.1)
CARBON DIOXIDE: 24 MMOL/L (ref 21–32)
CHLORIDE: 101 MMOL/L (ref 98–107)
DEPRECATED RDW RBC: 42.2 FL (ref 35.1–43.9)
ERYTHROCYTE [DISTWIDTH] IN BLOOD: 12.7 % (ref 11.6–14.6)
EST GLOM FILT RATE - AFR AMER: 145 ML/MIN (ref 60–?)
ESTIMATED CREATININE CLEARANCE: 50.36 ML/MIN
GLUCOSE: 118 MG/DL (ref 74–106)
HCT VFR BLD AUTO: 35.9 % (ref 37–47)
HGB BLD-MCNC: 12 G/DL (ref 12–15)
MCV RBC: 89.8 FL (ref 81–99)
MEAN CORP HGB CONC: 33.4 G/DL (ref 32–36)
MEAN PLATELET VOL.: 10 FL (ref 6.2–12)
PLATELET # BLD: 221 K/MM3 (ref 150–450)
POTASSIUM: 4 MMOL/L (ref 3.5–5.1)
RBC # BLD AUTO: 4 M/MM3 (ref 4.2–5.4)
WBC # BLD AUTO: 4.9 K/MM3 (ref 4.4–11)

## 2024-03-14 NOTE — PN_ITS
Subjective    
Subjective    
Cathi was seen on team rounds today.  Her daughter Aracelis participated by phone.  
    
    
Afebrile    
VSS-blood pressure has ranged from 121/53 to 145/73 over the past 48 hours.    
Heart rate is in the 80s and regular.      
Maintaining appropriate oxygen saturation on RA    
Oral intake -  FOOD good    FLUIDS better.  She had 2 L of fluid intake   
yesterday and today so far she has had 1420.    
The blood sugar record was reviewed and the blood sugars are well-controlled on   
Victoza and 500 mg of Glucophage daily.  There is no hypoglycemia.    
    
Discussed with nursing - no problems that need addressed    
Reviewed the THERAPY  notes    
Medication list reviewed.    
    
All lab drawn this morning was personally reviewed.  Hemoglobin is stable at 12.  
 The white blood cell count is normal and platelets are also within normal   
limits.  MCV and MCH are both normal.  Sodium is 134 which is mildly decreased.   
Chloride is normal at 101 and the serum bicarb is 24.  The BUN is 14 with a   
creatinine of 0.52 which is stable.  Calcium is normal.    
    
Cathi is complaining of  diarrhea  and she blames this on Glucophage.  After   
careful review of the nursing notes she is not having diarrhea but is having   
regular bowel movements.  She has had diarrhea with Glucophage in the past but   
at that time she was on 1000 mg p.o. twice daily.  She has been on doxycycline   
for Staph hominis cystitis and this may be causing some softer stool.  She   
denies abdominal pain, nausea, vomiting, flank pain, chest pain, shortness of   
breath, palpitations and calf pain.  She has not complained of dizziness in   
several days however when I directly asked her about dizziness she tells me that  
when she first jumps up out of bed in the morning she is lightheaded and   
sometimes when she walks quickly into the bathroom she also feels lightheaded.    
She denies vertigo.    
    
Objective Data    
Objective Data    
Vital Signs:     
Vital Signs    
    
    
    
Temp Pulse Resp BP Pulse Ox O2 Del Method FiO2    
     
 98.8 F   85   16   130/61 H  95   Room Air   21     
     
 03/14/24 09:16  03/14/24 09:16  03/14/24 09:16  03/14/24 09:16  03/14/24 09:16   
03/14/24 09:16  03/05/24 22:20    
    
    
    
    
Oxygen Delivery Method           Room Air                                         
       
Weight:                          170 lb 3.2 oz                                    
       
Body Mass Index (BMI)            31.1                                             
       
    
    
Intake & Output:     
                       Intake and Output for Last 24 Hours    
    
    
    
 03/12/24 03/13/24 03/14/24    
    
 23:59 23:59 23:59    
     
Intake Total 1200 / 1200 2010 / 2010 1420 / 1420    
     
Output Total 800 / 1500 3500 / 3700 1350 / 1350    
     
Balance 400 / -300 -1490 / -1690 70 / 70    
    
    
    
Lab / Micro Data    
    
                                                        03/14/24 05:21              
    
                                                        03/14/24 05:21              
    
Labs:     
                         Laboratory Results - last 24 hr    
    
03/13/24 17:14: POC Glucose 110 H    
03/13/24 21:43: POC Glucose 148 H    
03/14/24 05:21: WBC 4.9, RBC 4.00 L, Hgb 12.0, Hct 35.9 L, MCV 89.8, MCH 30.0,   
MCHC 33.4, RDW Std Deviation 42.2, RDW Coeff of [place] 12.7, Plt Count 221, MPV   
10.0, Sodium 134 L, Potassium 4.0, Chloride 101, Carbon Dioxide 24.0, Anion Gap   
9, BUN 14, Creatinine 0.52 L, Estim Creat Clear Calc 50.36, Est GFR (MDRD) Af   
Amer 145, Est GFR (MDRD) Non-Af 120, BUN/Creatinine Ratio 27.1 H, Glucose 118 H,  
Calcium 8.7    
03/14/24 06:24: POC Glucose 124 H    
    
    
Micro:     
                                  Microbiology    
    
03/06/24 21:15   Urine Catheter - Catheter   Urine Culture - Final    
                            Staphylococcus hominis hominis    
    
    
    
Physical Exam    
Const    
alert and no apparent distress    
General Appearance: cooperative    
Orientation / Consciousness: Negative for confused    
HEENT    
HEENT Narrative:     
Having a hard time hearing even with the hearing aids in.      
Mouth: dry mucous membranes    
Neck    
supple    
Resp    
normal respiratory effort and clear to auscultation bilaterally    
Resp Narrative:     
Able to speak in complete sentences.    
Effort and Inspection: Negative for tachypneic    
Cardio    
regular rate, regular rhythm, no murmurs and no gallops    
GI    
normal to inspection, nondistended, normoactive bowel sounds, soft to palpation   
and non-tender    
Extremity    
no calf tenderness    
Extremity Narrative:     
Jg hose are in place.      
General Extremity: Negative for edema    
Skin    
General Skin Exam: no breakdown    
Rashes: no rashes    
Psych    
Psych Narrative:     
Tolerating Sertraline without SE.  Affect is not flat.  She is appropriate and   
calm.  Tells me that she is less anxious than at admission to rehab.  Makes good  
eye contact when we are talking.  Seems to have more energy.      
Appearance: appropriate    
    
Assessment & Plan    
Assessment/Plan    
(1) Physical debility:     
(2) Skull fracture without loss of consciousness:     
QUALIFIERS:               Encounter type: subsequent encounter  Fracture type:   
closed    
(3) Memory loss:     
(4) Diabetes mellitus, type 2:     
(5) Episodic recurrent vertigo:     
(6) Closed TBI (traumatic brain injury):     
(7) Cystitis:     
PLAN:     
Plan    
1.  Continue therapy    
2.  Add lactobacillus 3 times daily to her current drug regimen    
3.  Patient is now agreeable to using a pill organizer to take her medications   
and her daughter Aracelis will supervise the filling of the pill organizer for at   
least the first couple weeks.    
4.  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   
[facility] in [place].  Will write an order for her to be seen at discharge.    
5.  She has elected to be discharged on Saturday.  Will do an overnight trending  
pulse ox tonight to determine eligibility for home O2 until she has her sleep   
study going forward.    
6.  Will need a front wheeled walker at discharge.  She is unable to use a cane   
safely and requires a walker for ambulation/balance.    
    
Charges/Coding    
Visit Charges    
Inpatient E&M: 56077 Subs Hosp L2

## 2024-03-14 NOTE — PCM.PROGNOTE
Subjective
Subjective
Cathi was seen on team rounds today.  Her daughter Aracelis participated by phone. 

Afebrile
VSS-blood pressure has ranged from 121/53 to 145/73 over the past 48 hours.  Heart rate is in the 80s and regular.  
Maintaining appropriate oxygen saturation on RA
Oral intake -  FOOD good    FLUIDS better.  She had 2 L of fluid intake yesterday and today so far she has had 1420.
The blood sugar record was reviewed and the blood sugars are well-controlled on Victoza and 500 mg of Glucophage daily.  There is no hypoglycemia.

Discussed with nursing - no problems that need addressed
Reviewed the THERAPY  notes
Medication list reviewed.

All lab drawn this morning was personally reviewed.  Hemoglobin is stable at 12.  The white blood cell count is normal and platelets are also within normal limits.  MCV and MCH are both normal.  Sodium is 134 which is mildly decreased.  Chloride is 
normal at 101 and the serum bicarb is 24.  The BUN is 14 with a creatinine of 0.52 which is stable.  Calcium is normal.

Cathi is complaining of  diarrhea  and she blames this on Glucophage.  After careful review of the nursing notes she is not having diarrhea but is having regular bowel movements.  She has had diarrhea with Glucophage in the past but at that time she 
was on 1000 mg p.o. twice daily.  She has been on doxycycline for Staph hominis cystitis and this may be causing some softer stool.  She denies abdominal pain, nausea, vomiting, flank pain, chest pain, shortness of breath, palpitations and calf 
pain.  She has not complained of dizziness in several days however when I directly asked her about dizziness she tells me that when she first jumps up out of bed in the morning she is lightheaded and sometimes when she walks quickly into the 
bathroom she also feels lightheaded.  She denies vertigo.

Objective Data
Objective Data
Vital Signs: 
Vital Signs

Temp Pulse Resp BP Pulse Ox O2 Del Method FiO2
 98.8 F   85   16   130/61 H  95   Room Air   21 
 03/14/24 09:16  03/14/24 09:16  03/14/24 09:16  03/14/24 09:16  03/14/24 09:16  03/14/24 09:16  03/05/24 22:20



Oxygen Delivery Method         Room Air                                         
Weight:                        170 lb 3.2 oz                                    
Body Mass Index (BMI)          31.1                                             


Intake & Output: 
Intake and Output for Last 24 Hours

 03/12/24 03/13/24 03/14/24
 23:59 23:59 23:59
Intake Total 1200 / 1200 2010 / 2010 1420 / 1420
Output Total 800 / 1500 3500 / 3700 1350 / 1350
Balance 400 / -300 -1490 / -1690 70 / 70


Lab / Micro Data

03/14/24 05:21          

03/14/24 05:21          

Labs: 
Laboratory Results - last 24 hr

03/13/24 17:14: POC Glucose 110 H
03/13/24 21:43: POC Glucose 148 H
03/14/24 05:21: WBC 4.9, RBC 4.00 L, Hgb 12.0, Hct 35.9 L, MCV 89.8, MCH 30.0, MCHC 33.4, RDW Std Deviation 42.2, RDW Coeff of [place] 12.7, Plt Count 221, MPV 10.0, Sodium 134 L, Potassium 4.0, Chloride 101, Carbon Dioxide 24.0, Anion Gap 9, BUN 14, 
Creatinine 0.52 L, Estim Creat Clear Calc 50.36, Est GFR (MDRD) Af Amer 145, Est GFR (MDRD) Non-Af 120, BUN/Creatinine Ratio 27.1 H, Glucose 118 H, Calcium 8.7
03/14/24 06:24: POC Glucose 124 H


Micro: 
Microbiology

03/06/24 21:15   Urine Catheter - Catheter   Urine Culture - Final
                            Staphylococcus hominis hominis



Physical Exam
Const
alert and no apparent distress
General Appearance: cooperative
Orientation / Consciousness: Negative for confused
HEENT
HEENT Narrative: 
Having a hard time hearing even with the hearing aids in.  
Mouth: dry mucous membranes
Neck
supple
Resp
normal respiratory effort and clear to auscultation bilaterally
Resp Narrative: 
Able to speak in complete sentences.
Effort and Inspection: Negative for tachypneic
Cardio
regular rate, regular rhythm, no murmurs and no gallops
GI
normal to inspection, nondistended, normoactive bowel sounds, soft to palpation and non-tender
Extremity
no calf tenderness
Extremity Narrative: 
Jg hose are in place.  
General Extremity: Negative for edema
Skin
General Skin Exam: no breakdown
Rashes: no rashes
Psych
Psych Narrative: 
Tolerating Sertraline without SE.  Affect is not flat.  She is appropriate and calm.  Tells me that she is less anxious than at admission to rehab.  Makes good eye contact when we are talking.  Seems to have more energy.  
Appearance: appropriate

Assessment & Plan
Assessment/Plan
(1) Physical debility: 
(2) Skull fracture without loss of consciousness: 
QUALIFIERS:               Encounter type: subsequent encounter  Fracture type: closed
(3) Memory loss: 
(4) Diabetes mellitus, type 2: 
(5) Episodic recurrent vertigo: 
(6) Closed TBI (traumatic brain injury): 
(7) Cystitis: 
PLAN: 
Plan
1.  Continue therapy
2.  Add lactobacillus 3 times daily to her current drug regimen
3.  Patient is now agreeable to using a pill organizer to take her medications and her daughter Aracelis will supervise the filling of the pill organizer for at least the first couple weeks.
4.  Patient was instructed not to drive until she attends the driving school at [facility] in [place].  Will write an order for her to be seen at discharge.
5.  She has elected to be discharged on Saturday.  Will do an overnight trending pulse ox tonight to determine eligibility for home O2 until she has her sleep study going forward.
6.  Will need a front wheeled walker at discharge.  She is unable to use a cane safely and requires a walker for ambulation/balance.

Charges/Coding
Visit Charges
Inpatient E&M: 22765 Subs Hosp L2

## 2024-03-14 NOTE — CASEMGMT
Addendum entered by Liana Barnes  03/14/24 14:55: 

Dtr provided HHC choices. 1. [facility], 2. [facility], 3. , 4. [facility], 5. [facility] SW [facility].

Addendum entered by Liana Barnes  03/14/24 13:25: 

Dr also requested a referral to driving rehab in [place]. SHARMAINE provided information to pt for dtr to call and schedule. SW also faxed referral. 

Original Note:

Social Work

IDT met with patient and family via conference call for Team meeting. Discussed patient's progress in PT/OT/ST/SN. Educated to Medicare approval of 13 days with DC 3/17. However, pt is needing new overnight O2, which ordering and testing need 
completed within 48 hours. SHARMAINE educated to these  guidelines and offered for pt to DC 3/16 or 3/18, to adhere to proper testing timeframes. Pt and dtr prefer 3/16 since dtr is off work. IDT recommending HHC PT/OT/ST/SN; no other DME needs. SW 
offered skilled HHC agencies in preferred area with quality and resource data via [employer]. Dtr agreed for a text link - link sent. No other DME needs beside overnight O2. Pt to follow up outpatient with a sleep study as well.

Plan: DC home alone 3/16, HHC PT/OT/ST/SN, overnight O2

AKILA MaganaW

## 2024-03-15 VITALS
RESPIRATION RATE: 17 BRPM | TEMPERATURE: 98.42 F | OXYGEN SATURATION: 97 % | DIASTOLIC BLOOD PRESSURE: 83 MMHG | SYSTOLIC BLOOD PRESSURE: 170 MMHG | HEART RATE: 76 BPM

## 2024-03-15 VITALS
SYSTOLIC BLOOD PRESSURE: 133 MMHG | HEART RATE: 85 BPM | RESPIRATION RATE: 14 BRPM | DIASTOLIC BLOOD PRESSURE: 77 MMHG | TEMPERATURE: 97.2 F | OXYGEN SATURATION: 95 %

## 2024-03-15 NOTE — DS.PCM_ITS
Providers    
Date of Admission: 03/04/24    
Date of Discharge: 03/16/24    
Primary Care Physician:     
Dr. Zulay Callahan    
none    
Reason For Visit: DEBILITY DUE TO CLOSED SKULL FRACTURE WITH TBI    
    
Diagnosis    
Discharge Diagnosis    
(1) Physical debility:     
      Status: Acute    
      Code(s):    
R53.81 - Other malaise    
(2) History of recent fall:     
      Status: Acute    
      Code(s):    
Z91.81 - History of falling    
      Plan:     
Ground-level fall    
(3) Skull fracture without loss of consciousness:     
      Status: Acute    
      Code(s):    
S02.91XA - Unspecified fracture of skull, initial encounter for closed fracture    
      Qualifiers:    
        Encounter type: subsequent encounter  Fracture type: closed    
      Plan:     
Due to a ground level fall.      
(4) Closed TBI (traumatic brain injury):     
      Status: Acute    
      Code(s):    
S06.9XAA - Unspecified intracranial injury with loss of consciousness status   
unknown, initial encounter    
      Qualifiers:    
        Encounter type: subsequent encounter  Loss of consciousness   
presence/duration: without LOC  Qualified Code(s): S06.9X0D - Unspecified   
intracranial injury without loss of consciousness, subsequent encounter    
(5) Memory loss:     
      Status: Acute    
      Code(s):    
R41.3 - Other amnesia    
      Plan:     
Multifactorial.  Suspect depression and also possible LALY.  Had an abnormal   
overnight trending pulse ox and she is going to have a sleep study at [facility] going   
forward.     
    
(6) Cystitis:     
      Status: Resolved    
      Code(s):    
N30.90 - Cystitis, unspecified without hematuria    
      Plan:     
Due to Staph Hominis which is usually a contaminant however, it was a cath   
specimen show she was treated with 7 days of doxycycline.     
(7) Diabetes mellitus, type 2:     
      Status: Acute    
      Code(s):    
E11.9 - Type 2 diabetes mellitus without complications    
      Plan:     
Well controlled at [facility] on Victoza and Glucophage 500 mg Q AM.      
(8) Vitamin D deficiency:     
      Status: Acute    
      Code(s):    
E55.9 - Vitamin D deficiency, unspecified    
      Plan:     
Started on a Vitamin D supplement.  Recommend a BMD if she has not had one in   
the past 2 years.      
(9) Carotid stenosis, non-symptomatic:     
      Status: Acute    
      Code(s):    
I65.29 - Occlusion and stenosis of unspecified carotid artery    
      Qualifiers:    
        Laterality: bilateral  Qualified Code(s): I65.23 - Occlusion and   
stenosis of bilateral carotid arteries    
      Plan:     
< 50% stenosis but, the plaques in the left carotid was ulcerated.  She was seen  
by Vascular surgery at [place] and they recommended ASA and continued statin   
therapy only.  would repeat a carotid US in 1 year to follow the stenosis.        
(10) Episodic lightheadedness:     
      Status: Acute    
      Code(s):    
R42 - Dizziness and giddiness    
      Plan:     
Orthostatics were + at admission to rehab and she had an elevated BUN/CREAT   
ratio and hyponatremia in the ED at the time of the fall.  Spironolactone has   
been discontinued and on 3/14/24 she was not orthostatic.  Edema in the ankles   
is controlled with NEO hose.  She has had no vertigo while on rehab.  Avoid   
Antivert due to anticholinergic properties and risk for orthostatic hypotension   
and increased falls.  Avoid medications on the Beer's list if at all possible.      
(11) Hyperlipidemia:     
      Status: Acute    
      Code(s):    
E78.5 - Hyperlipidemia, unspecified    
      Plan:     
Continue statin.    
(12) Lumbar canal stenosis:     
      Status: Chronic    
      Code(s):    
M48.061 - Spinal stenosis, lumbar region without neurogenic claudication    
      Plan:     
CT of the lumbar spine showed multilevel spondylitic changes with at least   
moderate canal stenosis at L3-L4.  She has not been c/o back pain since being   
started on Celebrex 100 mg daily.  She also has abeba pain in her knees.  HGB is   
stable and so is the creat.  she has not had epigastric pain, nausea or   
heartburn.  She has not had to have Famotidine since one day after admission to   
rehab (3/6/24).      
    
Plan    
1.  Discharge home on 3/16/2024 with home health care for PT/OT/ST/SN.  Her   
daughter Aracelis will help to manage medications and finances.    
2.  Will have the sleep lab call her daughter Aracelis to schedule an overnight   
sleep study following discharge.    
3.  No DME needs-she already has a wheeled walker at home.    
4.  Follow-up with Dr. Callahan within 2 weeks post DC.    
5.  RX's faxed to [facility] in [place].      
    
Medications at Discharge    
Home Medications    
    
acetaminophen 500 mg capsule 1,000 mg PO Q8H PRN pain 03/04/24     
aspirin 81 mg capsule 81 mg PO DAILY heart health 03/04/24     
brimonidine 0.2 %-timolol 0.5 % eye drops drp ophthalmic (eye) BID glaucoma   
03/04/24     
diclofenac sodium 1 % topical gel topical BID pain to knee 03/04/24     
multivitamin (Daily Multi-Vitamin tablet) 1 tab PO DAILY supplement 03/04/24     
liraglutide 0.6 mg/0.1 mL (18 mg/3 mL) subcutaneous pen injector (Victoza 2-Jose Juan)  
0.3 mg subcut DAILY glucose 03/05/24     
acidophilus 25 million cell-pectin, citrus 100 mg tablet 1 tab PO BID #60 tabs   
03/15/24     
celecoxib 100 mg capsule 100 mg PO DAILY #30 caps 03/15/24     
cholecalciferol (vitamin D3) 25 mcg (1,000 unit) tablet 25 mcg PO DAILYCM #30   
tabs 03/15/24     
ezetimibe 10 mg tablet 10 mg PO DAILY cholesterol inhibitor #30 tabs 03/15/24     
famotidine 20 mg tablet 20 mg PO DAILY PRN nausea/dyspepsia #30 tabs 03/15/24     
losartan 25 mg tablet 25 mg PO DAILY #30 tabs 03/15/24     
metformin 500 mg tablet 500 mg PO DAILYCM #30 tabs 03/15/24     
sertraline 50 mg tablet 25 mg (1/2 x 50 mg) PO DAILY #30 tabs 03/15/24     
    
    
    
Hospital Course    
Operations    
None    
Procedures    
None    
Summary of Care Provided    
Minutes Spent on Discharge: 40    
Hospital Course:     
    CATHI TAMEZ, is a 82 YO F with a PMH of DM II, HLD, BPPV (X4 years ago.....r  
esolved with vestibular training), DJD, presbycusis (has bilateral hearing   
aids), glaucoma, chronic back pain, gallbladder dysfunction and hx of breast CA   
who had a ground level fall on 2/29/24.  She struck her head but, she did not   
lose consciousness.  She sustained a non-displaced occipital fx of the skull.    
She was admitted to [facility].  C spine imaging was   
negative for fracture or dislocation. CT of C/A/P showed endometrial thickening   
measuring up to 9 mm with no other pathology. She has had an endometrial bx that  
was negative for pathology.  CTA of the neck on 3/1/2024 showed bilateral   
atherosclerotic disease up to 50% stenosis. There was ulceration of the plaque   
in the Left carotid. CT scan of the lumbar spine showed multilevel spondylitic   
changes with at least moderate spinal canal stenosis at L3-L4.  Vascular surgery  
was consulted due to the ulcerated Plaque in the L carotid and they recommended   
81 mg of aspirin daily and a high intensity statin for risk factor modification.  
 Echocardiogram done at the previous hospital showed an ejection fraction of 55   
to 60% with an impaired relaxation pattern consistent with left ventricular   
impaired relaxation.  The aortic valve was mildly thickened with no evidence of   
aortic stenosis.  The mitral valve was also mildly thickened and she had a trace  
of tricuspid and mitral regurgitation.  Lab at admission to  showed a low   
sodium at 132 and low chloride at 95.  The BUN was 10 with a creatinine of   
0.42.BUN/creatinine ratio was > 20 consistent with IV volume depletion.  Glucose  
was elevated at 167.  TSH was normal.  Hemoglobin A1c was 7.3%.  She has on   
Victoza for the past 3 years and  takes 1.8 mg weekly.  She was also taking   
glimepiride.  She was seen by the gerontologist at [place] who recommended   
discontinuing glimepiride due to risk of hypoglycemia in the elderly.  While at   
 she was seen by PT/OT and acute rehab was recommended.  Cathi was transferred  
to the acute inpt rehab unit at [facility] on 3/4/24 for 3 hours of therapy daily to   
restore function/independence at or near her level prior to the fall.      
     Orthostatic vital signs at presentation to acute rehab were consistent with  
intravascular volume depletion/dehydration.  Spironolactone was discontinued and  
she was placed in NEO hose which has controlled the edema in her ankles very   
well.  She tells me she has compression stockings at home that she used to wear   
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    
Orthostatics were repeated 2 days prior to discharge and they were negative for   
orthostatic hypotension.  Blood sugars were mildly elevated above 200 at times   
on Victoza alone and she was started on Glucophage 500 mg daily in the a.m. and   
the blood sugars are well-controlled at discharge.  Patient has been on a much   
higher dose of Glucophage in the past and had diarrhea.  She wanted to blame the  
Glucophage because she had some loose stools but she was taking tetracycline at   
the time for a Staph hominis urinary tract infection.  A probiotic was added to   
her drug regimen and the diarrhea resolved.  I suspect that she has some   
irritable bowel syndrome because she has alternating diarrhea and constipation.   
She was sometimes having diarrhea at home prior to the Glucophage being added.    
She has not had any frequent stooling but, stool was softer than what she   
considers her normal.      
     Cathi was c/o low back pain even though she had a lidocaine patch.  CT of   
the lumbar spine showed a subacute vertebral fracture which may have been   
related to the fall.  She was started on Celebrex 100 mg p.o. daily and has been  
tolerating this well with no heartburn, epigastric pain, nausea or other a  
bdominal discomfort.  Hemoglobin is stable and normal and the creatinine is also  
stable.  At the time of DC she is not c/o back pain or pain in her knees.  She   
initially had some urinary incontinence and a UA was checked.  The UA was   
obtained via straight cath.  She had 0-5 white blood cells per high-power field   
with +1 bacteria and the culture grew 80,000-100,000 colonies of Staphylococcus   
hominis.  This is generally a contaminant however since it was a cath specimen   
and she had urgent continence we elected to treat with doxycycline 100 mg twice   
daily x 7 days.  Urge incontinence resolved.  She denies dysuria at the time of   
discharge.  A comment of osteopenia was made on the x-ray reports obtained from   
the previous hospital and a vitamin D level was checked and it was low at 23.    
Cathi told me that she had previously been taking a vitamin D supplement but for  
some reason she was not taking it at the time she was admitted.  She was started  
on a vitamin D supplement.  She also tells me that she has not had a bone   
mineral density for approximately 4 years and I recommend that she have a BMD in  
the near future to see if she may need additional treatment for osteoporosis.    
An overnight trending pulse ox was obtained shortly after admission and Cathi   
had desaturation below 89% with 2 desaturation events (a oxygen saturation less   
than 88% for longer than 60 seconds.)  She was placed on oxygen anytime she was   
sleeping.  An overnight trending pulse ox was repeated prior to discharge and   
the percentage of the monitoring.  When the pulse ox was less than 90% was down   
to 1.18%.  She had no desaturation events.  She was not sent home with home   
oxygen however she will have a sleep study going forward and the sleep lab will   
be calling her daughter Aracelis to schedule.    
     Cathi and her daughter admitted that she had had some problems with memory   
even prior to the fall.  The is he admitted to feeling depressed at times and   
anxious.  She was napping during the day and falling asleep at night in front of  
the television.  She would then get up, have a snack and go to bed around 2 AM.   
She would sleep till 11 or 12 in the morning.  She was started on sertraline 25   
mg daily on 3/8/2024 and she is tolerating this well.   She is doing better and   
is much more alert during the day prior to DC.  she is no longer napping   
throughout the day and she is sleeping well at night.  Appetite is good.  She   
tells me that she feels less anxious.      
      Cathi did quite well with therapy. She is supervision/set up for eating,   
grooming, bathing, upper body dressing, lower body dressing, toilet transfer and  
toileting and tub/shower transfer.  She was made independent in her room 48   
hours prior to discharge and is doing well without any loss of balance events.    
She has ambulated up to 330 feet with a front wheeled walker at supervision and   
up to 410 feet at contact-guard assist.  She is able to ascend/descend two 6   
inch steps and three 4 inch steps with 1 handrail at light contact-guard assist.  
 This will allow her to enter her home.  She is able to do 12 sit to stands in   
30 seconds pushing up with the bilateral lower extremities at standby assist.    
She has been working daily with the speech therapist and cognition is improving   
however she will need continued speech therapy at home.  We are recommending   
that her daughter Aracelis assist with setting up her pillboxes and managing f  
inances for at least a few weeks post discharge.    
         Cathi denies headaches, vertigo, chest pain, shortness of breath,   
cough, nausea/vomiting/epigastric pain/abdominal pain, dysuria and calf tend  
erness at the time of discharge.  She has an appointment to follow-up with Dr. Zulay Callahan on April 4.  Lab done on 3/14/2024 showed a normal white blood   
cell count, normal hemoglobin at 12 and normal platelets.  She is normochromic   
and normocytic.  The RDW is normal.  Sodium is 134 and she is asymptomatic.  The  
BUN is 14 with a creatinine of 0.52.  BUN/creatinine ratio is elevated at 27.1   
but Cathi has decreased fluid intake and we are constantly reminding her to   
increase her fluid intake.  She was advised not to drink caffeinated beverages   
due to the diuretic effects of caffeine and due to increased bladder   
irritability and urge incontinence with caffeine intake.      
     Cathi was discharged home on 3/16/24. [facility] has been arranged for   
PT/OT/ST/SN.  she did not need any DME....she has a cane and a walker at home.    
she was advised to continue using the walker until she is told by the [facility]   
therapist that she is OK to use a cane.  She and her dtr were instructed that   
Cathi will not be allowed to drive until she is evaluated at by the Certified   
 rehab specialist at the [facility] facility in [place]. She still has some   
lightheadedness (primarily upon arising in the AM) and she has had a TBI.   She   
was given information about the program.      
     
         
    
    
    
Physical Exam    
Const    
alert, oriented x3 and no apparent distress    
General Appearance: cooperative    
Orientation / Consciousness: Negative for confused    
HEENT    
HEENT Narrative:     
She is very [place], even with her hearing aids in.  This likely contributes to   
memory difficulties.  She plans on following up with the audiologist who   
prescribed her hearing aids following DC.   No JVD and no carotid bruits.    
Mouth: dry mucous membranes    
Eyes    
PERRL and EOMs intact bilaterally    
Eyes Narrative:     
There is no discharge from the eyes and no mattering of the eyelashes.    
Conjunctiva is normal and there is no scleral icterus.    
Neck    
no lymphadenopathy, supple, no JVD and no carotid bruits    
General: trachea midline    
Resp    
Resp Narrative:     
She has some coarse crackles in the R base that persist after a few deep   
breaths.  She may have some scarring in the R lung base. No wheezing.  Excellent  
air exchange, no tachypnea no conversational dyspnea.    
Cardio    
regular rate, regular rhythm, S1 normal heart sound, S2 normal heart sound, no   
rub and no gallops    
Cardio Narrative:     
She has a soft systolic ejection murmur heard best at the second right   
intercostal space and likely secondary to aortic valve sclerosis/thickening.    
GI    
normal to inspection, nondistended, normoactive bowel sounds, soft to palpation   
and non-tender    
GI Narrative:     
No guarding with palpation    
Extremity    
no calf tenderness    
Extremity Narrative:     
She has a trace of edema in both ankles but no pretibial edema.  Peripheral   
edema is secondary to venous insufficiency and is well-controlled with   
compression stockings.    
Skin    
General Skin Exam: no breakdown    
Rashes: no rashes    
Neuro    
CN's II-XII intact bilaterally, no focal motor deficits and no sensory deficits   
noted    
Coordination / Balance: finger-to-nose test normal and heel-to-shin test normal    
Speech: speech normal    
Psych    
affect normal    
Psych Narrative:     
She is much more alert at discharge than she was at admission.  No longer   
napping throughout the day.  Makes good eye contact with me when we are speaking  
and is sleeping well at night with a good appetite.    
Appearance: appropriate    
Attitude: No agitated    
Activity / Motor Behavior: Negative for restless    
Mood & Affect: Negative for flat affect    
    
Weight / BMI    
                                     Weight    
    
    
    
Weight:                        170 lb 3.2 oz                                      
    
     
Body Mass Index (BMI)          31.1                                               
    
    
    
    
    
ABG / Lab / Microbiology Data    
    
                                                        03/14/24 05:21              
    
                                                        03/14/24 05:21              
    
Laboratory:     
                         Laboratory Results - last 24 hr    
    
03/14/24 16:29: POC Glucose 138 H    
03/15/24 03:04: POC Glucose 120 H    
    
    
Microbiology:     
                                  Microbiology    
    
03/06/24 21:15   Urine Catheter - Catheter   Urine Culture - Final    
                            Staphylococcus hominis hominis    
    
    
    
Meaningful Use Info    
Meaningful Use Diagnoses (Choose all that apply): None applicable    
    
Discharge Plan    
Admission    
Admit Date/Time: 03/04/24 19:02    
    
Primary Reason for Your Visit: TBI with closed skull fracture.      
    
Attending Provider: Dionne Valencia    
    
Instructions    
Patient Instructions:  Dealing With the Stress of ..., TBI Improving Cognition   
Post, TBI Caring For Person, TBI and Depression, TBI and Anxiety, Understanding   
Anxiety Disorders    
    
Additional Instructions / Restrictions:    
1.  You had a serious head injury and not only did you sustain a skull fracture   
in the fall but, you also have a traumatic brain injury, Called TBI.      
I have given you some literature to read about TBI and some of the symptoms and   
things you can do to help with cognition.  You have made progress with speech   
therapy but, you are still having some issues with memory.  We recommend you use  
a pill organizer for your medications and for at least the next few weeks you   
set up the pill boxes with [facility] to make sure that you are getting the   
medications as prescribed and at the correct times.  you may want to set an   
alarm on your phone to remind you when you are to take your medications so that   
you do not miss any meds during the day.      
2.  You are walking much better with the wheeled walker and you have really   
picked up your pace.  I have not noticed you having any loss of balance when   
walking recently.  I think you will be fine home alone.  I do recommend that you  
let Aracelis help you with medication management and finances for now.  You will   
be getting continued PT/OT/ST/skilled nurse when you get home.      
3.  the second overnight pulse ox we did was improved over the first.  The   
oxygen still drops at times but, there was no desaturation < 88% lst longer than  
a minute.  I do not think you will need oxygen at home.  I do think you should   
follow through with the sleep study.      
4.  Avoid taking sleep aids such as Benadryl, Ativan, and Ambien.  You can take   
Melatonin but, the max dose for someone your age is 3 mg.  these medications   
contribute to increased falls and confusion.      
5.  Your blood sugars are well controlled with Victoza and Glucophage 500 mg   
once a day.  I do not think the Glucophage is causing diarrhea.  I think you may  
have irritable bowel syndrome.  We have started you on a probiotic to see if   
that will help regulate the stool better.  Irritable bowel, also called IBS, can  
cause diarrhea and constipation.  sometimes the diarrhea and constipation   
alternate.  It tends to become more active when people are anxious.  You told me  
that you feel anxious.  I think you have also had some problems with depression.  
 Untreated depression can cause problems with memory.  It also causes you to   
feel more tired, less motivated and less interested in doing fun things.    
Generally people do not have JUST anxiety or just depression.  They have a   
combination of the 2.  Many people get anxious as they age, adriana if they live   
alone.  You have been started on Sertraline.  Sertraline is a serotonin reuptake  
inhibitor.  It treats both anxiety and depression. It was started on 3/8/24 so   
you have been taking it for a week and you have not had any adverse side   
effects.  I started you on the lowest dose, 25 mg daily.  I would continue this   
dose for another 3 weeks and if you are still feeling anxious or depressed I   
would increase the dose to 50 mg daily.      
6.  You probably already know this but, I am going to say it anyway.  When you   
are taking BP pills you need to move slower when changing positions.  When you   
first awaken in the AM sit at the edge of the bed for a minute or 2 before   
standing and when you do stand then stand for 1-2 minutes before walking.  I   
think this will help with the lightheadedness.  You do not have vertigo and so I  
do not think you should be taking Antivert.  Antivert can drop your BP when you   
stand and lead to increased lightheadedness.  Avoid taking any diuretics since   
you often do not drink enough fluids and this can also increase lightheadedness.  
     
7.  You have some plaque in the Left carotid artery.  Vascular surgery saw you   
at the other hospital and they recommended you take a baby aspirin daily and a   
statin to keep cholesterol controlled.  Both these medications will decrease the  
risk of a stroke going forward.      
8.  Like most people your age you have arthritis. I started you on a medication   
called Celebrex which is an anti-inflammatory (NSAID).  you have been taking it   
once a day since you were admitted to rehab and your joint pains have improved.   
Your blood count and kidney function are unchanged since admission.  Always take  
the Celebrex with food.        
9.  I do not want you driving because of the lightheadedness and the TBI.  TBI   
can cause dizziness and you already had this prior to the fall.  there is a   
driving program in [place] that is run by the [facility].  They test vision  
and reaction time and your driving to make sure that you are safe to drive.  I   
would like you to attend this program after a few months to see if you will be   
able to drive going forward.      
10.  We checked your Vitamin D level while you were on rehab.  It was low.    
vitamin D is necessary for your body to absorb calcium from your GI tract to   
keep your bones healthy.  I have started you on a Vitamin D supplement that you   
will take daily.  If you have not had a bone density test in the past 2 years   
you may want to ask Dr. Callahan if you should have one.       
11.  It has been a pleasure meeting you Cathi.  If you or Aracelis have any   
questions after you leave rehab please do not hesitate to call me.      
    
OFFICE:  [phone]    
CELL:   [phone]    
    
Discharge Orders/Prescriptions    
Prescriptions:    
New    
  metformin 500 mg Tablet     
   500 mg PO DAILYCM Qty: 30 0RF    
  famotidine 20 mg Tablet     
   20 mg PO DAILY PRN (Reason: nausea/dyspepsia) Qty: 30 0RF    
  losartan 25 mg Tablet     
   25 mg PO DAILY Qty: 30 0RF    
  celecoxib 100 mg Capsule     
   100 mg PO DAILY Qty: 30 0RF    
   Rx Instructions:    
   Take this medication with food    
  acidophilus-pectin, citrus 25 million cell -100 mg Tablet     
   1 tab PO BID Qty: 60 0RF    
  cholecalciferol (vitamin D3) 25 mcg (1,000 unit) Tablet     
   25 mcg PO DAILYCM Qty: 30 0RF    
  sertraline 50 mg Tablet     
   25 mg PO DAILY Qty: 30 0RF    
   Rx Instructions:    
   Take 1/2 tab daily in the morning.     
    
Continued    
  brimonidine-timolol 0.2-0.5 % drops     
     ophthalmic (eye) BID     
  acetaminophen 500 mg capsule     
   1,000 mg PO Q8H PRN (Reason: pain)     
  aspirin 81 mg capsule     
   81 mg PO DAILY     
  multivitamin [Daily Multi-Vitamin]  Tablet     
   1 tab PO DAILY     
  Victoza 2-Jose Juan 0.6 mg/0.1 mL (18 mg/3 mL) pen injector     
   0.3 mg subcut DAILY     
  ezetimibe 10 mg tablet     
   10 mg PO DAILY Qty: 30 0RF    
    
Discontinued    
  lidocaine [Lidocaine Pain Relief] 4 % adhesive patch,medicated     
   1 patch topical Q12H     
  spironolactone [Aldactone] 50 mg tablet     
   50 mg PO DAILY     
  naproxen [EC-Naprosyn] 500 mg tablet,delayed release (DR/EC)     
   250 mg PO .Q6H/PRN     
    
No Action    
  diclofenac sodium 1 % gel     
     TOPICAL BID     
    
Referrals / Follow Up:    
Teetee Muñiz [Other] - 03/27/24 2:00 pm    
([facility]     
[address] Floor, [address])    
Jannet Lee [Other] (Make appt with Vascular surgery in 1 year. )    
ZULAY CALLAHAN DO [Non-Staff] - 04/03/24 1:20 pm (Arrive by 1:05pm)    
    
Disposition    
Disposition (needs filled in before D/C Order can be placed): Home Health   
Service    
    
    
Charges/Coding    
Visit Charges    
Inpatient E&M: 53656 Disch Hosp >30min

## 2024-03-15 NOTE — PCM.DC.SUM
Providers
Date of Admission: 03/04/24
Date of Discharge: 03/16/24
Primary Care Physician: 
Dr. Zulay Callahan
none
Reason For Visit: DEBILITY DUE TO CLOSED SKULL FRACTURE WITH TBI

Diagnosis
Discharge Diagnosis
(1) Physical debility: 
      Status: Acute
      Code(s):
R53.81 - Other malaise
(2) History of recent fall: 
      Status: Acute
      Code(s):
Z91.81 - History of falling
      Plan: 
Ground-level fall
(3) Skull fracture without loss of consciousness: 
      Status: Acute
      Code(s):
S02.91XA - Unspecified fracture of skull, initial encounter for closed fracture
      Qualifiers:
        Encounter type: subsequent encounter  Fracture type: closed
      Plan: 
Due to a ground level fall.  
(4) Closed TBI (traumatic brain injury): 
      Status: Acute
      Code(s):
S06.9XAA - Unspecified intracranial injury with loss of consciousness status unknown, initial encounter
      Qualifiers:
        Encounter type: subsequent encounter  Loss of consciousness presence/duration: without LOC  Qualified Code(s): S06.9X0D - Unspecified intracranial injury without loss of consciousness, subsequent encounter
(5) Memory loss: 
      Status: Acute
      Code(s):
R41.3 - Other amnesia
      Plan: 
Multifactorial.  Suspect depression and also possible LALY.  Had an abnormal overnight trending pulse ox and she is going to have a sleep study at [facility] going forward. 

(6) Cystitis: 
      Status: Resolved
      Code(s):
N30.90 - Cystitis, unspecified without hematuria
      Plan: 
Due to Staph Hominis which is usually a contaminant however, it was a cath specimen show she was treated with 7 days of doxycycline. 
(7) Diabetes mellitus, type 2: 
      Status: Acute
      Code(s):
E11.9 - Type 2 diabetes mellitus without complications
      Plan: 
Well controlled at [facility] on Victoza and Glucophage 500 mg Q AM.  
(8) Vitamin D deficiency: 
      Status: Acute
      Code(s):
E55.9 - Vitamin D deficiency, unspecified
      Plan: 
Started on a Vitamin D supplement.  Recommend a BMD if she has not had one in the past 2 years.  
(9) Carotid stenosis, non-symptomatic: 
      Status: Acute
      Code(s):
I65.29 - Occlusion and stenosis of unspecified carotid artery
      Qualifiers:
        Laterality: bilateral  Qualified Code(s): I65.23 - Occlusion and stenosis of bilateral carotid arteries
      Plan: 
< 50% stenosis but, the plaques in the left carotid was ulcerated.  She was seen by Vascular surgery at [facility] and they recommended ASA and continued statin therapy only.  would repeat a carotid US in 1 year to follow the stenosis.    
(10) Episodic lightheadedness: 
      Status: Acute
      Code(s):
R42 - Dizziness and giddiness
      Plan: 
Orthostatics were + at admission to rehab and she had an elevated BUN/CREAT ratio and hyponatremia in the ED at the time of the fall.  Spironolactone has been discontinued and on 3/14/24 she was not orthostatic.  Edema in the ankles is controlled 
with NEO hose.  She has had no vertigo while on rehab.  Avoid Antivert due to anticholinergic properties and risk for orthostatic hypotension and increased falls.  Avoid medications on the Beer's list if at all possible.  
(11) Hyperlipidemia: 
      Status: Acute
      Code(s):
E78.5 - Hyperlipidemia, unspecified
      Plan: 
Continue statin.
(12) Lumbar canal stenosis: 
      Status: Chronic
      Code(s):
M48.061 - Spinal stenosis, lumbar region without neurogenic claudication
      Plan: 
CT of the lumbar spine showed multilevel spondylitic changes with at least moderate canal stenosis at L3-L4.  She has not been c/o back pain since being started on Celebrex 100 mg daily.  She also has abeba pain in her knees.  HGB is stable and so is 
the creat.  she has not had epigastric pain, nausea or heartburn.  She has not had to have Famotidine since one day after admission to rehab (3/6/24).  

Plan
1.  Discharge home on 3/16/2024 with home health care for PT/OT/ST/SN.  Her daughter Aracelis will help to manage medications and finances.
2.  Will have the sleep lab call her daughter Aracelis to schedule an overnight sleep study following discharge.
3.  No DME needs-she already has a wheeled walker at home.
4.  Follow-up with Dr. Callahan within 2 weeks post DC.
5.  RX's faxed to [facility] on [facility] in [place].  

Medications at Discharge
Home Medications

acetaminophen 500 mg capsule 1,000 mg PO Q8H PRN pain 03/04/24 
aspirin 81 mg capsule 81 mg PO DAILY heart health 03/04/24 
brimonidine 0.2 %-timolol 0.5 % eye drops drp ophthalmic (eye) BID glaucoma 03/04/24 
diclofenac sodium 1 % topical gel topical BID pain to knee 03/04/24 
multivitamin (Daily Multi-Vitamin tablet) 1 tab PO DAILY supplement 03/04/24 
liraglutide 0.6 mg/0.1 mL (18 mg/3 mL) subcutaneous pen injector (Victoza 2-Jose Juan) 0.3 mg subcut DAILY glucose 03/05/24 
acidophilus 25 million cell-pectin, citrus 100 mg tablet 1 tab PO BID #60 tabs 03/15/24 
celecoxib 100 mg capsule 100 mg PO DAILY #30 caps 03/15/24 
cholecalciferol (vitamin D3) 25 mcg (1,000 unit) tablet 25 mcg PO DAILYCM #30 tabs 03/15/24 
ezetimibe 10 mg tablet 10 mg PO DAILY cholesterol inhibitor #30 tabs 03/15/24 
famotidine 20 mg tablet 20 mg PO DAILY PRN nausea/dyspepsia #30 tabs 03/15/24 
losartan 25 mg tablet 25 mg PO DAILY #30 tabs 03/15/24 
metformin 500 mg tablet 500 mg PO DAILYCM #30 tabs 03/15/24 
sertraline 50 mg tablet 25 mg (1/2 x 50 mg) PO DAILY #30 tabs 03/15/24 



Hospital Course
Operations
None
Procedures
None
Summary of Care Provided
Minutes Spent on Discharge: 40
Hospital Course: 
    CATHI TAMEZ, is a 84 YO F with a PMH of DM II, HLD, BPPV (X4 years ago.....resolved with vestibular training), DJD, presbycusis (has bilateral hearing aids), glaucoma, chronic back pain, gallbladder dysfunction and hx of breast CA who had a 
ground level fall on 2/29/24.  She struck her head but, she did not lose consciousness.  She sustained a non-displaced occipital fx of the skull.  She was admitted to [facility].  C spine imaging was negative for fracture or 
dislocation. CT of C/A/P showed endometrial thickening measuring up to 9 mm with no other pathology. She has had an endometrial bx that was negative for pathology.  CTA of the neck on 3/1/2024 showed bilateral atherosclerotic disease up to 50% 
stenosis. There was ulceration of the plaque in the Left carotid. CT scan of the lumbar spine showed multilevel spondylitic changes with at least moderate spinal canal stenosis at L3-L4.  Vascular surgery was consulted due to the ulcerated Plaque in 
the L carotid and they recommended 81 mg of aspirin daily and a high intensity statin for risk factor modification.  Echocardiogram done at the previous hospital showed an ejection fraction of 55 to 60% with an impaired relaxation pattern consistent 
with left ventricular impaired relaxation.  The aortic valve was mildly thickened with no evidence of aortic stenosis.  The mitral valve was also mildly thickened and she had a trace of tricuspid and mitral regurgitation.  Lab at admission to  
showed a low sodium at 132 and low chloride at 95.  The BUN was 10 with a creatinine of 0.42.BUN/creatinine ratio was > 20 consistent with IV volume depletion.  Glucose was elevated at 167.  TSH was normal.  Hemoglobin A1c was 7.3%.  She has on 
Victoza for the past 3 years and  takes 1.8 mg weekly.  She was also taking glimepiride.  She was seen by the gerontologist at [facility] who recommended discontinuing glimepiride due to risk of hypoglycemia in the elderly.  While at  she was seen 
by PT/OT and acute rehab was recommended.  Cathi was transferred to the acute inpt rehab unit at [facility] on 3/4/24 for 3 hours of therapy daily to restore function/independence at or near her level prior to the fall.  
     Orthostatic vital signs at presentation to acute rehab were consistent with intravascular volume depletion/dehydration.  Spironolactone was discontinued and she was placed in NEO hose which has controlled the edema in her ankles very well.  She 
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  Orthostatics were repeated 2 days prior to discharge and they were negative for orthostatic hypotension.  
Blood sugars were mildly elevated above 200 at times on Victoza alone and she was started on Glucophage 500 mg daily in the a.m. and the blood sugars are well-controlled at discharge.  Patient has been on a much higher dose of Glucophage in the past 
and had diarrhea.  She wanted to blame the Glucophage because she had some loose stools but she was taking tetracycline at the time for a Staph hominis urinary tract infection.  A probiotic was added to her drug regimen and the diarrhea resolved.  I 
suspect that she has some irritable bowel syndrome because she has alternating diarrhea and constipation.  She was sometimes having diarrhea at home prior to the Glucophage being added.  She has not had any frequent stooling but, stool was softer 
than what she considers her normal.  
     Cathi was c/o low back pain even though she had a lidocaine patch.  CT of the lumbar spine showed a subacute vertebral fracture which may have been related to the fall.  She was started on Celebrex 100 mg p.o. daily and has been tolerating this 
well with no heartburn, epigastric pain, nausea or other abdominal discomfort.  Hemoglobin is stable and normal and the creatinine is also stable.  At the time of DC she is not c/o back pain or pain in her knees.  She initially had some urinary 
incontinence and a UA was checked.  The UA was obtained via straight cath.  She had 0-5 white blood cells per high-power field with +1 bacteria and the culture grew 80,000-100,000 colonies of Staphylococcus hominis.  This is generally a contaminant 
however since it was a cath specimen and she had urgent continence we elected to treat with doxycycline 100 mg twice daily x 7 days.  Urge incontinence resolved.  She denies dysuria at the time of discharge.  A comment of osteopenia was made on the 
x-ray reports obtained from the previous hospital and a vitamin D level was checked and it was low at 23.  Cathi told me that she had previously been taking a vitamin D supplement but for some reason she was not taking it at the time she was 
admitted.  She was started on a vitamin D supplement.  She also tells me that she has not had a bone mineral density for approximately 4 years and I recommend that she have a BMD in the near future to see if she may need additional treatment for 
osteoporosis.  An overnight trending pulse ox was obtained shortly after admission and Cathi had desaturation below 89% with 2 desaturation events (a oxygen saturation less than 88% for longer than 60 seconds.)  She was placed on oxygen anytime she 
was sleeping.  An overnight trending pulse ox was repeated prior to discharge and the percentage of the monitoring.  When the pulse ox was less than 90% was down to 1.18%.  She had no desaturation events.  She was not sent home with home oxygen 
however she will have a sleep study going forward and the sleep lab will be calling her daughter Aracelis to schedule.
     Cathi and her daughter admitted that she had had some problems with memory even prior to the fall.  The is he admitted to feeling depressed at times and anxious.  She was napping during the day and falling asleep at night in front of the 
television.  She would then get up, have a snack and go to bed around 2 AM.  She would sleep till 11 or 12 in the morning.  She was started on sertraline 25 mg daily on 3/8/2024 and she is tolerating this well.   She is doing better and is much more 
alert during the day prior to DC.  she is no longer napping throughout the day and she is sleeping well at night.  Appetite is good.  She tells me that she feels less anxious.  
      Cathi did quite well with therapy. She is supervision/set up for eating, grooming, bathing, upper body dressing, lower body dressing, toilet transfer and toileting and tub/shower transfer.  She was made independent in her room 48 hours prior 
to discharge and is doing well without any loss of balance events.  She has ambulated up to 330 feet with a front wheeled walker at supervision and up to 410 feet at contact-guard assist.  She is able to ascend/descend two 6 inch steps and three 4 
inch steps with 1 handrail at light contact-guard assist.  This will allow her to enter her home.  She is able to do 12 sit to stands in 30 seconds pushing up with the bilateral lower extremities at standby assist.  She has been working daily with 
the speech therapist and cognition is improving however she will need continued speech therapy at home.  We are recommending that her daughter Aracelis assist with setting up her pillboxes and managing finances for at least a few weeks post discharge.
         Cathi denies headaches, vertigo, chest pain, shortness of breath, cough, nausea/vomiting/epigastric pain/abdominal pain, dysuria and calf tenderness at the time of discharge.  She has an appointment to follow-up with Dr. Zulay Callahan on 
April 4.  Lab done on 3/14/2024 showed a normal white blood cell count, normal hemoglobin at 12 and normal platelets.  She is normochromic and normocytic.  The RDW is normal.  Sodium is 134 and she is asymptomatic.  The BUN is 14 with a creatinine 
of 0.52.  BUN/creatinine ratio is elevated at 27.1 but Cathi has decreased fluid intake and we are constantly reminding her to increase her fluid intake.  She was advised not to drink caffeinated beverages due to the diuretic effects of caffeine and 
due to increased bladder irritability and urge incontinence with caffeine intake.  
     Cathi was discharged home on 3/16/24. [facility] has been arranged for PT/OT/ST/SN.  she did not need any DME....she has a cane and a walker at home.  she was advised to continue using the walker until she is told by the [facility] therapist that she is OK 
to use a cane.  She and her dtr were instructed that Cathi will not be allowed to drive until she is evaluated at by the Certified  rehab specialist at the [facility] facility in [place]. She still has some lightheadedness (primarily upon arising in 
the AM) and she has had a TBI.   She was given information about the program.  
 
     



Physical Exam
Const
alert, oriented x3 and no apparent distress
General Appearance: cooperative
Orientation / Consciousness: Negative for confused
HEENT
HEENT Narrative: 
She is very [place], even with her hearing aids in.  This likely contributes to memory difficulties.  She plans on following up with the audiologist who prescribed her hearing aids following DC.   No JVD and no carotid bruits.
Mouth: dry mucous membranes
Eyes
PERRL and EOMs intact bilaterally
Eyes Narrative: 
There is no discharge from the eyes and no mattering of the eyelashes.  Conjunctiva is normal and there is no scleral icterus.
Neck
no lymphadenopathy, supple, no JVD and no carotid bruits
General: trachea midline
Resp
Resp Narrative: 
She has some coarse crackles in the R base that persist after a few deep breaths.  She may have some scarring in the R lung base. No wheezing.  Excellent air exchange, no tachypnea no conversational dyspnea.
Cardio
regular rate, regular rhythm, S1 normal heart sound, S2 normal heart sound, no rub and no gallops
Cardio Narrative: 
She has a soft systolic ejection murmur heard best at the second right intercostal space and likely secondary to aortic valve sclerosis/thickening.
GI
normal to inspection, nondistended, normoactive bowel sounds, soft to palpation and non-tender
GI Narrative: 
No guarding with palpation
Extremity
no calf tenderness
Extremity Narrative: 
She has a trace of edema in both ankles but no pretibial edema.  Peripheral edema is secondary to venous insufficiency and is well-controlled with compression stockings.
Skin
General Skin Exam: no breakdown
Rashes: no rashes
Neuro
CN's II-XII intact bilaterally, no focal motor deficits and no sensory deficits noted
Coordination / Balance: finger-to-nose test normal and heel-to-shin test normal
Speech: speech normal
Psych
affect normal
Psych Narrative: 
She is much more alert at discharge than she was at admission.  No longer napping throughout the day.  Makes good eye contact with me when we are speaking and is sleeping well at night with a good appetite.
Appearance: appropriate
Attitude: No agitated
Activity / Motor Behavior: Negative for restless
Mood & Affect: Negative for flat affect

Weight / BMI
Weight

Weight:                        170 lb 3.2 oz                                     
Body Mass Index (BMI)          31.1                                              



ABG / Lab / Microbiology Data

03/14/24 05:21          

03/14/24 05:21          

Laboratory: 
Laboratory Results - last 24 hr

03/14/24 16:29: POC Glucose 138 H
03/15/24 03:04: POC Glucose 120 H


Microbiology: 
Microbiology

03/06/24 21:15   Urine Catheter - Catheter   Urine Culture - Final
                            Staphylococcus hominis hominis



Meaningful Use Info
Meaningful Use Diagnoses (Choose all that apply): None applicable

Discharge Plan
Admission
Admit Date/Time: 03/04/24 19:02

Primary Reason for Your Visit: TBI with closed skull fracture.  

Attending Provider: Dionne Valencia

Instructions
Patient Instructions:  Dealing With the Stress of ..., TBI Improving Cognition Post, TBI Caring For Person, TBI and Depression, TBI and Anxiety, Understanding Anxiety Disorders

Additional Instructions / Restrictions:
1.  You had a serious head injury and not only did you sustain a skull fracture in the fall but, you also have a traumatic brain injury, Called TBI.  
I have given you some literature to read about TBI and some of the symptoms and things you can do to help with cognition.  You have made progress with speech therapy but, you are still having some issues with memory.  We recommend you use a pill 
organizer for your medications and for at least the next few weeks you set up the pill boxes with [facility] to make sure that you are getting the medications as prescribed and at the correct times.  you may want to set an alarm on your phone to remind 
you when you are to take your medications so that you do not miss any meds during the day.  
2.  You are walking much better with the wheeled walker and you have really picked up your pace.  I have not noticed you having any loss of balance when walking recently.  I think you will be fine home alone.  I do recommend that you let Aracelis help 
you with medication management and finances for now.  You will be getting continued PT/OT/ST/skilled nurse when you get home.  
3.  the second overnight pulse ox we did was improved over the first.  The oxygen still drops at times but, there was no desaturation < 88% lst longer than a minute.  I do not think you will need oxygen at home.  I do think you should follow through 
with the sleep study.  
4.  Avoid taking sleep aids such as Benadryl, Ativan, and Ambien.  You can take Melatonin but, the max dose for someone your age is 3 mg.  these medications contribute to increased falls and confusion.  
5.  Your blood sugars are well controlled with Victoza and Glucophage 500 mg once a day.  I do not think the Glucophage is causing diarrhea.  I think you may have irritable bowel syndrome.  We have started you on a probiotic to see if that will help 
regulate the stool better.  Irritable bowel, also called IBS, can cause diarrhea and constipation.  sometimes the diarrhea and constipation alternate.  It tends to become more active when people are anxious.  You told me that you feel anxious.  I 
think you have also had some problems with depression.  Untreated depression can cause problems with memory.  It also causes you to feel more tired, less motivated and less interested in doing fun things.  Generally people do not have JUST anxiety 
or just depression.  They have a combination of the 2.  Many people get anxious as they age, adriana if they live alone.  You have been started on Sertraline.  Sertraline is a serotonin reuptake inhibitor.  It treats both anxiety and depression. It was 
started on 3/8/24 so you have been taking it for a week and you have not had any adverse side effects.  I started you on the lowest dose, 25 mg daily.  I would continue this dose for another 3 weeks and if you are still feeling anxious or depressed 
I would increase the dose to 50 mg daily.  
6.  You probably already know this but, I am going to say it anyway.  When you are taking BP pills you need to move slower when changing positions.  When you first awaken in the AM sit at the edge of the bed for a minute or 2 before standing and 
when you do stand then stand for 1-2 minutes before walking.  I think this will help with the lightheadedness.  You do not have vertigo and so I do not think you should be taking Antivert.  Antivert can drop your BP when you stand and lead to 
increased lightheadedness.  Avoid taking any diuretics since you often do not drink enough fluids and this can also increase lightheadedness.  
7.  You have some plaque in the Left carotid artery.  Vascular surgery saw you at the other hospital and they recommended you take a baby aspirin daily and a statin to keep cholesterol controlled.  Both these medications will decrease the risk of a 
stroke going forward.  
8.  Like most people your age you have arthritis. I started you on a medication called Celebrex which is an anti-inflammatory (NSAID).  you have been taking it once a day since you were admitted to rehab and your joint pains have improved.  Your 
blood count and kidney function are unchanged since admission.  Always take the Celebrex with food.    
9.  I do not want you driving because of the lightheadedness and the TBI.  TBI can cause dizziness and you already had this prior to the fall.  there is a driving program in [place] that is run by the [facility]  They test vision and reaction 
time and your driving to make sure that you are safe to drive.  I would like you to attend this program after a few months to see if you will be able to drive going forward.  
10.  We checked your Vitamin D level while you were on rehab.  It was low.  vitamin D is necessary for your body to absorb calcium from your GI tract to keep your bones healthy.  I have started you on a Vitamin D supplement that you will take daily. 
 If you have not had a bone density test in the past 2 years you may want to ask Dr. Callahan if you should have one.   
11.  It has been a pleasure meeting you Cathi.  If you or Aracelis have any questions after you leave rehab please do not hesitate to call me.  

OFFICE:  [phone]
CELL:   [phone]

Discharge Orders/Prescriptions
Prescriptions:
New
  metformin 500 mg Tablet 
   500 mg PO DAILYCM Qty: 30 0RF
  famotidine 20 mg Tablet 
   20 mg PO DAILY PRN (Reason: nausea/dyspepsia) Qty: 30 0RF
  losartan 25 mg Tablet 
   25 mg PO DAILY Qty: 30 0RF
  celecoxib 100 mg Capsule 
   100 mg PO DAILY Qty: 30 0RF
   Rx Instructions:
   Take this medication with food
  acidophilus-pectin, citrus 25 million cell -100 mg Tablet 
   1 tab PO BID Qty: 60 0RF
  cholecalciferol (vitamin D3) 25 mcg (1,000 unit) Tablet 
   25 mcg PO DAILYCM Qty: 30 0RF
  sertraline 50 mg Tablet 
   25 mg PO DAILY Qty: 30 0RF
   Rx Instructions:
   Take 1/2 tab daily in the morning. 

Continued
  brimonidine-timolol 0.2-0.5 % drops 
     ophthalmic (eye) BID 
  acetaminophen 500 mg capsule 
   1,000 mg PO Q8H PRN (Reason: pain) 
  aspirin 81 mg capsule 
   81 mg PO DAILY 
  multivitamin [Daily Multi-Vitamin]  Tablet 
   1 tab PO DAILY 
  Victoza 2-Jose Juan 0.6 mg/0.1 mL (18 mg/3 mL) pen injector 
   0.3 mg subcut DAILY 
  ezetimibe 10 mg tablet 
   10 mg PO DAILY Qty: 30 0RF

Discontinued
  lidocaine [Lidocaine Pain Relief] 4 % adhesive patch,medicated 
   1 patch topical Q12H 
  spironolactone [Aldactone] 50 mg tablet 
   50 mg PO DAILY 
  naproxen [EC-Naprosyn] 500 mg tablet,delayed release (DR/EC) 
   250 mg PO .Q6H/PRN 

No Action
  diclofenac sodium 1 % gel 
     TOPICAL BID 

Referrals / Follow Up:
Teetee Muñiz [Other] - 03/27/24 2:00 pm
([facility], [place] 
[address] Floor, [address])
Jannet Lee [Other] (Make appt with Vascular surgery in 1 year. )
ZULAY CALLAHAN DO [Non-Staff] - 04/03/24 1:20 pm (Arrive by 1:05pm)

Disposition
Disposition (needs filled in before D/C Order can be placed): Home Health Service


Charges/Coding
Visit Charges
Inpatient E&M: 51432 Disch Hosp >30min

## 2024-03-16 VITALS
TEMPERATURE: 98.3 F | RESPIRATION RATE: 17 BRPM | OXYGEN SATURATION: 95 % | SYSTOLIC BLOOD PRESSURE: 152 MMHG | HEART RATE: 81 BPM | DIASTOLIC BLOOD PRESSURE: 63 MMHG

## 2024-03-16 VITALS
TEMPERATURE: 98.24 F | RESPIRATION RATE: 17 BRPM | OXYGEN SATURATION: 95 % | SYSTOLIC BLOOD PRESSURE: 152 MMHG | DIASTOLIC BLOOD PRESSURE: 63 MMHG | HEART RATE: 81 BPM

## 2024-03-16 NOTE — NURSING
pt's daughter here and dc instructions gone over and given with no needs voiced. pt dressed and all belongings packed. escorted via wc with this rn

## 2024-03-18 ENCOUNTER — PATIENT OUTREACH (OUTPATIENT)
Dept: CARE COORDINATION | Facility: CLINIC | Age: 84
End: 2024-03-18
Payer: MEDICARE
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
## 2024-03-18 NOTE — PROGRESS NOTES
Discharge Facility:[facility]  Discharge Diagnosis:W19.XXXA Fall  Admission Date:3/4/24  Discharge Date:3/16/24    Discharge Facility:[facility] [address]  Discharge Diagnosis:W19.XXXA Fall  Admission Date:2/29/24  Discharge Date:3/4/24    PCP Appointment Date:No contact made task sent to office  Specialist Appointment Date: General Surgery 3/27/24  Hospital Encounter and Summary: Linked  Hospital Summary    2 call attempts made

## 2024-03-26 ENCOUNTER — HOSPITAL ENCOUNTER (OUTPATIENT)
Dept: HOSPITAL 100 - SL | Age: 84
Discharge: HOME | End: 2024-03-26
Payer: MEDICARE

## 2024-03-26 DIAGNOSIS — G47.10: Primary | ICD-10-CM

## 2024-03-26 PROCEDURE — 95810 POLYSOM 6/> YRS 4/> PARAM: CPT

## 2024-03-26 NOTE — XMS RPT_ITS
Comprehensive CCD (C-CDA v2.1)  
  
                           Created on: 2024  
  
  
Lise Aaron  
External Reference #: [id number]  
: 1940  
Sex: Female  
  
Demographics  
  
  
                                        Address             [place]  [address]  
   
                                        Home Phone          [phone]  
   
                                        Preferred Language  en  
   
                                        Marital Status        
   
                                        Rastafarian Affiliation Unknown  
   
                                        Race                White  
   
                                        Ethnic Group        Not  or Lati  
no  
  
  
Author  
  
  
                                        Name                Unknown  
   
                                        Address             [address] [facility]  
[address]  
[place]  [address]  
   
                                        Phone               [phone]  
   
                                        Organization        [facility]  
  
  
Care Team Providers  
  
  
                                Care Team Member Name Role            Phone  
   
                                Myles Perrin  Unavailable     Unavailable  
   
                                yMles Perrin  Unavailable     Unavailable  
   
                                Gavino Baker  Unavailable     Unavailable  
   
                                Gavino Baker  Unavailable     Unavailable  
   
                                Robinson Fisher Unavailable     Unavailable  
   
                                Robinson Fisher Unavailable     Unavailable  
   
                                Robinson Fisher Unavailable     Unavailable  
   
                                Robinson Fisher Unavailable     Unavailable  
   
                                DAWIT GARCIA Attending       Unavailable  
   
                                Unavailable     Primary Care Provider Unavailabl  
e  
   
                                Tiffanie Callahan A Unavailable     [phone]  
   
                                Unavailable     Unavailable     [phone]  
   
                                Leno Crowe MD P Unavailable     [phone]  
   
                                Rosalia XIAO, Enver  Unavailable     [phone]  
   
                                Allan Gallego Unavailable     [phone]  
[phone]  
   
                                Callahan DO, Tiffanie Primary Care Provider [phone]  
[phone]  
   
                                Callahan DO, Tiffanie Primary Care Provider [phone]  
[phone]  
   
                                Leno Crowe MD P Unavailable     [phone]  
   
                                Rosalia XIAO, Enver  Unavailable     [phone]  
   
                                Allan Gallego Unavailable     [phone]  
[phone]  
   
                                Leno Crowe MD P Unavailable     [phone]  
   
                                Rosalia XIAO [place]  Unavailable     [phone]  
   
                                Allan Gallego Unavailable     [phone]  
[phone]  
   
                                Callahan DO, Tiffanie Primary Care Provider [phone]  
[phone]  
   
                                CALLAHAN, TIFFANIE  Primary Care    Unavailable  
   
                                MADAI LENNON Attending       Unavailable  
   
                                CALLAHAN, TIFFANIE  Referring       Unavailable  
   
                                CALLAHAN, TIFFANIE  Primary Care    Unavailable  
   
                                CALLAHAN, TIFFANIE  Referring       Unavailable  
   
                                CALLAHAN, TIFFANIE  Primary Care    Unavailable  
   
                                DARMODY, MADAI M Attending       Unavailable  
   
                                CALLAHAN, TIFFANIE  Referring       Unavailable  
   
                                DARMODY, MADAI M Attending       Unavailable  
   
                                CALLAHAN, TIFFANIE  Primary Care    Unavailable  
   
                                CALLAHAN, TIFFANIE  Referring       Unavailable  
   
                                DARMODY, MADAI M Attending       Unavailable  
   
                                CALLAHAN, TIFFANIE  Primary Care    Unavailable  
   
                                CALLAHAN, TIFFANIE  Primary Care    Unavailable  
   
                                CALLAHAN, TIFFANIE  Primary Care    Unavailable  
   
                                CALLAHAN, TIFFANIE  Primary Care    Unavailable  
   
                                CALLAHAN, TIFFANIE  Referring       Unavailable  
   
                                DARMODY, MADAI M Attending       Unavailable  
   
                                CALLAHAN, TIFFANIE  Primary Care    Unavailable  
   
                                PRYKHODKO, AMANDA PATRICIABILLIEABRAHAMSABAS Attending       U  
navailable  
   
                                CALLAHAN, TIFFANIE  Primary Care    Unavailable  
   
                                Callahan, Dr. Tiffanie Milian Primary Care    Unavailab  
le  
   
                                Callahan, Dr. Tiffanie Milian Attending       Unavailab  
le  
   
                                Callahan, Dr. Tiffanie Milian Attending       Unavailab  
le  
   
                                Callahan, Dr. Tiffanie Milian Primary Care    Unavailab  
le  
   
                                Callahan, Dr. Tiffanie Milian Attending       Unavailab  
le  
   
                                Callahan, Dr. Tiffanie Milian Primary Care    Unavailab  
le  
   
                                Callahan, Dr. Tiffanie Milian Attending       Unavailab  
le  
   
                                Callahan, Dr. Tiffanie Milian Primary Care    Unavailab  
le  
   
                                Callahan, Dr. Tiffanie Milian Primary Care    Unavailab  
le  
   
                                Callahan, Dr. Tiffanie Milian Attending       Unavailab  
le  
   
                                Callahan, Dr. Tiffanie Milian Attending       Unavailab  
le  
   
                                Callahan, Dr. Tiffanie Milian Primary Care    Unavailab  
le  
   
                                Callahan, Dr. Tiffanie Milian Primary Care    Unavailab  
le  
   
                                Callahan, Dr. Tiffanie Milian Attending       Unavailab  
le  
   
                                Callahan, Dr. Tiffanie Milian Primary Care    Unavailab  
le  
   
                                Callahan, Dr. Tiffanie Milian Attending       Unavailab  
le  
   
                                Callahan DO Tiffanie Primary Care Provider [phone]  
[phone]  
   
                                Callahan DO Tiffanie A Primary Care Provider [phone]  
   
                                Leno Crowe MD Unavailable     [phone]  
   
                                Rosalia XIAO, Enver  Unavailable     [phone]  
   
                                Nahun RASMUSSEN, Allan PAZ Unavailable     [phone]  
   
                                Callahan DO, Tiffanie Primary Care Provider [phone]  
[phone]  
   
                                CALLAHAN, TIFFANIE  Primary Care    Unavailable  
   
                                LENO CROWE Attending       Unavailable  
   
                                YI VALE   Referring       Unavailable  
   
                                VALE, YI   Attending       Unavailable  
   
                                CALLAHAN, TIFFANIE  Primary Care    Unavailable  
   
                                VALE, YI   Referring       Unavailable  
   
                                CALLAHAN, TIFFANIE A Attending       Unavailable  
   
                                CALLAHAN, TIFFANIE A Primary Care    Unavailable  
   
                                CALLAHAN, TIFFANIE A Attending       Unavailable  
   
                                CALLAHAN, TIFFANIE A Primary Care    Unavailable  
   
                                CALLAHAN, TIFFANIE A Attending       Unavailable  
   
                                CALLAHAN, TIFFANIE A Primary Care    Unavailable  
   
                                Amrita XIAO, Leno MARADIAGA Unavailable     [phone]  
   
                                Rosalia XIAO, Enver  Unavailable     [phone]  
   
                                Nahun RASMUSSEN, Allan PAZ Unavailable     [phone]  
   
                                CALLAHAN, TIFFANIE  Referring       Unavailable  
   
                                CALLAHAN, TIFFANIE  Primary Care    Unavailable  
   
                                ADITI HENDERSON     Attending       Unavailable  
   
                                Callahan DO, Tiffanie A Unavailable     [phone]  
[phone]  
   
                                CALLAHAN, TIFFANIE A Primary Care    Unavailable  
   
                                TAQUERIA LAMAS   Attending       Unavailable  
   
                                TAQUERIA LAMAS   Referring       Unavailable  
   
                                CALLAHAN, TIFFANIE A Primary Care    Unavailable  
   
                                FABRIZIO RICHMOND Admitting       Unavailable  
   
                                MEAGANKATHY Attending       Unavailable  
   
                                BHANUSTEPHANY FAUST  Referring       Unavailable  
   
                                CALLAHAN, TIFFANIE A Primary Care    Unavailable  
   
                                CALLAHAN, TIFFANIE A Primary Care    Unavailable  
   
                                CALLAHAN, TIFFANIE A Primary Care    Unavailable  
  
  
  
Allergies  
  
  
                                                    Allergy   
Classification                          Reported   
Allergen(s)               Allergy Type              Date of   
Onset                     Reaction(s)               Facility  
   
                                                    Adhesive Tape  
(13 sources)              Adhesive Tape             Substance   
Allergy                                                     [facility] [address]   
DO  
Work Phone:   
[phone]  
   
                                                    Alendronate  
(13 sources)                            Alendronate;   
Translations:   
[Fosamax]       Drug Allergy                                    [facility]   
DO  
Work Phone:   
[phone]  
   
                                                      
(20 sources)              Adhesive Tape             Allergy to   
substance   
(finding)                                                   [facility]  
and [address]   
[place]  
Work Phone:   
[phone]  
   
                                                      
(20 sources)                            Alendronate;   
Translations:   
[Fosamax]                 Drug Allergy                
9                                                   [facility]  
   
                                                      
(7 sources)                             Hmg-Coa   
Reductase   
Inhibitors   
(Statins)                               Propensity to   
adverse   
reactions to   
drug                                      
9                                                   [facility]  
   
                                                      
(10 sources)              *Adhesive Tape            Propensity to   
adverse   
reactions                                 
9                         Blisters                  [facility]  
   
                                                      
(11 sources)                            Alendronate;   
Translations:   
[ALENDRONATE]             Drug Allergy                
9                         Unknown                   [facility]  
   
                                                      
(8 sources)                             HMG-CoA   
reductase   
inhibitor;   
Translations:   
[STATINS-HMG-CO  
A REDUCTASE   
INHIBITORS]                             Propensity to   
adverse   
reactions to   
drug                                      
9                         Unknown                   [facility]  
   
                                                      
(3 sources)                             HMG-CoA   
reductase   
inhibitor                               Propensity to   
adverse   
reactions to   
drug                                      
9                         Unknown                   [facility]  
   
                                                      
(6 sources)                             Adhesive   
Tape-Silicones;   
Translations:   
[ADHESIVE   
TAPE-SILICONES]                         Propensity to   
adverse   
reactions                                 
9                         Unknown, [facility]  
  
  
  
Medications  
Current Medications  
  
  
  
                      Medication Drug Class(es) Dates      Sig (Normalized) Sig   
(Original)  
   
                                                    acetaminophen 325 mg   
oral tablet  
(20 sources)                                        Start:   
2024  
End:   
2024                              take 2 tablets by   
mouth every six   
hours for pain                          acetaminophen   
(Tylenol) 325 mg   
tablet Indications:   
Fall, initial   
encounter Take 2   
tablets (650 mg) by   
mouth every 6 hours   
if needed for mild   
pain (1 - 3) for up   
to 14 days. 30   
tablet 0 2024 Active  
  
  
  
                                          
   
                                          
   
                                          
   
                                          
   
                                          
   
                                          
   
                                          
   
                                          
   
                                          
   
                                          
   
                                          
   
                                          
   
                                          
   
                                          
   
                                          
   
                                          
   
                                          
   
                                          
   
                                          
   
                                          
   
                                          
   
                                          
   
                                          
   
                                          
   
                                          
   
                                          
   
                                          
   
                                          
   
                                          
   
                                          
   
                                          
   
                                          
   
                                          
   
                                          
   
                                          
   
                                          
   
                                          
   
                                          
   
                                          
   
                                          
   
                                          
   
                                          
   
                                          
   
                                          
   
                                          
   
                                          
   
                                          
   
                                          
   
                                          
  
  
  
Completed/Discontinued Medications  
  
  
  
                      Medication Drug Class(es) Dates      Sig (Normalized) Sig   
(Original)  
   
                                                    cloNIDine   
hydrochloride 0.1 mg   
oral tablet  
(1 source)                              Central alpha-2   
Adrenergic Agonist                      Start:   
2024  
End:   
2024                                          cloNIDine   
(Catapres) tablet   
0.1 mg  
   
                                                    cyclopentolate   
hydrochloride 5   
mg/ml ophthalmic   
solution  
(6 sources)                                                     Cyclogyl 0.5 %   
Ophthalmic   
Solution   
Quantity: 0   
Refills: 0   
Ordered:   
15-Huy-2021 DO   
Active  
   
                                                    0.5 ml dulaglutide   
1.5 mg/ml   
auto-injector  
(2 sources)                             GLP-1 Receptor   
Agonist                                 Start:   
2022                              inject 0.75 mg by   
subcutaneous   
injection every   
week                                    Trulicity 0.75   
MG/0.5ML   
Subcutaneous   
Solution   
Pen-injector   
INJECT 0.75 MG   
Weekly Quantity:   
1 Refills: 1   
Ordered:   
2-Mar-2022 Tiffanie Callahan DO   
Start :   
2-Mar-2022 Active  
   
                                                    empagliflozin 10 mg   
oral tablet  
(20 sources)                            Sodium-Glucose   
Cotransporter 2   
Inhibitor                               Start:   
2022  
End:   
2022                              take 1 tablet by   
mouth once daily                        Jardiance 10 MG   
Oral Tablet TAKE   
1 TABLET BY MOUTH   
ONCE DAILY   
Quantity: 30   
Refills: 1   
Ordered:   
2022 Tiffanie Callahan DO   
Start :   
2022 Active  
   
                                                    glimepiride 1 mg   
oral tablet  
(20 sources)              Sulfonylurea              Start:   
2022  
End:   
2024                              take 1 tablet by   
mouth once daily   
before mealtime                         glimepiride   
(AmaryL) 1 mg   
tablet   
Indications:   
Controlled type 2   
diabetes mellitus   
without   
complication,   
without long-term   
current use of   
insulin (CMS/[facility])   
Take 1 tablet (1   
mg) by mouth once   
daily in the   
morning. Take   
before meals. 90   
tablet 3   
2023   
Discontinued   
(Stop Taking at   
Discharge)  
  
  
  
                                          
   
                                          
   
                                          
   
                                          
   
                                          
   
                                          
   
                                          
   
                                          
   
                                          
   
                                          
   
                                          
   
                                          
   
                                          
   
                                          
  
  
  
Problems  
Active Problems  
  
  
                      Problem Classification Problem    Date       Documented Da  
te Episodic/Chronic  
   
                                                    Allergic reactions  
(20 sources)                            H/O: Disorder;   
Translations:   
[Other allergy,   
other than to   
medicinal agents]                       Onset:   
2024                                          Episodic  
   
                                                    Cancer of breast  
(20 sources)                            Malignant tumor   
of breast ;   
Translations:   
[Malignant   
neoplasm of   
breast (female),   
unspecified]                            Onset:   
2023                Chronic  
  
  
  
                                          
   
                                          
   
                                          
   
                                          
   
                                          
   
                                          
   
                                          
   
                                          
   
                                          
   
                                          
   
                                          
   
                                          
   
                                          
   
                                          
   
                                          
   
                                          
   
                                          
   
                                          
   
                                          
   
                                          
   
                                          
   
                                          
   
                                          
   
                                          
   
                                          
   
                                          
   
                                          
   
                                          
   
                                          
   
                                          
   
                                          
   
                                          
   
                                          
   
                                          
   
                                          
   
                                          
   
                                          
   
                                          
   
                                          
   
                                          
   
                                          
   
                                          
   
                                          
   
                                          
   
                                          
   
                                          
   
                                          
   
                                          
   
                                          
  
  
Past or Other Problems  
  
  
                      Problem Classification Problem    Date       Documented Da  
te Episodic/Chronic  
   
                                                    Cancer of breast  
(20 sources)                            History of malignant   
neoplasm of breast;   
Translations:   
[Personal history of   
malignant neoplasm of   
breast]                                 Onset:   
2012                Episodic  
   
                                                    Cancer of head and   
neck  
(20 sources)                            Malignant tumor of   
tongue; Translations:   
[Malignant neoplasm   
of tongue,   
unspecified]                            Onset:   
2009  
Resolved:   
08-                08-                Chronic  
   
                                                    Diseases of mouth;   
excluding dental  
(11 sources)                            Disorder of tongue;   
Translations:   
[Disease of tongue,   
unspecified]                            Onset:   
2021                                          Episodic  
   
                                                    Immunizations and   
screening for   
infectious disease  
(20 sources)                            Patient encounter   
status; Translations:   
[Other specified   
vaccination]                            Onset:   
2023                                          Episodic  
   
                                                    Mood disorders  
(9 sources)               Mood disorders            Onset:   
10-  
Resolved:   
12-                10-                  
   
                                                    Neoplasms of   
unspecified nature or   
uncertain behavior  
(10 sources)                            Neoplastic disease of   
uncertain behavior;   
Translations:   
[Neoplasm of   
uncertain behavior,   
unspecified]                            Onset:   
2009  
Resolved:   
08-                08-                Episodic  
   
                                                    Other female genital   
disorders  
(20 sources)                            Vaginal lesion;   
Translations: [Other   
specified   
noninflammatory   
disorders of vagina]                    Onset:   
2023                Episodic  
   
                                                    Other gastrointestinal   
disorders  
(20 sources)                            Heartburn;   
Translations:   
[Heartburn]                             Onset:   
2023                Episodic  
   
                                                    Other nervous system   
disorders  
(9 sources)                             Abnormal gait;   
Translations:   
[Abnormality of gait]                   Onset:   
2023                Episodic  
   
                                                    Other nervous system   
disorders  
(3 sources)                             Unsteadiness on feet;   
Translations:   
[Unsteadiness on   
feet]                                   Onset:   
2023                                          Episodic  
   
                                                    Other non-traumatic   
joint disorders  
(20 sources)                            Pain in right knee;   
Translations: [Right   
knee pain]                              Onset:   
2023  
Resolved:   
2023                Episodic  
   
                                                    Other upper   
respiratory disease  
(20 sources)                            Disorder of pharynx;   
Translations:   
[Unspecified disease   
of pharynx]                             Onset:   
2023                                          Episodic  
   
                                                    Other upper   
respiratory disease  
(4 sources)                             Other diseases of   
pharynx;   
Translations: [Other   
diseases of pharynx]                    Onset:   
2023                                          Episodic  
   
                                                    Other upper   
respiratory infections  
(4 sources)                             Posterior rhinorrhea;   
Translations:   
[Postnasal drip]                        Onset:   
2023                Episodic  
   
                                                    Residual codes;   
unclassified  
(20 sources)                            Past history of   
procedure;   
Translations: [Other   
specified personal   
history presenting   
hazards to health]                        
Resolved:   
2021                                          Episodic  
  
  
  
                                          
   
                                          
   
                                          
   
                                          
   
                                          
  
  
  
Results  
  
  
                      Test Name  Value      Interpretation Reference Range Facil  
ity  
  
  
  
                                          
   
                                          
   
                                          
   
                                          
   
                                          
   
                                          
   
                                          
   
                                          
   
                                          
   
                                          
   
                                          
   
                                          
   
                                          
   
                                          
   
                                          
   
                                          
   
                                          
   
                                          
   
                                          
   
                                          
   
                                          
   
                                          
   
                                          
   
                                          
   
                                          
   
                                          
   
                                          
   
                                          
   
                                          
   
                                          
   
                                          
   
                                          
   
                                          
   
                                          
   
                                          
   
                                          
   
                                          
   
                                          
   
                                          
   
                                          
   
                                          
   
                                          
   
                                          
   
                                          
   
                                          
   
                                          
   
                                          
   
                                          
   
                                          
   
                                          
   
                                          
   
                                          
   
                                          
   
                                          
   
                                          
   
                                          
   
                                          
   
                                          
   
                                          
   
                                          
   
                                          
   
                                          
   
                                          
   
                                          
   
                                          
   
                                          
   
                                          
   
                                          
   
                                          
   
                                          
   
                                          
   
                                          
   
                                          
   
                                          
   
                                          
   
                                          
   
                                          
   
                                          
   
                                          
   
                                          
   
                                          
   
                                          
   
                                          
   
                                          
   
                                          
   
                                          
   
                                          
   
                                          
   
                                          
   
                                          
   
                                          
   
                                          
   
                                          
   
                                          
   
                                          
   
                                          
   
                                          
   
                                          
   
                                          
   
                                          
   
                                          
   
                                          
   
                                          
   
                                          
   
                                          
   
                                          
   
                                          
   
                                          
   
                                          
   
                                          
   
                                          
   
                                          
   
                                          
   
                                          
   
                                          
   
                                          
   
                                          
   
                                          
   
                                          
   
                                          
   
                                          
   
                                          
   
                                          
   
                                          
   
                                          
   
                                          
   
                                          
   
                                          
   
                                          
   
                                          
   
                                          
   
                                          
   
                                          
   
                                          
   
                                          
   
                                          
   
                                          
   
                                          
   
                                          
   
                                          
   
                                          
   
                                          
   
                                          
   
                                          
   
                                          
   
                                          
   
                                          
   
                                          
   
                                          
   
                                          
   
                                          
   
                                          
   
                                          
   
                                          
   
                                          
   
                                          
   
                                          
   
                                          
   
                                          
   
                                          
   
                                          
   
                                          
   
                                          
   
                                          
   
                                          
   
                                          
   
                                          
   
                                          
   
                                          
   
                                          
   
                                          
   
                                          
   
                                          
   
                                          
   
                                          
   
                                          
   
                                          
   
                                          
   
                                          
   
                                          
   
                                          
   
                                          
   
                                          
   
                                          
   
                                          
   
                                          
   
                                          
   
                                          
   
                                          
   
                                          
   
                                          
   
                                          
   
                                          
   
                                          
   
                                          
   
                                          
   
                                          
   
                                          
   
                                          
   
                                          
   
                                          
   
                                          
   
                                          
   
                                          
   
                                          
   
                                          
   
                                          
   
                                          
   
                                          
   
                                          
   
                                          
   
                                          
   
                                          
   
                                          
   
                                          
   
                                          
   
                                          
   
                                          
   
                                          
   
                                          
   
                                          
   
                                          
   
                                          
   
                                          
   
                                          
   
                                          
   
                                          
   
                                          
   
                                          
   
                                          
   
                                          
   
                                          
   
                                          
   
                                          
   
                                          
   
                                          
   
                                          
   
                                          
   
                                          
   
                                          
   
                                          
   
                                          
   
                                          
   
                                          
   
                                          
   
                                          
   
                                          
   
                                          
   
                                          
   
                                          
   
                                          
   
                                          
   
                                          
   
                                          
   
                                          
   
                                          
   
                                          
   
                                          
   
                                          
   
                                          
   
                                          
   
                                          
   
                                          
   
                                          
   
                                          
   
                                          
   
                                          
   
                                          
   
                                          
   
                                          
   
                                          
   
                                          
   
                                          
   
                                          
   
                                          
   
                                          
   
                                          
   
                                          
   
                                          
   
                                          
   
                                          
   
                                          
   
                                          
   
                                          
   
                                          
   
                                          
   
                                          
   
                                          
   
                                          
   
                                          
   
                                          
   
                                          
   
                                          
   
                                          
   
                                          
   
                                          
   
                                          
   
                                          
   
                                          
   
                                          
   
                                          
   
                                          
   
                                          
   
                                          
   
                                          
   
                                          
   
                                          
   
                                          
   
                                          
   
                                          
   
                                          
   
                                          
   
                                          
   
                                          
   
                                          
   
                                          
   
                                          
   
                                          
   
                                          
   
                                          
   
                                          
   
                                          
   
                                          
   
                                          
   
                                          
   
                                          
   
                                          
   
                                          
   
                                          
   
                                          
   
                                          
   
                                          
   
                                          
   
                                          
   
                                          
   
                                          
   
                                          
   
                                          
   
                                          
   
                                          
   
                                          
   
                                          
   
                                          
   
                                          
   
                                          
   
                                          
   
                                          
   
                                          
   
                                          
   
                                          
   
                                          
   
                                          
   
                                          
   
                                          
   
                                          
   
                                          
   
                                          
   
                                          
   
                                          
   
                                          
   
                                          
   
                                          
   
                                          
   
                                          
   
                                          
   
                                          
   
                                          
   
                                          
   
                                          
   
                                          
   
                                          
   
                                          
   
                                          
   
                                          
   
                                          
   
                                          
   
                                          
   
                                          
   
                                          
   
                                          
   
                                          
   
                                          
   
                                          
   
                                          
   
                                          
   
                                          
   
                                          
   
                                          
   
                                          
   
                                          
   
                                          
   
                                          
   
                                          
   
                                          
   
                                          
   
                                          
   
                                          
   
                                          
   
                                          
   
                                          
   
                                          
   
                                          
   
                                          
   
                                          
   
                                          
   
                                          
   
                                          
   
                                          
   
                                          
   
                                          
   
                                          
   
                                          
   
                                          
   
                                          
   
                                          
   
                                          
   
                                          
   
                                          
   
                                          
   
                                          
   
                                          
   
                                          
   
                                          
   
                                          
   
                                          
   
                                          
   
                                          
   
                                          
   
                                          
   
                                          
   
                                          
   
                                          
   
                                          
   
                                          
   
                                          
   
                                          
   
                                          
   
                                          
   
                                          
   
                                          
   
                                          
   
                                          
   
                                          
   
                                          
   
                                          
   
                                          
   
                                          
   
                                          
   
                                          
   
                                          
   
                                          
   
                                          
   
                                          
   
                                          
   
                                          
   
                                          
   
                                          
   
                                          
   
                                          
   
                                          
   
                                          
   
                                          
   
                                          
   
                                          
   
                                          
   
                                          
   
                                          
   
                                          
   
                                          
   
                                          
   
                                          
   
                                          
   
                                          
   
                                          
   
                                          
   
                                          
   
                                          
   
                                          
   
                                          
   
                                          
   
                                          
   
                                          
   
                                          
   
                                          
   
                                          
   
                                          
   
                                          
   
                                          
   
                                          
   
                                          
   
                                          
   
                                          
   
                                          
   
                                          
   
                                          
   
                                          
   
                                          
   
                                          
   
                                          
   
                                          
   
                                          
   
                                          
   
                                          
   
                                          
   
                                          
   
                                          
   
                                          
   
                                          
   
                                          
   
                                          
   
                                          
   
                                          
   
                                          
   
                                          
   
                                          
   
                                          
   
                                          
   
                                          
   
                                          
   
                                          
   
                                          
   
                                          
   
                                          
   
                                          
   
                                          
   
                                          
   
                                          
   
                                          
   
                                          
   
                                          
   
                                          
   
                                          
   
                                          
   
                                          
   
                                          
   
                                          
   
                                          
   
                                          
   
                                          
   
                                          
   
                                          
   
                                          
   
                                          
   
                                          
   
                                          
   
                                          
   
                                          
   
                                          
   
                                          
   
                                          
   
                                          
   
                                          
   
                                          
   
                                          
   
                                          
   
                                          
   
                                          
   
                                          
   
                                          
   
                                          
   
                                          
   
                                          
   
                                          
   
                                          
   
                                          
   
                                          
   
                                          
   
                                          
   
                                          
   
                                          
   
                                          
   
                                          
   
                                          
   
                                          
   
                                          
   
                                          
   
                                          
   
                                          
   
                                          
   
                                          
   
                                          
   
                                          
   
                                          
   
                                          
   
                                          
   
                                          
   
                                          
   
                                          
   
                                          
   
                                          
   
                                          
   
                                          
   
                                          
   
                                          
   
                                          
   
                                          
   
                                          
   
                                          
   
                                          
   
                                          
   
                                          
   
                                          
   
                                          
   
                                          
   
                                          
   
                                          
   
                                          
   
                                          
   
                                          
   
                                          
   
                                          
   
                                          
   
                                          
   
                                          
   
                                          
   
                                          
   
                                          
   
                                          
   
                                          
   
                                          
   
                                          
   
                                          
   
                                          
   
                                          
   
                                          
   
                                          
   
                                          
   
                                          
   
                                          
   
                                          
   
                                          
   
                                          
   
                                          
   
                                          
   
                                          
   
                                          
   
                                          
   
                                          
   
                                          
   
                                          
   
                                          
   
                                          
   
                                          
   
                                          
   
                                          
   
                                          
   
                                          
   
                                          
   
                                          
   
                                          
   
                                          
   
                                          
   
                                          
   
                                          
   
                                          
   
                                          
   
                                          
   
                                          
   
                                          
   
                                          
   
                                          
   
                                          
   
                                          
   
                                          
   
                                          
   
                                          
   
                                          
   
                                          
   
                                          
   
                                          
   
                                          
   
                                          
   
                                          
   
                                          
   
                                          
   
                                          
   
                                          
   
                                          
   
                                          
   
                                          
   
                                          
   
                                          
   
                                          
   
                                          
   
                                          
   
                                          
   
                                          
   
                                          
   
                                          
   
                                          
   
                                          
   
                                          
   
                                          
   
                                          
   
                                          
   
                                          
   
                                          
   
                                          
   
                                          
   
                                          
   
                                          
   
                                          
   
                                          
   
                                          
   
                                          
   
                                          
   
                                          
   
                                          
   
                                          
   
                                          
   
                                          
   
                                          
   
                                          
   
                                          
   
                                          
   
                                          
   
                                          
   
                                          
   
                                          
   
                                          
   
                                          
   
                                          
   
                                          
   
                                          
   
                                          
   
                                          
   
                                          
   
                                          
   
                                          
   
                                          
   
                                          
   
                                          
   
                                          
   
                                          
   
                                          
   
                                          
   
                                          
   
                                          
   
                                          
   
                                          
   
                                          
   
                                          
   
                                          
   
                                          
   
                                          
   
                                          
   
                                          
   
                                          
   
                                          
   
                                          
   
                                          
   
                                          
   
                                          
   
                                          
   
                                          
   
                                          
   
                                          
   
                                          
   
                                          
   
                                          
   
                                          
   
                                          
   
                                          
   
                                          
   
                                          
   
                                          
   
                                          
   
                                          
   
                                          
   
                                          
   
                                          
   
                                          
   
                                          
   
                                          
   
                                          
   
                                          
   
                                          
   
                                          
   
                                          
   
                                          
   
                                          
   
                                          
   
                                          
   
                                          
   
                                          
   
                                          
   
                                          
   
                                          
   
                                          
   
                                          
   
                                          
   
                                          
   
                                          
   
                                          
   
                                          
   
                                          
   
                                          
   
                                          
   
                                          
   
                                          
   
                                          
   
                                          
   
                                          
   
                                          
   
                                          
   
                                          
   
                                          
   
                                          
   
                                          
   
                                          
   
                                          
   
                                          
   
                                          
   
                                          
   
                                          
   
                                          
   
                                          
   
                                          
   
                                          
   
                                          
   
                                          
   
                                          
   
                                          
   
                                          
   
                                          
   
                                          
   
                                          
   
                                          
   
                                          
   
                                          
   
                                          
   
                                          
   
                                          
   
                                          
   
                                          
   
                                          
   
                                          
   
                                          
   
                                          
   
                                          
   
                                          
   
                                          
   
                                          
   
                                          
   
                                          
   
                                          
   
                                          
   
                                          
   
                                          
   
                                          
   
                                          
   
                                          
   
                                          
   
                                          
   
                                          
   
                                          
   
                                          
   
                                          
   
                                          
   
                                          
   
                                          
   
                                          
   
                                          
   
                                          
   
                                          
   
                                          
   
                                          
   
                                          
   
                                          
   
                                          
   
                                          
   
                                          
   
                                          
   
                                          
   
                                          
   
                                          
   
                                          
   
                                          
   
                                          
   
                                          
   
                                          
   
                                          
   
                                          
   
                                          
   
                                          
   
                                          
   
                                          
   
                                          
   
                                          
   
                                          
   
                                          
   
                                          
   
                                          
   
                                          
   
                                          
   
                                          
   
                                          
   
                                          
   
                                          
   
                                          
   
                                          
   
                                          
   
                                          
   
                                          
   
                                          
   
                                          
   
                                          
   
                                          
   
                                          
   
                                          
   
                                          
   
                                          
   
                                          
   
                                          
   
                                          
   
                                          
   
                                          
   
                                          
   
                                          
   
                                          
   
                                          
   
                                          
   
                                          
   
                                          
   
                                          
   
                                          
   
                                          
   
                                          
   
                                          
   
                                          
   
                                          
   
                                          
   
                                          
   
                                          
   
                                          
   
                                          
   
                                          
   
                                          
   
                                          
   
                                          
   
                                          
   
                                          
   
                                          
   
                                          
   
                                          
   
                                          
   
                                          
   
                                          
   
                                          
   
                                          
   
                                          
   
                                          
   
                                          
   
                                          
   
                                          
   
                                          
   
                                          
   
                                          
   
                                          
   
                                          
   
                                          
   
                                          
   
                                          
   
                                          
   
                                          
   
                                          
   
                                          
   
                                          
   
                                          
   
                                          
   
                                          
   
                                          
   
                                          
   
                                          
   
                                          
   
                                          
   
                                          
   
                                          
   
                                          
   
                                          
   
                                          
   
                                          
   
                                          
   
                                          
   
                                          
   
                                          
   
                                          
   
                                          
   
                                          
   
                                          
   
                                          
   
                                          
   
                                          
   
                                          
   
                                          
   
                                          
   
                                          
   
                                          
   
                                          
   
                                          
   
                                          
   
                                          
   
                                          
   
                                          
   
                                          
   
                                          
   
                                          
   
                                          
   
                                          
   
                                          
   
                                          
   
                                          
   
                                          
   
                                          
   
                                          
   
                                          
   
                                          
   
                                          
   
                                          
   
                                          
   
                                          
   
                                          
   
                                          
   
                                          
   
                                          
   
                                          
   
                                          
   
                                          
   
                                          
   
                                          
   
                                          
   
                                          
   
                                          
   
                                          
   
                                          
   
                                          
   
                                          
   
                                          
   
                                          
   
                                          
   
                                          
   
                                          
   
                                          
   
                                          
   
                                          
   
                                          
   
                                          
   
                                          
   
                                          
   
                                          
   
                                          
   
                                          
   
                                          
   
                                          
   
                                          
   
                                          
   
                                          
   
                                          
   
                                          
   
                                          
   
                                          
   
                                          
   
                                          
   
                                          
   
                                          
   
                                          
   
                                          
   
                                          
   
                                          
   
                                          
   
                                          
   
                                          
   
                                          
   
                                          
   
                                          
   
                                          
   
                                          
   
                                          
   
                                          
   
                                          
   
                                          
   
                                          
  
  
  
Vital Signs  
  
  
                          Date Time    Vital Sign   Value        Performing   
Clinician                               Facility  
   
                                                    2024   
16:          Body temperature    98.6 [degF]         Jerod Alonzo MD MPH  
Work Phone:   
[phone]                          [facility]  
   
                                                    2024   
16:                              Diastolic blood   
pressure                  70 mm[Hg]                 Jerod Alonzo MD MPH  
Work Phone:   
[phone]                          [facility]  
   
                                                    2024   
16:          Heart rate          87 /min             Jerod Alonzo MD MPH  
Work Phone:   
[phone]                          [facility]  
   
                                                    2024   
16:          Respiratory rate    18 /min             Jerod Alonzo MD MPH  
Work Phone:   
[phone]                          [facility]  
   
                                                    2024   
16:                              SaO2% (BldA) [Mass   
fraction]                 95 %                      Jerod Alonzo MD MPH  
Work Phone:   
[phone]                          [facility]  
   
                                                    2024   
16:                              Systolic blood   
pressure                  133 mm[Hg]                Jerod Alonzo MD MPH  
Work Phone:   
[phone]                          [facility]  
   
                                                    2024   
02:          Body height         160 cm              Jerod Alonzo MD MPH  
Work Phone:   
[phone]                          [facility]  
   
                                                    2024   
02:                              Body mass index   
(BMI) [Ratio]             31.71 kg/m2               Jerod Alonzo MD MPH  
Work Phone:   
[phone]                          [facility]  
   
                                                    2024   
02:          Body weight         81.2 kg             Jerod Alonzo MD MPH  
Work Phone:   
[phone]                          [facility]  
   
                                                    2024   
23:                              Diastolic blood   
pressure                  71 mm[Hg]                 Leno Lino DO  
Work Phone:   
[phone]                          [facility]  
   
                                                    2024   
23:          Heart rate          98 /min             Leno Lino DO  
Work Phone:   
[phone]                          [facility]  
   
                                                    2024   
23:          Respiratory rate    18 /min             Leno Lino DO  
Work Phone:   
[phone]                          [facility]  
   
                                                    2024   
23:                              SaO2% (BldA) [Mass   
fraction]                 96 %                      Leno Lino DO  
Work Phone:   
[phone]                          [facility]  
   
                                                    2024   
23:                              Systolic blood   
pressure                  132 mm[Hg]                Leno Lino DO  
Work Phone:   
[phone]                          [facility]  
   
                                                    2024   
18:          Body height         160 cm              Leno Lino DO  
Work Phone:   
[phone]                          [facility]  
   
                                                    2024   
18:                              Body mass index   
(BMI) [Ratio]             31.71 kg/m2               Leno Lino DO  
Work Phone:   
[phone]                          [facility]  
   
                                                    2024   
18:          Body temperature    97.2 [degF]         Leno Lino DO  
Work Phone:   
[phone]                          [facility]  
   
                                                    2024   
18:          Body weight         81.19 kg            Leno Lino DO  
Work Phone:   
[phone]                          [facility]  
   
                                                    2024   
14:          Body height         160 cm              Aditi Henderson MD  
Work Phone:   
[phone]                          [facility]  
   
                                                    2024   
14:                              Body mass index   
(BMI) [Ratio]             30.22 kg/m2               Aditi Henderson MD  
Work Phone:   
[phone]                          [facility]  
   
                                                    2024   
14:          Body weight         77.38 kg            Aditi Henderson MD  
Work Phone:   
[phone]                          [facility]  
   
                                                    2024   
14:                              Diastolic blood   
pressure                  75 mm[Hg]                 Aditi Henderson MD  
Work Phone:   
[phone]                          [facility]  
   
                                                    2024   
14:          Heart rate          92 /min             Aditi Henderson MD  
Work Phone:   
[phone]                          [facility]  
   
                                                    2024   
14:                              SaO2% (BldA) [Mass   
fraction]                 98 %                      Aditi Henderson MD  
Work Phone:   
[phone]                          [facility]  
   
                                                    2024   
14:                              Systolic blood   
pressure                  144 mm[Hg]                Aditi Henderson MD  
Work Phone:   
[phone]                          [facility]  
   
                                                    2023   
15:          Body height         160 cm              Leno Crowe MD  
Work Phone:   
[phone]                          [facility]  
   
                                                    2023   
15:                              Body mass index   
(BMI) [Ratio]             30.68 kg/m2               Leno Crowe MD  
Work Phone:   
[phone]                          [facility]  
   
                                                    2023   
15:          Body temperature    97.2 [degF]         Leno Crowe MD  
Work Phone:   
[phone]                          [facility]  
   
                                                    2023   
15:          Body weight         78.56 kg            Leno Crowe MD  
Work Phone:   
[phone]                          [facility]  
   
                                                    2023   
15:                              Diastolic blood   
pressure                  91 mm[Hg]                 Leno Crowe MD  
Work Phone:   
[phone]                          [facility]  
   
                                                    2023   
15:          Heart rate          93 /min             Leno Crowe MD  
Work Phone:   
[phone]                          [facility]  
   
                                                    2023   
15:                              Systolic blood   
pressure                  191 mm[Hg]                Leno Crowe MD  
Work Phone:   
[phone]                          [facility]  
   
                                                    2023   
13:          Body height         157.5 cm            Tiffanie Callahan DO  
Work Phone:   
[phone]                          [facility]  
   
                                                    2023   
13:                              Body mass index   
(BMI) [Ratio]             32.41 kg/m2               Tiffanie Callahan DO  
Work Phone:   
[phone]                          [facility]  
   
                                                    2023   
13:          Body weight         80.38 kg            Tiffanie Callahan DO  
Work Phone:   
[phone]                          [facility]  
   
                                                    2023   
13:                              Diastolic blood   
pressure                  76 mm[Hg]                 Tiffanie Callahan DO  
Work Phone:   
[phone]                          [facility]  
   
                                                    2023   
13:          Heart rate          88 /min             Tiffanie Callahan DO  
Work Phone:   
[phone]                          [facility]  
   
                                                    2023   
13:                              Systolic blood   
pressure                  144 mm[Hg]                Tiffanie Callahan DO  
Work Phone:   
[phone]                          [facility]  
   
                                                    2023   
14:          Body weight         81.83 kg            Amanda Gill MD  
Work Phone:   
[phone]                          [place] [address]   
14:                              Diastolic blood   
pressure                  82 mm[Hg]                 Amanda Gill MD  
Work Phone:   
[phone]                          [facility]  
   
                                                    2023   
14:          Heart rate          89 /min             Amanda Gill MD  
Work Phone:   
[phone]                          [facility]  
   
                                                    2023   
14:          Respiratory rate    18 /min             Amanda Gill MD  
Work Phone:   
[phone]                          [facility]  
   
                                                    2023   
14:                              SaO2% (BldA) [Mass   
fraction]                 97 %                      Amanda Gill MD  
Work Phone:   
[phone]                          [facility]  
   
                                                    2023   
14:                              Systolic blood   
pressure                  156 mm[Hg]                Amanda Gill MD  
Work Phone:   
[phone]                          [facility]  
   
                                                    2022   
13:                              Body mass index   
(BMI) [Ratio]             33.1 kg/m2                Madai Lennon MD  
Work Phone:   
[phone]                          [facility]  
   
                                                    2022   
13:          Body weight         83.46 kg            Madai Lennon MD  
Work Phone:   
[phone]                          [facility]  
   
                                                    2022   
13:                              Diastolic blood   
pressure                  74 mm[Hg]                 Madai Lennon MD  
Work Phone:   
[phone]                          [facility]  
   
                                                    2022   
13:          Heart rate          95 /min             Madai Lennon MD  
Work Phone:   
[phone]                          [facility]  
   
                                                    2022   
13:                              SaO2% (BldA) [Mass   
fraction]                 95 %                      Madai Lennon MD  
Work Phone:   
[phone]                          [facility]  
   
                                                    2022   
13:                              Systolic blood   
pressure                  132 mm[Hg]                Madai Lennon MD  
Work Phone:   
[phone]                          [facility]  
   
                                                    2022   
14:                              Body mass index   
(BMI) [Ratio]             32.56 kg/m2               Leno Crowe MD  
Work Phone:   
[phone]                          [facility]  
   
                                                    2022   
14:          Body temperature    97.5 [degF]         Leno Crowe MD  
Work Phone:   
[phone]                          [facility]  
   
                                                    2022   
14:          Body weight         82.1 kg             Leno Crowe MD  
Work Phone:   
[phone]                          [facility]  
   
                                                    2022   
14:                              Diastolic blood   
pressure                  85 mm[Hg]                 Leno Crowe MD  
Work Phone:   
[phone]                          [facility]  
   
                                                    2022   
14:          Heart rate          85 /min             Leno Crowe MD  
Work Phone:   
[phone]                          [facility]  
   
                                                    2022   
14:          Respiratory rate    16 /min             Leno Crowe MD  
Work Phone:   
[phone]                          [facility]  
   
                                                    2022   
14:                              Systolic blood   
pressure                  182 mm[Hg]                Leno Crowe MD  
Work Phone:   
[phone]                          [facility]  
   
                                                    2022   
13:          Body height         157.48 cm           Tiffanie PATINO Callahan  
Work Phone:   
[phone]                          [facility] [address] DO  
Work Phone:   
[phone]  
   
                                                    2022   
13:                              Body mass index   
(BMI) [Ratio]             34.11 kg/m2               Tiffanie SUKUMAR Callahan  
Work Phone:   
[phone]                          [facility] [address] DO  
Work Phone:   
[phone]  
   
                                                    2022   
13:                              Body surface area   
Derived from formula      1.86 m2                   Tiffanie SUKUMAR Callahan  
Work Phone:   
[phone]                          [facility] [address] DO  
Work Phone:   
[phone]  
   
                                                    2022   
13:          Body weight         84.6 kg             Tiffanie A Callahan  
Work Phone:   
[phone]                          [facility] [address] DO  
Work Phone:   
[phone]  
   
                                                    2022   
13:                              Diastolic blood   
pressure                  78 mm[Hg]                 Tiffanie A Callahan  
Work Phone:   
[phone]                          [facility] [address] DO  
Work Phone:   
[phone]  
   
                                                    2022   
13:          Heart rate          84 /min             Tiffanie A Callahan  
Work Phone:   
[phone]                          [facility] [address] DO  
Work Phone:   
[phone]  
   
                                                    2022   
13:                              Systolic blood   
pressure                  132 mm[Hg]                Tiffanie A Callahan  
Work Phone:   
[phone]                          [facility] [address] DO  
Work Phone:   
[phone]  
   
                                                    2022   
12:          Body height         158.8 cm            Madai Lennon MD  
Work Phone:   
[phone]                          [facility]  
   
                                                    2022   
12:                              Body mass index   
(BMI) [Ratio]             34.16 kg/m2               Madai Lennon MD  
Work Phone:   
[phone]                          [facility]  
   
                                                    2022   
12:          Body weight         86.09 kg            Madai Lennon MD  
Work Phone:   
[phone]                          [facility]  
   
                                                    2022   
12:                              Diastolic blood   
pressure                  70 mm[Hg]                 Madai Lennon MD  
Work Phone:   
[phone]                          [facility]  
   
                                                    2022   
12:          Heart rate          99 /min             Madai Lennon MD  
Work Phone:   
[phone]                          [facility]  
   
                                                    2022   
12:          Respiratory rate    16 /min             Madai Lennon MD  
Work Phone:   
[phone]                          [facility]  
   
                                                    2022   
12:                              Systolic blood   
pressure                  140 mm[Hg]                Madai Lennon MD  
Work Phone:   
[phone]                          [facility]  
   
                                                    2022   
13:                              Diastolic blood   
pressure                  72 mm[Hg]                 Tiffanie A Callahan  
Work Phone:   
[phone]                          [facility] 205 DO  
Work Phone:   
[phone]  
   
                                                    2022   
13:                              Systolic blood   
pressure                  124 mm[Hg]                Tiffanie A Callahan  
Work Phone:   
[phone]                          [facility] 205 DO  
Work Phone:   
[phone]  
   
                                                    2022   
13:          Body height         157.48 cm           Tiffanie A Callahan  
Work Phone:   
[phone]                          [facility] 205 DO  
Work Phone:   
[phone]  
   
                                                    2022   
13:                              Body mass index   
(BMI) [Ratio]             35.16 kg/m2               Tiffanie A Callahan  
Work Phone:   
[phone]                          [facility] 205 DO  
Work Phone:   
[phone]  
   
                                                    2022   
13:                              Body surface area   
Derived from formula      1.88 m2                   Tiffanie A Callahan  
Work Phone:   
[phone]                          [facility] 205 DO  
Work Phone:   
[phone]  
   
                                                    2022   
13:          Body weight         87.2 kg             Tiffanie A Callahan  
Work Phone:   
[phone]                          [facility] 205 DO  
Work Phone:   
[phone]  
   
                                                    2022   
13:                              Diastolic blood   
pressure                  90 mm[Hg]                 Tiffanie A Callahan  
Work Phone:   
[phone]                          [facility] 205 DO  
Work Phone:   
[phone]  
   
                                                    2022   
13:          Heart rate          92 /min             Tiffanie A Callahan  
Work Phone:   
[phone]                          [facility] 205 DO  
Work Phone:   
[phone]  
   
                                                    2022   
13:                              Systolic blood   
pressure                  142 mm[Hg]                Tiffanie A Callahan  
Work Phone:   
[phone]                          [facility] 205 DO  
Work Phone:   
[phone]  
   
                                                    2022   
13:          Body weight         86.91 kg            Amanda Gill MD  
Work Phone:   
[phone]                          [facility]  
   
                                                    2022   
13:                              Diastolic blood   
pressure                  83 mm[Hg]                 Amanda Gill MD  
Work Phone:   
[phone]                          [facility]  
   
                                                    2022   
13:          Heart rate          106 /min            Amanda Gill MD  
Work Phone:   
[phone]                          [facility]  
   
                                                    2022   
13:          Respiratory rate    18 /min             Amanda Gill MD  
Work Phone:   
[phone]                          [facility]  
   
                                                    2022   
13:                              SaO2% (BldA) [Mass   
fraction]                 96 %                      Amanda Gill MD  
Work Phone:   
[phone]                          [facility]  
   
                                                    2022   
13:                              Systolic blood   
pressure                  160 mm[Hg]                Amanda Gill MD  
Work Phone:   
[phone]                          [facility]  
   
                                                    2022   
13:          Body height         157.48 cm           Tiffanie PATINO Callahan  
Work Phone:   
[phone]                          [facility] 205 DO  
Work Phone:   
[phone]  
   
                                                    2022   
13:                              Body mass index   
(BMI) [Ratio]             35.12 kg/m2               Tiffanie PATINO Callahan  
Work Phone:   
[phone]                          [facility] 205 DO  
Work Phone:   
[phone]  
   
                                                    2022   
13:                              Body surface area   
Derived from formula      1.88 m2                   Tiffanie A Callahan  
Work Phone:   
[phone]                          [facility] 205 DO  
Work Phone:   
[phone]  
   
                                                    2022   
13:          Body weight         87.09 kg            Tiffanie A Callahan  
Work Phone:   
[phone]                          [facility] 205 DO  
Work Phone:   
[phone]  
   
                                                    2022   
13:                              Diastolic blood   
pressure                  82 mm[Hg]                 Tiffanie A Callahan  
Work Phone:   
[phone]                          [facility] 205 DO  
Work Phone:   
[phone]  
   
                                                    2022   
13:          Heart rate          88 /min             Tiffanie A Callahan  
Work Phone:   
[phone]                          [facility] 205 DO  
Work Phone:   
[phone]  
   
                                                    2022   
13:                              Systolic blood   
pressure                  142 mm[Hg]                Tiffanie A Callahan  
Work Phone:   
[phone]                          [facility] 205 DO  
Work Phone:   
[phone]  
   
                                                    2021   
13:                              Diastolic blood   
pressure                  68 mm[Hg]                 Tiffanie A Callahan  
Work Phone:   
[phone]                          [facility] 205 DO  
Work Phone:   
[phone]  
   
                                                    2021   
13:                              Systolic blood   
pressure                  128 mm[Hg]                Tiffanie A Callahan  
Work Phone:   
[phone]                          [facility] 205 DO  
Work Phone:   
[phone]  
   
                                                    2021   
13:          Body height         157.48 cm           Tiffanie A Callahan  
Work Phone:   
[phone]                          [facility] 205 DO  
Work Phone:   
[phone]  
   
                                                    2021   
13:                              Body mass index   
(BMI) [Ratio]             35.39 kg/m2               Tiffanie A Callahan  
Work Phone:   
[phone]                          [facility] 205 DO  
Work Phone:   
[phone]  
   
                                                    2021   
13:                              Body surface area   
Derived from formula      1.88 m2                   Tiffanie A Callahan  
Work Phone:   
[phone]                          [facility] 205 DO  
Work Phone:   
[phone]  
   
                                                    2021   
13:          Body temperature    95.5 [degF]         Tiffanie A Callahan  
Work Phone:   
[phone]                          [facility] 205 DO  
Work Phone:   
[phone]  
   
                                                    2021   
13:          Body weight         87.77 kg            Tiffanie A Callahan  
Work Phone:   
[phone]                          [facility] 205 DO  
Work Phone:   
[phone]  
   
                                                    2021   
13:                              Diastolic blood   
pressure                  80 mm[Hg]                 Tiffanie A Callahan  
Work Phone:   
[phone]                          [facility] 205 DO  
Work Phone:   
[phone]  
   
                                                    2021   
13:          Heart rate          93 /min             Tiffanie A Callahan  
Work Phone:   
[phone]                          [facility] 205 DO  
Work Phone:   
[phone]  
   
                                                    2021   
13:                              Systolic blood   
pressure                  160 mm[Hg]                Tiffanie A Callahan  
Work Phone:   
[phone]                          [facility] 205 DO  
Work Phone:   
[phone]  
   
                                                    2021   
13:          Body height         157.48 cm           Tiffanie A Callahan  
Work Phone:   
[phone]                          [facility] 205 DO  
Work Phone:   
[phone]  
   
                                                    2021   
13:                              Body mass index   
(BMI) [Ratio]             35.57 kg/m2               Tiffanie A Callahan  
Work Phone:   
[phone]                          [facility] 205 DO  
Work Phone:   
[phone]  
   
                                                    2021   
13:                              Body surface area   
Derived from formula      1.89 m2                   Tiffanie A Callahan  
Work Phone:   
[phone]                          [facility] 205 DO  
Work Phone:   
[phone]  
   
                                                    2021   
13:          Body temperature    96.6 [degF]         Tiffanie A Callahan  
Work Phone:   
[phone]                          [facility] 205 DO  
Work Phone:   
[phone]  
   
                                                    2021   
13:          Body weight         88.22 kg            Tiffanie A Callahan  
Work Phone:   
[phone]                          [facility] 205 DO  
Work Phone:   
[phone]  
   
                                                    2021   
13:                              Diastolic blood   
pressure                  64 mm[Hg]                 Tiffanie A Callahan  
Work Phone:   
[phone]                          [facility] 205 DO  
Work Phone:   
[phone]  
   
                                                    2021   
13:          Heart rate          91 /min             Tiffanie A Callahan  
Work Phone:   
[phone]                          [facility] 205 DO  
Work Phone:   
[phone]  
   
                                                    2021   
13:                              Systolic blood   
pressure                  120 mm[Hg]                Tiffanie A Callahan  
Work Phone:   
[phone]                          [facility] 205 DO  
Work Phone:   
[phone]  
   
                                                    2021   
14:          Body height         157.48 cm           Tiffanie A Callahan  
Work Phone:   
[phone]                          [address]  
Work Phone:   
[phone]  
   
                                                    2021   
14:                              Body mass index   
(BMI) [Ratio]             35.85 kg/m2               Tiffanie A Callahan  
Work Phone:   
[phone]                          [address]  
Work Phone:   
[phone]  
   
                                                    2021   
14:                              Body surface area   
Derived from formula      1.9 m2                    Tiffanie A Callahan  
Work Phone:   
[phone]                          [address]  
Work Phone:   
[phone]  
   
                                                    2021   
14:          Body temperature    97.5 [degF]         Tiffanie A Callahan  
Work Phone:   
[phone]                          [address]  
Work Phone:   
[phone]  
   
                                                    2021   
14:          Body weight         88.9 kg             Tiffanie A Callahan  
Work Phone:   
[phone]                          [address]  
Work Phone:   
[phone]  
   
                                                    2021   
14:                              Diastolic blood   
pressure                  70 mm[Hg]                 Tiffanie A Callahan  
Work Phone:   
[phone]                          [address]  
Work Phone:   
[phone]  
   
                                                    2021   
14:                              Systolic blood   
pressure                  122 mm[Hg]                Tiffanie A Callahan  
Work Phone:   
[phone]                          [address]  
Work Phone:   
[phone]  
   
                                                    2021   
13:          Body height         157.48 cm           Tiffanie A Callahan  
Work Phone:   
[phone]                          [address]  
Work Phone:   
[phone]  
   
                                                    2021   
13:                              Body mass index   
(BMI) [Ratio]             35.89 kg/m2               Tiffanie A Callahan  
Work Phone:   
[phone]                          [address]  
Work Phone:   
[phone]  
   
                                                    2021   
13:                              Body surface area   
Derived from formula      1.9 m2                    Tiffanie A Callahan  
Work Phone:   
[phone]                          [address]  
Work Phone:   
[phone]  
   
                                                    2021   
13:          Body temperature    98.2 [degF]         Tiffanie A Callahan  
Work Phone:   
[phone]                          [address]  
Work Phone:   
[phone]  
   
                                                    2021   
13:          Body weight         89 kg               Tiffanie A Callahan  
Work Phone:   
[phone]                          [address]  
Work Phone:   
[phone]  
   
                                                    2021   
13:                              Diastolic blood   
pressure                  70 mm[Hg]                 Tiffanie A Callahan  
Work Phone:   
[phone]                          [address]  
Work Phone:   
[phone]  
   
                                                    2021   
13:                              Systolic blood   
pressure                  128 mm[Hg]                Tiffanie A Callahan  
Work Phone:   
[phone]                          [address]  
Work Phone:   
[phone]  
   
                                                    2021   
13:                              Body mass index   
(BMI) [Ratio]             34.87 kg/m2               Marcos Lindsey MD  
Work Phone:   
[phone]                          [facility]  
   
                                                    2021   
13:          Body temperature    97.11 [degF]        Marcos Lindsey MD  
Work Phone:   
[phone]                          [facility]  
   
                                                    2021   
13:          Body weight         88.59 kg            Marcos Lindsey MD  
Work Phone:   
[phone]                          [facility]  
   
                                                    2021   
13:                              Diastolic blood   
pressure                  66 mm[Hg]                 Marcos Lindsey MD  
Work Phone:   
[phone]                          [facility]  
   
                                                    2021   
13:          Heart rate          96 /min             Marcos Lindsey MD  
Work Phone:   
[phone]                          [facility]  
   
                                                    2021   
13:          Respiratory rate    18 /min             Marcos Lindsey MD  
Work Phone:   
[phone]                          [facility]  
   
                                                    2021   
13:                              SaO2% (BldA) [Mass   
fraction]                 94 %                      Marcos Lindsey MD  
Work Phone:   
[phone]                          [facility]  
   
                                                    2021   
13:                              Systolic blood   
pressure                  145 mm[Hg]                Marcos Lindsey MD  
Work Phone:   
[phone]                          [facility]  
   
                                                    2021   
14:          Body height         157.48 cm           Tiffanie A Callahan  
Work Phone:   
[phone]                          [address]  
Work Phone:   
[phone]  
   
                                                    2021   
14:                              Body mass index   
(BMI) [Ratio]             35.93 kg/m2               Tiffanie A Callahan  
Work Phone:   
[phone]                          [place] [address] [address]  
Work Phone:   
[phone]  
   
                                                    2021   
14:                              Body surface area   
Derived from formula      1.9 m2                    Tiffanie A Callahan  
Work Phone:   
[phone]                          [place] [address] [address]  
Work Phone:   
[phone]  
   
                                                    2021   
14:          Body temperature    97.1 [degF]         Tiffanie A Callahan  
Work Phone:   
[phone]                          [place] [address] [address]  
Work Phone:   
[phone]  
   
                                                    2021   
14:          Body weight         89.1 kg             Tiffanie A Callahan  
Work Phone:   
[phone]                          [address]  
Work Phone:   
[phone]  
   
                                                    2021   
14:                              Diastolic blood   
pressure                  78 mm[Hg]                 Tiffanie A Callahan  
Work Phone:   
[phone]                          [place] [address] [address]  
Work Phone:   
[phone]  
   
                                                    2021   
14:                              Systolic blood   
pressure                  140 mm[Hg]                Tiffanie A Callahan  
Work Phone:   
[phone]                          [address]  
Work Phone:   
[phone]  
   
                                                    07-   
14:          Body height         157.48 cm           Tiffanie A Callahan  
Work Phone:   
[phone]                          [facility] 205 DO  
Work Phone:   
[phone]  
   
                                                    07-   
14:                              Body mass index   
(BMI) [Ratio]             35.67 kg/m2               Tiffanie A Callahan  
Work Phone:   
[phone]                          [facility] 205 DO  
Work Phone:   
[phone]  
   
                                                    07-   
14:                              Body surface area   
Derived from formula      1.89 m2                   Tiffanie A Callahan  
Work Phone:   
[phone]                          [facility] 205 DO  
Work Phone:   
[phone]  
   
                                                    07-   
14:          Body temperature    96 [degF]           Tiffanie A Callahan  
Work Phone:   
[phone]                          [facility] 205 DO  
Work Phone:   
[phone]  
   
                                                    07-   
14:          Body weight         88.45 kg            Tiffanie A Callahan  
Work Phone:   
[phone]                          [facility] DO  
Work Phone:   
[phone]  
   
                                                    07-   
14:                              Diastolic blood   
pressure                  74 mm[Hg]                 Tiffanie A Callahan  
Work Phone:   
[phone]                          [facility] DO  
Work Phone:   
[phone]  
   
                                                    07-   
14:          Heart rate          87 /min             Tiffanie A Callahan  
Work Phone:   
[phone]                          [facility] DO  
Work Phone:   
[phone]  
   
                                                    07-   
14:                              Systolic blood   
pressure                  132 mm[Hg]                Tiffanie A Callahan  
Work Phone:   
[phone]                          [facility] DO  
Work Phone:   
[phone]  
   
                                                    2021   
14:          Body height         157.48 cm           Tiffanie A Callahan  
Work Phone:   
[phone]                          [place] [address] [address]  
Work Phone:   
[phone]  
   
                                                    2021   
14:                              Body mass index   
(BMI) [Ratio]             36.03 kg/m2               Tiffanie A Callahan  
Work Phone:   
[phone]                          [place] [address] [address]  
Work Phone:   
[phone]  
   
                                                    2021   
14:                              Body surface area   
Derived from formula      1.9 m2                    Tiffanie A Callahan  
Work Phone:   
[phone]                          [place] [address] [address]  
Work Phone:   
[phone]  
   
                                                    2021   
14:          Body temperature    97.9 [degF]         Tiffanie A Callahan  
Work Phone:   
[phone]                          [place] [address] [address]  
Work Phone:   
[phone]  
   
                                                    2021   
14:          Body weight         89.36 kg            Tiffanie A Callahan  
Work Phone:   
[phone]                          [place] [address] [address]  
Work Phone:   
[phone]  
   
                                                    2021   
14:                              Diastolic blood   
pressure                  78 mm[Hg]                 Tiffanie A Callahan  
Work Phone:   
[phone]                          [place] [address] [address]  
Work Phone:   
[phone]  
   
                                                    2021   
14:                              Systolic blood   
pressure                  122 mm[Hg]                Tiffanie A Callahan  
Work Phone:   
[phone]                          [place] [address] [address]  
Work Phone:   
[phone]  
   
                                                    06-   
14:          Body height         157.48 cm           Tiffanie A Callahan  
Work Phone:   
[phone]                          [facility] DO  
Work Phone:   
[phone]  
   
                                                    06-   
14:                              Body mass index   
(BMI) [Ratio]             35.94 kg/m2               Tiffanie A Callahan  
Work Phone:   
[phone]                          [facility] 205 DO  
Work Phone:   
[phone]  
   
                                                    06-   
14:                              Body surface area   
Derived from formula      1.9 m2                    Tiffanie A Callahan  
Work Phone:   
[phone]                          [facility] 205 DO  
Work Phone:   
[phone]  
   
                                                    06-   
14:          Body temperature    96.5 [degF]         Tiffanie A Callahan  
Work Phone:   
[phone]                          [facility] 205 DO  
Work Phone:   
[phone]  
   
                                                    06-   
14:          Body weight         89.13 kg            Tiffanie A Callahan  
Work Phone:   
[phone]                          [facility] 205 DO  
Work Phone:   
[phone]  
   
                                                    06-   
14:                              Diastolic blood   
pressure                  60 mm[Hg]                 Tiffanie A Callahan  
Work Phone:   
[phone]                          [facility] 205 DO  
Work Phone:   
[phone]  
   
                                                    06-   
14:          Heart rate          80 /min             Tiffanie A Callahan  
Work Phone:   
[phone]                          [facility] 205 DO  
Work Phone:   
[phone]  
   
                                                    06-   
14:                              Systolic blood   
pressure                  124 mm[Hg]                Tiffanie A Callahan  
Work Phone:   
[phone]                          [facility] 205 DO  
Work Phone:   
[phone]  
  
  
  
Encounters  
  
  
                          Encounter Date Encounter Type Care Provider Facility  
   
                                Start: 2024 Documentation procedure Ana Paula Henson RN                                      [facility]   
Health  
   
                                                    Start: 2024  
End: 2024                         Evaluation and   
management of inpatient   TAQUERIA LAMAS              [facility]  
   
                                                    Start: 2024  
End: 2024                         Evaluation and   
management of inpatient                 Jerod Alonzo MD MPH  
Work Phone:   
[phone]                          [facility]  
  
  
  
                                          
   
                                          
   
                                          
   
                                          
   
                                          
   
                                          
   
                                          
   
                                          
   
                                          
   
                                          
   
                                          
   
                                          
   
                                          
   
                                          
   
                                          
   
                                          
   
                                          
   
                                          
   
                                          
   
                                          
   
                                          
   
                                          
   
                                          
   
                                          
   
                                          
   
                                          
   
                                          
   
                                          
   
                                          
   
                                          
   
                                          
   
                                          
   
                                          
   
                                          
   
                                          
   
                                          
   
                                          
   
                                          
   
                                          
   
                                          
   
                                          
   
                                          
   
                                          
   
                                          
   
                                          
   
                                          
   
                                          
   
                                          
   
                                          
   
                                          
   
                                          
   
                                          
   
                                          
   
                                          
   
                                          
   
                                          
   
                                          
   
                                          
   
                                          
   
                                          
   
                                          
   
                                          
   
                                          
   
                                          
   
                                          
   
                                          
   
                                          
   
                                          
   
                                          
   
                                          
   
                                          
   
                                          
   
                                          
   
                                          
   
                                          
   
                                          
   
                                          
   
                                          
   
                                          
   
                                          
   
                                          
   
                                          
   
                                          
   
                                          
   
                                          
   
                                          
   
                                          
   
                                          
   
                                          
   
                                          
   
                                          
   
                                          
   
                                          
   
                                          
   
                                          
   
                                          
   
                                          
   
                                          
   
                                          
   
                                          
   
                                          
   
                                          
   
                                          
   
                                          
   
                                          
   
                                          
   
                                          
   
                                          
   
                                          
   
                                          
   
                                          
   
                                          
   
                                          
   
                                          
   
                                          
   
                                          
   
                                          
   
                                          
   
                                          
  
  
  
Procedures  
  
  
                          Date         Procedure    Procedure Detail Performing   
Clinician  
   
                          Start: 2024 DISCHARGE PATIENT              TAQUERIA   
LAMAS  
   
                          Start: 2024 PROVIDER CERTIFICATION                
TAQUERIA LAMAS  
   
                          Start: 2024 ADULT DISCHARGE DIET              MA  
RK LAMAS  
   
                          Start: 2024 DISCHARGE ACTIVITY              TAQUERIA  
 LAMAS  
   
                          Start: 2024 FULL CODE                 TAQUERIA LAMAS  
   
                          Start: 2024 MEDICATION ADMINISTRATION             
   TAQUERIA LAMAS  
   
                                        Start: 2024   NOTIFY PROVIDER (DO   
NOT   
PROMPT FOR PARAMETERS)                              TAQUERIA LAMAS  
   
                                        Start: 2024   SNF FREE OF COMMUNIC  
ABLE   
DISEASE                                             TAQUERIA LAMAS  
   
                                        Start: 2024   SNF RECEIVING AGENCY  
   
STANDING ORDERS                                     TAQUERIA LAMAS  
   
                          Start: 2024 VITAL SIGNS               TAQUERIA LAMAS  
   
                          Start: 2024 WEIGH PATIENT              TAQUERIA SWIF  
T  
   
                          Start: 2024 SNF DISCHARGE POTENTIAL               
 TAQUERIA LAMAS  
   
                          Start: 2024 SNF LEVEL OF CARE              TAQUERIA   
LAMAS  
   
                          Start: 2024 SNF REHAB POTENTIAL              ELROY ALLEN LAMAS  
   
                                        Start: 2024   Glucose [Mass/volume  
] in   
Serum or Plasma                                     TAQUERIA LAMAS  
   
                                        Start: 2024   Glucose quantitative  
 blood   
xcpt reagent strip                                  Kathy Archer MD  
Work Phone:   
[phone]  
   
                                        Start: 2024   Glucose [Mass/volume  
] in   
Serum or Plasma                                     TAQUERIA LAMAS  
   
                                        Start: 2024   Glucose quantitative  
 blood   
xcpt reagent strip                                  Kathy Archer MD  
Work Phone:   
[phone]  
   
                                        Start: 2024   Glucose [Mass/volume  
] in   
Serum or Plasma                                     TAQUERIA LAMAS  
   
                                        Start: 2024   Glucose quantitative  
 blood   
xcpt reagent strip                                  Kathy Archer MD  
Work Phone:   
[phone]  
   
                                        Start: 2024   Glucose [Mass/volume  
] in   
Serum or Plasma                                     TAQUERIA LAMAS  
   
                                        Start: 2024   Glucose quantitative  
 blood   
xcpt reagent strip                                  Kathy Archer MD  
Work Phone:   
[phone]  
   
                                        Start: 2024   Glucose [Mass/volume  
] in   
Serum or Plasma                                     TAQUERIA LAMAS  
   
                                        Start: 2024   Glucose quantitative  
 blood   
xcpt reagent strip                                  Kathy Archer MD  
Work Phone:   
[phone]  
   
                                        Start: 2024   Glucose [Mass/volume  
] in   
Serum or Plasma                                     TAQUERIA LAMAS  
   
                                        Start: 2024   Magnesium [Mass/volu  
me] in   
Serum or Plasma                                     TAQUERIA LAMAS  
   
                          Start: 2024 RENAL FUNCTION PANEL              MA  
RK LAMAS  
   
                                                    Start: 2024  
End: 2024     Renal function panel                     Marcy Bhanu PA-C  
Work Phone:   
[phone]  
   
                                        Start: 2024   Glucose [Mass/volume  
] in   
Serum or Plasma                                     TAQUERIA LAMAS  
   
                                        Start: 2024   Glucose quantitative  
 blood   
xcpt reagent strip                                  Kathy Archer MD  
Work Phone:   
[phone]  
   
                                        Start: 2024   Basic metabolic 2000  
 panel   
- Serum or Plasma                                   TAQUERIA LAMAS  
   
                                        Start: 2024   Magnesium [Mass/volu  
me] in   
Serum or Plasma                                     TAQUERIA LAMAS  
   
                                        Start: 2024   Glucose [Mass/volume  
] in   
Serum or Plasma                                     TAQUERIA LAMAS  
   
                                        Start: 2024   Basic metabolic pane  
l   
calcium total                                       Stephany Bhanu PA-C  
Work Phone:   
[phone]  
   
                                        Start: 2024   Glucose quantitative  
 blood   
xcpt reagent strip                                  Kathy Archer MD  
Work Phone:   
[phone]  
   
                                        Start: 2024   Glucose [Mass/volume  
] in   
Serum or Plasma                                     TAQUERIA LAMAS  
   
                                        Start: 2024   Glucose quantitative  
 blood   
xcpt reagent strip                                  Kathy Archer MD  
Work Phone:   
[phone]  
   
                                        Start: 2024   Glucose [Mass/volume  
] in   
Serum or Plasma                                     TAQUERIA LAMAS  
   
                                        Start: 2024   IP CONSULT TO VASCUL  
AR   
SURGERY                                             TAQUERIA LAMAS  
   
                                        Start: 2024   Glucose quantitative  
 blood   
xcpt reagent strip                                  Kathy Archer MD  
Work Phone:   
[phone]  
   
                                        Start: 2024   CT ANGIO HEAD AND NE  
CK W   
AND WO IV CONTRAST                                  TAQUERIA LAAMS  
   
                                        Start: 2024   Ct angiography head   
w/contrast/noncontrast                              Jimmy Arguello DO  
Work Phone:   
[phone]  
   
                                        Start: 2024   Glucose [Mass/volume  
] in   
Serum or Plasma                                     TAQUERIA LAMAS  
   
                                        Start: 2024   VASC US CAROTID FAYE  
RY   
DUPLEX BILATERAL                                    TAQUERIA LAMAS  
   
                                        Start: 2024   Glucose quantitative  
 blood   
xcpt reagent strip                                  Kathy Archer MD  
Work Phone:   
[phone]  
   
                                        Start: 2024   Glucose [Mass/volume  
] in   
Serum or Plasma                                     TAQUERIA LAMAS  
   
                                        Start: 2024   Duplex scan extracra  
nial   
art compl bi study                                  Stephany Beachu PA-C  
Work Phone:   
[phone]  
   
                          Start: 2024 RENAL FUNCTION PANEL              ELADIO SCHNEIDER LAMAS  
   
                                                    Start: 2024  
End: 2024     Renal function panel                     Stephany Beachu PA-C  
Work Phone:   
[phone]  
   
                                        Start: 2024   Glucose [Mass/volume  
] in   
Serum or Plasma                                     TAQUERIA LAMAS  
   
                                        Start: 2024   Glucose quantitative  
 blood   
xcpt reagent strip                                  Kathy Archer MD  
Work Phone:   
[phone]  
   
                                        Start: 2024   Glucose [Mass/volume  
] in   
Serum or Plasma                                     TAQUERIA LAMAS  
   
                                        Start: 2024   Basic metabolic 2000  
 panel   
- Serum or Plasma                                   TAQUERIA LAMAS  
   
                                        Start: 2024   Glucose quantitative  
 blood   
xcpt reagent strip                                  Kathy Archer MD  
Work Phone:   
[phone]  
   
                                        Start: 2024   Basic metabolic pane  
l   
calcium total                                       Stephany Beachu PA-C  
Work Phone:   
[phone]  
   
                                        Start: 2024   TRANSTHORACIC ECHO (  
TTE)   
COMPLETE                                            TAQUERIA LAMAS  
   
                          Start: 2024 ADULT DIET                TAQUERIA LAMAS  
   
                                        Start: 2024   Echo tthrc r-t 2d   
w/wom-mode compl spec&colr   
zuleika Richmond PA-C  
Work Phone:   
[phone]  
   
                          Start: 2024 ECG 12-LEAD               TAQUERIA LAMAS  
   
                                        Start: 2024   Glucose [Mass/volume  
] in   
Serum or Plasma                                     TAQUERIA LAMAS  
   
                                        Start: 2024   DIET INSTRUCTIONS TO  
   
NURSING                                             TAQUERIA   
   
                          Start: 2024 NURSING COMMUNICATION              M  
LAVERN LAMAS  
   
                                        Start: 2024   IP CONSULT TO [facility]                                            TAQUERIA   
   
                          Start: 2024 ORTHOSTATIC BLOOD PRESSURE            
    TAQUERIA LAMAS  
   
                          Start: 2024 Incentive spirometry              ELADIO SCHNEIDER LAMAS  
   
                          Start: 2024 IP CONSULT TO SOCIAL WORK             
   TAQUERIA   
   
                          Start: 2024 OT EVAL AND TREAT              TAQUERIA   
LAMAS  
   
                          Start: 2024 PT EVAL AND TREAT              TAQUERIA   
LAMAS  
   
                          Start: 2024 PULSE OXIMETRY, CONTINUOUS            
    TAQUERIA   
   
                                        Start: 2024   SEQUENTIAL COMPRESSI  
ON   
DEVICE                                              TAQUERIA   
   
                          Start: 2024 STRICT INTAKE AND OUTPUT              
  TAQUERIA   
   
                          Start: 2024 TELEMETRY MONITORING              ELADIO SCHNEIDER   
   
                          Start: 2024 VITAL SIGNS               TAQUERIA   
   
                          Start: 2024 ADMIT TO INPATIENT              TAQUERIA  
 LAMAS  
   
                          Start: 2024 FALL PRECAUTIONS              TAQUERIA S  
WIFT  
   
                          Start: 2024 KEILA COMA SCALE              TAQUERIA  
 LAMAS  
   
                          Start: 2024 HEIGHT AND WEIGHT              TAQUERIA   
  
   
                                        Start: 2024   NOTIFY PROVIDER (DO   
NOT   
PROMPT FOR PARAMETERS)                              TAQUERIA   
   
                                        Start: 2024   NOTIFY PROVIDER (PRO  
MPT FOR   
PARAMETERS)                                         TAQUERIA   
   
                          Start: 2024 PULSE OXIMETRY              TAQUERIA   
   
                                        Start: 2024   Ecg routine ecg w/le  
ast 12   
lds trcg only w/o i&r                               Stephany Drummond PA-C  
Work Phone:   
[phone]  
   
                          Start: 2024 ED TO FLOOR BED REQUEST               
 TAQUERIA   
   
                                        Start: 2024   Glucose quantitative  
 blood   
xcpt reagent strip                                  Robinson Muñiz MD  
Work Phone:   
[phone]  
   
                          Start: 2024 PULSE OXIMETRY, CONTINUOUS            
    Fabrizio Richmond PA-C  
Work Phone:   
[phone]  
   
                                        Start: 2024   CT CHEST ABDOMEN PEL  
VIS W   
IV CONTRAST                                         TAQUERIA LAMAS  
   
                                        Start: 2024   CT LUMBAR SPINE WO I  
V   
CONTRAST                                            TAQUERIA LAMAS  
   
                                        Start: 2024   CT THORACIC SPINE WO  
 IV   
CONTRAST                                            TAQUERIA LAMAS  
   
                                        Start: 2024   CT CERVICAL SPINE WO  
 IV   
CONTRAST                                            TAQUERIA LAMAS  
   
                                        Start: 2024   CT HEAD W/O CONTRAST  
 TRAUMA   
PROTOCOL                                            TAQUERIA LAMAS  
   
                          Start: 2024 XR CHEST 1 VIEW              TAQUERIA SCHMID  
[facility]  
   
                          Start: 2024 XR PELVIS 1-2 VIEWS              ELROY ALLEN LAMAS  
   
                                        Start: 2024   Glucose [Mass/volume  
] in   
Serum or Plasma                                     TAQUERIA LAMAS  
   
                                        Start: 2024   CBC W Auto Different  
ial   
panel - Blood                                       TAQUERIA LAMAS  
   
                                        Start: 2024   Comprehensive metabo  
lic   
2000 panel - Serum or   
Plasma                                              TAQUERIA LAMAS  
   
                                        Start: 2024   Ethanol [Mass/volume  
] in   
Serum or Plasma                                     TAQUERIA LAMAS  
   
                                        Start: 2024   Hemoglobin   
A1c/Hemoglobin.total in   
Blood                                               TAQUERIA LAMAS  
   
                                        Start: 2024   Lactate [Moles/volum  
e] in   
Serum or Plasma                                     TAQUERIA LAMAS  
   
                          Start: 2024 PROTIME-INR               TAQUERIA LAMAS  
   
                          Start: 2024 TYPE AND SCREEN              TAQUERIA SCHMID  
[facility]  
   
                          Start: 2024 VITAMIN D 25-HYDROXY,TOTAL            
    TAQUERIA LAMAS  
   
                          Start: 2024 INSERT PERIPHERAL IV              MA  
RK LAMAS  
   
                                        Start: 2024   Ct thorax w/contrast  
   
material                                            Morenita Ricks MD  
Work Phone:   
[phone]  
   
                                                    Start: 2024  
End: 2024                         Ct cervical spine w/o   
contrast material                                   Sowmya Garza MD  
Work Phone:   
[phone]  
   
                                        Start: 2024   Ct head/brain w/o co  
ntrast   
material                                            Sowmya Garza MD  
Work Phone:   
[phone]  
   
                                        Start: 2024   Radiologic exam ches  
t   
single view                                         Morenita Ricks MD  
Work Phone:   
[phone]  
   
                                        Start: 2024   Radiologic examinati  
on   
pelvis 1/2 views                                    Morenita Ricks MD  
Work Phone:   
[phone]  
   
                                        Start: 2024   Blood typing serolog  
ic rh   
(d)                                                 Morenita Ricks MD  
Work Phone:   
[phone]  
   
                                                    Start: 2024  
End: 2024                         Comprehensive metabolic   
panel                                               Morenita Ricks MD  
Work Phone:   
[phone]  
   
                                        Start: 2024   Ethanol [Mass/volume  
] in   
Serum or Plasma                                     Morenita Ricks MD  
Work Phone:   
[phone]  
   
                                        Start: 2024   INITIATE REQUEST TO   
ANOTHER   
 FACILITY                                         TIFFANIE CALLAHAN  
   
                                        Start: 2024   Glucose [Mass/volume  
] in   
Serum or Plasma                                     TIFFANIE CALLAHAN  
   
                          Start: 2024 CT 3D RECONSTRUCTION              ME  
EZIO CALLAHAN  
   
                                        Start: 2024   CT CERVICAL SPINE WO  
 IV   
CONTRAST                                            TIFFANIE CALLAHAN  
   
                          Start: 2024 CT HEAD WO IV CONTRAST                
TIFFANIE CALLAHAN  
   
                                        Start: 2024   CT LUMBAR SPINE WO I  
V   
CONTRAST                                            TIFFANIE CALLAHAN  
   
                                        Start: 2024   CT FACIAL BONES WO I  
V   
CONTRAST                                            TIFFANIE MONTESE  
   
                                        Start: 2024   Glucose quantitative  
 blood   
xcpt reagent strip                                  Taqueria SADIA Lamas DO  
Work Phone:   
[phone]  
   
                                        Start: 2024   3d rendering   
w/interp&postproc diff work   
station                                             Leno Lino DO  
Work Phone:   
[phone]  
   
                                        Start: 2024   Ct cervical spine w/  
o   
contrast material                                   Lenotucker Lino DO  
Work Phone:   
[phone]  
   
                                        Start: 2024   Ct head/brain w/o co  
ntrast   
material                                            Leno DONELL Lino DO  
Work Phone:   
[phone]  
   
                                        Start: 2023   Screening mammograph  
y bi   
2-view breast inc cad                               Yi Does APRN-CNP  
Work Phone:   
[phone]  
   
                          Start: 2023 ALBUMIN, URINE RANDOM              M  
ARK LAMAS  
   
                                        Start: 2023   CBC W Auto Different  
ial   
panel - Blood                                       TAQUERIA LAMAS  
   
                                        Start: 2023   Comprehensive metabo  
lic   
2000 panel - Serum or   
Plasma                                              TAQUERIA LAMAS  
   
                                        Start: 2023   Hemoglobin   
A1c/Hemoglobin.total in   
Blood                                               TAQUERIA LAMAS  
   
                                        Start: 2023   TSH WITH REFLEX TO F  
REE T4   
IF ABNORMAL                                         TAQUERIA LAMAS  
   
                          Start: 2023 ALBUMIN, URINE RANDOM              M  
ARK LAMAS  
   
                                        Start: 2023   CBC W Auto Different  
ial   
panel - Blood                                       TAQUERIA LAMAS  
   
                                        Start: 2023   Comprehensive metabo  
lic   
2000 panel - Serum or   
Plasma                                              TAQUERIA LAMAS  
   
                                        Start: 2023   Hemoglobin   
A1c/Hemoglobin.total in   
Blood                                               TAQUERIA LAMAS  
   
                          Start: 2023 Lipid panel               TAQUERIA LAMAS  
   
                                        Start: 2023   TSH WITH REFLEX TO F  
REE T4   
IF ABNORMAL                                         TAQUERIA LAMAS  
   
                                        Start: 2023   Lipid 1996 panel - S  
yaritza or   
Plasma                                              Tiffanie Callahan DO  
Work Phone:   
[phone]  
   
                                        Start: 2023   PNEUMOCOCCAL CONJUGA  
TE   
VACCINE 20-VALENT IM                                TIFFANIE CALLAHAN  
   
                                        Start: 2022   Continuous glucose   
monitoring analysis i&r                             Madai MILES Lennon MD  
Work Phone:   
[phone]  
   
                                        Start: 2022   Screening mammograph  
y bi   
2-view breast inc cad                               Yi LEA-CNP  
Work Phone:   
[phone]  
   
                                       Hysteroscopy              Tiffanie Callahan  
Work Phone:   
[phone]  
  
  
  
                                          
   
                                          
   
                                          
   
                                          
  
  
  
Plan of Treatment  
  
  
                          Date         Care Activity Detail       Author  
   
                                        Start: 2034   DTaP/Tdap/Td Vaccine  
s   
(2 - Td or Tdap)                        DTaP/Tdap/Td Vaccines   
(2 - Td or Tdap)                        [facility]  
   
                                                    Start: 2024  
End: 2024                         Patient encounter   
procedure                                           [facility]   
[facility]   
[place]  
   
                                                    Start: 2024  
End: 2025                         MG Breast - right   
Screening                               MAMMO SCREENING WITH   
CHIVO RIGHT Imaging   
Routine Personal   
history of malignant   
neoplasm of breast   
Encounter for   
screening mammogram   
for malignant   
neoplasm of breast   
Expected: 2024,   
Expires: 2025                     [facility]  
  
  
  
                                          
   
                                          
   
                                          
   
                                          
   
                                          
   
                                          
   
                                          
   
                                          
   
                                          
   
                                          
   
                                          
   
                                          
   
                                          
   
                                          
   
                                          
   
                                          
   
                                          
   
                                          
   
                                          
   
                                          
   
                                          
   
                                          
   
                                          
   
                                          
   
                                          
   
                                          
   
                                          
   
                                          
   
                                          
   
                                          
   
                                          
   
                                          
   
                                          
   
                                          
   
                                          
   
                                          
   
                                          
   
                                          
   
                                          
   
                                          
   
                                          
   
                                          
   
                                          
   
                                          
   
                                          
   
                                          
   
                                          
   
                                          
   
                                          
   
                                          
   
                                          
   
                                          
   
                                          
   
                                          
   
                                          
   
                                          
   
                                          
   
                                          
   
                                          
   
                                          
   
                                          
   
                                          
   
                                          
   
                                          
   
                                          
   
                                          
   
                                          
   
                                          
   
                                          
   
                                          
   
                                          
   
                                          
   
                                          
   
                                          
   
                                          
   
                                          
   
                                          
   
                                          
   
                                          
   
                                          
   
                                          
   
                                          
   
                                          
   
                                          
   
                                          
   
                                          
   
                                          
   
                                          
   
                                          
   
                                          
   
                                          
   
                                          
   
                                          
  
  
  
Immunizations  
  
  
                      Immunization Date Immunization Notes      Care Provider Fa  
cility  
   
                                        2024          tetanus toxoid, redu  
julieta   
diphtheria toxoid, and   
acellular pertussis   
vaccine, adsorbed                                   Leno Lino DO  
Work Phone:   
[phone]                          [facility]  
   
                                        2023          Pneumococcal conjuga  
te   
vaccine, 20-valent,   
adult (PREVNAR 20)                                  Tiffanie Callahan DO  
Work Phone:   
[phone]                          [facility]  
   
                                        2022          Moderna COVID-19   
Vaccine 100 MCG/0.5ML   
Intramuscular   
Suspension                                          Tiffanie Callahan  
Work Phone:   
[phone]                          [facility]  
   
                                        2021          influenza, high dose  
   
seasonal,   
preservative-free;   
Translations: [Fluzone   
High-Dose 0.5 ML   
Intramuscular   
Suspension Prefilled   
Syringe]                                            Tiffanie Callahan  
Work Phone:   
[phone]                          [facility] 205 DO  
Work Phone:   
[phone]  
  
  
  
                                          
   
                                          
   
                                          
   
                                          
   
                                          
   
                                          
   
                                          
   
                                          
   
                                          
   
                                          
   
                                          
   
                                          
   
                                          
   
                                          
   
                                          
   
                                          
   
                                          
   
                                          
   
                                          
   
                                          
   
                                          
   
                                          
   
                                          
  
  
  
Payers  
  
  
                          Date         Payer Category Payer        Policy ID  
   
                                08-      Unknown         GENERIC PAYOR ME  
DICARE   
SUPPLEMENT [id number]   
8/10/2020-Present [phone]   
[address] [place] [address]         [id number]   
[id number]  
[id number]  
   
                                03-      Unknown         ANTHEM ANTHEM BC  
 TRADITIONAL   
[id number] 3/15/2019-Present             [id number]   
[id number]  
[id number]  
   
                          03-   Unknown                   [id number]  
   
                          2011   Unknown                   [id number]  
   
                                2011      Unknown         ANTHEM BCBS OUT   
OF STATE MISC   
[id number] 2011-Present           [id number]   
[id number]  
[id number]  
   
                          2011   Unknown                     
   
                          2004   Medicare                  [id number]  
   
                          2004   Medicare                  [id number]  
   
                          2004   Medicare                  [id number]   
[id number]  
[id number]  
   
                          2004   Medicare                  [id number]  
[id number]  
[id number]  
   
                          1939   Unknown                   [id number]   
[id number]  
3  
   
                          1939   Unknown                   [id number]   
[id number]  
3  
   
                          1939   Unknown                   [id number]   
[id number]  
3  
   
                          1939   Unknown                   [id number]   
[id number]  
3  
   
                          1939   Unknown                   [id number]   
[id number]  
3  
   
                          1939   Unknown                   [id number]   
[id number]  
3  
   
                          1939   Unknown                   [id number]   
[id number]  
3  
   
                          1939   Unknown                   [id number]   
[id number]  
3  
   
                          1939   Unknown                   [id number]   
[id number]  
3  
   
                          1939   Unknown                   [id number]   
[id number]  
3  
   
                          1939   Unknown                   [id number]   
[id number]  
3  
   
                          1939   Unknown                   [id number]   
[id number]  
69  
   
                          1939   Unknown                   [id number]   
[id number]  
69  
   
                          1939   Unknown                   [id number]   
[id number]  
69  
   
                          1939   Unknown                   [id number]   
[id number]  
69  
   
                          1939   Unknown                   [id number]   
[id number]  
69  
   
                          1939   Unknown                   [id number]   
[id number]  
69  
   
                          1939   Unknown                   [id number]   
[id number]  
69  
   
                          1939   Unknown                   [id number]   
[id number]  
69  
   
                          1939   Unknown                   [id number]   
[id number]  
4  
   
                          1939   Unknown                   [id number]   
[id number]  
4  
   
                          1939   Unknown                   [id number]   
[id number]  
44  
   
                          1939   Unknown                   [id number]   
[id number]  
44  
   
                          1939   Unknown                   [id number]   
[id number]  
44  
   
                          1939   Unknown                   [id number]   
[id number]  
3  
   
                          1939   Unknown                   [id number]   
[id number]  
43  
   
                          1939   Unknown                   [id number]   
[id number]  
45  
   
                          1939   Unknown                   [id number]   
[id number]  
45  
   
                          1939   Unknown                   [id number]   
[id number]  
45  
   
                          1939   Unknown                   [id number]   
[id number]  
45  
  
  
  
Social History  
  
  
                          Date         Type         Detail       Facility  
   
                                                            Tobacco smoking stat  
[facility] [[email or web address]                      Unknown if ever smoked    [facility]  
   
                          Start: 1940 Sex Assigned At Birth Not on file  [facility]  
[facility]  
   
                                                    Start: 2023  
End: 2024     Never smoker        Never smoker        [facility]   
205 DO  
Work Phone:   
[phone]  
   
                                                    Start: 2021  
End: 2024           Alcohol intake            Current non-drinker of   
alcohol (finding)                       [facility]  
   
                          Start: 10- History SDOH Financial 4              
[facility]  
   
                                                    Start: 10-  
End: 10-     History SDOH Food Worry 1                   [facility]  
   
                                                    Start: 10-  
End: 10-                         History SDOH Transport   
Med                       2                         [facility]  
   
                                                    Start: 2022  
End: 2024                         Exposure to SARS-CoV-2   
(event)                   Not sure                  [facility]  
   
                                                            Tobacco smoking stat  
us   
[facility]                                    Tobacco smoking   
consumption unknown                     [facility]  
   
                                                    Start: 2022  
End: 2023                         Tobacco smoking status   
[facility]                      Never smoked tobacco      [facility]  
   
                                                    Start: 2022  
End: 2023                         Tobacco use and   
exposure                                Smokeless tobacco   
non-user                                [facility]  
   
                                                    Start: 2022  
End: 2023           Alcohol intake            Lifetime non-drinker   
(finding)                               [facility]  
   
                          Start: 10- History SDOH Financial 5              
[facility]  
   
                                                    Start: 2023  
End: 2024     Tobacco use panel                       [facility]  
   
                                                            How hard is it for y  
ou   
to pay for the very   
basics like food,   
housing, medical care,   
and heating               Not hard at all           [facility]  
   
                                                            (I/We) worried wheth  
er   
(my/our) food would run   
out before (I/we) got   
money to buy more.        Never true                [facility]  
   
                                                Gender identity Identifies as fe  
male   
gender (finding)                        [facility]  
   
                                                            How often to you hav  
e a   
drink containing   
alcohol?                  Never                     [facility]  
   
                                                            In the past 12 month  
s,   
was there a time when   
you were not able to   
pay the mortgage or   
rent on time?             No                        [facility]  
Work Phone:   
[phone]  
  
  
  
Medical Equipment  
  
  
                                Procedure Code  Equipment Code  Equipment Origin  
al   
Text                      Equipment Identifier      Dates  
   
                                                       [id number]  Start:   
2020  
   
                                            Use twice a day [id number]  Start:   
2019  
   
                                                                Use daily to yaron  
t   
blood sugar               [id number]                 Start:   
2020  
   
                                                                USE AS DIRECTED   
DAILY FOR INJECTIONS      [id number]                 Start:   
2022  
   
                                                                USE 1 STRIP TWIC  
E A   
DAY                       [id number]                 Start:   
10-  
   
                                                                In vitro; Use 1   
strip twice a day         [id number]                     
   
                                                                1 each once dany  
y.   
31G x 5mm; Inject 1   
each as directed   
daily                     [id number]                 Start:   
2023  
   
                                                                1 each once dany  
y.   
31G x 5mm; Inject 1   
each as directed   
daily                     [id number]                 Start:   
2023  
  
  
  
Clinical Notes 2021 to 2024  
     Harriett Henson RN - 2024 2:07 PM Heena Edwards RN -   
2024 4:22 PM Sejal Edwards RN - 2024 4:22 PM Mahendra Isidro RN - 2024 2:21 PM ESTPatient Instructions  
  
                                Note Date & Type Note            Facility  
   
                                                    2024 History of   
Present illness Narrative               Formatting of this note might be   
different from the original.  
  
Referral for patient received via   
Extended Care system from [facility]  
EXTENDED [id number] ACCESS [id number]  
Disciplines SN/PT/OT/ST requested   
with [facility] 3/16/24--  
  
[facility] unable to accept at this time   
due to [place] staffing being at   
capacity.  
This RN called CM--Liana Barnes   
at Phone: [phone] advising   
[facility] is unable to accept at this   
time. CB number for [facility] intake   
dept given in case of any questions  
  
(Unable to reply in online   
referral-- print  or  exit   
referral  are only   
options).Referral closed at this   
time.  
Electronically signed by   
Harriett Henson RN at   
2024 2:13 PM EDT  
documented in this encounter            [facility]  
   
                                        2024 Nurse Note Formatting of this  
 note might be   
different from the original.  
Report called to nurse Pitt at [facility],   
[phone]  
Electronically signed by Jamaica Edwards RN at 2024 4:23 PM   
[facility]  
   
                                        2024 Nurse Note Formatting of this  
 note might be   
different from the original.  
Report called to nurse Pitt at [facility],   
[phone]  
Electronically signed by Jamaica Edwards RN at 2024 4:23 PM   
EST  
Formatting of this note might be   
different from the original.  
Orthostatics done at 0902 by PT/OT  
  
supine: 130/73, sitting EOB:   
143/77, standin/72  
  
See flowsheet  
Electronically signed by Kimberly Isidro RN at 2024 2:22 PM   
EST  
documented in this encounter            [facility]  
Work Phone:   
[phone]  
   
                                                    2024 History of   
Present illness Narrative               Formatting of this note might be   
different from the original.  
[facility] [phone].   
Daughter Mariah aware of dc. IMM   
completed. Copy signed and in paper   
chart  
  
  
Electronically signed by Olga Britton LCSW at 2024   
2:30 PM EST  
Formatting of this note is   
different from the original.  
[facility]  
TRAUMA SERVICE - PROGRESS NOTE  
  
Patient Name: Lise Aaron  
MRN: [id number]  
Admit Date: 229  
: 1940 AGE: 83 y.o.   
GENDER: female  
===================================  
===================================  
========  
MECHANISM OF INJURY:  
Ground level fall, recent episodes   
of syncope and h/o vertigo  
  
INJURIES:  
Non-displaced occipital fracture  
  
OTHER MEDICAL PROBLEMS:  
Near Syncope  
Vertigo  
Hypomagnesemia  
H/o T2DM, HLD  
  
INCIDENTAL FINDINGS:  
Degenerative lumbar spine changes  
T12 subacute fracture  
Heavily calcified neck vasculature   
and aortic valve  
Endometrial fluid  
  
PROCEDURES:  
N/A  
  
===================================  
===================================  
========  
TODAY'S ASSESSMENT AND PLAN OF   
CARE:  
#Occipital fracture, degenerative   
spine changes  
-NSGY consulted  
-->no acute interventions  
-APAP 975mg q8hrs PRN for acute   
pain  
-PT/OT recommend SNF  
  
#Syncope vs. vertigo #aortic valve   
calcifications  
-Echocardiogram : EF 55 to 60%,   
impaired left ventricular diastolic   
filling  
-EKG showing sinus tachycardia,   
otherwise unremarkable  
-Orthostatics negative  
-Stop home meclizine  
-Geriatrics recommends outpatient   
vestibular therapy  
-Carotid duplex 3/1: b/l plaque   
buildup with ulcerated plaque to   
LCA  
-->CTA neck 3/1: b/l   
atherosclerotic disease with up to   
50% stenosis  
-->Vascular surgery consulted: The   
patient should be on 81 mg of   
aspirin daily and high intensity   
statin for risk factor modification   
and best medical therapy. Will f/up   
outpatient  
  
#Comorbidities  
-Continue home Combigan eye drops,   
ezetimibe, famotidine,   
spironolactone  
-Holding home lifitegrast eye drops   
(not in formulary)  
-Geriatrics team following  
  
#FEN/GI #Hypomagnesemia  
-SSI increased to #2  
-Holding home glimepiride (do not   
resume upon discharge, per Bing),   
liraglutide  
-Carb controlled diet  
-BR with senna, docusate  
-Replete electrolytes as indicated  
  
#DVT Ppx  
-Enoxaparin 30mg BID  
-SCDs  
  
Seen and discussed with Dr. Archer.  
  
30 minutes spent with patient   
reviewing VSS/medications,   
obtaining subjective information,   
performing physical exam,   
discussing plan of care; with   
greater than 50% of that time spent   
in patient room.  
  
Stephany Drummond PA-C  
Trauma Surgery  
Team Phone: [phone]  
  
===================================  
===================================  
========  
CHIEF COMPLAINT / OVERNIGHT EVENTS:  
84 y/o s/p near syncopal fall with   
nondisplaced occipital fracture. No   
acute overnight events.  
  
MEDICAL HISTORY / ROS:  
Admission history and ROS reviewed.   
Pertinent changes as follows:  
No new complaints.  
  
PHYSICAL EXAM:  
Heart Rate: [82-91]  
Temp: [36.3 C (97.3 F)-37.2 C (99   
F)]  
Resp: [18]  
BP: (118-145)/(58-77)  
SpO2: [95 %-96 %]  
Physical Exam  
Vitals reviewed.  
Constitutional:  
General: She is awake. She is not   
in acute distress.  
Appearance: She is well-developed.   
She is not ill-appearing or   
toxic-appearing.  
Comments: Elderly female sitting up   
in chair in NAD  
HENT:  
Head: Normocephalic and atraumatic.  
Comments: Bandaid to right cheek  
Nose: Nose normal.  
Mouth/Throat:  
Mouth: Mucous membranes are moist.  
Eyes:  
Extraocular Movements:  
Right eye: Normal extraocular   
motion.  
Left eye: Normal extraocular   
motion.  
Pupils: Pupils are equal, round,   
and reactive to light.  
Cardiovascular:  
Rate and Rhythm: Normal rate and   
regular rhythm.  
Pulses:  
Radial pulses are 2+ on the right   
side and 2+ on the left side.  
Dorsalis pedis pulses are 2+ on the   
right side and 2+ on the left side.  
Pulmonary:  
Effort: Pulmonary effort is normal.  
Breath sounds: Normal breath   
sounds. No decreased breath sounds.  
Abdominal:  
General: There is no distension.  
Tenderness: There is no abdominal   
tenderness.  
Musculoskeletal:  
Cervical back: Normal range of   
motion. Normal range of motion.  
Thoracic back: Normal range of   
motion.  
Right lower leg: No edema.  
Left lower leg: No edema.  
Skin:  
General: Skin is warm and dry.  
Capillary Refill: Capillary refill   
takes less than 2 seconds.  
Comments: Thin hair  
Neurological:  
General: No focal deficit present.  
Mental Status: She is alert and   
oriented to person, place, and   
time.  
GCS: GCS eye subscore is 4. GCS   
verbal subscore is 5. GCS motor   
subscore is 6.  
Sensory: Sensation is intact.  
Motor: Motor function is intact.  
Comments: Hard of hearing  
Psychiatric:  
Mood and Affect: Mood and affect   
normal.  
Behavior: Behavior is cooperative.  
  
IMAGING SUMMARY: (summary of new   
imaging findings, not a copy of   
dictation)  
N/A  
  
LABS:  
Results from last 7 days  
Lab Units 24  
0223  
WBC AUTO x10*3/uL 9.7  
HEMOGLOBIN g/dL 14.6  
HEMATOCRIT % 42.2  
PLATELETS AUTO x10*3/uL 179  
NEUTROS PCT AUTO % 70.7  
LYMPHS PCT AUTO % 16.5  
MONOS PCT AUTO % 11.0  
EOS PCT AUTO % 1.1  
  
Results from last 7 days  
Lab Units 24  
0223  
INR 1.1  
  
Results from last 7 days  
Lab Units 24  
0652 24  
0947 24  
0804 24  
1636 24  
0223  
SODIUM mmol/L 136 134* 134* < >   
132*  
POTASSIUM mmol/L 4.4 4.0 3.5 < >   
4.1  
CHLORIDE mmol/L 102 101 99 < > 95*  
CO2 mmol/L 26 23 24 < > 26  
BUN mg/dL 12 12 17 < > 10  
CREATININE mg/dL 0.53 0.40* 0.63 <   
> 0.42*  
CALCIUM mg/dL 9.0 9.1 8.9 < > 9.4  
PROTEIN TOTAL g/dL -- -- -- -- 6.4  
BILIRUBIN TOTAL mg/dL -- -- -- --   
0.9  
ALK PHOS U/L -- -- -- -- 53  
ALT U/L -- -- -- -- 14  
AST U/L -- -- -- -- 13  
GLUCOSE mg/dL 142* 151* 112* < >   
167*  
< > = values in this interval not   
displayed.  
  
Results from last 7 days  
Lab Units 24  
BILIRUBIN TOTAL mg/dL 0.9  
  
  
  
I have reviewed all medications,   
laboratory results, and imaging   
pertinent for today's encounter.  
Electronically signed by Stephany Drummond PA-C at 2024 2:39 PM   
EST  
Formatting of this note might be   
different from the original.  
TCC spoke to pt's dtr, Mariah and   
made aware [facility] can accept pt.   
Per Mariah would like referral sent   
to [facility] as well and   
if they can accept then [facility]   
would be [facility]. Pt's dtr also would   
like a medical update-TCC will make   
medical team aware.  
Electronically signed by Sofy Bryan RN at 2024 11:53 AM   
EST  
Formatting of this note is   
different from the original.  
[facility]  
TRAUMA SERVICE - PROGRESS NOTE  
  
Patient Name: Lise Aaron  
MRN: [id number]  
Admit Date: 229  
: 1940 AGE: 83 y.o.   
GENDER: female  
===================================  
===================================  
========  
MECHANISM OF INJURY:  
Ground level fall, recent episodes   
of syncope and h/o vertigo  
  
LOC (yes/no?): Denies  
Anticoagulant / Anti-platelet Rx?   
(for what dx?): Denies  
Referring Facility Name (N/A for   
scene EMR run): Transfer from [facility]  
  
INJURIES:  
Non-displaced occipital fracture  
  
OTHER MEDICAL PROBLEMS:  
Near Syncope  
Vertigo  
Hyponatremia  
H/o T2DM, HLD  
  
INCIDENTAL FINDINGS:  
Degenerative lumbar spine changes  
T12 subacute fracture  
Heavily calcified neck vasculature   
and aortic valve  
Endometrial fluid  
  
PROCEDURES:  
N/A  
  
===================================  
===================================  
========  
TODAY'S ASSESSMENT AND PLAN OF   
CARE:  
#Occipital fracture, degenerative   
spine changes  
-NSGY consulted  
-->no acute interventions  
-Repeat CT head/spine unchanged  
-APAP 975mg q8hrs for acute pain  
-PT/OT  
  
#Syncope vs. vertigo #aortic valve   
calcifications  
-Echocardiogram : EF 55 to 60%,   
impaired left ventricular diastolic   
filling  
-EKG showing sinus tachycardia,   
otherwise unremarkable  
-Orthostatics negative  
-Stop home meclizine  
-Geriatrics recommends outpatient   
vestibular therapy  
-Carotid duplex 3/1: b/l plaque   
buildup with ulcerated plaque to   
LCA  
-->CTA neck 3/1: b/l   
atherosclerotic disease with up to   
50% stenosis  
-->Vascular surgery consulted: No   
acute intervention. Will f/up   
outpatient  
  
#Hyponatremia  
-132 upon admission, has been   
mildly hyponatremic in the past  
-Repeat BMP daily  
  
#Comorbidities  
-Continue home Combigan eye drops,   
ezetimibe, famotidine,   
spironolactone  
-Holding home lifitegrast eye drops   
(not in formulary)  
-Geriatrics team following  
  
#FEN/GI  
-SSI increased to #2  
-Holding home glimepiride (do not   
resume upon discharge, per Bing),   
liraglutide  
-Carb controlled diet  
-BR with senna, docusate  
  
#DVT Ppx  
-Enoxaparin 30mg BID  
-SCDs  
  
Seen and discussed with Dr. Archer.  
  
20 minutes spent with patient   
reviewing VSS/medications,   
obtaining subjective information,   
performing physical exam,   
discussing plan of care; with   
greater than 50% of that time spent   
in patient room.  
  
Stephany Drummond PA-C  
Trauma Surgery  
Team Phone: [phone]  
  
===================================  
===================================  
========  
CHIEF COMPLAINT / OVERNIGHT EVENTS:  
84 y/o s/p near syncopal fall with   
nondisplaced occipital fracture. No   
acute overnight events.  
  
MEDICAL HISTORY / ROS:  
Admission history and ROS reviewed.   
Pertinent changes as follows:  
No new complaints.  
  
PHYSICAL EXAM:  
Heart Rate: [84-89]  
Temp: [36.3 C (97.3 F)-37.2 C (99   
F)]  
Resp: [16-18]  
BP: (121-145)/(56-77)  
SpO2: [92 %-96 %]  
Physical Exam  
Vitals reviewed.  
Constitutional:  
General: She is awake. She is not   
in acute distress.  
Appearance: She is well-developed.   
She is not ill-appearing or   
toxic-appearing.  
Comments: Elderly female sitting up   
in chair in NAD  
HENT:  
Head: Normocephalic and atraumatic.  
Comments: Bandaid to right cheek  
Nose: Nose normal.  
Mouth/Throat:  
Mouth: Mucous membranes are moist.  
Eyes:  
Extraocular Movements:  
Right eye: Normal extraocular   
motion.  
Left eye: Normal extraocular   
motion.  
Pupils: Pupils are equal, round,   
and reactive to light.  
Cardiovascular:  
Rate and Rhythm: Normal rate and   
regular rhythm.  
Pulses:  
Radial pulses are 2+ on the right   
side and 2+ on the left side.  
Dorsalis pedis pulses are 2+ on the   
right side and 2+ on the left side.  
Pulmonary:  
Effort: Pulmonary effort is normal.  
Breath sounds: Normal breath   
sounds. No decreased breath sounds.  
Abdominal:  
General: There is no distension.  
Tenderness: There is no abdominal   
tenderness.  
Musculoskeletal:  
Cervical back: Normal range of   
motion. Normal range of motion.  
Thoracic back: Normal range of   
motion.  
Right lower leg: No edema.  
Left lower leg: No edema.  
Skin:  
General: Skin is warm and dry.  
Capillary Refill: Capillary refill   
takes less than 2 seconds.  
Comments: Thin hair  
Neurological:  
General: No focal deficit present.  
Mental Status: She is alert and   
oriented to person, place, and   
time.  
GCS: GCS eye subscore is 4. GCS   
verbal subscore is 5. GCS motor   
subscore is 6.  
Sensory: Sensation is intact.  
Motor: Motor function is intact.  
Comments: Hard of hearing  
Psychiatric:  
Mood and Affect: Mood and affect   
normal.  
Behavior: Behavior is cooperative.  
  
IMAGING SUMMARY: (summary of new   
imaging findings, not a copy of   
dictation)  
N/A  
  
LABS:  
Results from last 7 days  
Lab Units 24  
WBC AUTO x10*3/uL 9.7  
HEMOGLOBIN g/dL 14.6  
HEMATOCRIT % 42.2  
PLATELETS AUTO x10*3/uL 179  
NEUTROS PCT AUTO % 70.7  
LYMPHS PCT AUTO % 16.5  
MONOS PCT AUTO % 11.0  
EOS PCT AUTO % 1.1  
  
Results from last 7 days  
Lab Units 24  
0223  
INR 1.1  
  
Results from last 7 days  
Lab Units 24  
0947 24  
0804 24  
1636 24  
SODIUM mmol/L 134* 134* 130* 132*  
POTASSIUM mmol/L 4.0 3.5 4.0 4.1  
CHLORIDE mmol/L 101 99 96* 95*  
CO2 mmol/L 23 24 23 26  
BUN mg/dL 12 17 13 10  
CREATININE mg/dL 0.40* 0.63 0.56   
0.42*  
CALCIUM mg/dL 9.1 8.9 9.2 9.4  
PROTEIN TOTAL g/dL -- -- -- 6.4  
BILIRUBIN TOTAL mg/dL -- -- -- 0.9  
ALK PHOS U/L -- -- -- 53  
ALT U/L -- -- -- 14  
AST U/L -- -- -- 13  
GLUCOSE mg/dL 151* 112* 203* 167*  
  
Results from last 7 days  
Lab Units 24  
BILIRUBIN TOTAL mg/dL 0.9  
  
  
  
I have reviewed all medications,   
laboratory results, and imaging   
pertinent for today's encounter.  
Electronically signed by Stephany Drummond PA-C at 2024 9:31 PM   
EST  
Formatting of this note is   
different from the original.  
  
24 1431  
Discharge Planning  
Living Arrangements Alone  
Support Systems Children  
Assistance Needed Per patient   
independent in ALDs prior to   
admission  
Type of Residence Private residence  
Number of Stairs to Enter Residence   
2  
Number of Stairs Within Residence 0  
(Ranch)  
Do you have animals or pets at   
home? Yes  
Type of Animals or Pets 1 cat  
Who is requesting discharge   
planning? Provider  
Home or Post Acute Services Post   
acute facilities (Rehab/SNF/etc)  
Type of Post Acute Facility   
Services Rehab  
Patient expects to be discharged   
to: Acute Rehab vs home with family   
support  
Does the patient need discharge   
transport arranged? Yes  
Financial Resource Strain  
How hard is it for you to pay for   
the very basics like food, housing,   
medical care, and heating? Not hard  
Housing Stability  
In the last 12 months, was there a   
time when you were not able to pay   
the mortgage or rent on time? N  
In the last 12 months, how many   
places have you lived? 1  
In the last 12 months, was there a   
time when you did not have a steady   
place to sleep or slept in a   
shelter (including now)? N  
Transportation Needs  
In the past 12 months, has lack of   
transportation kept you from   
medical appointments or from   
getting medications? no  
In the past 12 months, has lack of   
transportation kept you from   
meetings, work, or from getting   
things needed for daily living? No  
Patient Choice  
Provider Choice list and CMS   
website   
(https://medicare.gov/care-compare#  
search) for post-acute Quality and   
Resource Measure Data were provided   
and reviewed with: Patient;Family  
Patient / Family choosing to   
utilize agency / facility   
established prior to   
hospitalization No  
  
Transitional Care Coordinator Note:   
TCC met with patient introduced   
self and role to complete   
assessment (see above) and discuss   
discharge plan. Patient confirmed   
demographics:  
  
Address: [address] [place] [address] [place] [address]  
Alternate/Emergency Contact: Mariah Shafer (daughter) [phone]   
and/or Oleg Aaron (grand daughter)   
[phone]  
Patient Contact: [phone]  
Insurance: Medicare  
  
Per patient lives alone in ranch   
style home. Patient owns cane (uses   
daily), walker and wheelchair. Per   
patient granddaughter come by   
weekly to clean home and children   
stop by through out the week. Per   
medical team patient is not   
medically ready, pending syncope   
work up. Patient evaluated by   
therapy rec High Intensity. TCC   
informed and educated patient on   
therapy recommendations. Patient   
stated she wanted to discharge   
home. TCC inquired if family may be   
able to assist with 24/7 care at   
home post discharge. Patient stated   
 I don't think so . TCC inquired if   
TCC could call patient's daughter   
to discuss discharge plan for home   
with 24/7 support or [place]. Patient   
agreeable to discharge planning   
team calling her daughter Mariah.  
  
Trauma SW contacted patient's   
daughter. Per SW family and patient   
are agreeable to discharge plan of   
[place]. Choices provided to SW, SW to   
build and send referral.  
  
Ange Cartwright RN BSN  
Transitional Care Coordinator  
  
Electronically signed by Ange Cartwright RN at 2024   
2:44 PM EST  
Formatting of this note is   
different from the original.  
Occupational Therapy  
  
Evaluation  
  
Patient Name: Lise Aaron  
MRN: [id number]  
Today's Date: 3/1/2024  
Time Calculation  
Start Time: 938  
Stop Time: 956  
Time Calculation (min): 18 min  
  
Assessment  
IP OT Assessment  
OT Assessment: Pt present with   
decreased balance, endurance, and   
poor safety awareness increasing   
fall risk and impairing   
occupational performance of ADL and   
functional mobility  
Evaluation/Treatment Tolerance:   
Patient tolerated treatment well  
End of Session Communication:   
Bedside nurse  
End of Session Patient Position: Up   
in chair, Alarm on  
Plan:  
Treatment Interventions: ADL   
retraining, Functional transfer   
training  
OT Frequency: 3 times per week  
OT Discharge Recommendations: High   
intensity level of continued care  
OT - OK to Discharge: Yes   
(indicates completed eval and d/c   
recommendation)  
  
Subjective  
Current Problem:  
1. Fall, initial encounter Vascular   
US carotid artery duplex bilateral  
Vascular US carotid artery duplex   
bilateral  
  
2. Closed fracture of occipital   
bone, unspecified laterality,   
unspecified occipital fracture   
type, initial encounter (CMS/[facility])  
  
3. Dizziness Transthoracic Echo   
(TTE) Complete  
Transthoracic Echo (TTE) Complete  
Vascular US carotid artery duplex   
bilateral  
Vascular US carotid artery duplex   
bilateral  
  
4. Other general symptoms and signs   
Transthoracic Echo (TTE) Complete  
Transthoracic Echo (TTE) Complete  
  
5. Other specified symptoms and   
signs involving the circulatory and   
respiratory systems Vascular US   
carotid artery duplex bilateral  
  
  
General:  
Reason for Referral: s/p GLF with   
Non-displaced occipital fracture  
Past Medical History Relevant to   
Rehab: h/o breast ca, HTN, HLD,   
T2DM, glaucoma, hearing loss,   
syncope  
Co-Treatment: PT  
Co-Treatment Reason: facilitate   
safe d/c planning  
Prior to Session Communication:   
Bedside nurse  
Patient Position Received: Up in   
chair, Alarm on  
General Comment: pleasant and   
cooperative, agreeable to OT  
Precautions:  
Hearing/Visual Limitations: [place]  
Medical Precautions: Fall   
precautions, Spinal precautions  
  
Pain:  
Pain Assessment  
Pain Assessment: 0-10  
Pain Score: 0 - No pain  
Lines/Tubes/Drains:  
External Urinary Catheter Female   
(Active)  
Number of days: 1  
  
Objective  
Cognition:  
Overall Cognitive Status: Within   
Functional Limits  
Orientation Level: Oriented X4  
Safety Judgment: Decreased   
awareness of need for safety   
precautions (Pt required continuous   
VC for safety awareness throughout   
functional tasks during session)  
Memory Comments: Pt reported  I am   
forgetful of things   
Complex Functional Tasks: Moderate  
Routine Tasks: Moderate  
Unable to Self-Monitor and   
Self-Correct Consistently: Moderate  
Insight: Mild  
Impulsive: Moderately  
Planning: Reduced planning skills  
Cognition Test Scores  
Cognition Tests: Cognition Test   
Performed  
SBT Test Score:  indicates   
normal cognition (Pt with mild   
deficits in attention and memory   
recall)  
  
  
Home Living:  
Type of Home: House  
Lives With: Alone  
Home Adaptive Equipment: Cane,   
Walker rolling or standard  
Home Layout: One level  
Home Access: Level entry  
Bathroom Shower/Tub: Tub/shower   
unit  
Bathroom Toilet: Standard  
Bathroom Equipment: Grab bars in   
shower, Shower chair with back  
Prior Function:  
Level of [place]: Independent   
with ADLs and functional transfers,   
Needs assistance with homemaking  
Receives Help From: Family  
ADL Assistance: Independent  
Homemaking Assistance: (adult   
children assist with grocery   
shopping, driving, and light   
housework)  
Ambulatory Assistance: (MOD I cane)  
Hand Dominance: Right  
IADL History:  
Current License: No  
Mode of Transportation: Family  
Occupation: Retired  
ADL:  
Eating Deficit: (setup only)  
Grooming Deficit: (CGA standing   
while performing grooming tasks at   
sink)  
Bathing Deficit: (CGA at least)  
UE Dressing Deficit: (SBA with   
setup seated at least)  
LE Dressing Deficit: (CGA at least   
2/2 clothing management and   
functional mobility)  
Toileting Deficit: (Pt performed   
toileting CGA, setup up for   
pericare care required)  
Functional Deficit: (Pt performed   
functional mobility mod household   
distance from chair to bathroom to   
1/4 hallway length back to chair   
with FWW CGA, min A for turns; max   
VC for safety, AE positioning,)  
Activity Tolerance:  
Endurance: Decreased tolerance for   
upright activites  
  
Bed Mobility/Transfers: Bed   
Mobility  
Bed Mobility: No  
and Transfers  
Transfer: Yes  
Transfer 1  
Transfer From 1: Chair with arms to  
Transfer to 1: Stand  
Technique 1: Sit to stand  
Transfer Device 1: Walker  
Transfer Level of Assistance 1:   
Contact guard, Moderate verbal cues  
Trials/Comments 1: 1x; mod VC for   
safety  
Transfers 2  
Transfer From 2: Stand to  
Transfer to 2: Toilet  
Technique 2: Stand to sit  
Transfer Device 2: Walker  
Transfer Level of Assistance 2:   
Contact guard, Moderate verbal cues  
Trials/Comments 2: 1x; VC for hand   
placement, safety  
Transfers 3  
Transfer From 3: Toilet to  
Transfer to 3: Stand  
Technique 3: Stand to sit  
Transfer Device 3: Walker  
Transfer Level of Assistance 3:   
Contact guard, Moderate verbal cues  
Trials/Comments 3: 1x; VC for hand   
placement and safety  
Transfers 4  
Transfer From 4: Stand to  
Transfer to 4: Chair with arms  
Technique 4: Stand to sit  
Transfer Device 4: Walker  
Transfer Level of Assistance 4:   
Contact guard, Moderate verbal cues  
Trials/Comments 4: 1x; VC for   
safety  
IADL's:  
Current License: No  
Mode of Transportation: Family  
Occupation: Retired  
Vision: Vision - Basic Assessment  
Current Vision: Wears glasses all   
the time  
Visual History: Glaucoma  
  
Sensation:  
Light Touch: No apparent deficits  
  
Hand Function:  
Hand Function  
Gross Grasp: Functional  
Coordination: Functional  
Extremities: RUE  
RUE : Within Functional Limits, LUE  
LUE: Within Functional Limits, ,   
and  
  
Outcome Measures: [facility] Daily   
Activity  
Putting on and taking off regular   
lower body clothing: A little  
Bathing (including washing,   
rinsing, drying): A lot  
Putting on and taking off regular   
upper body clothing: A little  
Toileting, which includes using   
toilet, bedpan or urinal: A little  
Taking care of personal grooming   
such as brushing teeth: A little  
Eating Meals: A little  
Daily Activity - Total Score: 17  
, OT Adult Other Outcome Measures  
4AT: 4AT-  
  
Education Documentation  
Precautions, taught by Kathy Ray OT at 3/1/2024 1:10 PM.  
Learner: Patient  
Readiness: Acceptance  
Method: Explanation  
Response: Needs Reinforcement  
  
ADL Training, taught by Kathy aRy OT at 3/1/2024 1:10 PM.  
Learner: Patient  
Readiness: Acceptance  
Method: Explanation  
Response: Needs Reinforcement  
  
Goals:  
  
Encounter Problems  
  
Encounter Problems (Active)  
  
ADLs  
  
Patient with complete lower body   
dressing with independent level of   
assistance (Progressing)  
  
Start: 24 Expected End:   
03/15/24  
  
  
Patient will complete toileting   
including hygiene clothing   
management/hygiene with modified   
independent level of assistance and   
DME prn. (Progressing)  
  
Start: 24 Expected End:   
03/15/24  
  
  
  
BALANCE  
  
Pt will maintain dynamic standing   
balance during ADL task with   
modified independent level of   
assistance in order to demonstrate   
decreased risk of falling and   
improved postural control.   
(Progressing)  
  
Start: 24 Expected End:   
03/15/24  
  
  
  
COGNITION/SAFETY  
  
Pt will perform all ADLs and   
functional mobility with good   
safety awareness and no VC   
(Progressing)  
  
Start: 24 Expected End:   
03/15/24  
  
  
  
MOBILITY  
  
Patient will perform Functional   
mobility max Household   
distances/Community Distances with   
modified independent level of   
assistance and least restrictive   
device in order to improve safety   
and functional mobility.   
(Progressing)  
  
Start: 24 Expected End:   
03/15/24  
  
  
  
TRANSFERS  
  
Patient will perform bed mobility   
independent level of assistance in   
order to improve safety and   
independence with mobility   
(Progressing)  
  
Start: 24 Expected End:   
03/15/24  
  
  
Patient will complete functional   
transfers with least restrictive   
device with modified independent   
level of assistance. (Progressing)  
  
Start: 24 Expected End:   
03/15/24  
  
  
  
  
03/01/24 at 1:10 PM  
Kathy Ray OT  
Rehab Office: [phone]  
  
  
Electronically signed by Kathy Ray OT at 2024 1:11 PM   
EST  
Formatting of this note might be   
different from the original.  
SHARMAINE called daughter Mariah to   
determine family supports at time   
of dc as patient is refusing AR.   
Left message for return call. SHARMAINE   
will continue to follow for dc   
planning needs.  
  
Sharmaine spoke with daughter Mariah-   
daughter gave several dc scenarios.   
Patient to dc to [facility] AR or   
patient dc to daughters home for   
 supervision. She said patient   
lives alone. She is alone all day   
while family is at work. They are   
only able to assist in the evening.   
Daughter to call patient to discuss   
options. SHARMAINE will continue to follow   
for dc planning assistance.  
  
SHARMAINE built and sent referral to [facility]   
to determine if facility can accept   
for home with family vs AR dc.  
Electronically signed by Olga Britton LCSW at 2024   
4:10 PM EST  
Formatting of this note is   
different from the original.  
Images from the original note were   
not included.  
Subjective  
Patient was awake and standing with   
physical therapy in the room when I   
saw her. She was pleasant and   
cooperative. Today denies pain and   
reports the lidocaine patches are   
working. This morning she refused   
tylenol because she did not have   
any pain. Patient denies CP, SOB or   
mood changes, although she   
sometimes gets sad for brief   
moments when she thinks about her   
late  who  4 years ago.  
  
Objective  
  
Current Facility-Administered   
Medications  
Medication Dose Route Frequency   
Provider Last Rate Last Admin  
acetaminophen (Tylenol) tablet 975   
mg 975 mg oral q8h Fabrizio Richmond PA-C 975 mg at 24   
0818  
dextrose 10 % in water (D10W)   
infusion 0.3 g/kg/hr intravenous   
Once PRN Stephany Drummond PA-C  
dextrose 50 % injection 25 g 25 g   
intravenous q15 min PRN Stephany Drummond PA-C  
enoxaparin (Lovenox) syringe 30 mg   
30 mg subcutaneous q12h Fabrizio Richmond PA-C 30 mg at 24   
0818  
glucagon (Glucagen) injection 1 mg   
1 mg intramuscular q15 min PRN   
Stephany Drummond PA-C  
insulin lispro (HumaLOG) injection   
0-5 Units 0-5 Units subcutaneous   
TID with meals Stephany Drummond PA-C  
iohexol (OMNIPaque) 350 mg   
iodine/mL solution 100 mL 100 mL   
intravenous Once in imaging Jerod Alonzo MD [facility]  
lidocaine 4 % patch 1 patch 1 patch   
transdermal Daily Stephany Drummond PA-C 1 patch at 24 0818  
polyethylene glycol (Glycolax,   
Miralax) packet 17 g 17 g oral   
Daily Fabrizio Richmond PA-C  
sennosides-docusate sodium   
(Brenda-Colace) 8.6-50 mg per tablet   
2 tablet 2 tablet oral BID Fabrizio Richmond PA-C  
  
Physical Exam  
Constitutional:  
Appearance: Normal appearance.  
HENT:  
Head: Normocephalic and atraumatic.  
Cardiovascular:  
Rate and Rhythm: Normal rate and   
regular rhythm.  
Pulmonary:  
Effort: Pulmonary effort is normal.  
Breath sounds: Normal breath   
sounds.  
Abdominal:  
General: Bowel sounds are normal.  
Palpations: Abdomen is soft.  
Neurological:  
Mental Status: She is alert and   
oriented to person, place, and   
time.  
Psychiatric:  
Mood and Affect: Mood normal.  
Behavior: Behavior normal.  
Thought Content: Thought content   
normal.  
  
Confusion Assessment Method(CAM)   
for diagnosis of delirium:  
  
1. Acute onset or fluctuating   
course: absent/present: Absent  
2. Inattention: absent/present:   
Absent  
3. Disorganized thinking:   
absent/present: Absent  
4. Altered level of consciousness:   
absent/present: Absent  
CAM: negative  
  
AT Score For Assessment of Delirium   
and Cognitive Impairment:  
  
Alertness: 0  
Normal(fully alert,but not   
agitated, throughout assessment)=0  
Mild sleepiness for <10 seconds   
after walking, then normal=0  
Clearly abnormal=4  
2. AMT4: 0  
No mistakes=0  
One mistake=1  
Two or more mistakes/untestable=2  
3. Attention: 0  
Achieves seven months or more   
correctly=0  
Starts but scores <7 months/   
refuses to start=1  
Untestable(cannot start because   
unwell, drowsy, inattentive)=2  
4. Acute: 0  
No=0  
Yes=4  
  
Total Score: 0  
4 or above: Possible delirium +/-   
cognitive impairment  
1-3: Possible cognitive impairment  
0: Delirium or severe cognitive   
impairment unlikely(but delirium   
still possible if (4) information   
incomplete)  
  
Last Recorded Vitals  
  
2024  
10:30 AM 2024  
3:00 PM 2024  
5:39 PM 2024  
9:00 PM 2024  
11:00 PM 3/1/2024  
3:00 AM 3/1/2024  
7:55 AM  
Vitals  
Systolic 132 138 133 106 101 104   
115  
Diastolic 79 72 75 66 61 63 57  
Heart Rate 106 98 91 91 87 79 80  
Temp 36.7 C (98.1 F) 37.2 C (99 F)   
38.5 C (101.3 F) 36.5 C (97.7 F)   
36.9 C (98.4 F)  
Resp 33 18 17 18 17 16  
  
Vitals:  
24  
Weight: 81.2 kg (179 lb 0.2 oz)  
  
Relevant Results  
Lab Results  
Component Value Date  
TSH 1.14 2023  
VITD25 29 (L) 2024  
HGBA1C 7.3 (H) 2024  
  
Results from last 7 days  
Lab Units 24  
0804 24  
1636 24  
WBC AUTO x10*3/uL -- -- 9.7  
HEMOGLOBIN g/dL -- -- 14.6  
HEMATOCRIT % -- -- 42.2  
ALT U/L -- -- 14  
AST U/L -- -- 13  
SODIUM mmol/L 134* 130* 132*  
POTASSIUM mmol/L 3.5 4.0 4.1  
CHLORIDE mmol/L 99 96* 95*  
CREATININE mg/dL 0.63 0.56 0.42*  
BUN mg/dL 17 13 10  
CO2 mmol/L 24 23 26  
INR -- -- 1.1  
HEMOGLOBIN A1C % -- -- 7.3*  
  
DATA:  
EKG: QTC  
Encounter Date: 24  
ECG 12 Lead  
Result Value  
Ventricular Rate 105  
Atrial Rate 105  
HI Interval 184  
QRS Duration 90  
QT Interval 362  
QTC Calculation(Bazett) 478  
P Axis 38  
R Axis -7  
T Axis 51  
QRS Count 17  
Q Onset 225  
P Onset 133  
P Offset 191  
T Offset 406  
QTC Fredericia 436  
Narrative  
Sinus tachycardia  
Otherwise normal ECG  
No previous ECGs available  
  
See ED provider note for full   
interpretation and clinical   
correlation  
Confirmed by Danielle Padilla ([id number])   
on 2024 10:11:57 PM  
  
Anti-psychotics in 48 hours: none  
Opioids/Benzodiazepines in 48   
hours: none  
Anticholinergics on board:No  
Restraints:No  
Indwelling catheters:No  
Last BM: not recorded  
UO in 24 hours: not recorded  
Activity in the past 24 hours: none  
Need for ambulatory devices: yes  
  
  
  
Assessment/Plan  
This is a/an 83 y.o. year old   
female, with past medical history   
relevant for ongoing   
dizziness/vertigo for the past four   
years, T2DM, bilateral hearing   
loss, and HLD, presenting after a   
fall from standing w/o LOC, in   
which she sustained a closed   
occipital skull fracture. She is   
being seen in geriatric   
consultation for evaluation after   
her fall.  
  
Principal Problem:  
Fall, initial encounter  
  
# acute fall - unclear mechanism,   
may be related to orthostatic   
hypotension or h/o BPPV.  
- Orthostatic vitals normal on   
3/1/2024. Subjectively felt dizzy   
this morning when standing up.  
- Recommend vestibular therapy with   
PT/OT (both inpatient and on   
discharge)  
  
#occipital skull fracture/lumbar   
pain - pain under control today,   
refused AM scheduled tylenol.  
- Continue lidocaine patches for   
lumbar pain and scheduled Tylenol   
for pain control.  
  
#polypharmacy  
- Continue to hold home meclizine.  
  
#diabetes  
- Hemoglobin A1C 7.3 on 24.   
Unchanged from 23.  
-Glucose 269 today, start ISS. If   
blood sugar over the next days   
continue to be elevated, consider   
starting low dose lantus with ISS.  
- On home victoza, can continue at   
discharge.  
  
#concern for possible mood   
disorder.  
- GDS score today was 2. Low   
concern for depression.  
  
Medication Evaluation:  
High risk medications: none  
Other concerning issues regarding   
medications: none  
  
  
  
4M AGE-FRIENDLY INITIATIVE:  
What matters most to patient: to   
get better and be discharged.  
Medications: none  
Mentation: CAM-neg 4AT-0, oriented   
x3.  
Mobility: PT/OT rec high intensity   
therapy, but patient refused. Rec   
low intensity therapy assist x1   
with wheeled walker.  
  
Geriatric medicine will continue to   
follow the patient. Thank you for   
allowing geriatric medicine to be   
involved in the care of your   
patient. Geriatric medicine   
consultation team is available   
during work hours Monday through   
Friday. For any emergency issues   
requiring immediate assistance over   
the weekend, please page Geriatrics   
pager [phone]  
  
LARA PARMAR, MS4  
  
I saw and evaluated the patient. I   
personally obtained the key and   
critical portions of the history   
and physical exam or was physically   
present for key and critical   
portions performed by the medical   
student. I reviewed the medical   
student s documentation and   
discussed the patient with the   
medical student. I agree with the   
medical student s medical decision   
making as documented in their note  
  
Charla Sánchez MD  
  
Electronically signed by Charla Sánchez MD at 2024 4:17 AM   
EST  
Formatting of this note is   
different from the original.  
[facility]  
TRAUMA SERVICE - PROGRESS NOTE  
  
Patient Name: Lise Aaron  
MRN: [id number]  
Admit Date: 229  
: 1940 AGE: 83 y.o.   
GENDER: female  
===================================  
===================================  
========  
MECHANISM OF INJURY:  
Ground level fall, recent episodes   
of syncope and h/o vertigo  
  
LOC (yes/no?): Denies  
Anticoagulant / Anti-platelet Rx?   
(for what dx?): Denies  
Referring Facility Name (N/A for   
scene EMR run): Transfer from [facility]  
  
INJURIES:  
Non-displaced occipital fracture  
  
OTHER MEDICAL PROBLEMS:  
Near Syncope  
Vertigo  
Hyponatremia  
H/o T2DM, HLD  
  
INCIDENTAL FINDINGS:  
Degenerative lumbar spine changes  
T12 subacute fracture  
Heavily calcified neck vasculature   
and aortic valve  
Endometrial fluid  
  
PROCEDURES:  
N/A  
  
===================================  
===================================  
========  
TODAY'S ASSESSMENT AND PLAN OF   
CARE:  
#Occipital fracture, degenerative   
spine changes  
-NSGY consulted  
-->no acute interventions  
-Repeat CT head/spine unchanged  
-APAP 975mg q8hrs for acute pain  
-PT/OT  
  
#Syncope vs. vertigo #aortic valve   
calcifications  
-Echocardiogram : EF 55 to 60%,   
impaired left ventricular diastolic   
filling  
-EKG showing sinus tachycardia,   
otherwise unremarkable  
-Orthostatics negative  
-Stop home meclizine  
-Geriatrics recommends outpatient   
vestibular therapy  
-Carotid duplex 3/1: b/l plaque   
buildup with ulcerated plaque to   
LCA  
-->CTA neck 3/1: b/l   
atherosclerotic disease with up to   
50% stenosis  
-->Vascular surgery consulted: No   
acute intervention. Will f/up   
outpatient  
  
#Hyponatremia  
-132 upon admission, has been   
mildly hyponatremic in the past  
-Repeat BMP daily  
  
#Comorbidities  
-Continue home Combigan eye drops,   
ezetimibe, famotidine,   
spironolactone  
-Holding home lifitegrast eye drops   
(not in formulary)  
-Geriatrics team following  
  
#FEN/GI  
-SSI increased to #2  
-Holding home glimepiride (do not   
resume upon discharge, per Bing),   
liraglutide  
-Carb controlled diet  
-BR with senmarjorie docusate  
  
#DVT Ppx  
-Enoxaparin 30mg BID  
-SCDs  
  
Seen and discussed with Dr. Archer.  
  
45 minutes spent with patient   
reviewing VSS/medications,   
obtaining subjective information,   
performing physical exam,   
discussing plan of care; with   
greater than 50% of that time spent   
in patient room.  
  
Stephany Drummond PA-C  
Trauma Surgery  
Team Phone: [phone]  
  
===================================  
===================================  
========  
CHIEF COMPLAINT / OVERNIGHT EVENTS:  
84 y/o s/p near syncopal fall with   
nondisplaced occipital fracture. No   
acute overnight events.  
  
MEDICAL HISTORY / ROS:  
Admission history and ROS reviewed.   
Pertinent changes as follows:  
No new complaints.  
  
PHYSICAL EXAM:  
Heart Rate: [75-91]  
Temp: [36.5 C (97.7 F)-38.5 C   
(101.3 F)]  
Resp: [16-18]  
BP: (101-149)/(57-93)  
SpO2: [92 %-96 %]  
Physical Exam  
Vitals reviewed.  
Constitutional:  
General: She is awake. She is not   
in acute distress.  
Appearance: She is well-developed.   
She is not ill-appearing or   
toxic-appearing.  
Comments: Elderly female sitting up   
in chair in NAD  
HENT:  
Head: Normocephalic and atraumatic.  
Comments: Bandaid to right cheek  
Nose: Nose normal.  
Mouth/Throat:  
Mouth: Mucous membranes are moist.  
Eyes:  
Extraocular Movements:  
Right eye: Normal extraocular   
motion.  
Left eye: Normal extraocular   
motion.  
Pupils: Pupils are equal, round,   
and reactive to light.  
Cardiovascular:  
Rate and Rhythm: Normal rate and   
regular rhythm.  
Pulses:  
Radial pulses are 2+ on the right   
side and 2+ on the left side.  
Dorsalis pedis pulses are 2+ on the   
right side and 2+ on the left side.  
Pulmonary:  
Effort: Pulmonary effort is normal.  
Breath sounds: Normal breath   
sounds. No decreased breath sounds.  
Abdominal:  
General: There is no distension.  
Tenderness: There is no abdominal   
tenderness.  
Musculoskeletal:  
Cervical back: Normal range of   
motion. Tenderness present.   
Muscular tenderness present. Normal   
range of motion.  
Thoracic back: Tenderness (mid,   
baseline) present. Normal range of   
motion.  
Lumbar back: No tenderness.  
Right lower leg: No edema.  
Left lower leg: No edema.  
Skin:  
General: Skin is warm and dry.  
Capillary Refill: Capillary refill   
takes less than 2 seconds.  
Comments: Thin hair  
Neurological:  
General: No focal deficit present.  
Mental Status: She is alert and   
oriented to person, place, and   
time.  
GCS: GCS eye subscore is 4. GCS   
verbal subscore is 5. GCS motor   
subscore is 6.  
Sensory: Sensation is intact.  
Motor: Motor function is intact.  
Comments: Hard of hearing  
Psychiatric:  
Mood and Affect: Mood and affect   
normal.  
Behavior: Behavior is cooperative.  
  
IMAGING SUMMARY: (summary of new   
imaging findings, not a copy of   
dictation)  
Echocardiogram : EF 55 to 60%,   
impaired left ventricular diastolic   
filling  
  
LABS:  
Results from last 7 days  
Lab Units 24  
WBC AUTO x10*3/uL 9.7  
HEMOGLOBIN g/dL 14.6  
HEMATOCRIT % 42.2  
PLATELETS AUTO x10*3/uL 179  
NEUTROS PCT AUTO % 70.7  
LYMPHS PCT AUTO % 16.5  
MONOS PCT AUTO % 11.0  
EOS PCT AUTO % 1.1  
  
Results from last 7 days  
Lab Units 24  
INR 1.1  
  
Results from last 7 days  
Lab Units 24  
0804 24  
1636 24  
SODIUM mmol/L 134* 130* 132*  
POTASSIUM mmol/L 3.5 4.0 4.1  
CHLORIDE mmol/L 99 96* 95*  
CO2 mmol/L 24 23 26  
BUN mg/dL 17 13 10  
CREATININE mg/dL 0.63 0.56 0.42*  
CALCIUM mg/dL 8.9 9.2 9.4  
PROTEIN TOTAL g/dL -- -- 6.4  
BILIRUBIN TOTAL mg/dL -- -- 0.9  
ALK PHOS U/L -- -- 53  
ALT U/L -- -- 14  
AST U/L -- -- 13  
GLUCOSE mg/dL 112* 203* 167*  
  
Results from last 7 days  
Lab Units 24  
BILIRUBIN TOTAL mg/dL 0.9  
  
  
  
I have reviewed all medications,   
laboratory results, and imaging   
pertinent for today's encounter.  
Electronically signed by Stephany Drummond PA-C at 2024 7:05 PM   
EST  
Formatting of this note might be   
different from the original.  
Patient seen by PT and was told she   
could benefit from high intensity   
therapy upon discharge. Patient   
refused and PT agreed to home with   
low intensity therapy with wheeled   
walker. SW will continue to follow   
to assist with safe discharge plan.  
Electronically signed by Sarah Reyes, LCSW at 2024 7:48 PM   
EST  
Formatting of this note is   
different from the original.  
Physical Therapy  
  
Physical Therapy Evaluation  
  
Patient Name: Lise Aaron  
MRN: [id number]  
Today's Date: 2024  
Time Calculation  
Start Time: 1030  
Stop Time: 1100  
Time Calculation (min): 30 min  
  
Assessment/Plan  
PT Assessment  
PT Assessment Results: Pain,   
Decreased strength, Decreased range   
of motion, Decreased endurance,   
Impaired balance, Decreased   
mobility, Decreased coordination  
Rehab Prognosis: Good  
End of Session Communication:   
Bedside nurse  
Assessment Comment: pt is an 83   
year old admitted after fall with   
non-displaced occipital fx. pt with   
some imbalance in standing and   
would benefit from skilled services   
to help pt return to PLOF  
End of Session Patient Position: Up   
in chair  
IP OR SWING BED PT PLAN  
Inpatient or Swing Bed: Inpatient  
PT Plan  
PT Plan: Skilled PT  
PT Frequency: 5 times per week  
PT Discharge Recommendations: Other   
(comment) (pt would benefit from   
high intensity therapy but   
refusing. pt stated that she does   
not want to go anywhere but home.   
Pt instructed to use ww and to be   
very careful. will reccomend low   
intensity therapy for home going.)  
PT Recommended Transfer Status:   
Assist x1  
PT - OK to Discharge: Yes  
  
Subjective  
General Visit Information:  
General  
Reason for Referral: pt admitted   
for fall with non-displaced   
occipital fx.  
Past Medical History Relevant to   
Rehab: pt with h/o syncope, DM2,   
HLD.  
Family/Caregiver Present: No  
Prior to Session Communication:   
Bedside nurse  
Patient Position Received: Bed, 2   
rail up (with cervical collar   
(aspen on))  
General Comment: pt supine with   
aspen collar on and purwick . pt   
willing and cooperative as back   
hurting from lying down.  
Home Living:  
Home Living  
Type of Home: House  
Lives With: Alone (with not much   
help)  
Home Adaptive Equipment: Cane,   
Walker rolling or standard  
Home Layout: One level  
Home Access: Stairs to enter with   
rails  
Entrance Stairs-Number of Steps: 2   
in  
Prior Level of Function:  
Prior Function Per Pt/Caregiver   
Report  
Level of [place]: Independent   
with ADLs and functional transfers,   
Independent with homemaking with   
ambulation (was bringing the   
garbage in when she fell)  
Precautions:  
Precautions  
Medical Precautions: Fall   
precautions, Spinal precautions  
Vital Signs:  
Vital Signs  
Heart Rate: (!) 106  
Heart Rate Source: Monitor  
Pulse Ox: 96 %  
BP: 132/79 (134/78 supine< 126/76   
sitting)  
BP Location: Left arm  
BP Method: Automatic  
Patient Position: Sitting  
  
Objective  
Pain:  
Pain Assessment  
Pain Assessment: 0-10  
Pain Score: (pt would not rate   
pain. but complaining of pain in   
her lower back)  
Cognition:  
Cognition  
Overall Cognitive Status: Within   
Functional Limits  
Orientation Level: Oriented X4  
  
General Assessments:  
Activity Tolerance  
Endurance: Tolerates 10 - 20 min   
exercise with multiple rests  
  
Sensation  
Light Touch: No apparent deficits  
  
Postural Control  
Postural Control: Within Functional   
Limits  
  
Static Sitting Balance  
Static Sitting-Balance Support:   
Feet supported  
Static Sitting-Level of Assistance:   
Contact guard  
  
Static Standing Balance  
Static Standing-Balance Support:   
Bilateral upper extremity supported  
Static Standing-Level of   
Assistance: Contact guard  
Functional Assessments:  
Bed Mobility  
Bed Mobility: Yes  
Bed Mobility 1  
Bed Mobility 1: Supine to sitting  
Level of Assistance 1: Moderate   
assistance  
  
Transfers  
Transfer: Yes  
Transfer 1  
Transfer From 1: Sit to, Stand to  
Transfer to 1: Stand, Sit  
Technique 1: Sit to stand, Stand to   
sit  
Transfer Device 1: Walker  
Transfer Level of Assistance 1:   
Minimum assistance  
Trials/Comments 1: 3 trials  
Transfers 2  
Transfer From 2: Bed to (bed pan in   
chair)  
Transfer to 2: (bedpain in chair to   
stand)  
Technique 2: Sit to stand, Stand to   
sit  
Transfer Device 2: Walker  
Transfer Level of Assistance 2:   
Minimum assistance  
Trials/Comments 2: pt required   
assist due to dizziness but wanting   
to pee in the commode  
Transfers 3  
Transfer From 3: Commode-standard   
to  
Transfer to 3: Chair with arms  
Technique 3: Sit to stand, Stand to   
sit  
Transfer Device 3: Walker  
Transfer Level of Assistance 3:   
Minimum assistance  
  
Ambulation/Gait Training  
Ambulation/Gait Training Performed:   
Yes  
Ambulation/Gait Training 1  
Surface 1: Level tile  
Device 1: Rolling walker  
Assistance 1: Contact guard  
Quality of Gait 1: Narrow base of   
support, Inconsistent stride   
length, Decreased step length  
Comments/Distance (ft) 1: pt able   
to ambulate 5 ft backwards and then   
10 ft forward to the chair. pt with   
slight posterior lean  
  
  
Extremity/Trunk Assessments:  
RLE  
RLE : Exceptions to WFL  
Strength RLE  
RLE Overall Strength: Greater than   
or equal to 3/5 as evidenced by   
functional mobility  
LLE  
LLE : Exceptions to WFL  
Strength LLE  
LLE Overall Strength: Greater than   
or equal to 3/5 as evidenced by   
functional mobility  
Outcome Measures:  
[facility] Basic Mobility  
Turning from your back to your side   
while in a flat bed without using   
bedrails: A lot  
Moving from lying on your back to   
sitting on the side of a flat bed   
without using bedrails: A lot  
Moving to and from bed to chair   
(including a wheelchair): A little  
Standing up from a chair using your   
arms (e.g. wheelchair or bedside   
chair): A little  
To walk in hospital room: A little  
Climbing 3-5 steps with railing: A   
lot  
Basic Mobility - Total Score: 15  
  
Encounter Problems  
  
Encounter Problems (Active)  
  
Balance  
  
STG - Maintains dynamic standing   
balance with upper extremity   
support with ww and SBA for 2 mins   
without LOB (Progressing)  
  
Start: 24 Expected End:   
24  
INTERVENTIONS:  
1. Practice standing with minimal   
support.  
2. Educate patient about standing   
tolerance.  
3. Educate patient about   
independence with gait, transfers,   
and ADL's.  
4. Educate patient about use of   
assistive device.  
5. Educate patient about   
self-directed care.  
  
  
  
Mobility  
  
STG - Patient will ambulate 100 ft   
with ww and SBA (Progressing)  
  
Start: 24 Expected End:   
24  
  
  
STG - Patient will ascend and   
descend two stairs with CGA   
(Progressing)  
  
Start: 24 Expected End:   
24  
  
  
  
Transfers  
  
STG - Transfer from bed to chair   
with CGA and ww (Progressing)  
  
Start: 24 Expected End:   
24  
  
  
STG - Patient will perform bed   
mobility with SBA (Progressing)  
  
Start: 24 Expected End:   
24  
  
  
  
  
Education Documentation  
Precautions, taught by Dianna Fatima PT at 2024 12:14   
PM.  
Learner: Patient  
Readiness: Acceptance  
Method: Explanation  
Response: Needs Reinforcement  
Comment: pt educated on importance   
of rehab also spinal precuations  
  
Home Exercise Program, taught by   
Dianna Fatima PT at   
2024 12:14 PM.  
Learner: Patient  
Readiness: Acceptance  
Method: Explanation  
Response: Needs Reinforcement  
Comment: pt educated on importance   
of rehab also spinal precuations  
  
Mobility Training, taught by   
Dianna Fatima PT at   
2024 12:14 PM.  
Learner: Patient  
Readiness: Acceptance  
Method: Explanation  
Response: Needs Reinforcement  
Comment: pt educated on importance   
of rehab also spinal precuations  
  
Education Comments  
No comments found.  
  
  
Electronically signed by Dianna Fatima PT at 2024 12:16   
PM EST  
Formatting of this note is   
different from the original.  
Pharmacy Medication History Review  
  
Lise Aaron is a 83 y.o. female   
admitted for Fall, initial   
encounter. Pharmacy reviewed the   
patient's yovyw-gx-kygvohrkb   
medications and allergies for   
accuracy.  
  
The list below reflects the updated   
PTA list. Comments regarding how   
patient may be taking medications   
differently can be found in the   
Admit Orders Activity  
Prior to Admission Medications  
Prescriptions Last Dose Informant   
Patient Reported?  
b complex 0.4 mg tablet 2024   
Self Yes  
Sig: Take 1 tablet by mouth daily  
blood sugar diagnostic (Accu-Chek   
Melanie Plus test strp) strip Unknown   
Self Yes  
Sig: In vitro; Use 1 strip twice a   
day  
brimonidine-timoloL (Combigan)   
0.2-0.5 % ophthalmic solution   
2024 Self Yes  
Sig: Administer 1 drop into both   
eyes 2 times a day.  
diclofenac sodium (Voltaren) 1 %   
gel gel 2024 Self No  
Sig: Apply 1 Application topically   
2 times a day. Apply to knee  
ezetimibe (Zetia) 10 mg tablet   
2024 Self No  
Sig: Take 1 tablet (10 mg) by mouth   
once daily.  
famotidine (Pepcid) 20 mg tablet  
Sig: Take 1 tablet by mouth once   
daily as needed Past Month Self Yes  
glimepiride (AmaryL) 1 mg tablet at   
patient only takes this medication   
when blood sugar is over 150 Self   
No  
Sig: Take 1 tablet (1 mg) by mouth   
once daily in the morning. Take   
before meals.  
lifitegrast (XIIDRA OPHT) 2024   
Self Yes  
Sig: Administer 1 drop into both   
eyes in the morning and at bedtime.   
5%  
liraglutide (Victoza 2-Jose Juan) 0.6   
mg/0.1 mL (18 mg/3 mL) injection   
2024 Self No  
Sig: Inject 0.3 mL (1.8 mg) under   
the skin once daily.  
meclizine (Antivert) 25 mg tablet   
2024 Self Yes  
Sig: Take 0.5 tablets (12.5 mg) by   
mouth once daily for 14 days.  
multivitamin tablet 2024 Self   
Yes  
Sig: Take 1 tablet by mouth once   
daily.  
naproxen sodium 220 mg capsule  
Sig: Take 1 tablet by mouth every 6   
hours as needed 2024 Self Yes  
pen needle, diabetic (BD Ultra-Fine   
Mini Pen Needle) 31 gauge x 3/16    
needle Unknown Self No  
Si each once daily. 31G x 5mm;   
Inject 1 each as directed daily  
spironolactone (Aldactone) 50 mg   
tablet Past Month at patient has   
surplus of this medication -   
patient takes this medication prn   
Self Yes  
Sig: Take 1 tablet (50 mg) by mouth   
once daily.  
triamcinolone (Kenalog) 0.1 % cream   
2024 Self No  
Sig: Apply topically 2 times a day.   
Apply to affected area 1-2 times   
daily as needed. Avoid face and   
groin.  
  
Facility-Administered Medications:   
None  
  
  
The list below reflects the updated   
allergy list. Please review each   
documented allergy for additional   
clarification and justification.  
Allergies Reviewed by Carlitos Maradiaga CPhT on 2024  
  
Severity Reactions Comments  
Adhesive Tape-silicones Not   
Specified Unknown, Hives Steri   
strips  
Fosamax [alendronate] Not Specified   
Unknown  
Statins-hmg-coa Reductase   
Inhibitors Not Specified Unknown   
Muscle weakness and pain  
  
  
  
Patient accepts M2B at discharge.   
Pharmacy has been updated to   
[facility].  
  
Sources used to complete the med   
history include  
Allergy list from epic  
Epic dispense history  
Oarrs ( none )  
Patient interview  
2024 office visit progress   
note ( ahmad )  
  
Below are additional concerns with   
the patient's PTA list.  
Patient is a good historian -   
patient knows medication name,   
dose, and frequency  
The patient takes spironolactone   
and glimepiride as needed; no fill   
history for spironolactone, but   
patient confirms she has a surplus   
at home  
  
Carlitos Maradiaga CPhT  
Transitions of Care Pharmacy   
Technician  
[facility]   
Services  
Please reach out via E[employer]   
Chat for questions, or if no   
response call [employer] or vocera   
[facility]  
  
Electronically signed by Reyna Kwon PharmD at 2024   
6:53 AM EST  
Formatting of this note is   
different from the original.  
[facility]  
TRAUMA SERVICE - PROGRESS NOTE  
  
Patient Name: Lise Aaron  
MRN: [id number]  
Admit Date: 229  
: 1940 AGE: 83 y.o.   
GENDER: female  
===================================  
===================================  
========  
MECHANISM OF INJURY:  
Ground level fall, recent episodes   
of syncope and h/o vertigo  
  
LOC (yes/no?): Denies  
Anticoagulant / Anti-platelet Rx?   
(for what dx?): Denies  
Referring Facility Name (N/A for   
scene EMR run): Transfer from [facility]  
  
INJURIES:  
Non-displaced occipital fracture  
  
OTHER MEDICAL PROBLEMS:  
Near Syncope  
Vertigo  
Hyponatremia  
H/o T2DM, HLD  
  
INCIDENTAL FINDINGS:  
Degenerative lumbar spine changes  
T12 subacute fracture  
Heavily calcified neck vasculature   
and aortic valve  
Endometrial fluid  
  
PROCEDURES:  
N/A  
  
===================================  
===================================  
========  
TODAY'S ASSESSMENT AND PLAN OF   
CARE:  
#Occipital fracture, C-spine pain,   
degenerative spine changes  
-NSGY consulted  
-->no acute interventions  
-Repeat CT head/spine unchanged  
-APAP 975mg q8hrs for acute pain  
-C-collar removed with negative CT   
scan and muscular tenderness, none   
midline  
-Q4hr neurochecks  
-PT/OT  
  
#Syncope vs. vertigo #aortic valve   
calcifications  
-Echocardiogram ordered  
-EKG showing sinus tachycardia,   
otherwise unremarkable  
-Orthostatic vitals when able  
-Stop home meclizine  
  
#Hyponatremia  
-132 upon admission, has been   
mildly hyponatremic in the past  
-Repeat BMP daily  
  
#Comorbidities  
-Continue home Combigan eye drops,   
ezetimibe, famotidine,   
spironolactone  
-Holding home lifitegrast eye drops   
(not in formulary)  
-Consult Geriatrics team and   
appreciate recs  
  
#FEN/GI  
-SSI #1  
-Holding home glimepiride (do not   
resume upon discharge), liraglutide  
-Carb controlled diet  
-BR with senna, docusate  
  
#DVT Ppx  
-Enoxaparin 30mg BID  
-SCDs  
  
Seen and discussed with Dr. Fuchs.  
  
25 minutes spent with patient   
reviewing VSS/medications,   
obtaining subjective information,   
performing physical exam,   
discussing plan of care; with   
greater than 50% of that time spent   
in patient room.  
  
Stephany Drummond PA-C  
Trauma Surgery  
Team Phone: [phone]  
  
===================================  
===================================  
========  
CHIEF COMPLAINT / OVERNIGHT EVENTS:  
84 y/o s/p near syncopal fall with   
nondisplaced occipital fracture  
  
MEDICAL HISTORY / ROS:  
Admission history and ROS reviewed.   
Pertinent changes as follows:  
Pt does note mid back pain but   
notes this is her baseline and she   
uses voltaren gel over this area at   
home. Neck pain is to lateral   
sternocleidomastoid.  
  
PHYSICAL EXAM:  
Heart Rate: []  
Temperature: [36.2 C (97.2 F)-36.5   
C (97.7 F)]  
Respirations: [9-18]  
BP: (131-179)/()  
Height: [160 cm (5' 3 )]  
Weight: [81.2 kg (179 lb)-81.2 kg   
(179 lb 0.2 oz)]  
Pulse Ox: [93 %-97 %]  
Physical Exam  
Vitals reviewed.  
Constitutional:  
General: She is awake. She is not   
in acute distress.  
Appearance: She is well-developed.   
She is not ill-appearing or   
toxic-appearing.  
Comments: Elderly female sitting up   
in chair in NAD  
HENT:  
Head: Normocephalic and atraumatic.  
Comments: Bandaid from home to   
right cheek  
Nose: Nose normal.  
Mouth/Throat:  
Mouth: Mucous membranes are moist.  
Eyes:  
Extraocular Movements:  
Right eye: Normal extraocular   
motion.  
Left eye: Normal extraocular   
motion.  
Pupils: Pupils are equal, round,   
and reactive to light.  
Cardiovascular:  
Rate and Rhythm: Regular rhythm.   
Tachycardia present.  
Pulses:  
Radial pulses are 2+ on the right   
side and 2+ on the left side.  
Dorsalis pedis pulses are 2+ on the   
right side and 2+ on the left side.  
Pulmonary:  
Effort: Pulmonary effort is normal.  
Breath sounds: Normal breath   
sounds. No decreased breath sounds.  
Abdominal:  
General: There is no distension.  
Tenderness: There is no abdominal   
tenderness.  
Musculoskeletal:  
Cervical back: Normal range of   
motion. Tenderness present.   
Muscular tenderness (left   
paraspinal) present. Normal range   
of motion.  
Thoracic back: Tenderness (mid,   
baseline) present. Normal range of   
motion.  
Lumbar back: No tenderness.  
Right lower leg: No edema.  
Left lower leg: No edema.  
Skin:  
General: Skin is warm and dry.  
Capillary Refill: Capillary refill   
takes less than 2 seconds.  
Comments: Thin hair  
Neurological:  
General: No focal deficit present.  
Mental Status: She is alert and   
oriented to person, place, and   
time.  
GCS: GCS eye subscore is 4. GCS   
verbal subscore is 5. GCS motor   
subscore is 6.  
Sensory: Sensation is intact.  
Motor: Motor function is intact.  
Comments: Hard of hearing  
Psychiatric:  
Mood and Affect: Mood and affect   
normal.  
Behavior: Behavior is cooperative.  
  
IMAGING SUMMARY: (summary of new   
imaging findings, not a copy of   
dictation)  
No new images  
  
LABS:  
Results from last 7 days  
Lab Units 24  
WBC AUTO x10*3/uL 9.7  
HEMOGLOBIN g/dL 14.6  
HEMATOCRIT % 42.2  
PLATELETS AUTO x10*3/uL 179  
NEUTROS PCT AUTO % 70.7  
LYMPHS PCT AUTO % 16.5  
MONOS PCT AUTO % 11.0  
EOS PCT AUTO % 1.1  
  
Results from last 7 days  
Lab Units 24  
INR 1.1  
  
Results from last 7 days  
Lab Units 24  
SODIUM mmol/L 132*  
POTASSIUM mmol/L 4.1  
CHLORIDE mmol/L 95*  
CO2 mmol/L 26  
BUN mg/dL 10  
CREATININE mg/dL 0.42*  
CALCIUM mg/dL 9.4  
PROTEIN TOTAL g/dL 6.4  
BILIRUBIN TOTAL mg/dL 0.9  
ALK PHOS U/L 53  
ALT U/L 14  
AST U/L 13  
GLUCOSE mg/dL 167*  
  
Results from last 7 days  
Lab Units 24  
BILIRUBIN TOTAL mg/dL 0.9  
  
  
  
I have reviewed all medications,   
laboratory results, and imaging   
pertinent for today's encounter.  
Electronically signed by Stephany Drummond PA-C at 2024 5:26 PM   
EST  
Formatting of this note might be   
different from the original.  
Limited Trauma: Fall  
  
Pt is an 83 year old female   
presenting to the ED following a   
fall. Per report, pt was taking her   
trash cans out when the wind blew,   
causing pt to fall onto the   
concrete. Pt did strike her head,   
-LOC. Pt was found to have a an   
occipital fracture at the OSH. Pt   
presenting with an abrasion to the   
back of the head and lower back   
pain.  
Pt lives at home alone. Pt's   
daughter lives about 15 minutes   
away from pt. Pt ambulates with a   
cane daily and is able to drive   
short distances. Pt has two steps   
to enter the home. Pt has no active   
home care serves and pt's children   
and grandchildren help at home and   
in the community. Pt's daughter   
Mariah Walker [phone] is aware   
of pt's transfer.  
  
Plan: pending  
  
DARLINE Hopper  
  
Secure Chat  
[phone]  
  
Electronically signed by Demetra Scott LCSW at 2024 2:50 AM   
EST  
documented in this encounter            [facility]  
Work Phone:   
[phone]  
   
                                                    2024 Hospital   
Discharge instructions                    
  
  
Jannet Lee MD - 2024 1:24   
PM ESTFormatting of this note is   
different from the original.  
[facility]  
DISCHARGE INSTRUCTIONS  
  
You were admitted to [facility]   
from 24 - 24 for   
treatment and evaluation after a   
fall. You sustained the following   
injuries: non-displaced occipital   
fracture. You did not require   
surgery during this hospital stay.  
  
GENERAL INSTRUCTIONS  
1) Diet: You may resume your   
regular home diet  
  
2) Activity:  
- Move around as you are able  
- Avoiding driving when your pain   
is not well controlled. If your   
pain would hinder you from slamming   
the breaks or turning your head   
from side to side, avoid driving  
- Refrain from driving if you have   
used any narcotic medication in the   
last 48-72 hours. These medications   
can slow down your reaction time   
and impair your judgment.  
-Avoid strenuous activities   
including  
  
3) Medications:  
- Take tylenol 650 mg every 6 hours   
as needed for your pain.  
- You can apply 1 lidocaine patches   
as needed for pain. Remember to   
have a period of 12 hours where you   
ARE NOT wearing a lidocaine patch   
BEFORE you reapply another patch.  
- You will now start taking Asprin   
81 mg daily  
- you will resume all of your other   
home medications that you were   
taking before being admitted to the   
hospital  
- Stop taking Glimepiride.  
  
4) Return precautions --  
- Fever > 100.5 F (>38 C), chills  
-uncontrolled nausea and/or   
vomiting  
-Chest pain  
-new or worsening shortness of   
breathe  
-uncontrolled diarrhea  
-you stop passing gas  
-have new bruising, rashes,   
blistering on your body  
-new numbness/tingling, loss of   
feeling in any part of your body  
-new or worsening swelling  
-uncontrolled pain  
- feeling of black curtain blocking   
your vision in 1 eye  
  
IF YOU THINK THIS IS AN EMERGENCY,   
PLEASE CALL 911 OR VISIT YOUR   
NEAREST EMERGENCY DEPARTMENT.  
  
5) Incentive spirometer use: please   
try to use the incentive spirometer   
every hour that you are awake. A   
good idea is to use this device   
10-15 times each hour (if the TV is   
on, 5-6 times per commercial will   
help you meet this goal). Your goal   
should be to reach 2000 on the   
spirometer over the week following   
your discharge.  
  
ADDITIONAL INSTRUCTIONS  
  
FOLLOW-UP APPOINTMENTS:  
[facility] Surgery Clinic  
You will follow-up with this clinic   
in 1 month after discharge. If you   
do not hear from them within 1 week   
of discharge with appointment   
information, please call   
[phone] to schedule your   
appointment.  
  
Vascular Surgery Clinic  
You will follow-up with this clinic   
in 1 year after discharge. If you   
do not hear from them within 1 week   
of discharge with appointment   
information, please call   
[phone] to schedule your   
appointment. This is follow-up on   
the narrowing of arteries in your   
neck (carotid arteries)  
  
- primary care physician follow-up  
Please set up a follow-up visit   
with you PCP. Also ask them about a   
good medication option for   
alternatives to a statin given you   
have an allergy to this class of   
medication.  
  
  
  
Electronically signed by Jannet Lee MD at 2024 3:24 PM EST  
  
documented in this encounter            [facility]  
Work Phone:   
[phone]  
   
                                                    2024 Plan of care   
note                                    Formatting of this note might be   
different from the original.  
The patient's goals for the shift   
include pain control  
  
The clinical goals for the shift   
include no falls  
  
Electronically signed by Jessica Puckett RN at 2024 6:14 AM   
EST  
                                        [facility]  
   
                                                    2024 Miscellaneous   
Notes                                   Formatting of this note might be   
different from the original.  
The patient's goals for the shift   
include pain control  
  
The clinical goals for the shift   
include no falls  
  
Electronically signed by Jessica Puckett RN at 2024 6:14 AM   
EST  
Formatting of this note might be   
different from the original.  
The patient's goals for the shift   
include discharge planning  
  
The clinical goals for the shift   
include pt will be HD stable  
  
  
Electronically signed by Shari Kincaid RN at 2024   
11:31 AM EST  
Formatting of this note might be   
different from the original.  
  
Problem: Pain  
Goal: My pain/discomfort is   
manageable  
Outcome: Progressing  
  
Problem: Safety  
Goal: Patient will be injury free   
during hospitalization  
Outcome: Progressing  
  
  
Electronically signed by Ariana Mera RN at 2024 12:24 AM   
EST  
Formatting of this note might be   
different from the original.  
Lise Aaron is a 84 yo F presenting   
as a LIMITED activation after   
sustaining a fall from ground level   
on . Was taking out the trash   
when a abi of wind knocked her to   
the ground. Struck her head but   
denies LOC. Has been having   
episodes of dizziness at home for   
quite some time. Has been seeing a   
neurologist. Pt was transferred to   
the [facility] 3/1, no acute interventions   
for her occipital fx, and   
undergoing workup for   
syncope/vertigo. PT/OT recommending   
high intensity therapy on discharge   
however pt states she only wants to   
go home at discharge at that time   
3/1. 3/2 pt remained HDS, Cta of   
the neck had show bilaterally   
atherosclerotic disease, vascular   
surgery involved and rec'd   
outpatient follow up. Pt remained   
stable on 3/3, and plan to take   
aspirin 81mg daily for management.  
  
At the time of discharge, patient's   
pain was controlled with oral   
analgesia, patient was urinating,   
having BMs, sleeping, and eating   
well. Based on PT/OT's   
recommendation, patient was   
discharged to SNF on 2024   
with scripts and follow up   
appointments. Discharge plan was   
discussed with the patient and all   
of the patient's questions were   
answered.  
  
Electronically signed by Jannet Lee MD at 2024 1:38 PM EST  
Formatting of this note might be   
different from the original.  
The patient's goals for the shift   
include pain management  
  
The clinical goals for the shift   
include pt will be free from falls  
  
  
Electronically signed by Shari Kincaid RN at 2024   
10:04 AM EST  
Formatting of this note might be   
different from the original.  
The patient's goals for the shift   
include pain control  
  
The clinical goals for the shift   
include pt will remain free from   
falls throughout the shift  
  
Problem: Pain  
Goal: My pain/discomfort is   
manageable  
Outcome: Progressing  
  
Problem: Safety  
Goal: Patient will be injury free   
during hospitalization  
Outcome: Progressing  
Goal: I will remain free of falls  
Outcome: Progressing  
  
Problem: Daily Care  
Goal: Daily care needs are met  
Outcome: Progressing  
  
Problem: Psychosocial Needs  
Goal: Demonstrates ability to cope   
with hospitalization/illness  
Outcome: Progressing  
Goal: Collaborate with me, my   
family, and caregiver to identify   
my specific goals  
Outcome: Progressing  
  
  
Electronically signed by Neisha Basilio RN at 2024 4:02 AM   
EST  
Formatting of this note might be   
different from the original.  
The patient's goals for the shift   
include safety and pain control  
  
The clinical goals for the shift   
include patient will remain HDS and   
pain will be controlled throughout   
the shift.  
  
Problem: Pain  
Goal: My pain/discomfort is   
manageable  
Outcome: Progressing  
  
Problem: Safety  
Goal: Patient will be injury free   
during hospitalization  
Outcome: Progressing  
Goal: I will remain free of falls  
Outcome: Progressing  
  
Problem: Daily Care  
Goal: Daily care needs are met  
Outcome: Progressing  
  
Problem: Psychosocial Needs  
Goal: Demonstrates ability to cope   
with hospitalization/illness  
Outcome: Progressing  
Goal: Collaborate with me, my   
family, and caregiver to identify   
my specific goals  
Outcome: Progressing  
  
  
  
Electronically signed by Neisha Basilio RN at 2024 2:38 AM   
EST  
Formatting of this note might be   
different from the original.  
The patient's goals for the shift   
include pain control  
  
The clinical goals for the shift   
include pain control  
  
Electronically signed by Shari Kincaid RN at 2024   
6:44 PM EST  
documented in this encounter            [facility]  
Work Phone:   
[phone]  
   
                                                    2024 Plan of care   
note                                    Formatting of this note might be   
different from the original.  
The patient's goals for the shift   
include discharge planning  
  
The clinical goals for the shift   
include pt will be HD stable  
  
  
Electronically signed by Shari Kincaid RN at 2024   
11:31 AM [facility]  
Work Phone:   
[phone]  
   
                                                    2024 Plan of care   
note                                    Formatting of this note might be   
different from the original.  
  
Problem: Pain  
Goal: My pain/discomfort is   
manageable  
Outcome: Progressing  
  
Problem: Safety  
Goal: Patient will be injury free   
during hospitalization  
Outcome: Progressing  
  
  
Electronically signed by Ariana Mera RN at 2024 12:24 AM   
[facility]  
   
                                        2024 Hospital Note Formatting of t  
his note might be   
different from the original.  
Lise Aaron is a 84 yo F presenting   
as a LIMITED activation after   
sustaining a fall from ground level   
on . Was taking out the trash   
when a abi of wind knocked her to   
the ground. Struck her head but   
denies LOC. Has been having   
episodes of dizziness at home for   
quite some time. Has been seeing a   
neurologist. Pt was transferred to   
the [facility] 3/1, no acute interventions   
for her occipital fx, and   
undergoing workup for   
syncope/vertigo. PT/OT recommending   
high intensity therapy on discharge   
however pt states she only wants to   
go home at discharge at that time   
3/1. 3/2 pt remained HDS, Cta of   
the neck had show bilaterally   
atherosclerotic disease, vascular   
surgery involved and rec'd   
outpatient follow up. Pt remained   
stable on 3/3, and plan to take   
aspirin 81mg daily for management.  
  
At the time of discharge, patient's   
pain was controlled with oral   
analgesia, patient was urinating,   
having BMs, sleeping, and eating   
well. Based on PT/OT's   
recommendation, patient was   
discharged to SNF on 2024   
with scripts and follow up   
appointments. Discharge plan was   
discussed with the patient and all   
of the patient's questions were   
answered.  
  
Electronically signed by Jannet Lee MD at 2024 1:38 PM [facility]  
Work Phone:   
[phone]  
   
                                                    2024 Plan of care   
note                                    Formatting of this note might be   
different from the original.  
The patient's goals for the shift   
include pain management  
  
The clinical goals for the shift   
include pt will be free from falls  
  
  
Electronically signed by Shari Kincaid RN at 2024   
10:04 AM [facility]  
Work Phone:   
[phone]  
   
                                                    2024 Plan of care   
note                                    Formatting of this note might be   
different from the original.  
The patient's goals for the shift   
include pain control  
  
The clinical goals for the shift   
include pt will remain free from   
falls throughout the shift  
  
Problem: Pain  
Goal: My pain/discomfort is   
manageable  
Outcome: Progressing  
  
Problem: Safety  
Goal: Patient will be injury free   
during hospitalization  
Outcome: Progressing  
Goal: I will remain free of falls  
Outcome: Progressing  
  
Problem: Daily Care  
Goal: Daily care needs are met  
Outcome: Progressing  
  
Problem: Psychosocial Needs  
Goal: Demonstrates ability to cope   
with hospitalization/illness  
Outcome: Progressing  
Goal: Collaborate with me, my   
family, and caregiver to identify   
my specific goals  
Outcome: Progressing  
  
  
Electronically signed by Neisha Basilio RN at 2024 4:02 AM   
[facility]  
   
                                        2024 Consult note Associated Order  
(s): IP CONSULT TO   
VASCULAR SURGERY  
Formatting of this note is   
different from the original.  
[facility]  
Vascular Surgery  
H&P/Consult Note  
Lise Aaron  
[id number]  
1940  
  
Reson for Admission:  
GLF  
  
Reason For Consult:  
Incidental finding - possible L ICA   
plaque ulceration  
  
HPI  
Ms. Lise Aaron is a 84 YO female   
with PMH of T2DM, bilateral hearing   
loss, HLD, 4-year history of   
indeterminant dizziness/vertigo who   
presented to the [facility] as a trauma   
patient following ground-level fall   
from standing. She sustained a   
closed occipital skull fracture.   
Trauma team is evaluating for   
possible syncope, ongoing workup   
for history of dizziness/vertigo.  
  
Vascular surgery was consulted for   
incidental finding of possible L   
ICA plaque ulceration on carotid   
duplex.  
  
Patient denies any history of   
stroke, TIA like symptoms,   
amaurosis fugax.  
  
  
Review of Systems  
No headaches, lightheadedness,   
dizziness, amaurosis, chest pain,   
shortness of breath, abdominal   
pain, extremity   
weakness/pain/sensory loss, no   
fevers or chills. Remainder of 12   
point ROS are negative.  
  
PMH:  
Past Medical History:  
Diagnosis Date  
Diabetes mellitus (CMS/HCC)  
Encounter for full-term   
uncomplicated delivery  
Normal vaginal delivery  
Other conditions influencing health   
status  
Menstruation  
  
Shx:  
Past Surgical History:  
Procedure Laterality Date  
OTHER SURGICAL HISTORY 06/15/2021  
Ankle surgery  
OTHER SURGICAL HISTORY 06/15/2021  
Left mastectomy  
OTHER SURGICAL HISTORY 2021  
Hysteroscopy  
  
  
FMHx:  
family history includes Heart   
disease in her mother; Hypertension   
in her father and mother.  
  
Social Hx:  
Smoker: Lifelong non-smoker  
Alcohol consumption: None  
Illicit drug use: None  
Occupation: Retired nurse  
Living Situation: , lives at   
home alone, has a cat, adult   
children in the area  
POA-daughter Jerry Shafer  
  
Objective:  
  
Last Recorded Vitals  
Vitals:  
24 1500  
BP: (!) 149/93  
Pulse: 75  
Resp: 17  
Temp: 36.8 C (98.2 F)  
SpO2: 96%  
  
I/O last 2 completed shifts:  
In: 120 (1.5 mL/kg) [P.O.:120]  
Out: - (0 mL/kg)  
Weight: 81.2 kg  
  
Physical Exam  
General: NAD. Nontoxic, frail  
HEENT: Atraumatic. MMM. EOMI. 2+   
palpable carotids bilaterally, no   
discernible bruit auscultated.   
Band-Aid over right lower cheek.  
Card: RRR  
Pulm: Nonlabored breathing on room   
air  
Chest: No bruising or seatbelt   
sign. Chest is nontender. Equal   
chest wall rise  
Abdomen: Soft, nontender,   
nondistended. No bruising.  
Extremities: MAEx4. Warm and   
well-perfused. 2+ pitting edema   
bilaterally.  
Neuro: No focal deficits.  
Pulses:  
Globally 2+ palpable  
  
Relevant Results  
Lab Results  
Component Value Date  
GLUCOSE 112 (H) 2024  
CALCIUM 8.9 2024  
 (L) 2024  
K 3.5 2024  
CO2 24 2024  
CL 99 2024  
BUN 17 2024  
CREATININE 0.63 2024  
  
Lab Results  
Component Value Date  
GLUCOSE 112 (H) 2024  
CALCIUM 8.9 2024  
 (L) 2024  
K 3.5 2024  
CO2 24 2024  
CL 99 2024  
BUN 17 2024  
CREATININE 0.63 2024  
  
( Bilateral Carotid Artery   
Duplex  
CONCLUSIONS:  
Right Carotid: Findings are   
consistent with less than 50%   
stenosis of the right proximal   
internal carotid artery. Right   
external carotid artery appears   
patent with no evidence of   
stenosis. No evidence of   
hemodynamically significant   
stenosis of the right common   
carotid artery. The right vertebral   
artery is patent with antegrade   
flow. No evidence of   
hemodynamically significant   
stenosis in the right subclavian   
artery.  
Left Carotid: Findings are   
consistent with less than 50%   
stenosis of the left proximal   
internal carotid artery. Left   
external carotid artery appears   
patent with no evidence of   
stenosis. No evidence of   
hemodynamically significant   
stenosis of the left common carotid   
artery. The left vertebral artery   
is patent with antegrade flow. No   
evidence of hemodynamically   
significant stenosis in the left   
subclavian artery.  
  
Imaging & Doppler Findings:  
Right Plaque Morph: The proximal   
right internal carotid artery   
demonstrates heterogenous and   
irregular plaque. The right carotid   
bulb demonstrates heterogenous and   
irregular plaque.  
Left Plaque Morph: The proximal   
left internal carotid artery   
demonstrates heterogenous and   
highly,irregular/ ?ulcerated plaque   
that might need further imaging.   
The left carotid bulb demonstrates   
calcified plaque.  
  
Right Left  
PSV EDV PSV EDV  
82 cm/s CCA P 75 cm/s  
65 cm/s CCA D 60 cm/s  
102 cm/s 18 cm/s ICA P 60 cm/s 12   
cm/s  
116 cm/s 21 cm/s ICA M 70 cm/s 23   
cm/s  
69 cm/s 13 cm/s ICA D 63 cm/s 19   
cm/s  
111 cm/s ECA 97 cm/s  
37 cm/s 7 cm/s Vertebral 37 cm/s 9   
cm/s  
119 cm/s Subclavian 102 cm/s  
  
Right Left  
ICA/CCA Ratio 1.6 1.0  
  
--------------------------------  
(2024) CTA head and Neck:  
IMPRESSION:  
Atherosclerotic disease of   
bilateral intracranial internal   
carotid  
arteries with mild narrowing of the   
right ophthalmic segment. There  
is no evidence of major vessel cut   
off.  
  
Atherosclerotic disease of   
bilateral carotid bifurcations and  
proximal internal carotid arteries   
in the neck, right greater than  
left. The right-sided carotid   
bifurcation and proximal internal  
carotid artery plaque is heavily   
calcified and irregular and causes  
up to 50% stenosis as per the   
NASCET criteria. The left-sided   
plaque  
is not associated with   
hemodynamically significant   
stenosis. If there  
is need for assessment of plaque   
morphology, high-resolution plaque  
imaging with contrast-enhanced MRI   
for vessel wall imaging is  
recommended.  
  
Assessment/Plan  
Ms. Lise Aaron is a 84 YO female   
with PMH of T2DM, bilateral hearing   
loss, HLD, 4-year history of   
indeterminant dizziness/vertigo who   
presented to the [facility] as a trauma   
patient following ground-level fall   
from standing. Patient is not   
symptomatic from carotid stenosis   
was noted on imaging. No surgical   
intervention required at this time   
for questionable plaque ulceration   
seen on duplex.  
  
Plan/Recommendations:  
-No surgical intervention from   
vascular surgery at this time  
-Educated patient on signs and   
symptoms of symptomatic carotid   
stenosis  
  
  
Discussed with fellow Dr. Ruth.  
  
Jannet Lee MD  
Vascular Surgery, PGY1  
Team Pager: [phone]  
Available via [employer]  
  
Fellow Addendum:  
  
Patient was seen and examined. I   
aggree with the above   
documentation, except as modified.  
  
In brief, Ms. Lise Aaron is a 83   
y.o. female presenting after a   
ground level fall and incidentally   
found to have bilateral carotid   
artery stenosis on duplex and CTA.   
Patient has 50% REY on CTA and   
&lt;50% based on duplex. She has no   
history of TIA, amaurosis fugax or   
stroke. Dizziness is an uncommon   
presentation for symptomatic   
carotid disease, and furthermore   
she has less than 50% stenosis by   
CTA and duplex.  
  
No vascular surgical intervention   
is warranted at this time. The   
patient can follow-up with as an   
outpatient in 1 year with a repeat   
carotid artery duplex. The patient   
should be on 81 mg of aspirin daily   
and high intensity statin for risk   
factor modification and best   
medical therapy.  
  
Vascular surgery will sign off at   
this time. Please do not hesitate   
to contact us with any questions,   
concerns, or changes in the   
patient's clinical condition. Thank   
you for the consultation and the   
opportunity to participate in the   
patient's care.  
  
This patient was discussed with the   
attending of record, Dr. Grant.  
  
Aniceto Ruth MD  
Vascular Surgery Fellow  
Team Pager [phone]  
Available on Secure Chat  
  
Electronically signed by Sina Grant MD at 2024 9:34 AM EST  
  
Associated attestation - iSna Grant MD - 2024 9:34 AM EST  
Formatting of this note might be   
different from the original.  
I reviewed the resident/fellow's   
documentation and discussed the   
patient with the resident/fellow. I   
agree with the resident/fellow's   
medical decision making as   
documented in the note.  
                                        [facility]  
Work Phone:   
[phone]  
   
                                        2024 Consult note Associated Order  
(s): IP CONSULT TO   
VASCULAR SURGERY  
Formatting of this note is   
different from the original.  
[facility]  
Vascular Surgery  
H&P/Consult Note  
Lise Aaron  
[id number]  
1940  
  
Reson for Admission:  
GLF  
  
Reason For Consult:  
Incidental finding - possible L ICA   
plaque ulceration  
  
HPI  
Ms. Lise Aaron is a 84 YO female   
with PMH of T2DM, bilateral hearing   
loss, HLD, 4-year history of   
indeterminant dizziness/vertigo who   
presented to the [facility] as a trauma   
patient following ground-level fall   
from standing. She sustained a   
closed occipital skull fracture.   
Trauma team is evaluating for   
possible syncope, ongoing workup   
for history of dizziness/vertigo.  
  
Vascular surgery was consulted for   
incidental finding of possible L   
ICA plaque ulceration on carotid   
duplex.  
  
Patient denies any history of   
stroke, TIA like symptoms,   
amaurosis fugax.  
  
  
Review of Systems  
No headaches, lightheadedness,   
dizziness, amaurosis, chest pain,   
shortness of breath, abdominal   
pain, extremity   
weakness/pain/sensory loss, no   
fevers or chills. Remainder of 12   
point ROS are negative.  
  
PMH:  
Past Medical History:  
Diagnosis Date  
Diabetes mellitus (CMS/[facility])  
Encounter for full-term   
uncomplicated delivery  
Normal vaginal delivery  
Other conditions influencing health   
status  
Menstruation  
  
Shx:  
Past Surgical History:  
Procedure Laterality Date  
OTHER SURGICAL HISTORY 06/15/2021  
Ankle surgery  
OTHER SURGICAL HISTORY 06/15/2021  
Left mastectomy  
OTHER SURGICAL HISTORY 2021  
Hysteroscopy  
  
  
FMHx:  
family history includes Heart   
disease in her mother; Hypertension   
in her father and mother.  
  
Social Hx:  
Smoker: Lifelong non-smoker  
Alcohol consumption: None  
Illicit drug use: None  
Occupation: Retired nurse  
Living Situation: , lives at   
home alone, has a cat, adult   
children in the area  
POA-daughter Jerry Shafer  
  
Objective:  
  
Last Recorded Vitals  
Vitals:  
24 1500  
BP: (!) 149/93  
Pulse: 75  
Resp: 17  
Temp: 36.8 C (98.2 F)  
SpO2: 96%  
  
I/O last 2 completed shifts:  
In: 120 (1.5 mL/kg) [P.O.:120]  
Out: - (0 mL/kg)  
Weight: 81.2 kg  
  
Physical Exam  
General: NAD. Nontoxic, frail  
HEENT: Atraumatic. MMM. EOMI. 2+   
palpable carotids bilaterally, no   
discernible bruit auscultated.   
Band-Aid over right lower cheek.  
Card: RRR  
Pulm: Nonlabored breathing on room   
air  
Chest: No bruising or seatbelt   
sign. Chest is nontender. Equal   
chest wall rise  
Abdomen: Soft, nontender,   
nondistended. No bruising.  
Extremities: MAEx4. Warm and   
well-perfused. 2+ pitting edema   
bilaterally.  
Neuro: No focal deficits.  
Pulses:  
Globally 2+ palpable  
  
Relevant Results  
Lab Results  
Component Value Date  
GLUCOSE 112 (H) 2024  
CALCIUM 8.9 2024  
 (L) 2024  
K 3.5 2024  
CO2 24 2024  
CL 99 2024  
BUN 17 2024  
CREATININE 0.63 2024  
  
Lab Results  
Component Value Date  
GLUCOSE 112 (H) 2024  
CALCIUM 8.9 2024  
 (L) 2024  
K 3.5 2024  
CO2 24 2024  
CL 99 2024  
BUN 17 2024  
CREATININE 0.63 2024  
  
( Bilateral Carotid Artery   
Duplex  
CONCLUSIONS:  
Right Carotid: Findings are   
consistent with less than 50%   
stenosis of the right proximal   
internal carotid artery. Right   
external carotid artery appears   
patent with no evidence of   
stenosis. No evidence of   
hemodynamically significant   
stenosis of the right common   
carotid artery. The right vertebral   
artery is patent with antegrade   
flow. No evidence of   
hemodynamically significant   
stenosis in the right subclavian   
artery.  
Left Carotid: Findings are   
consistent with less than 50%   
stenosis of the left proximal   
internal carotid artery. Left   
external carotid artery appears   
patent with no evidence of   
stenosis. No evidence of   
hemodynamically significant   
stenosis of the left common carotid   
artery. The left vertebral artery   
is patent with antegrade flow. No   
evidence of hemodynamically   
significant stenosis in the left   
subclavian artery.  
  
Imaging & Doppler Findings:  
Right Plaque Morph: The proximal   
right internal carotid artery   
demonstrates heterogenous and   
irregular plaque. The right carotid   
bulb demonstrates heterogenous and   
irregular plaque.  
Left Plaque Morph: The proximal   
left internal carotid artery   
demonstrates heterogenous and   
highly,irregular/ ?ulcerated plaque   
that might need further imaging.   
The left carotid bulb demonstrates   
calcified plaque.  
  
Right Left  
PSV EDV PSV EDV  
82 cm/s CCA P 75 cm/s  
65 cm/s CCA D 60 cm/s  
102 cm/s 18 cm/s ICA P 60 cm/s 12   
cm/s  
116 cm/s 21 cm/s ICA M 70 cm/s 23   
cm/s  
69 cm/s 13 cm/s ICA D 63 cm/s 19   
cm/s  
111 cm/s ECA 97 cm/s  
37 cm/s 7 cm/s Vertebral 37 cm/s 9   
cm/s  
119 cm/s Subclavian 102 cm/s  
  
Right Left  
ICA/CCA Ratio 1.6 1.0  
  
--------------------------------  
(2024) CTA head and Neck:  
IMPRESSION:  
Atherosclerotic disease of   
bilateral intracranial internal   
carotid  
arteries with mild narrowing of the   
right ophthalmic segment. There  
is no evidence of major vessel cut   
off.  
  
Atherosclerotic disease of   
bilateral carotid bifurcations and  
proximal internal carotid arteries   
in the neck, right greater than  
left. The right-sided carotid   
bifurcation and proximal internal  
carotid artery plaque is heavily   
calcified and irregular and causes  
up to 50% stenosis as per the   
NASCET criteria. The left-sided   
plaque  
is not associated with   
hemodynamically significant   
stenosis. If there  
is need for assessment of plaque   
morphology, high-resolution plaque  
imaging with contrast-enhanced MRI   
for vessel wall imaging is  
recommended.  
  
Assessment/Plan  
Ms. Lise Aaron is a 84 YO female   
with PMH of T2DM, bilateral hearing   
loss, HLD, 4-year history of   
indeterminant dizziness/vertigo who   
presented to the [facility] as a trauma   
patient following ground-level fall   
from standing. Patient is not   
symptomatic from carotid stenosis   
was noted on imaging. No surgical   
intervention required at this time   
for questionable plaque ulceration   
seen on duplex.  
  
Plan/Recommendations:  
-No surgical intervention from   
vascular surgery at this time  
-Educated patient on signs and   
symptoms of symptomatic carotid   
stenosis  
  
  
Discussed with fellow Dr. Ruth.  
  
Jannet Lee MD  
Vascular Surgery, PGY1  
Team Pager: [phone]  
Available via [employer]  
  
Fellow Addendum:  
  
Patient was seen and examined. I   
aggree with the above   
documentation, except as modified.  
  
In brief, Ms. Lise Aaron is a 83   
y.o. female presenting after a   
ground level fall and incidentally   
found to have bilateral carotid   
artery stenosis on duplex and CTA.   
Patient has 50% REY on CTA and   
&lt;50% based on duplex. She has no   
history of TIA, amaurosis fugax or   
stroke. Dizziness is an uncommon   
presentation for symptomatic   
carotid disease, and furthermore   
she has less than 50% stenosis by   
CTA and duplex.  
  
No vascular surgical intervention   
is warranted at this time. The   
patient can follow-up with as an   
outpatient in 1 year with a repeat   
carotid artery duplex. The patient   
should be on 81 mg of aspirin daily   
and high intensity statin for risk   
factor modification and best   
medical therapy.  
  
Vascular surgery will sign off at   
this time. Please do not hesitate   
to contact us with any questions,   
concerns, or changes in the   
patient's clinical condition. Thank   
you for the consultation and the   
opportunity to participate in the   
patient's care.  
  
This patient was discussed with the   
attending of record, Dr. Grant.  
  
Aniceto Ruth MD  
Vascular Surgery Fellow  
Team Pager [phone]  
Available on Secure Chat  
  
Electronically signed by Sina Grant MD at 2024 9:34 AM EST  
  
Associated attestation - Sina Grant MD - 2024 9:34 AM EST  
Formatting of this note might be   
different from the original.  
I reviewed the resident/fellow's   
documentation and discussed the   
patient with the resident/fellow. I   
agree with the resident/fellow's   
medical decision making as   
documented in the note.  
Formatting of this note is   
different from the original.  
Images from the original note were   
not included.  
Consults  
  
Primary Team: Trauma  
  
Admit Date: 2024  
  
Emergency Contact:  
Extended Emergency Contact   
Information  
Primary Emergency Contact:   
MARIAH SHAFER  
Address: [address] [place] [id number] [place] [facility]  
Home Phone: [phone]  
Mobile Phone: [phone]  
Relation: Daughter  
  
Reason For Consult: Fall  
  
History Of Present Illness:  
Lise Aaron is a 83 y.o. female   
with a PMHx of HLD, T2DM, glaucoma,   
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   
who presented after sustaining a   
fall from ground level. She was   
taking out the trash when a abi of   
wind knocked her to the ground. She   
struck the back of her head but   
denies LOC. Notably she has been   
having episodes of dizziness at   
home for quite some time and been   
seeing a neurologist for this   
issue.  
  
History per patient:  
The patient reports that while she   
was taking out the trash, a abi of   
wind knocked her over, causing her   
to fall flat on her rear and   
subsequently backward, resulting in   
hitting her head. She reports   
feeling slightly dizzy at this time   
but is not sure if this was the   
primary cause of her fall. She   
mentions that her neighbors   
assisted her back into the house   
and called for an ambulance. She   
confirms no loss of consciousness   
during the incident. Additionally,   
she mentions taking meclizine the   
night before and wonders if it   
might have played a role in her   
fall. She denies experiencing any   
other falls of similar nature but   
acknowledges persistent dizziness   
over the past four years.   
Importantly, she denies any history   
of hypoglycemia and states she has   
not recently taken glimepiride, and   
only takes it when her BG is >150.  
  
History per family/caregiver:   
(obtained in addition due to   
patient s fall)  
The patient's youngest daughter,   
Jerry Shafer, who is also the POA,   
corroborats her mother's story and   
does not express overt concern   
regarding her mother having memory   
loss or an inability to perform her   
ADL's. However, she does express   
concerns about her mother's   
depressive symptoms since her   
father's passing (the patient's   
late ) four years ago. She   
has previously discussed this   
concern with her mother, who is   
hesitant to consider any   
pharmacological interventions.  
  
What matters most to the patient:  
Per Ms. Aaron, what matters most to   
her is her continued independence.   
She desires to continue living on   
her own and wishes she could drive   
but is unable to because of her   
dizziness/vertigo.  
  
Prior to Admission Meds  
Prior to Admission Medications  
Prescriptions Last Dose Informant   
Patient Reported? Taking?  
b complex 0.4 mg tablet 2024   
Self Yes No  
Sig: Take 1 tablet by mouth once   
daily. Super  
blood sugar diagnostic (Accu-Chek   
Melanie Plus test strp) strip Unknown   
Self Yes No  
Sig: In vitro; Use 1 strip twice a   
day  
brimonidine-timoloL (Combigan)   
0.2-0.5 % ophthalmic solution   
2024 Self Yes No  
Sig: Administer 1 drop into both   
eyes 2 times a day.  
diclofenac sodium (Voltaren) 1 %   
gel gel 2024 Self No No  
Sig: Apply 1 Application topically   
2 times a day. Apply to knee  
ezetimibe (Zetia) 10 mg tablet   
2024 Self No No  
Sig: Take 1 tablet (10 mg) by mouth   
once daily.  
famotidine (Pepcid) 20 mg tablet   
Past Month Self Yes No  
Sig: Take 1 tablet (20 mg) by mouth   
once daily as needed for heartburn   
or indigestion.  
glimepiride (AmaryL) 1 mg tablet at   
patient only takes this medication   
when blood sugar is over 150 Self   
No No  
Sig: Take 1 tablet (1 mg) by mouth   
once daily in the morning. Take   
before meals.  
lifitegrast (XIIDRA OPHT) 2024   
Self Yes No  
Sig: Administer 1 drop into both   
eyes in the morning and at bedtime.   
5%  
liraglutide (Victoza 2-Jose Juan) 0.6   
mg/0.1 mL (18 mg/3 mL) injection   
2024 Self No No  
Sig: Inject 0.3 mL (1.8 mg) under   
the skin once daily.  
meclizine (Antivert) 25 mg tablet   
2024 Self Yes No  
Si.5 tablets (12.5 mg). Take by   
mouth once daily for 14 days.  
multivitamin tablet 2024 Self   
Yes No  
Sig: Take 1 tablet by mouth once   
daily.  
naproxen sodium 220 mg capsule   
2024 Self Yes No  
Sig: Take 1 tablet by mouth every 6   
hours if needed (pain).  
pen needle, diabetic (BD Ultra-Fine   
Mini Pen Needle) 31 gauge x 3/16    
needle Unknown Self No No  
Si each once daily. 31G x 5mm;   
Inject 1 each as directed daily  
spironolactone (Aldactone) 50 mg   
tablet Past Month at patient has   
surplus of this medication -   
patient takes this medication prn   
Self Yes No  
Sig: Take 1 tablet (50 mg) by mouth   
once daily.  
triamcinolone (Kenalog) 0.1 % cream   
2024 Self No No  
Sig: Apply topically 2 times a day.   
Apply to affected area 1-2 times   
daily as needed. Avoid face and   
groin.  
  
Facility-Administered Medications:   
None  
  
  
Current Meds in Hospital  
Current Facility-Administered   
Medications  
Medication Dose Route Frequency   
Provider Last Rate Last Admin  
acetaminophen (Tylenol) tablet 975   
mg 975 mg oral q8h Fabrizio Richmond PA-C  
dextrose 10 % in water (D10W)   
infusion 0.3 g/kg/hr intravenous   
Once PRN Stephany Drummond PA-C  
dextrose 50 % injection 25 g 25 g   
intravenous q15 min PRN Stephany Drumomnd PA-C  
enoxaparin (Lovenox) syringe 30 mg   
30 mg subcutaneous q12h Fabrizio Richmond PA-C 30 mg at 24   
0752  
glucagon (Glucagen) injection 1 mg   
1 mg intramuscular q15 min PRN   
Stephany Drummond PA-C  
insulin lispro (HumaLOG) injection   
0-5 Units 0-5 Units subcutaneous   
TID with meals Stephany Drummond PA-C  
iohexol (OMNIPaque) 350 mg   
iodine/mL solution 100 mL 100 mL   
intravenous Once in imaging Jerod Alonzo MD MPH  
polyethylene glycol (Glycolax,   
Miralax) packet 17 g 17 g oral   
Daily Fabrizio Richmond PA-C  
sennosides-docusate sodium   
(Brenda-Colace) 8.6-50 mg per tablet   
2 tablet 2 tablet oral BID Fabrizio Richmond PA-C  
  
Current Outpatient Medications  
Medication Sig Dispense Refill  
b complex 0.4 mg tablet Take 1   
tablet by mouth once daily. Super  
blood sugar diagnostic (Accu-Chek   
Melanie Plus test strp) strip In   
vitro; Use 1 strip twice a day  
brimonidine-timoloL (Combigan)   
0.2-0.5 % ophthalmic solution   
Administer 1 drop into both eyes 2   
times a day.  
diclofenac sodium (Voltaren) 1 %   
gel gel Apply 1 Application   
topically 2 times a day. Apply to   
knee 100 g 1  
ezetimibe (Zetia) 10 mg tablet Take   
1 tablet (10 mg) by mouth once   
daily. 90 tablet 1  
famotidine (Pepcid) 20 mg tablet   
Take 1 tablet (20 mg) by mouth once   
daily as needed for heartburn or   
indigestion.  
glimepiride (AmaryL) 1 mg tablet   
Take 1 tablet (1 mg) by mouth once   
daily in the morning. Take before   
meals. 90 tablet 3  
lifitegrast (XIIDRA OPHT)   
Administer 1 drop into both eyes in   
the morning and at bedtime. 5%  
liraglutide (Victoza 2-Jose Juan) 0.6   
mg/0.1 mL (18 mg/3 mL) injection   
Inject 0.3 mL (1.8 mg) under the   
skin once daily. 3 mL 5  
meclizine (Antivert) 25 mg tablet   
0.5 tablets (12.5 mg). Take by   
mouth once daily for 14 days.  
multivitamin tablet Take 1 tablet   
by mouth once daily.  
naproxen sodium 220 mg capsule Take   
1 tablet by mouth every 6 hours if   
needed (pain).  
pen needle, diabetic (BD Ultra-Fine   
Mini Pen Needle) 31 gauge x 3/16    
needle 1 each once daily. 31G x   
5mm; Inject 1 each as directed   
daily 100 each 1  
spironolactone (Aldactone) 50 mg   
tablet Take 1 tablet (50 mg) by   
mouth once daily.  
triamcinolone (Kenalog) 0.1 % cream   
Apply topically 2 times a day.   
Apply to affected area 1-2 times   
daily as needed. Avoid face and   
groin. 30 g 0  
  
  
The patient's outpatient electronic   
medical record (EMR) is reviewed:  
-Appears to have been receiving   
majority of care at [facility]  
-Has -associated PCP in [place]   
(Dr. Tiffanie Callahan), last saw in   
2024  
-Recently referred to community   
neurologist in [facility] (Dr. Aditi Henderson) for ongoing vertigo  
-Per PCP notes, concern for   
possible orthostatic hypotension,   
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   
BPPV. Neurologist documents that   
patient had in-clinic Tricia-Hallpike   
maneuver and Epley maneuver in   
past, which she seems to have   
benefited from. Prescribed   
meclizine at this visit (24)  
  
Past Medical History  
Past Medical History:  
Diagnosis Date  
Diabetes mellitus (CMS/[facility])  
Encounter for full-term   
uncomplicated delivery  
Normal vaginal delivery  
Other conditions influencing health   
status  
Menstruation  
  
  
Surgical History  
Past Surgical History:  
Procedure Laterality Date  
OTHER SURGICAL HISTORY 06/15/2021  
Ankle surgery  
OTHER SURGICAL HISTORY 06/15/2021  
Left mastectomy  
OTHER SURGICAL HISTORY 2021  
Hysteroscopy  
  
  
Family History  
Her family history includes Heart   
disease in her mother; Hypertension   
in her father and mother. A fall   
with a hip fracture in her brother   
who may have also been diagnosed   
with dementia.  
  
Social History  
She reports that she has never   
smoked. She has never used   
smokeless tobacco. No history on   
file for alcohol use and drug use.  
-Alcohol use: No  
-Tobacco use: No  
-Illicit drug use: No  
-Exercise: Walks outside and   
gardens when the weather permits.  
-Spiritual needs: Goes to [employer]   
but not recently, as she has been   
avoiding driving due to her   
dizziness  
-Marital Status:  passed   
away 4 years ago  
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   
ago.  
Highest Level of Education: College   
Graduate  
Community Resources: none  
: No  
Current living environment: One   
story house with a basement, two   
stairs up to front door with   
railing (reports that she never   
goes into basement, she has all her   
necessary amenities on the 1st   
floor)  
  
Activities of Daily Living:  
Basic ADLs: (I= independent, A=   
assistance, D= dependent)  
Bathing: I, Dressing: I, Toileting:   
I, Transferring: I, Continence:   
Urge incontinence , Feeding: I  
Tomas Index: 5  
  
Instrumental ADLs: (I= independent,   
A= assistance, D= dependent)  
Ability to use phone: I, Shopping:   
D (daughter purchases groceries for   
patient as she has not been   
driving), Cooking: I (patient does   
not frequently cook large meals but   
able to prep things for herself),   
Housekeeping: I, Laundry: I,   
Transportation: D (previously able   
to drive, denies any prior   
accidents; currently hasn't been   
driving due to dizziness),   
Medications: I (though, of note,   
last PCP note states that patient   
should stop taking her   
sulfonylurea-- however, she appears   
to still be doing so), Handle   
Finances: I- daughter manages   
savings from 's life   
insurance, but patient independent   
in writing checks and paying bills   
on time  
  
[place] Scale: 6  
  
Allergies  
Adhesive tape-silicones, Fosamax   
[alendronate], and Statins-hmg-coa   
reductase inhibitors  
  
Review of Systems  
Review of System:  
Constitutional:  
-Night sweats/fever: No  
-Weight change: Lost 20 lbs since   
her  passed away 4 years   
ago, attributes to decrease in   
appetite  
  
Integumentary: recent BCC removal   
on R. lower cheek  
  
Ears, Nose, Throat:  
-Hearing loss: Bilateral hearing   
aids, reports declining function   
greater in R. ear  
  
Eyes:  
-Vision loss: Endorses hx of   
glaucoma but no recent change in   
vision  
  
Cardiovascular:  
-Chest Pain: denies  
-Orthopnea: denies  
-Paroxysmal nocturnal dyspnea:   
denies  
-Edema: Occasional, uses   
spironolactone  
  
Respiratory:  
-Dyspnea: no  
-Wheezing: no  
  
Gastrointestinal:  
-Appetite: Has an established daily   
eating routine/diet  
-Difficulty chewing/ swallowing: no  
-Heartburn: no  
-Bowels: occasional diarrhea (last   
2 days ago), otherwise regular   
every couple of days; denies   
hematochezia or melena  
  
Genitourinary:  
-Incontinence: reports occasional   
urge incontinence  
-Nocturia: 2x per night  
  
Musculoskeletal:  
-Mobility: Endorses use of cane and   
walker when having dizzy spells  
-Pain: Chronic lower back/lumbar   
pain  
  
Neurological:  
-Confusion: No  
-Falls: None aside from current   
presentation  
-Gait: Intact, cane/walker use with   
dizziness  
-Memory: Occasionally forgets   
things, no short term memory loss  
-Tremor: No  
  
Psychiatric:  
-Mood: Sad at times when reminded   
of , wishes she cuold go to   
her timeshare in [place] but   
has no one to go with her,   
otherwise reports good mood  
-Sleep: Good sleep 1-2am - 11am  
  
Endocrine: Denies heat or cold   
intolerance  
  
Hematologic/ Lymphatic: none  
  
Allergic/ Immunologic: none  
  
Documents on file and valid:  
Advance Directive/Living Will:   
Daughter reports she does  
Health Care Power of : Yes,   
youngest daughter, Mariah Shafer  
Other: n/a  
Code Status: DNR/DNI  
  
Confusion Assessment Method (CAM)  
Not on file.  
  
[place] Cognitive Assessment   
(MoCA)  
Not on file.  
  
Geriatric Depression Scale (GDS)  
Not on file.  
  
Mini-Cog (Early Dementia Detection)  
Not on file.  
  
[place] Mini-Mental State Exam   
(MMSE)  
Not on file.  
  
Patient Health Questionnaire   
(PHQ-9)  
Not on file.  
  
Physical Exam  
Constitutional:  
General: She is awake.  
Appearance: Normal appearance. She   
is obese.  
Eyes:  
Extraocular Movements: Extraocular   
movements intact.  
Conjunctiva/sclera: Conjunctivae   
normal.  
Pupils: Pupils are equal, round,   
and reactive to light.  
Comments: No saccades appreciated   
with EOMI  
Cardiovascular:  
Rate and Rhythm: Normal rate and   
regular rhythm.  
Pulses: Normal pulses.  
Heart sounds: Normal heart sounds.  
Pulmonary:  
Effort: Pulmonary effort is normal.  
Breath sounds: Normal breath   
sounds.  
Abdominal:  
General: Bowel sounds are normal.  
Palpations: Abdomen is soft.  
Musculoskeletal:  
General: Normal range of motion.  
Right lower leg: No edema.  
Left lower leg: No edema.  
Skin:  
General: Skin is warm and dry.  
Findings: No rash.  
Comments: Bandage in place over   
right cheek where patient   
reportedly had BCC removed ~1 week   
ago  
Neurological:  
General: No focal deficit present.  
Mental Status: She is alert and   
oriented to person, place, and   
time. Mental status is at baseline.  
Cranial Nerves: Cranial nerves 2-12   
are intact.  
Sensory: Sensation is intact.  
Comments: 5/5 bilateral upper   
extremity flexion and extension,   
4-5/5 bilateral hip flexion, 5/5   
bilateral knee flexion/extension,   
5/5 bilateral plantar/dorsiflexion.   
5/ 5  strength  
Psychiatric:  
Attention and Perception: Attention   
normal.  
Mood and Affect: Mood normal.  
Speech: Speech normal.  
Behavior: Behavior normal. Behavior   
is cooperative.  
Thought Content: Thought content   
normal.  
  
Last Recorded Vitals  
  
2024  
2:14 AM 2024  
2:20 AM 2024  
2:22 AM 2024  
2:27 AM 2024  
5:00 AM 2024  
7:15 AM 2024  
7:30 AM  
Vitals  
Systolic 168 179 135 153 131 133  
Diastolic 99 106 75 86 73 76  
Heart Rate 100 107 96 96 98 100  
Resp 18 18 18 9 14  
Height (in) 1.6 m (5' 3 )  
Weight (lb) 179.01  
BMI 31.71 kg/m2  
BSA (m2) 1.9 m2  
  
Vitals:  
24 0220  
Weight: 81.2 kg (179 lb 0.2 oz)  
  
  
Confusion Assessment Method(CAM)   
for diagnosis of delirium:  
  
1. Acute onset or fluctuating   
course: absent/present: Absent  
2. Inattention: absent/present:   
Absent  
3. Disorganized thinking:   
absent/present: Absent  
4. Altered level of consciousness:   
absent/present: Absent  
CAM: negative  
  
AT Score For Assessment of Delirium   
and Cognitive Impairment:  
  
Alertness: 4  
Normal(fully alert,but not   
agitated, throughout assessment)=0  
Mild sleepiness for <10 seconds   
after walking, then normal=0  
Clearly abnormal=4  
2. AMT4: 0  
No mistakes=0  
One mistake=1  
Two or more mistakes/untestable=2  
3. Attention: 0  
Achieves seven months or more   
correctly=0  
Starts but scores <7 months/   
refuses to start=1  
Untestable(cannot start because   
unwell, drowsy, inattentive)=2  
4. Acute: 0  
No=0  
Yes=4  
  
Total Score: 0  
4 or above: Possible delirium +/-   
cognitive impairment  
1-3: Possible cognitive impairment  
0: Delirium or severe cognitive   
impairment unlikely(but delirium   
still possible if (4) information   
incomplete)  
  
Relevant Results  
Lab Results  
Component Value Date  
TSH 1.14 2023  
HGBA1C 7.3 (A) 2023  
  
Results from last 7 days  
Lab Units 24  
0223  
WBC AUTO x10*3/uL 9.7  
HEMOGLOBIN g/dL 14.6  
HEMATOCRIT % 42.2  
ALT U/L 14  
AST U/L 13  
SODIUM mmol/L 132*  
POTASSIUM mmol/L 4.1  
CHLORIDE mmol/L 95*  
CREATININE mg/dL 0.42*  
BUN mg/dL 10  
CO2 mmol/L 26  
INR 1.1  
  
Recent Imaging Results  
  
CT chest abdomen pelvis w IV   
contrast, CT thoracic spine wo IV   
contrast, CT lumbar spine wo IV   
contrast  
Narrative: Interpreted By: James Bird and Liller Gregory  
STUDY:  
CT CHEST ABDOMEN PELVIS W IV   
CONTRAST; CT LUMBAR SPINE WO IV  
CONTRAST; CT THORACIC SPINE WO IV   
CONTRAST; 2024 3:01 am  
  
INDICATION:  
Signs/Symptoms:Trauma;   
Signs/Symptoms:Fall  
  
COMPARISON:  
None.  
  
ACCESSION NUMBER(S):  
[id number]; [id number];   
[id number]  
  
ORDERING CLINICIAN:  
MORENITA RICKS  
  
TECHNIQUE:  
CT of the chest, abdomen, and   
pelvis was performed.  
Contiguous axial images were   
obtained at 5 mm slice thickness  
through the chest, and at 3 mm   
through the abdomen and pelvis.  
Coronal and sagittal   
reconstructions at 3 mm slice   
thickness were  
performed.  
100 ml of contrast material   
Omnipaque 350 were administered  
intravenously without immediate   
complication.  
  
FINDINGS:  
CHEST:  
  
LUNG/PLEURA/LARGE AIRWAYS:  
No air space opacity, focal   
consolidation, pleural  
effusion/hemothorax, or   
pneumothorax are appreciated.   
Biapical  
scarring is noted. Bibasilar linear   
atelectasis/scarring. There are  
no discrete pulmonary nodules.  
  
VESSELS:  
No traumatic aortic injury is   
appreciated within the limitations   
of  
this non-EKG gated study. The   
thoracic aorta is of normal course   
and  
caliber. Main pulmonary artery and   
its branches are normal in  
caliber. Severe coronary artery   
calcifications are seen. The study  
is not optimized for evaluation of   
coronary arteries.  
  
HEART:  
The heart is diffusely enlarged.   
There is no pericardial effusion.  
Aortic valve calcifications are   
noted correlate with concern for  
aortic valve stenosis. Mitral   
annulus calcifications noted.  
  
MEDIASTINUM AND GINNY:  
No pneumomediastinum, abnormal   
mediastinal fluid collection or  
mediastinal hematoma are   
appreciated. No mediastinal, hilar   
or  
biaxillary adenopathy is present.   
Debris is noted within the  
esophagus.  
  
CHEST WALL AND LOWER NECK:  
No acute fracture or dislocation of   
the included osseous structures  
are appreciated. No suspicious   
osseous lesions are identified. The  
thoracic wall soft tissues are   
within normal limits. Patient is  
status post left hemithyroidectomy.  
  
ABDOMEN:  
  
LIVER:  
No focal perfusion abnormality of   
the liver is appreciated to suggest  
contusion or laceration. There is   
no subcapsular hematoma, no  
perihepatic fluid collection.  
  
GALLBLADDER:  
The gallbladder is nondistended   
without evidence of radiopaque   
stone.  
  
BILE DUCTS:  
The intahepatic and extrahepatic   
bile ducts are not dilated.  
  
PANCREAS:  
There is mild fatty infiltration   
and atrophy of the pancreas. No  
focal lesion or ductal dilation. No   
traumatic injury.  
  
SPLEEN:  
No parenchymal perfusion deficit of   
the spleen is appreciated to  
suggest contusion or laceration.   
There is no subcapsular hematoma,   
no  
perisplenic fluid collection.  
  
ADRENAL GLANDS:  
The bilateral adrenal glands are   
unremarkable in appearance.  
  
KIDNEYS AND URETERS:  
No parenchymal perfusion deficit is   
appreciated in bilateral kidneys  
to suggest contusion or laceration.   
There is no subcapsular hematoma,  
no perinephric fluid collection. No   
hydroureteronephrosis or  
nephroureterolithiasis is present.   
There is mild nonspecific  
bilateral perinephric fat   
stranding.  
  
PELVIS:  
  
BLADDER:  
The urinary bladder appears within   
normal limits.  
  
REPRODUCTIVE ORGANS:  
Uterus is present. There is a small   
amount of fluid within the  
endometrial canal..  
  
BOWEL:  
The stomach is unremarkable. The   
small bowel is normal in caliber  
without evidence of focal wall   
thickening or obstruction. There is  
no evidence of focal wall   
thickening or dilatation of the   
large  
bowel. The appendix is not   
definitely visualized, although   
there are  
no pericecal inflammatory changes   
to suggest acute appendicitis.  
  
VESSELS:  
The aorta and IVC are within normal   
limits. The principal  
vasculature of the abdomen and   
pelvis is patent. There is moderate  
to severe atherosclerotic   
calcification of the abdominal   
aorta and  
its branches.  
  
PERITONEUM/RETROPERITONEUM/LYMPH   
NODES:  
There is no evidence of intra- or   
retroperitoneal hematoma. There is  
no free or loculated fluid   
collection, no free intraperitoneal   
air.  
No abdominopelvic lymphadenopathy   
is present.  
  
BONES AND ABDOMINAL WALL:  
No evidence of acute fracture or   
dislocation of the included osseous  
structures. No suspicious osseous   
lesions are identified. There is  
diastasis of the rectus abdominus   
musculature measuring up to 6.3 cm  
with mesenteric fat containing   
ventral wall hernia.  
  
Thoracic spine:  
Prevertebral soft tissues are   
unremarkable.  
Alignment is maintained.  
There is mild wedging and   
irregularity of the anterior   
inferior  
endplate of a T12. No surrounding   
soft tissue edema. There is  
anteriorly flowing osteophytosis   
spanning more than 4 vertebral  
segments likely indicating   
component of diffuse idiopathic   
skeletal  
hyperostosis. Intervertebral disc   
heights are diffusely narrowed.  
No high-grade spinal canal or   
neural foraminal stenosis.  
  
Lumbar spine:  
Moderate dextroscoliotic curve of   
the lumbar spine. There is grade 1  
anterolisthesis of L4 on L5.   
Prevertebral soft tissues are  
unremarkable. No acute fracture.  
Diffuse loss of intervertebral disc   
height most significant in the  
superior lumbar spine where there   
is severe endplate sclerosis and  
osteophytosis. Bulky anteriorly   
projecting osteophytosis is noted.  
Posterior projecting disc   
osteophyte complexes at L1-L2,   
L2-L3, and  
L3-L4 of abuts the ventral thecal   
sac resulting in at least mild  
spinal canal stenosis. At least   
moderate spinal canal stenosis at  
L3-L4. Moderate neural foraminal   
stenosis at L2-L3 and L3-L4 on the  
left. Mild neural foraminal   
stenosis on the right at L1-L2.  
  
Impression: 1. No acute traumatic   
pathology noted within the chest,   
abdomen, or  
pelvis.  
2. Anteroinferior endplate   
irregularity at T12 which is age  
indeterminate. This may represent   
sequela of degenerative change,  
fracture, or possibly chronic disc   
osteomyelitis. Correlate for point  
tenderness on exam. MRI may be   
performed for further assessment as  
clinically indicated.  
3. Multilevel spondylitic changes   
as described above with at least  
moderate spinal canal stenosis at   
L3-L4.  
4. There is a small amount of fluid   
within the endometrial canal.  
Pelvic ultrasound may be performed   
for further assessment as  
indicated.  
5. Prominent aortic valve   
calcifications noted. Correlate   
with  
concern for aortic valve stenosis.  
6. Additional findings as above.  
  
I personally reviewed the   
images/study and I agree with the   
findings  
as stated above by resident   
physician, Dr. Logan Holder.  
  
MACRO:  
None  
  
Signed by: James Bird 2024   
3:58 AM  
Dictation workstation: [id number]  
CT head W O contrast trauma   
protocol, CT cervical spine wo IV   
contrast  
Narrative: Interpreted By: James Bird and Liller Gregory  
STUDY:  
CT HEAD W/O CONTRAST TRAUMA   
PROTOCOL; CT CERVICAL SPINE WO IV  
CONTRAST; 2024 2:59 am  
  
INDICATION:  
Signs/Symptoms:occipital fx;   
Signs/Symptoms:trauma, fall  
  
COMPARISON:  
Same-day CT head and cervical spine   
6:38 p.m.  
  
ACCESSION NUMBER(S):  
[id number]; [id number]  
  
ORDERING CLINICIAN:  
SOWMYA GARZA  
  
TECHNIQUE:  
Axial noncontrast CT images of head   
with coronal and sagittal  
reconstructed images. Axial   
noncontrast CT images of the   
cervical  
spine with coronal and sagittal   
reconstructed images.  
  
FINDINGS:  
CT HEAD:  
  
BRAIN PARENCHYMA: Assessment of the   
posterior fossa and occipital  
lobes limited by streak artifact   
from dental amalgam. No evidence of  
acute intraparenchymal hemorrhage   
or parenchymal evidence of acute  
large territory ischemic infarct.   
No mass-effect, midline shift or  
effacement of cerebral sulci.   
Gray-white matter distinction is  
preserved. Periventricular and   
subcortical white matter   
hypodensities  
are seen and favored to represent   
sequela of chronic microvascular  
ischemic change.  
  
VENTRICLES and EXTRA-AXIAL SPACES:   
No acute extra-axial or  
intraventricular hemorrhage within   
the above limitations. Ventricles  
and sulci are age-concordant.  
  
PARANASAL SINUSES/MASTOIDS: No   
hemorrhage or air-fluid levels   
within  
the visualized paranasal sinuses.   
The mastoid air cells are  
well-aerated.  
  
CALVARIUM/ORBITS: Redemonstration   
of nondisplaced occipital bone  
fracture extending inferiorly to   
the level of the foramen magnum.  
The orbits and globes are intact to   
the extent visualized.  
  
EXTRACRANIAL SOFT TISSUES: Small   
soft tissue swelling/scalp hematoma  
overlying the fracture site.  
  
  
CT CERVICAL SPINE:  
  
PREVERTEBRAL SOFT TISSUES: Within   
normal limits.  
  
CRANIOCERVICAL JUNCTION: Intact.  
  
ALIGNMENT: No traumatic   
malalignment or traumatic facet   
widening.  
  
VERTEBRAE: No acute fracture.   
Vertebral body heights are   
maintained.  
  
SPINAL CANAL/INTERVERTEBRAL DISCS:   
No high-grade spinal canal  
stenosis. No significant disc   
height loss. Varying degrees of  
degenerative disc disease, notable   
for anterior projecting  
osteophytes at C2-C3 and C3-C4.  
  
NEURAL FORAMINA: No high-grade   
neural foraminal stenosis.  
  
OTHER: Mild interlobular septal and   
apical scarring are noted.  
Otherwise, the lungs are clear.   
Bulky atherosclerotic calcification  
of the carotid arteries. Nodular   
appearance of the right lobe of the  
thyroid gland.  
  
Impression: CT HEAD:  
1. Unchanged nondisplaced occipital   
bone fracture extending to the  
foramen magnum without associated   
intracranial pathology. Please note  
assessment of the posterior fossa   
and occipital lobes is somewhat  
limited by streak artifact from   
dental amalgam.  
2. Similar small overlying scalp   
hematoma/swelling.  
  
CT CERVICAL SPINE:  
1. No acute fracture or traumatic   
malalignment of the cervical spine.  
2. Multilevel spondylotic changes   
of the cervical spine as detailed  
above.  
  
I personally reviewed the   
images/study and I agree with the   
findings  
as stated above by resident   
physician, Dr. Logan Holder.  
  
MACRO:  
none  
  
Signed by: James Bird 2024   
3:41 AM  
Dictation workstation: [id number]  
XR chest 1 view, XR pelvis 1-2   
views  
Narrative: Interpreted By: James Bird and Liller Gregory  
STUDY:  
Chest, single portable AP view.  
Pelvis, single portable view.  
  
INDICATION:  
Signs/Symptoms:Trauma.  
  
COMPARISON:  
None.  
  
ACCESSION NUMBER(S):  
[id number]; [id number]  
  
ORDERING CLINICIAN:  
MORENITA RICKS  
  
FINDINGS:  
Chest:  
Heart is normal in size.  
Calcifications of the aortic arch   
and tortuosity of the descending  
thoracic aorta likely accentuated   
by patient rotation. Interstitial  
prominence. No focal consolidation,   
large pleural fluid, or  
discernible pneumothorax. Elevation   
of the right hemidiaphragm.  
No displaced fracture identified.  
  
Pelvis:  
Of note the examination is limited   
due to only partial exclusion of  
the right hip from the field of   
view. No acute fracture or  
malalignment. Enthesopathy of the   
left iliac crest.  
No radiopaque foreign body or soft   
tissue gas  
  
Impression: 1. Mild interstitial   
prominence which may reflect   
chronic parenchymal  
changes or mild edema.  
2. No acute traumatic osseous   
abnormality identified in the chest   
or  
pelvis within the above   
limitations.  
  
I personally reviewed the   
images/study and I agree with the   
findings  
as stated above by resident   
physician, Dr. Logan Holder.  
  
MACRO:  
none  
  
Signed by: James Bird 2024   
2:42 AM  
Dictation workstation: [id number]  
  
Head/Brain Imaging  
=== Results for orders placed   
during the hospital encounter of   
24 ===  
  
CT head wo IV contrast [[id number]]   
2024 Status: Normal  
Findings compatible with a   
nondisplaced calvarial fracture   
seen in  
the occipital bone. No depression.   
Volume loss seen in the brain. No  
definite intracranial hemorrhage.   
Given skull fracture consider  
short-term CT follow-up  
  
No evidence of acute cervical spine   
fracture. Robust vascular  
calcification. No acute facial bone   
fracture detected.  
  
Signed by: Nazario Enrique 2024   
7:44 PM  
Dictation workstation:   
[id number]  
No results found for this or any   
previous visit.  
  
  
DATA:  
EKG: QTC  
No results found for this or any   
previous visit (from the past 4464   
hour(s)).  
Anti-psychotics in 48 hours:  
Opioids/Benzodiazepines in 48   
hours:  
Anticholinergics on board:No  
Restraints:No  
Indwelling catheters:No (external   
catheter)  
Last BM: 2 days ago  
UO in 24 hours: not recorded  
Activity in the past 24 hours: in   
chair  
Need for ambulatory devices: yes  
  
Assessment/Plan  
This is a/an 83 y.o. year old   
female, with past medical history   
relevant for ongoing   
dizziness/vertigo for the past four   
years, T2DM, bilateral hearing   
loss, and HLD, presenting after a   
fall from standing w/o LOC, in   
which she sustained a closed   
occipital skull fracture. She is   
being seen in geriatric   
consultation for evaluation after   
her fall.  
  
Problem list:  
Vertigo, suspected BPPV  
Concern for orthostatic hypotension  
HLD  
T2DM  
Bilateral hearing loss  
Acute fall c/b occipital fracture  
  
1. Acute fall  
-Unclear etiology, may be related   
to underlying vertigo/orthostatic   
hypotension  
-Recommend obtaining orthostatic   
vitals and add-on vitamin D level  
-Recommend vestibular therapy with   
PT/OT (both inpatient and on   
discharge)  
-If no evidence of cervical   
fracture and otherwise safe to do   
so, would remove cervical collar   
for patient comfort  
  
2. Occipital fx, lumbar pain  
-Pain management: agree with   
scheduled Tylenol, recommend   
lidocaine patch to lower back  
  
3. Polypharmacy  
-Please continue to hold, and do   
NOT resume on discharge, home   
meclizine and glimepiride. Patient   
at high risk for both hypoglycemia   
and anti-cholinergic side-effects   
and hasn't noticed in improvement   
in her dizziness with the   
meclizine.  
  
4. Diabetes  
-Last A1C 7.3 (2023)  
-Recommend add-on HbA1C  
  
5. Concern for possible mood   
disorder  
-Per daughter, patient's mood and   
level of engagement in her hobbies   
has been low since the patient's   
 passed four years ago.   
States that the patient has been   
evaluated for depression/anxiety   
before but declined any   
pharmacotherapy  
-Will plan for bedside geriatric   
depression tomorrow  
  
Medication Evaluation:  
High risk medications: Meclizine   
(anti-cholinergic, pt report no   
benefit and hx of   
dizziness/vertigo), Glimepiride   
(concern for hypoglycemia)  
Other concerning issues regarding   
medications: PCP recommended   
stopping glimeperide during their   
last visit, though appears patient   
has still been taking it  
  
Care Transitions:  
-Recommended level for discharge:   
PT/OT following  
-Home going considerations: pending   
PT/OT  
-Primary care physician: Tiffanie Callahan DO  
  
Goals of Care:  
-Primary goals: continued   
independent living  
-Health care power of :   
daughter Mariah (paperwork not on   
file, daughter to bring a copy   
later this afternoon)  
-Living will: daughter to bring   
copy later this afternoon  
Code status: DNR/DNI  
  
4M AGE-FRIENDLY INITIATIVE:  
What matters most to patient:   
Continued independent living  
Medications: meclizine and   
glimepiride high-risk, holding;   
recommend discontinuation on   
discharge  
Mentation: A&O x5  
Mobility: Intact w/ assistive   
device  
  
Geriatric medicine will continue to   
follow the patient. Thank you for   
allowing geriatric medicine to be   
involved in the care of your   
patient. Geriatric medicine   
consultation team is available   
during work hours Monday through   
Friday. For any emergency issues   
requiring immediate assistance over   
the weekend, please page Geriatrics   
pager [phone]  
  
Consult Billing Time  
Prep time on date of patient   
encounter(minutes): 30  
Time directly with   
patient/family/caregiver(minutes):   
45  
Documentation time(minutes): 45  
TOTAL TIME(minutes): 120  
  
Benji Cordero MS3, [facility]  
  
I saw and evaluated the patient. I   
personally obtained the key and   
critical portions of the history   
and physical exam or was physically   
present for key and critical   
portions performed by the resident.   
I reviewed the resident s   
documentation and discussed the   
patient with the resident. I agree   
with the resident s medical   
decision making as documented in   
their note  
  
Charla Sánchez MD  
  
Electronically signed by Charla Sánchez MD at 2024 3:43 PM   
EST  
Formatting of this note is   
different from the original.  
Consults  
  
Reason For Consult  
RO fracture  
  
History Of Present Illness  
Lise Aaron is a 83 y.o. female   
with h/o breast ca, HTN, HLD, T2DM,   
glaucoma, hearing loss, s/p GLF,   
CTH non displaced RO fracture, CT C   
spine neg  
  
Denies headache, change in vision,   
weakness, change in sensation, or   
any other focal neurologic   
deficits.  
  
Patient is not on any AC/AP.  
  
Imaging is personally reviewed and   
the is notable for CT head with   
nondisplaced right occipital   
fracture, negative for acute   
intracranial pathology, including   
bleeding. CT C-spine is negative   
for acute fracture.  
  
Past Medical History  
She has a past medical history of   
Diabetes mellitus (CMS/[facility]),   
Encounter for full-term   
uncomplicated delivery, and Other   
conditions influencing health   
status.  
  
Surgical History  
She has a past surgical history   
that includes Other surgical   
history (06/15/2021); Other   
surgical history (06/15/2021); and   
Other surgical history   
(2021).  
  
Social History  
She reports that she has never   
smoked. She has never used   
smokeless tobacco. No history on   
file for alcohol use and drug use.  
  
Family History  
Family History  
Problem Relation Name Age of Onset  
Heart disease Mother  
Hypertension Mother  
Hypertension Father  
  
  
Allergies  
Adhesive tape-silicones, Fosamax   
[alendronate], and Statins-hmg-coa   
reductase inhibitors  
  
Review of Systems  
Review of systems was reviewed and   
otherwise negative other than what   
was listed in the HPI.  
  
Physical Exam  
NAD, A&Ox3  
PERRL, EOMI, Face symmetric, Facial   
SILT, Palate/Tongue midline and   
symmetric, shoulder shrugs   
symmetric, hearing intact to finger   
rubs bilaterally  
Motor: 5/5, no pronator drift  
Sensation: SILT throughout all   
extremities  
DTRS: 2+ Throughout, No Hoffmans or   
Clonus  
  
Last Recorded Vitals  
Blood pressure 135/75, pulse 96,   
temperature 36.5 C (97.7 F),   
temperature source Temporal, resp.   
rate 18, height 1.6 m (5' 3 ),   
weight 81.2 kg (179 lb 0.2 oz),   
SpO2 96 %.  
  
Relevant Results  
Results for orders placed or   
performed during the hospital   
encounter of 24 (from the   
past 24 hour(s))  
CBC and Auto Differential  
Result Value Ref Range  
WBC 9.7 4.4 - 11.3 x10*3/uL  
nRBC 0.0 0.0 - 0.0 /100 WBCs  
RBC 4.99 4.00 - 5.20 x10*6/uL  
Hemoglobin 14.6 12.0 - 16.0 g/dL  
Hematocrit 42.2 36.0 - 46.0 %  
MCV 85 80 - 100 fL  
MCH 29.3 26.0 - 34.0 pg  
MCHC 34.6 32.0 - 36.0 g/dL  
RDW 12.2 11.5 - 14.5 %  
Platelets 179 150 - 450 x10*3/uL  
Neutrophils % 70.7 40.0 - 80.0 %  
Immature Granulocytes %, Automated   
0.3 0.0 - 0.9 %  
Lymphocytes % 16.5 13.0 - 44.0 %  
Monocytes % 11.0 2.0 - 10.0 %  
Eosinophils % 1.1 0.0 - 6.0 %  
Basophils % 0.4 0.0 - 2.0 %  
Neutrophils Absolute 6.83 (H) 1.60   
- 5.50 x10*3/uL  
Immature Granulocytes Absolute,   
Automated 0.03 0.00 - 0.50 x10*3/uL  
Lymphocytes Absolute 1.59 0.80 -   
3.00 x10*3/uL  
Monocytes Absolute 1.06 (H) 0.05 -   
0.80 x10*3/uL  
Eosinophils Absolute 0.11 0.00 -   
0.40 x10*3/uL  
Basophils Absolute 0.04 0.00 - 0.10   
x10*3/uL  
Comprehensive Metabolic Panel  
Result Value Ref Range  
Glucose 167 (H) 74 - 99 mg/dL  
Sodium 132 (L) 136 - 145 mmol/L  
Potassium 4.1 3.5 - 5.3 mmol/L  
Chloride 95 (L) 98 - 107 mmol/L  
Bicarbonate 26 21 - 32 mmol/L  
Anion Gap 15 10 - 20 mmol/L  
Urea Nitrogen 10 6 - 23 mg/dL  
Creatinine 0.42 (L) 0.50 - 1.05   
mg/dL  
eGFR >90 >60 mL/min/1.73m*2  
Calcium 9.4 8.6 - 10.6 mg/dL  
Albumin 3.9 3.4 - 5.0 g/dL  
Alkaline Phosphatase 53 33 - 136   
U/L  
Total Protein 6.4 6.4 - 8.2 g/dL  
AST 13 9 - 39 U/L  
Bilirubin, Total 0.9 0.0 - 1.2   
mg/dL  
ALT 14 7 - 45 U/L  
Lactate  
Result Value Ref Range  
Lactate 1.4 0.4 - 2.0 mmol/L  
Protime-INR  
Result Value Ref Range  
Protime 12.0 9.8 - 12.8 seconds  
INR 1.1 0.9 - 1.1  
POCT GLUCOSE  
Result Value Ref Range  
POCT Glucose 178 (H) 74 - 99 mg/dL  
  
  
Assessment/Plan  
  
Lise Aaron is a 83 y.o. female   
with h/o breast ca, HTN, HLD, T2DM,   
glaucoma, hearing loss, s/p GLF,   
CTH non displaced RO fracture, CT C   
spine neg  
  
Patient has stable neuro exam. No   
acute intracranial pathology. The   
fracture is nondisplaced. No acute   
neurosurgical interventions needed.   
No follow up needed with   
neurosurgery. Neurosurgery is   
signing off  
  
Note not final until signed by   
attending physician  
  
  
Electronically signed by Justin Chapa MD at 2024 8:53 AM   
EST  
documented in this encounter            [facility]  
Work Phone:   
[phone]  
   
                                        2024 Nurse Note Formatting of this  
 note might be   
different from the original.  
Orthostatics done at 0902 by PT/OT  
  
supine: 130/73, sitting EOB:   
143/77, standin/72  
  
See flowsheet  
Electronically signed by Kimberly Isidro RN at 2024 2:22 PM   
EST  
                                        [facility]  
   
                                                    2024 Plan of care   
note                                    Formatting of this note might be   
different from the original.  
The patient's goals for the shift   
include safety and pain control  
  
The clinical goals for the shift   
include patient will remain HDS and   
pain will be controlled throughout   
the shift.  
  
Problem: Pain  
Goal: My pain/discomfort is   
manageable  
Outcome: Progressing  
  
Problem: Safety  
Goal: Patient will be injury free   
during hospitalization  
Outcome: Progressing  
Goal: I will remain free of falls  
Outcome: Progressing  
  
Problem: Daily Care  
Goal: Daily care needs are met  
Outcome: Progressing  
  
Problem: Psychosocial Needs  
Goal: Demonstrates ability to cope   
with hospitalization/illness  
Outcome: Progressing  
Goal: Collaborate with me, my   
family, and caregiver to identify   
my specific goals  
Outcome: Progressing  
  
  
  
Electronically signed by Neisha Basilio RN at 2024 2:38 AM   
EST  
                                        [facility]  
Work Phone:   
[phone]  
   
                                                    2024 Plan of care   
note                                    Formatting of this note might be   
different from the original.  
The patient's goals for the shift   
include pain control  
  
The clinical goals for the shift   
include pain control  
  
Electronically signed by Shari Kincaid RN at 2024   
6:44 PM EST  
                                        [facility]  
Work Phone:   
[phone]  
   
                                        2024 Consult note Formatting of th  
is note is   
different from the original.  
Images from the original note were   
not included.  
Consults  
  
Primary Team: Trauma  
  
Admit Date: 2024  
  
Emergency Contact:  
Extended Emergency Contact   
Information  
Primary Emergency Contact:   
MARIAH SHAFER  
Address: [address] [place] [id number] [place] [facility]  
Home Phone: [phone]  
Mobile Phone: [phone]  
Relation: Daughter  
  
Reason For Consult: Fall  
  
History Of Present Illness:  
Lise Aaron is a 83 y.o. female   
with a PMHx of HLD, T2DM, glaucoma,   
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   
who presented after sustaining a   
fall from ground level. She was   
taking out the trash when a abi of   
wind knocked her to the ground. She   
struck the back of her head but   
denies LOC. Notably she has been   
having episodes of dizziness at   
home for quite some time and been   
seeing a neurologist for this   
issue.  
  
History per patient:  
The patient reports that while she   
was taking out the trash, a abi of   
wind knocked her over, causing her   
to fall flat on her rear and   
subsequently backward, resulting in   
hitting her head. She reports   
feeling slightly dizzy at this time   
but is not sure if this was the   
primary cause of her fall. She   
mentions that her neighbors   
assisted her back into the house   
and called for an ambulance. She   
confirms no loss of consciousness   
during the incident. Additionally,   
she mentions taking meclizine the   
night before and wonders if it   
might have played a role in her   
fall. She denies experiencing any   
other falls of similar nature but   
acknowledges persistent dizziness   
over the past four years.   
Importantly, she denies any history   
of hypoglycemia and states she has   
not recently taken glimepiride, and   
only takes it when her BG is >150.  
  
History per family/caregiver:   
(obtained in addition due to   
patient s fall)  
The patient's youngest daughter,   
Jerry Shafer, who is also the POA,   
corroborats her mother's story and   
does not express overt concern   
regarding her mother having memory   
loss or an inability to perform her   
ADL's. However, she does express   
concerns about her mother's   
depressive symptoms since her   
father's passing (the patient's   
late ) four years ago. She   
has previously discussed this   
concern with her mother, who is   
hesitant to consider any   
pharmacological interventions.  
  
What matters most to the patient:  
Per Ms. Aaron, what matters most to   
her is her continued independence.   
She desires to continue living on   
her own and wishes she could drive   
but is unable to because of her   
dizziness/vertigo.  
  
Prior to Admission Meds  
Prior to Admission Medications  
Prescriptions Last Dose Informant   
Patient Reported? Taking?  
b complex 0.4 mg tablet 2024   
Self Yes No  
Sig: Take 1 tablet by mouth once   
daily. Super  
blood sugar diagnostic (Accu-Chek   
Melanie Plus test strp) strip Unknown   
Self Yes No  
Sig: In vitro; Use 1 strip twice a   
day  
brimonidine-timoloL (Combigan)   
0.2-0.5 % ophthalmic solution   
2024 Self Yes No  
Sig: Administer 1 drop into both   
eyes 2 times a day.  
diclofenac sodium (Voltaren) 1 %   
gel gel 2024 Self No No  
Sig: Apply 1 Application topically   
2 times a day. Apply to knee  
ezetimibe (Zetia) 10 mg tablet   
2024 Self No No  
Sig: Take 1 tablet (10 mg) by mouth   
once daily.  
famotidine (Pepcid) 20 mg tablet   
Past Month Self Yes No  
Sig: Take 1 tablet (20 mg) by mouth   
once daily as needed for heartburn   
or indigestion.  
glimepiride (AmaryL) 1 mg tablet at   
patient only takes this medication   
when blood sugar is over 150 Self   
No No  
Sig: Take 1 tablet (1 mg) by mouth   
once daily in the morning. Take   
before meals.  
lifitegrast (XIIDRA OPHT) 2024   
Self Yes No  
Sig: Administer 1 drop into both   
eyes in the morning and at bedtime.   
5%  
liraglutide (Victoza 2-Jose Juan) 0.6   
mg/0.1 mL (18 mg/3 mL) injection   
2024 Self No No  
Sig: Inject 0.3 mL (1.8 mg) under   
the skin once daily.  
meclizine (Antivert) 25 mg tablet   
2024 Self Yes No  
Si.5 tablets (12.5 mg). Take by   
mouth once daily for 14 days.  
multivitamin tablet 2024 Self   
Yes No  
Sig: Take 1 tablet by mouth once   
daily.  
naproxen sodium 220 mg capsule   
2024 Self Yes No  
Sig: Take 1 tablet by mouth every 6   
hours if needed (pain).  
pen needle, diabetic (BD Ultra-Fine   
Mini Pen Needle) 31 gauge x 3/16    
needle Unknown Self No No  
Si each once daily. 31G x 5mm;   
Inject 1 each as directed daily  
spironolactone (Aldactone) 50 mg   
tablet Past Month at patient has   
surplus of this medication -   
patient takes this medication prn   
Self Yes No  
Sig: Take 1 tablet (50 mg) by mouth   
once daily.  
triamcinolone (Kenalog) 0.1 % cream   
2024 Self No No  
Sig: Apply topically 2 times a day.   
Apply to affected area 1-2 times   
daily as needed. Avoid face and   
groin.  
  
Facility-Administered Medications:   
None  
  
  
Current Meds in Hospital  
Current Facility-Administered   
Medications  
Medication Dose Route Frequency   
Provider Last Rate Last Admin  
acetaminophen (Tylenol) tablet 975   
mg 975 mg oral q8h Fabrizio Richmond PA-C  
dextrose 10 % in water (D10W)   
infusion 0.3 g/kg/hr intravenous   
Once PRN Stephany Drummond PA-C  
dextrose 50 % injection 25 g 25 g   
intravenous q15 min PRN Stephany Drummond PA-C  
enoxaparin (Lovenox) syringe 30 mg   
30 mg subcutaneous q12h Fabrizio Richmond PA-C 30 mg at 24   
0752  
glucagon (Glucagen) injection 1 mg   
1 mg intramuscular q15 min PRN   
Stephany Drummond PA-C  
insulin lispro (HumaLOG) injection   
0-5 Units 0-5 Units subcutaneous   
TID with meals Stephany Drummond PA-C  
iohexol (OMNIPaque) 350 mg   
iodine/mL solution 100 mL 100 mL   
intravenous Once in imaging Jerod Alonzo MD [facility]  
polyethylene glycol (Glycolax,   
Miralax) packet 17 g 17 g oral   
Daily Fabrizio Richmond PA-C  
sennosides-docusate sodium   
(Brenda-Colace) 8.6-50 mg per tablet   
2 tablet 2 tablet oral BID Fabrizio Richmond PA-C  
  
Current Outpatient Medications  
Medication Sig Dispense Refill  
b complex 0.4 mg tablet Take 1   
tablet by mouth once daily. Super  
blood sugar diagnostic (Accu-Chek   
Melanie Plus test strp) strip In   
vitro; Use 1 strip twice a day  
brimonidine-timoloL (Combigan)   
0.2-0.5 % ophthalmic solution   
Administer 1 drop into both eyes 2   
times a day.  
diclofenac sodium (Voltaren) 1 %   
gel gel Apply 1 Application   
topically 2 times a day. Apply to   
knee 100 g 1  
ezetimibe (Zetia) 10 mg tablet Take   
1 tablet (10 mg) by mouth once   
daily. 90 tablet 1  
famotidine (Pepcid) 20 mg tablet   
Take 1 tablet (20 mg) by mouth once   
daily as needed for heartburn or   
indigestion.  
glimepiride (AmaryL) 1 mg tablet   
Take 1 tablet (1 mg) by mouth once   
daily in the morning. Take before   
meals. 90 tablet 3  
lifitegrast (XIIDRA OPHT)   
Administer 1 drop into both eyes in   
the morning and at bedtime. 5%  
liraglutide (Victoza 2-Jose Juan) 0.6   
mg/0.1 mL (18 mg/3 mL) injection   
Inject 0.3 mL (1.8 mg) under the   
skin once daily. 3 mL 5  
meclizine (Antivert) 25 mg tablet   
0.5 tablets (12.5 mg). Take by   
mouth once daily for 14 days.  
multivitamin tablet Take 1 tablet   
by mouth once daily.  
naproxen sodium 220 mg capsule Take   
1 tablet by mouth every 6 hours if   
needed (pain).  
pen needle, diabetic (BD Ultra-Fine   
Mini Pen Needle) 31 gauge x 3/16    
needle 1 each once daily. 31G x   
5mm; Inject 1 each as directed   
daily 100 each 1  
spironolactone (Aldactone) 50 mg   
tablet Take 1 tablet (50 mg) by   
mouth once daily.  
triamcinolone (Kenalog) 0.1 % cream   
Apply topically 2 times a day.   
Apply to affected area 1-2 times   
daily as needed. Avoid face and   
groin. 30 g 0  
  
  
The patient's outpatient electronic   
medical record (EMR) is reviewed:  
-Appears to have been receiving   
majority of care at [facility]  
-Has -associated PCP in [place]   
(Dr. Tiffanie Callahan), last saw in   
2024  
-Recently referred to community   
neurologist in [facility] (Dr. Aditi Henderson) for ongoing vertigo  
-Per PCP notes, concern for   
possible orthostatic hypotension,   
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   
BPPV. Neurologist documents that   
patient had in-clinic Tricia-Hallpike   
maneuver and Epley maneuver in   
past, which she seems to have   
benefited from. Prescribed   
meclizine at this visit (24)  
  
Past Medical History  
Past Medical History:  
Diagnosis Date  
Diabetes mellitus (CMS/[facility])  
Encounter for full-term   
uncomplicated delivery  
Normal vaginal delivery  
Other conditions influencing health   
status  
Menstruation  
  
  
Surgical History  
Past Surgical History:  
Procedure Laterality Date  
OTHER SURGICAL HISTORY 06/15/2021  
Ankle surgery  
OTHER SURGICAL HISTORY 06/15/2021  
Left mastectomy  
OTHER SURGICAL HISTORY 2021  
Hysteroscopy  
  
  
Family History  
Her family history includes Heart   
disease in her mother; Hypertension   
in her father and mother. A fall   
with a hip fracture in her brother   
who may have also been diagnosed   
with dementia.  
  
Social History  
She reports that she has never   
smoked. She has never used   
smokeless tobacco. No history on   
file for alcohol use and drug use.  
-Alcohol use: No  
-Tobacco use: No  
-Illicit drug use: No  
-Exercise: Walks outside and   
gardens when the weather permits.  
-Spiritual needs: Goes to [employer]   
but not recently, as she has been   
avoiding driving due to her   
dizziness  
-Marital Status:  passed   
away 4 years ago  
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   
ago.  
Highest Level of Education: College   
Graduate  
Community Resources: none  
: No  
Current living environment: One   
story house with a basement, two   
stairs up to front door with   
railing (reports that she never   
goes into basement, she has all her   
necessary amenities on the 1st   
floor)  
  
Activities of Daily Living:  
Basic ADLs: (I= independent, A=   
assistance, D= dependent)  
Bathing: I, Dressing: I, Toileting:   
I, Transferring: I, Continence:   
Urge incontinence , Feeding: I  
Tomas Index: 5  
  
Instrumental ADLs: (I= independent,   
A= assistance, D= dependent)  
Ability to use phone: I, Shopping:   
D (daughter purchases groceries for   
patient as she has not been   
driving), Cooking: I (patient does   
not frequently cook large meals but   
able to prep things for herself),   
Housekeeping: I, Laundry: I,   
Transportation: D (previously able   
to drive, denies any prior   
accidents; currently hasn't been   
driving due to dizziness),   
Medications: I (though, of note,   
last PCP note states that patient   
should stop taking her   
sulfonylurea-- however, she appears   
to still be doing so), Handle   
Finances: I- daughter manages   
savings from 's life   
insurance, but patient independent   
in writing checks and paying bills   
on time  
  
Amber Scale: 6  
  
Allergies  
Adhesive tape-silicones, Fosamax   
[alendronate], and Statins-hmg-coa   
reductase inhibitors  
  
Review of Systems  
Review of System:  
Constitutional:  
-Night sweats/fever: No  
-Weight change: Lost 20 lbs since   
her  passed away 4 years   
ago, attributes to decrease in   
appetite  
  
Integumentary: recent BCC removal   
on R. lower cheek  
  
Ears, Nose, Throat:  
-Hearing loss: Bilateral hearing   
aids, reports declining function   
greater in R. ear  
  
Eyes:  
-Vision loss: Endorses hx of   
glaucoma but no recent change in   
vision  
  
Cardiovascular:  
-Chest Pain: denies  
-Orthopnea: denies  
-Paroxysmal nocturnal dyspnea:   
denies  
-Edema: Occasional, uses   
spironolactone  
  
Respiratory:  
-Dyspnea: no  
-Wheezing: no  
  
Gastrointestinal:  
-Appetite: Has an established daily   
eating routine/diet  
-Difficulty chewing/ swallowing: no  
-Heartburn: no  
-Bowels: occasional diarrhea (last   
2 days ago), otherwise regular   
every couple of days; denies   
hematochezia or melena  
  
Genitourinary:  
-Incontinence: reports occasional   
urge incontinence  
-Nocturia: 2x per night  
  
Musculoskeletal:  
-Mobility: Endorses use of cane and   
walker when having dizzy spells  
-Pain: Chronic lower back/lumbar   
pain  
  
Neurological:  
-Confusion: No  
-Falls: None aside from current   
presentation  
-Gait: Intact, cane/walker use with   
dizziness  
-Memory: Occasionally forgets   
things, no short term memory loss  
-Tremor: No  
  
Psychiatric:  
-Mood: Sad at times when reminded   
of , wishes she cuold go to   
her timeshare in [place] but   
has no one to go with her,   
otherwise reports good mood  
-Sleep: Good sleep 1-2am - 11am  
  
Endocrine: Denies heat or cold   
intolerance  
  
Hematologic/ Lymphatic: none  
  
Allergic/ Immunologic: none  
  
Documents on file and valid:  
Advance Directive/Living Will:   
Daughter reports she does  
Health Care Power of : Yes,   
youngest daughter, Mariah Shafer  
Other: n/a  
Code Status: DNR/DNI  
  
Confusion Assessment Method (CAM)  
Not on file.  
  
Alexi Cognitive Assessment   
(MoCA)  
Not on file.  
  
Geriatric Depression Scale (GDS)  
Not on file.  
  
Mini-Cog (Early Dementia Detection)  
Not on file.  
  
Folstein Mini-Mental State Exam   
(MMSE)  
Not on file.  
  
Patient Health Questionnaire   
(PHQ-9)  
Not on file.  
  
Physical Exam  
Constitutional:  
General: She is awake.  
Appearance: Normal appearance. She   
is obese.  
Eyes:  
Extraocular Movements: Extraocular   
movements intact.  
Conjunctiva/sclera: Conjunctivae   
normal.  
Pupils: Pupils are equal, round,   
and reactive to light.  
Comments: No saccades appreciated   
with EOMI  
Cardiovascular:  
Rate and Rhythm: Normal rate and   
regular rhythm.  
Pulses: Normal pulses.  
Heart sounds: Normal heart sounds.  
Pulmonary:  
Effort: Pulmonary effort is normal.  
Breath sounds: Normal breath   
sounds.  
Abdominal:  
General: Bowel sounds are normal.  
Palpations: Abdomen is soft.  
Musculoskeletal:  
General: Normal range of motion.  
Right lower leg: No edema.  
Left lower leg: No edema.  
Skin:  
General: Skin is warm and dry.  
Findings: No rash.  
Comments: Bandage in place over   
right cheek where patient   
reportedly had BCC removed ~1 week   
ago  
Neurological:  
General: No focal deficit present.  
Mental Status: She is alert and   
oriented to person, place, and   
time. Mental status is at baseline.  
Cranial Nerves: Cranial nerves 2-12   
are intact.  
Sensory: Sensation is intact.  
Comments: 5/5 bilateral upper   
extremity flexion and extension,   
4-5/5 bilateral hip flexion, 5/5   
bilateral knee flexion/extension,   
5/5 bilateral plantar/dorsiflexion.   
5/ 5  strength  
Psychiatric:  
Attention and Perception: Attention   
normal.  
Mood and Affect: Mood normal.  
Speech: Speech normal.  
Behavior: Behavior normal. Behavior   
is cooperative.  
Thought Content: Thought content   
normal.  
  
Last Recorded Vitals  
  
2024  
2:14 AM 2024  
2:20 AM 2024  
2:22 AM 2024  
2:27 AM 2024  
5:00 AM 2024  
7:15 AM 2024  
7:30 AM  
Vitals  
Systolic 168 179 135 153 131 133  
Diastolic 99 106 75 86 73 76  
Heart Rate 100 107 96 96 98 100  
Resp 18 18 18 9 14  
Height (in) 1.6 m (5' 3 )  
Weight (lb) 179.01  
BMI 31.71 kg/m2  
BSA (m2) 1.9 m2  
  
Vitals:  
24 0220  
Weight: 81.2 kg (179 lb 0.2 oz)  
  
  
Confusion Assessment Method(CAM)   
for diagnosis of delirium:  
  
1. Acute onset or fluctuating   
course: absent/present: Absent  
2. Inattention: absent/present:   
Absent  
3. Disorganized thinking:   
absent/present: Absent  
4. Altered level of consciousness:   
absent/present: Absent  
CAM: negative  
  
AT Score For Assessment of Delirium   
and Cognitive Impairment:  
  
Alertness: 4  
Normal(fully alert,but not   
agitated, throughout assessment)=0  
Mild sleepiness for <10 seconds   
after walking, then normal=0  
Clearly abnormal=4  
2. AMT4: 0  
No mistakes=0  
One mistake=1  
Two or more mistakes/untestable=2  
3. Attention: 0  
Achieves seven months or more   
correctly=0  
Starts but scores <7 months/   
refuses to start=1  
Untestable(cannot start because   
unwell, drowsy, inattentive)=2  
4. Acute: 0  
No=0  
Yes=4  
  
Total Score: 0  
4 or above: Possible delirium +/-   
cognitive impairment  
1-3: Possible cognitive impairment  
0: Delirium or severe cognitive   
impairment unlikely(but delirium   
still possible if (4) information   
incomplete)  
  
Relevant Results  
Lab Results  
Component Value Date  
TSH 1.14 2023  
HGBA1C 7.3 (A) 2023  
  
Results from last 7 days  
Lab Units 24  
0223  
WBC AUTO x10*3/uL 9.7  
HEMOGLOBIN g/dL 14.6  
HEMATOCRIT % 42.2  
ALT U/L 14  
AST U/L 13  
SODIUM mmol/L 132*  
POTASSIUM mmol/L 4.1  
CHLORIDE mmol/L 95*  
CREATININE mg/dL 0.42*  
BUN mg/dL 10  
CO2 mmol/L 26  
INR 1.1  
  
Recent Imaging Results  
  
CT chest abdomen pelvis w IV   
contrast, CT thoracic spine wo IV   
contrast, CT lumbar spine wo IV   
contrast  
Narrative: Interpreted By: James Bird and Liller Gregory  
STUDY:  
CT CHEST ABDOMEN PELVIS W IV   
CONTRAST; CT LUMBAR SPINE WO IV  
CONTRAST; CT THORACIC SPINE WO IV   
CONTRAST; 2024 3:01 am  
  
INDICATION:  
Signs/Symptoms:Trauma;   
Signs/Symptoms:Fall  
  
COMPARISON:  
None.  
  
ACCESSION NUMBER(S):  
[id number]; [id number];   
[id number]  
  
ORDERING CLINICIAN:  
MORENITA RICKS  
  
TECHNIQUE:  
CT of the chest, abdomen, and   
pelvis was performed.  
Contiguous axial images were   
obtained at 5 mm slice thickness  
through the chest, and at 3 mm   
through the abdomen and pelvis.  
Coronal and sagittal   
reconstructions at 3 mm slice   
thickness were  
performed.  
100 ml of contrast material   
Omnipaque 350 were administered  
intravenously without immediate   
complication.  
  
FINDINGS:  
CHEST:  
  
LUNG/PLEURA/LARGE AIRWAYS:  
No air space opacity, focal   
consolidation, pleural  
effusion/hemothorax, or   
pneumothorax are appreciated.   
Biapical  
scarring is noted. Bibasilar linear   
atelectasis/scarring. There are  
no discrete pulmonary nodules.  
  
VESSELS:  
No traumatic aortic injury is   
appreciated within the limitations   
of  
this non-EKG gated study. The   
thoracic aorta is of normal course   
and  
caliber. Main pulmonary artery and   
its branches are normal in  
caliber. Severe coronary artery   
calcifications are seen. The study  
is not optimized for evaluation of   
coronary arteries.  
  
HEART:  
The heart is diffusely enlarged.   
There is no pericardial effusion.  
Aortic valve calcifications are   
noted correlate with concern for  
aortic valve stenosis. Mitral   
annulus calcifications noted.  
  
MEDIASTINUM AND GINNY:  
No pneumomediastinum, abnormal   
mediastinal fluid collection or  
mediastinal hematoma are   
appreciated. No mediastinal, hilar   
or  
biaxillary adenopathy is present.   
Debris is noted within the  
esophagus.  
  
CHEST WALL AND LOWER NECK:  
No acute fracture or dislocation of   
the included osseous structures  
are appreciated. No suspicious   
osseous lesions are identified. The  
thoracic wall soft tissues are   
within normal limits. Patient is  
status post left hemithyroidectomy.  
  
ABDOMEN:  
  
LIVER:  
No focal perfusion abnormality of   
the liver is appreciated to suggest  
contusion or laceration. There is   
no subcapsular hematoma, no  
perihepatic fluid collection.  
  
GALLBLADDER:  
The gallbladder is nondistended   
without evidence of radiopaque   
stone.  
  
BILE DUCTS:  
The intahepatic and extrahepatic   
bile ducts are not dilated.  
  
PANCREAS:  
There is mild fatty infiltration   
and atrophy of the pancreas. No  
focal lesion or ductal dilation. No   
traumatic injury.  
  
SPLEEN:  
No parenchymal perfusion deficit of   
the spleen is appreciated to  
suggest contusion or laceration.   
There is no subcapsular hematoma,   
no  
perisplenic fluid collection.  
  
ADRENAL GLANDS:  
The bilateral adrenal glands are   
unremarkable in appearance.  
  
KIDNEYS AND URETERS:  
No parenchymal perfusion deficit is   
appreciated in bilateral kidneys  
to suggest contusion or laceration.   
There is no subcapsular hematoma,  
no perinephric fluid collection. No   
hydroureteronephrosis or  
nephroureterolithiasis is present.   
There is mild nonspecific  
bilateral perinephric fat   
stranding.  
  
PELVIS:  
  
BLADDER:  
The urinary bladder appears within   
normal limits.  
  
REPRODUCTIVE ORGANS:  
Uterus is present. There is a small   
amount of fluid within the  
endometrial canal..  
  
BOWEL:  
The stomach is unremarkable. The   
small bowel is normal in caliber  
without evidence of focal wall   
thickening or obstruction. There is  
no evidence of focal wall   
thickening or dilatation of the   
large  
bowel. The appendix is not   
definitely visualized, although   
there are  
no pericecal inflammatory changes   
to suggest acute appendicitis.  
  
VESSELS:  
The aorta and IVC are within normal   
limits. The principal  
vasculature of the abdomen and   
pelvis is patent. There is moderate  
to severe atherosclerotic   
calcification of the abdominal   
aorta and  
its branches.  
  
PERITONEUM/RETROPERITONEUM/LYMPH   
NODES:  
There is no evidence of intra- or   
retroperitoneal hematoma. There is  
no free or loculated fluid   
collection, no free intraperitoneal   
air.  
No abdominopelvic lymphadenopathy   
is present.  
  
BONES AND ABDOMINAL WALL:  
No evidence of acute fracture or   
dislocation of the included osseous  
structures. No suspicious osseous   
lesions are identified. There is  
diastasis of the rectus abdominus   
musculature measuring up to 6.3 cm  
with mesenteric fat containing   
ventral wall hernia.  
  
Thoracic spine:  
Prevertebral soft tissues are   
unremarkable.  
Alignment is maintained.  
There is mild wedging and   
irregularity of the anterior   
inferior  
endplate of a T12. No surrounding   
soft tissue edema. There is  
anteriorly flowing osteophytosis   
spanning more than 4 vertebral  
segments likely indicating   
component of diffuse idiopathic   
skeletal  
hyperostosis. Intervertebral disc   
heights are diffusely narrowed.  
No high-grade spinal canal or   
neural foraminal stenosis.  
  
Lumbar spine:  
Moderate dextroscoliotic curve of   
the lumbar spine. There is grade 1  
anterolisthesis of L4 on L5.   
Prevertebral soft tissues are  
unremarkable. No acute fracture.  
Diffuse loss of intervertebral disc   
height most significant in the  
superior lumbar spine where there   
is severe endplate sclerosis and  
osteophytosis. Bulky anteriorly   
projecting osteophytosis is noted.  
Posterior projecting disc   
osteophyte complexes at L1-L2,   
L2-L3, and  
L3-L4 of abuts the ventral thecal   
sac resulting in at least mild  
spinal canal stenosis. At least   
moderate spinal canal stenosis at  
L3-L4. Moderate neural foraminal   
stenosis at L2-L3 and L3-L4 on the  
left. Mild neural foraminal   
stenosis on the right at L1-L2.  
  
Impression: 1. No acute traumatic   
pathology noted within the chest,   
abdomen, or  
pelvis.  
2. Anteroinferior endplate   
irregularity at T12 which is age  
indeterminate. This may represent   
sequela of degenerative change,  
fracture, or possibly chronic disc   
osteomyelitis. Correlate for point  
tenderness on exam. MRI may be   
performed for further assessment as  
clinically indicated.  
3. Multilevel spondylitic changes   
as described above with at least  
moderate spinal canal stenosis at   
L3-L4.  
4. There is a small amount of fluid   
within the endometrial canal.  
Pelvic ultrasound may be performed   
for further assessment as  
indicated.  
5. Prominent aortic valve   
calcifications noted. Correlate   
with  
concern for aortic valve stenosis.  
6. Additional findings as above.  
  
I personally reviewed the   
images/study and I agree with the   
findings  
as stated above by resident   
physician, Dr. Logan Holder.  
  
MACRO:  
None  
  
Signed by: James Bird 2024   
3:58 AM  
Dictation workstation: [id number]  
CT head W O contrast trauma   
protocol, CT cervical spine wo IV   
contrast  
Narrative: Interpreted By: James Bird and Liller Gregory  
STUDY:  
CT HEAD W/O CONTRAST TRAUMA   
PROTOCOL; CT CERVICAL SPINE WO IV  
CONTRAST; 2024 2:59 am  
  
INDICATION:  
Signs/Symptoms:occipital fx;   
Signs/Symptoms:trauma, fall  
  
COMPARISON:  
Same-day CT head and cervical spine   
6:38 p.m.  
  
ACCESSION NUMBER(S):  
[id number]; [id number]  
  
ORDERING CLINICIAN:  
SOWMYA GARZA  
  
TECHNIQUE:  
Axial noncontrast CT images of head   
with coronal and sagittal  
reconstructed images. Axial   
noncontrast CT images of the   
cervical  
spine with coronal and sagittal   
reconstructed images.  
  
FINDINGS:  
CT HEAD:  
  
BRAIN PARENCHYMA: Assessment of the   
posterior fossa and occipital  
lobes limited by streak artifact   
from dental amalgam. No evidence of  
acute intraparenchymal hemorrhage   
or parenchymal evidence of acute  
large territory ischemic infarct.   
No mass-effect, midline shift or  
effacement of cerebral sulci.   
Gray-white matter distinction is  
preserved. Periventricular and   
subcortical white matter   
hypodensities  
are seen and favored to represent   
sequela of chronic microvascular  
ischemic change.  
  
VENTRICLES and EXTRA-AXIAL SPACES:   
No acute extra-axial or  
intraventricular hemorrhage within   
the above limitations. Ventricles  
and sulci are age-concordant.  
  
PARANASAL SINUSES/MASTOIDS: No   
hemorrhage or air-fluid levels   
within  
the visualized paranasal sinuses.   
The mastoid air cells are  
well-aerated.  
  
CALVARIUM/ORBITS: Redemonstration   
of nondisplaced occipital bone  
fracture extending inferiorly to   
the level of the foramen magnum.  
The orbits and globes are intact to   
the extent visualized.  
  
EXTRACRANIAL SOFT TISSUES: Small   
soft tissue swelling/scalp hematoma  
overlying the fracture site.  
  
  
CT CERVICAL SPINE:  
  
PREVERTEBRAL SOFT TISSUES: Within   
normal limits.  
  
CRANIOCERVICAL JUNCTION: Intact.  
  
ALIGNMENT: No traumatic   
malalignment or traumatic facet   
widening.  
  
VERTEBRAE: No acute fracture.   
Vertebral body heights are   
maintained.  
  
SPINAL CANAL/INTERVERTEBRAL DISCS:   
No high-grade spinal canal  
stenosis. No significant disc   
height loss. Varying degrees of  
degenerative disc disease, notable   
for anterior projecting  
osteophytes at C2-C3 and C3-C4.  
  
NEURAL FORAMINA: No high-grade   
neural foraminal stenosis.  
  
OTHER: Mild interlobular septal and   
apical scarring are noted.  
Otherwise, the lungs are clear.   
Bulky atherosclerotic calcification  
of the carotid arteries. Nodular   
appearance of the right lobe of the  
thyroid gland.  
  
Impression: CT HEAD:  
1. Unchanged nondisplaced occipital   
bone fracture extending to the  
foramen magnum without associated   
intracranial pathology. Please note  
assessment of the posterior fossa   
and occipital lobes is somewhat  
limited by streak artifact from   
dental amalgam.  
2. Similar small overlying scalp   
hematoma/swelling.  
  
CT CERVICAL SPINE:  
1. No acute fracture or traumatic   
malalignment of the cervical spine.  
2. Multilevel spondylotic changes   
of the cervical spine as detailed  
above.  
  
I personally reviewed the   
images/study and I agree with the   
findings  
as stated above by resident   
physician, Dr. Logan Holder.  
  
MACRO:  
none  
  
Signed by: James Bird 2024   
3:41 AM  
Dictation workstation: [id number]  
XR chest 1 view, XR pelvis 1-2   
views  
Narrative: Interpreted By: James Bird and Liller Gregory  
STUDY:  
Chest, single portable AP view.  
Pelvis, single portable view.  
  
INDICATION:  
Signs/Symptoms:Trauma.  
  
COMPARISON:  
None.  
  
ACCESSION NUMBER(S):  
[id number]; [id number]  
  
ORDERING CLINICIAN:  
MORENITA RICKS  
  
FINDINGS:  
Chest:  
Heart is normal in size.  
Calcifications of the aortic arch   
and tortuosity of the descending  
thoracic aorta likely accentuated   
by patient rotation. Interstitial  
prominence. No focal consolidation,   
large pleural fluid, or  
discernible pneumothorax. Elevation   
of the right hemidiaphragm.  
No displaced fracture identified.  
  
Pelvis:  
Of note the examination is limited   
due to only partial exclusion of  
the right hip from the field of   
view. No acute fracture or  
malalignment. Enthesopathy of the   
left iliac crest.  
No radiopaque foreign body or soft   
tissue gas  
  
Impression: 1. Mild interstitial   
prominence which may reflect   
chronic parenchymal  
changes or mild edema.  
2. No acute traumatic osseous   
abnormality identified in the chest   
or  
pelvis within the above   
limitations.  
  
I personally reviewed the   
images/study and I agree with the   
findings  
as stated above by resident   
physician, Dr. Logan Holder.  
  
MACRO:  
none  
  
Signed by: James Bird 2024   
2:42 AM  
Dictation workstation: [id number]  
  
Head/Brain Imaging  
=== Results for orders placed   
during the hospital encounter of   
24 ===  
  
CT head wo IV contrast [[id number]]   
2024 Status: Normal  
Findings compatible with a   
nondisplaced calvarial fracture   
seen in  
the occipital bone. No depression.   
Volume loss seen in the brain. No  
definite intracranial hemorrhage.   
Given skull fracture consider  
short-term CT follow-up  
  
No evidence of acute cervical spine   
fracture. Robust vascular  
calcification. No acute facial bone   
fracture detected.  
  
Signed by: Nazario Enrique 2024   
7:44 PM  
Dictation workstation:   
[id number]  
No results found for this or any   
previous visit.  
  
  
DATA:  
EKG: QTC  
No results found for this or any   
previous visit (from the past 4464   
hour(s)).  
Anti-psychotics in 48 hours:  
Opioids/Benzodiazepines in 48   
hours:  
Anticholinergics on board:No  
Restraints:No  
Indwelling catheters:No (external   
catheter)  
Last BM: 2 days ago  
UO in 24 hours: not recorded  
Activity in the past 24 hours: in   
chair  
Need for ambulatory devices: yes  
  
Assessment/Plan  
This is a/an 83 y.o. year old   
female, with past medical history   
relevant for ongoing   
dizziness/vertigo for the past four   
years, T2DM, bilateral hearing   
loss, and HLD, presenting after a   
fall from standing w/o LOC, in   
which she sustained a closed   
occipital skull fracture. She is   
being seen in geriatric   
consultation for evaluation after   
her fall.  
  
Problem list:  
Vertigo, suspected BPPV  
Concern for orthostatic hypotension  
HLD  
T2DM  
Bilateral hearing loss  
Acute fall c/b occipital fracture  
  
1. Acute fall  
-Unclear etiology, may be related   
to underlying vertigo/orthostatic   
hypotension  
-Recommend obtaining orthostatic   
vitals and add-on vitamin D level  
-Recommend vestibular therapy with   
PT/OT (both inpatient and on   
discharge)  
-If no evidence of cervical   
fracture and otherwise safe to do   
so, would remove cervical collar   
for patient comfort  
  
2. Occipital fx, lumbar pain  
-Pain management: agree with   
scheduled Tylenol, recommend   
lidocaine patch to lower back  
  
3. Polypharmacy  
-Please continue to hold, and do   
NOT resume on discharge, home   
meclizine and glimepiride. Patient   
at high risk for both hypoglycemia   
and anti-cholinergic side-effects   
and hasn't noticed in improvement   
in her dizziness with the   
meclizine.  
  
4. Diabetes  
-Last A1C 7.3 (2023)  
-Recommend add-on HbA1C  
  
5. Concern for possible mood   
disorder  
-Per daughter, patient's mood and   
level of engagement in her hobbies   
has been low since the patient's   
 passed four years ago.   
States that the patient has been   
evaluated for depression/anxiety   
before but declined any   
pharmacotherapy  
-Will plan for bedside geriatric   
depression tomorrow  
  
Medication Evaluation:  
High risk medications: Meclizine   
(anti-cholinergic, pt report no   
benefit and hx of   
dizziness/vertigo), Glimepiride   
(concern for hypoglycemia)  
Other concerning issues regarding   
medications: PCP recommended   
stopping glimeperide during their   
last visit, though appears patient   
has still been taking it  
  
Care Transitions:  
-Recommended level for discharge:   
PT/OT following  
-Home going considerations: pending   
PT/OT  
-Primary care physician: Tiffanie Callahan DO  
  
Goals of Care:  
-Primary goals: continued   
independent living  
-Health care power of :   
daughter Mariah (paperwork not on   
file, daughter to bring a copy   
later this afternoon)  
-Living will: daughter to bring   
copy later this afternoon  
Code status: DNR/DNI  
  
4M AGE-FRIENDLY INITIATIVE:  
What matters most to patient:   
Continued independent living  
Medications: meclizine and   
glimepiride high-risk, holding;   
recommend discontinuation on   
discharge  
Mentation: A&O x5  
Mobility: Intact w/ assistive   
device  
  
Geriatric medicine will continue to   
follow the patient. Thank you for   
allowing geriatric medicine to be   
involved in the care of your   
patient. Geriatric medicine   
consultation team is available   
during work hours Monday through   
Friday. For any emergency issues   
requiring immediate assistance over   
the weekend, please page Geriatrics   
pager [phone]  
  
Consult Billing Time  
Prep time on date of patient   
encounter(minutes): 30  
Time directly with   
patient/family/caregiver(minutes):   
45  
Documentation time(minutes): 45  
TOTAL TIME(minutes): 120  
  
Benji Cordero MS3, [facility]  
  
I saw and evaluated the patient. I   
personally obtained the key and   
critical portions of the history   
and physical exam or was physically   
present for key and critical   
portions performed by the resident.   
I reviewed the resident s   
documentation and discussed the   
patient with the resident. I agree   
with the resident s medical   
decision making as documented in   
their note  
  
Charla Sánchez MD  
  
Electronically signed by Charla Sánchez MD at 2024 3:43 PM   
EST  
                                        [facility]  
Work Phone:   
[phone]  
   
                                        2024 Consult note Formatting of th  
is note is   
different from the original.  
Consults  
  
Reason For Consult  
RO fracture  
  
History Of Present Illness  
Lise Aaron is a 83 y.o. female   
with h/o breast ca, HTN, HLD, T2DM,   
glaucoma, hearing loss, s/p GLF,   
CTH non displaced RO fracture, CT C   
spine neg  
  
Denies headache, change in vision,   
weakness, change in sensation, or   
any other focal neurologic   
deficits.  
  
Patient is not on any AC/AP.  
  
Imaging is personally reviewed and   
the is notable for CT head with   
nondisplaced right occipital   
fracture, negative for acute   
intracranial pathology, including   
bleeding. CT C-spine is negative   
for acute fracture.  
  
Past Medical History  
She has a past medical history of   
Diabetes mellitus (CMS/[facility]),   
Encounter for full-term   
uncomplicated delivery, and Other   
conditions influencing health   
status.  
  
Surgical History  
She has a past surgical history   
that includes Other surgical   
history (06/15/2021); Other   
surgical history (06/15/2021); and   
Other surgical history   
(2021).  
  
Social History  
She reports that she has never   
smoked. She has never used   
smokeless tobacco. No history on   
file for alcohol use and drug use.  
  
Family History  
Family History  
Problem Relation Name Age of Onset  
Heart disease Mother  
Hypertension Mother  
Hypertension Father  
  
  
Allergies  
Adhesive tape-silicones, Fosamax   
[alendronate], and Statins-hmg-coa   
reductase inhibitors  
  
Review of Systems  
Review of systems was reviewed and   
otherwise negative other than what   
was listed in the HPI.  
  
Physical Exam  
NAD, A&Ox3  
PERRL, EOMI, Face symmetric, Facial   
SILT, Palate/Tongue midline and   
symmetric, shoulder shrugs   
symmetric, hearing intact to finger   
rubs bilaterally  
Motor: 5/5, no pronator drift  
Sensation: SILT throughout all   
extremities  
DTRS: 2+ Throughout, No Hoffmans or   
Clonus  
  
Last Recorded Vitals  
Blood pressure 135/75, pulse 96,   
temperature 36.5 C (97.7 F),   
temperature source Temporal, resp.   
rate 18, height 1.6 m (5' 3 ),   
weight 81.2 kg (179 lb 0.2 oz),   
SpO2 96 %.  
  
Relevant Results  
Results for orders placed or   
performed during the hospital   
encounter of 02/29/24 (from the   
past 24 hour(s))  
CBC and Auto Differential  
Result Value Ref Range  
WBC 9.7 4.4 - 11.3 x10*3/uL  
nRBC 0.0 0.0 - 0.0 /100 WBCs  
RBC 4.99 4.00 - 5.20 x10*6/uL  
Hemoglobin 14.6 12.0 - 16.0 g/dL  
Hematocrit 42.2 36.0 - 46.0 %  
MCV 85 80 - 100 fL  
MCH 29.3 26.0 - 34.0 pg  
MCHC 34.6 32.0 - 36.0 g/dL  
RDW 12.2 11.5 - 14.5 %  
Platelets 179 150 - 450 x10*3/uL  
Neutrophils % 70.7 40.0 - 80.0 %  
Immature Granulocytes %, Automated   
0.3 0.0 - 0.9 %  
Lymphocytes % 16.5 13.0 - 44.0 %  
Monocytes % 11.0 2.0 - 10.0 %  
Eosinophils % 1.1 0.0 - 6.0 %  
Basophils % 0.4 0.0 - 2.0 %  
Neutrophils Absolute 6.83 (H) 1.60   
- 5.50 x10*3/uL  
Immature Granulocytes Absolute,   
Automated 0.03 0.00 - 0.50 x10*3/uL  
Lymphocytes Absolute 1.59 0.80 -   
3.00 x10*3/uL  
Monocytes Absolute 1.06 (H) 0.05 -   
0.80 x10*3/uL  
Eosinophils Absolute 0.11 0.00 -   
0.40 x10*3/uL  
Basophils Absolute 0.04 0.00 - 0.10   
x10*3/uL  
Comprehensive Metabolic Panel  
Result Value Ref Range  
Glucose 167 (H) 74 - 99 mg/dL  
Sodium 132 (L) 136 - 145 mmol/L  
Potassium 4.1 3.5 - 5.3 mmol/L  
Chloride 95 (L) 98 - 107 mmol/L  
Bicarbonate 26 21 - 32 mmol/L  
Anion Gap 15 10 - 20 mmol/L  
Urea Nitrogen 10 6 - 23 mg/dL  
Creatinine 0.42 (L) 0.50 - 1.05   
mg/dL  
eGFR >90 >60 mL/min/1.73m*2  
Calcium 9.4 8.6 - 10.6 mg/dL  
Albumin 3.9 3.4 - 5.0 g/dL  
Alkaline Phosphatase 53 33 - 136   
U/L  
Total Protein 6.4 6.4 - 8.2 g/dL  
AST 13 9 - 39 U/L  
Bilirubin, Total 0.9 0.0 - 1.2   
mg/dL  
ALT 14 7 - 45 U/L  
Lactate  
Result Value Ref Range  
Lactate 1.4 0.4 - 2.0 mmol/L  
Protime-INR  
Result Value Ref Range  
Protime 12.0 9.8 - 12.8 seconds  
INR 1.1 0.9 - 1.1  
POCT GLUCOSE  
Result Value Ref Range  
POCT Glucose 178 (H) 74 - 99 mg/dL  
  
  
Assessment/Plan  
  
Lise Aaron is a 83 y.o. female   
with h/o breast ca, HTN, HLD, T2DM,   
glaucoma, hearing loss, s/p GLF,   
CTH non displaced RO fracture, CT C   
spine neg  
  
Patient has stable neuro exam. No   
acute intracranial pathology. The   
fracture is nondisplaced. No acute   
neurosurgical interventions needed.   
No follow up needed with   
neurosurgery. Neurosurgery is   
signing off  
  
Note not final until signed by   
attending physician  
  
  
Electronically signed by Justin Chapa MD at 2024 8:53 AM   
EST  
                                        [facility]  
Work Phone:   
[phone]  
   
                                                    2024 History and   
physical note                           Formatting of this note is   
different from the original.  
[facility]  
TRAUMA SERVICE - HISTORY AND   
PHYSICAL / CONSULT  
  
Patient Name: Lise Aaron  
MRN: [id number]  
Admit Date: 229  
: 1940 AGE: 83 y.o.   
GENDER: female  
===================================  
===================================  
========  
MECHANISM OF INJURY / CHIEF   
COMPLAINT:  
Ground level fall  
  
84 yo F presenting as a LIMITED   
activation after sustaining a fall   
from ground level. Was taking out   
the trash when a abi of wind   
knocked her to the ground. Struck   
her head but denies LOC. Has been   
having episodes of dizziness at   
home for quite some time. Has been   
seeing a neurologist.  
  
Initially presented to [facility]   
whereupon she underwent CT Head,   
C-Spine, Face, and L-Spine and was   
found to have an occipital   
non-displaced fracture. Transferred   
to [facility] for further evaluation.   
Here, mildly tachycardic. Reports   
neck and low back pain, though her   
low back pain has been ongoing.  
  
LOC (yes/no?): Denies  
Anticoagulant / Anti-platelet Rx?   
(for what dx?): Denies  
Referring Facility Name (N/A for   
scene EMR run): Transfer from [facility]  
  
INJURIES:  
Non-displaced occipital fracture  
  
OTHER MEDICAL PROBLEMS:  
Near Syncope; dizziness  
T2DM  
HLD  
  
INCIDENTAL FINDINGS:  
Degenerative lumbar spine changes  
Heavily calcified neck vasculature  
  
===================================  
===================================  
========  
ADMISSION PLAN OF CARE:  
-admit to trauma floor  
- maintain cervical collar due to   
ongoing midline tenderness  
- consult Neurosurgery for   
occipital fracture  
-->NTD  
  
===================================  
===================================  
========  
PAST MEDICAL HISTORY:  
PMH:  
T2DM  
Dizziness, ?vertigo  
Breast cancer  
  
PSH:  
Ankle surgery  
L mastectomy  
Hysteroscopy  
Facial basal cell carcinoma   
resection  
  
FH:  
HTN - mother, father  
Heart disease - mother  
  
SOCIAL HISTORY:  
Smoking: never  
Alcohol: denies  
Drug use: denies  
  
MEDICATIONS:  
Prior to Admission medications  
Medication Sig Start Date End Date   
Taking? Authorizing Provider  
b complex 0.4 mg tablet Super   
Historical Provider, MD  
blood sugar diagnostic (Accu-Chek   
Melanie Plus test strp) strip In   
vitro; Use 1 strip twice a day   
Historical Provider, MD  
brimonidine-timoloL (Combigan)   
0.2-0.5 % ophthalmic solution   
Administer 1 drop into both eyes 2   
times a day. Historical Provider,   
MD  
diclofenac sodium (Voltaren) 1 %   
gel gel Apply 1 Application   
topically 2 times a day. Apply to   
knee 24 Tiffanie Callahan,   
ezetimibe (Zetia) 10 mg tablet Take   
1 tablet (10 mg) by mouth once   
daily. 24 Tiffanie Callahan DO  
famotidine (Pepcid)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 underlying left breast   
tissue, as my rotational flap to   
close this defect. She did fine   
from that. She had some mild   
postoperative wound healing   
problems which eventually resolved.  
  
The patient did not have any   
subsequent additional systemic   
chemotherapy or additional   
radiation therapy thereafter.  
  
The patient's original recurrence   
of her breast cancer along the   
upper inner aspect of her left   
chest wall and left breast site for   
which I took her to the operating   
room on 01/15/2009, was ER   
negative, HI negative, and negative   
for HER-2/gunnar.  
  
Likewise, the patient is also   
followed by Dr. Marcos Lindsey of head   
and neck surgery at the [facility] for abnormalities   
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   
tonsillar region.  
  
I have most recently evaluated the   
patient for a palpable area under   
her left mastectomy and left chest   
wall resection site which I   
presumed was a hematoma. This area   
was classified as BI-RADS category   
3 on imaging. However, the   
patient's medical oncologist, Dr. Enedina Linares of breast medical   
oncology, recommended that we   
biopsy this area.  
  
Therefore, on 2009, I   
performed a left mastectomy site   
ultrasound-guided 8-gauge Mammotome   
vacuum-assisted biopsy to the   
complex fluid collection underneath   
her left mastectomy site. I took a   
total of 12 separate 8-gauge   
Mammotome cores and sampled the   
entire anterior wall of this   
complex fluid collection. Within   
these cores, grossly you could see   
thickened tissue representing the   
wall of the complex fluid   
collection, as well as old   
hematoma. These did not look   
terribly significant. These were   
all sent off for permanent   
pathology. The patient tolerated   
the procedure well. The patient's   
ultrasound-guided 8-gauge Mammotome   
vacuum-assisted biopsy to the   
complex fluid collection of the   
left mastectomy site has been   
reviewed by Dr. Leno Hoyt of   
the Department of Pathology. Dr. Hoyt saw findings consistent with   
prior surgical site representing a   
complex fluid collection which   
appears to be completely benign   
representing the area of the   
previous surgical resection. This   
showed no evidence of invasive   
carcinoma and no evidence of ductal   
carcinoma in situ. Additionally,   
within this area of benign changes,   
he saw scant breast tissue an area   
of sclerosing adenosis. Likewise,   
he did note one area that he said   
that he would describe as cytologic   
atypia, but for which he said he   
did not feel met criteria to be   
called atypical ductal hyperplasia,   
based on the fact that this was   
simply cytologic atypia and did not   
represent architectural changes as   
one would need to define it as   
atypical ductal hyperplasia.   
Likewise, he said that he thought   
that the cytologic atypia could be   
due to her previous history of   
radiation therapy as well as the   
chronic changes within the post   
surgical bed representing chronic   
inflammatory benign changes within   
the tissues.  
  
Right digital unilateral mammogram   
on 2009 was classified as   
stable and BI-RADS category 2 by   
the radiology staff. Therefore, on   
2009, the patient had a   
stable clinical exam at this time   
of her right breast and of her left   
mastectomy site and her left chest   
wall resection site. She had   
nothing at this time which   
signifies evidence of recurrent   
disease in her left mastectomy site   
and left chest wall resection site.  
  
More recently on breast imaging   
performed in 2010, the   
patient was found to have 2 areas   
within her right breast, with one   
in the 9 o'clock axis which was   
classified as BI-RADS category 4,   
and with one in the 9-10 o'clock   
axis which was classified as   
BI-RADS category 3. I recommended   
to the patient that we perform   
ultrasound-guided 8-gauge Mammotome   
vacuum-assisted biopsy to both of   
these areas, especially in light of   
the fact that the patient has a   
history of left breast cancer in   
 and recurrent left breast   
cancer in .  
  
On 2011, I performed right   
breast ultrasound-guided, 8-gauge   
Mammotome, vacuum-assisted biopsy   
to 2 separate lesions in the right   
breast. One was in the 9:00 axis of   
zone 3 of the right and measured   
0.87 x 0.76 x 0.54 cm in size.   
Another was in the 9 o'clock/10   
o'clock axis of zone 2 of the right   
breast and measured 0.75 x 0.66 x   
0.45 cm in size. Both lesions   
within the right breast were very   
vague in their appearance and   
somewhat transitory in the ability   
of the ultrasound tech to well   
visualize these consistently.   
However, we persisted and performed   
ultrasound-guided biopsy by 8-gauge   
Mammotome means to both areas.   
Right breast ultrasound-guided   
8-gauge Mammotome vacuum-assisted   
biopsy of the lesion in the 9:00   
axis of zone 3 showed sclerosing   
adenosis. Right breast   
ultrasound-guided 8-gauge Mammotome   
vacuum-assisted biopsy of the   
lesion in the 9-10 o'clock axis of   
zone 2 showed intraductal papilloma   
with moderate usual ductal   
hyperplasia and focal apocrine   
changes. There was no mention made   
of seeing atypia, or premalignant   
changes, or malignant changes in   
either of the two 8-gauge Mammotome   
vacuum-assisted biopsy sites. Both   
sites were marked with clips.  
  
The patient returns to my surgical   
oncology clinic at this time for   
clinical followup.  
  
Complete review of systems is   
otherwise negative aside from   
anything else which was mentioned   
elsewhere within the current clinic   
note.  
  
PHYSICAL EXAMINATION  
  
Vital Signs: BP (!) 191/91 (BP   
Position: Sitting)   Pulse 93     
Temp 97.2 F (36.2 C) (Oral)   Ht   
1.6 m (5' 3 )   Wt 78.6 kg (173 lb   
3.2 oz)   BMI 30.68 kg/m   Smoking   
Status Never  
  
Our Nurse, Elisha Hubbard and Aissatou Orlando, was present for the   
examination.  
  
The patient came to the clinic exam   
room alone.  
  
The patient has a medium-sized   
right breast, at best, for her   
larger body frame. The patient's   
right breast is very pendulous and   
ptotic, especially when she is   
sitting up.  
  
The patient has no new, discrete,   
concerning, palpable masses in her   
right breast. The patient has no   
right nipple abnormalities. The   
patient has no skin changes of her   
right breast.  
  
The patient's left mastectomy site   
and left upper chest wall resection   
site looks well. There is no signs   
of recurrent disease within the   
skin or within the underlying soft   
tissues of her left mastectomy site   
and left upper chest wall resection   
site.  
  
The patient has no cervical,   
supraclavicular, or axillary   
adenopathy bilaterally.  
  
The patient has no significant   
clinical findings and the remainder   
of her clinical examination of her   
chest region or of her clinical   
examination of her abdominal   
region.  
  
BREAST IMAGING  
  
Right breast 3-D digital screening   
mammogram was classified as BI-RADS   
category 2/benign findings/stable   
by the [facility] breast radiology.   
Scattered areas of fibroglandular   
tissue were seen within the right   
breast profile. Two stable biopsy   
clips were noted within the right   
breast from her previous benign   
right breast biopsies. Stable,   
scattered, benign-appearing   
calcifications and vascular   
calcifications were noted within   
her right breast. No new suspicious   
mammographic findings are seen   
within the right breast on right   
breast 3-D digital mammogram  
  
ASSESSMENT AND PLAN  
  
###################################  
############################  
  
The patient has a MORE REMOTE   
personal history of LEFT breast   
cancer process (ER negative, HI   
negative, HER2 Gunnar negative,   
high-grade invasive ductal   
carcinoma) in  [[AND]] a MORE   
RECENT personal history of   
Recurrent LEFT breast cancer   
process (ER negative, HI negative,   
HER2 Gunnar negative, high-grade   
invasive ductal carcinoma) in .  
  
The patient was previously followed   
by Dr. Enedina Linares of [facility] breast   
medical oncology.  
  
###################################  
############################  
  
The patient has a stable clinical   
exam of her left mastectomy site   
and of her right breast.  
  
The patient's right breast 3-D   
digital screening mammogram was   
classified as BI-RADS category   
2/benign findings/stable.  
  
The patient appears to be ENMANUEL from   
her previous history of left breast   
cancer in /recurrent left   
breast cancer in .  
  
We will continue to follow the   
patient annually. Therefore, I will   
next see her back on 2024,   
for repeat clinical exam and right   
breast digital mammogram. She   
should see me back sooner for new   
problems. Otherwise, I will see her   
back on 2024, as is outlined   
above.  
  
I have discussed all of these   
issues with the patient. I have   
answered all of her questions. The   
patient appears to understand what   
has been discussed, and she reports   
that she will follow-through   
accordingly, as is outlined above.  
  
I have reviewed Lise FARRUKH Aaron   
medical, surgical and other   
pertinent history in detail, and   
have updated medication and allergy   
information in the computerized   
patient record.  
  
REFERRING/PRIMARY PROVIDER(S)  
  
-Referring Provider for today's   
consult: Yi Vale APRN-CNP  
-Primary Care Provider: Tiffanie Callahan  
  
  
Electronically signed by Leno Crowe MD at 2023 4:05 PM   
EST  
documented in this encounter            [facility]  
   
                                                    2023 History of   
Present illness Narrative               Formatting of this note might be   
different from the original.  
Patient offered a medical chaperone   
for sensitive exam. Pt declined  
  
Electronically signed by Miranda Polk at 2023 3:09 PM EST  
documented in this encounter            [facility]  
   
                                                    2023 Evaluation +   
Plan note                               Associated Problem(s): Dizziness  
Formatting of this note might be   
different from the original.  
No real change with PT. She is not   
interested in further workup - will   
focus on BG control and water   
intake. Will continue to monitor.   
Return precautions reviewed.  
Electronically signed by Tiffanie Callahan DO at 2023 6:51 AM EDT  
                                        [facility]  
Work Phone:   
[phone]  
   
                                                    2023 Miscellaneous   
Notes                                   Associated Problem(s): Dizziness  
Formatting of this note might be   
different from the original.  
No real change with PT. She is not   
interested in further workup - will   
focus on BG control and water   
intake. Will continue to monitor.   
Return precautions reviewed.  
Electronically signed by Tiffanie Callahan DO at 2023 6:51 AM EDT  
Associated Problem(s): PND   
(post-nasal drip)  
Formatting of this note might be   
different from the original.  
Better with nasal steroid. Will   
continue.  
Electronically signed by Tiffanie Callahan DO at 2023 6:47 AM EDT  
Associated Problem(s): Pharyngeal   
disorder  
Formatting of this note might be   
different from the original.  
Will follow up with ENT as needed.  
Electronically signed by Tiffanie Callahan DO at 2023 6:46 AM EDT  
Associated Problem(s): Breast CA   
(CMS/HCC)  
Formatting of this note might be   
different from the original.  
Scheduled for annual eval at [facility] 2023.  
Electronically signed by Tiffanie Callahan DO at 2023 6:46 AM EDT  
Associated Problem(s): Type 2   
diabetes mellitus without   
complication, without long-term   
current use of insulin (CMS/HCC)  
Formatting of this note might be   
different from the original.  
Due for annual labs - will follow   
up with results when available. No   
changes today. Follow up in 3mo for   
recheck, sooner if needed.  
Electronically signed by Tiffanie Callahan DO at 2023 6:45 AM EDT  
documented in this encounter            [facility]  
Work Phone:   
[phone]  
   
                                                    2023 Evaluation +   
Plan note                               Associated Problem(s): PND   
(post-nasal drip)  
Formatting of this note might be   
different from the original.  
Better with nasal steroid. Will   
continue.  
Electronically signed by Tiffanie Callahan DO at 2023 6:47 AM [facility]  
Work Phone:   
[phone]  
   
                                                    2023 Evaluation +   
Plan note                               Associated Problem(s): Pharyngeal   
disorder  
Formatting of this note might be   
different from the original.  
Will follow up with ENT as needed.  
Electronically signed by Tiffanie Callahan DO at 2023 6:46 AM [facility]  
Work Phone:   
[phone]  
   
                                                    2023 Evaluation +   
Plan note                               Associated Problem(s): Breast CA   
(CMS/HCC)  
Formatting of this note might be   
different from the original.  
Scheduled for annual eval at [facility] 2023.  
Electronically signed by Tiffanie Callahan DO at 2023 6:46 AM [facility]  
Work Phone:   
[phone]  
   
                                                    2023 Evaluation +   
Plan note                               Associated Problem(s): Type 2   
diabetes mellitus without   
complication, without long-term   
current use of insulin (CMS/HCC)  
Formatting of this note might be   
different from the original.  
Due for annual labs - will follow   
up with results when available. No   
changes today. Follow up in 3mo for   
recheck, sooner if needed.  
Electronically signed by Tiffanie Callahan DO at 2023 6:45 AM [facility]  
Work Phone:   
[phone]  
   
                                                    2023 History of   
Present illness Narrative               Formatting of this note is   
different from the original.  
Subjective  
Patient ID: Lise Aaron is a 82   
y.o. female who presents for 6   
month check. Still having a lot of   
dizziness. Went to massage   
therapist and it helped back but   
not the dizziness.  
  
HPI  
DM2 - due for labs,  this   
morning, averaging low to mid 100s,   
when above 150 takes glimepiride   
1mg, last eye check with Dr. Clay 2023  
  
Lightheadedness -  PT can't do   
anything more for me , not   
interested in seeing neurology, saw   
massage therapist, helped back but   
not lightheadedness, feels off   
balance, using a cane now, not   
drinking too much water and plans   
to work on increasing  
  
Breast ca - scheduled for mammogram   
at [facility] 2023  
  
Pharyngeal lesion - plan is for   
follow up with Dr. Murphy as needed  
  
PND - better with otc Flonase,   
sleeps with humidifier  
  
Review of Systems  
Constitutional: Negative for chills   
and fever.  
Respiratory: Negative for cough and   
shortness of breath.  
Cardiovascular: Negative for chest   
pain and palpitations.  
Skin: Negative for rash.  
Neurological: Positive for   
light-headedness.  
Psychiatric/Behavioral: Negative   
for dysphoric mood. The patient is   
not nervous/anxious.  
  
Objective  
/76   Pulse 88   Ht 1.575 m   
(5' 2 )   Wt 80.4 kg (177 lb 3.2   
oz)   BMI 32.41 kg/m  
  
Physical Exam  
Constitutional:  
Appearance: Normal appearance.  
HENT:  
Head: Normocephalic and atraumatic.  
Eyes:  
General: No scleral icterus.  
Conjunctiva/sclera: Conjunctivae   
normal.  
Cardiovascular:  
Rate and Rhythm: Normal rate and   
regular rhythm.  
Heart sounds: No murmur heard.  
Pulmonary:  
Effort: Pulmonary effort is normal.   
No respiratory distress.  
Breath sounds: Normal breath   
sounds.  
Musculoskeletal:  
General: No swelling. Normal range   
of motion.  
Cervical back: Normal range of   
motion and neck supple.  
Skin:  
General: Skin is warm and dry.  
Findings: No rash.  
Neurological:  
General: No focal deficit present.  
Mental Status: She is alert.  
Psychiatric:  
Mood and Affect: Mood normal.  
Behavior: Behavior normal.  
  
Assessment/Plan  
Problem List Items Addressed This   
Visit  
  
Type 2 diabetes mellitus without   
complication, without long-term   
current use of insulin (CMS/[facility]) -   
Primary  
Due for annual labs - will follow   
up with results when available. No   
changes today. Follow up in 3mo for   
recheck, sooner if needed.  
  
  
Relevant Orders  
CBC and Auto Differential   
(Completed)  
Comprehensive Metabolic Panel   
(Completed)  
TSH with reflex to Free T4 if   
abnormal (Completed)  
Hemoglobin A1C (Completed)  
Albumin , Urine Random (Completed)  
Follow Up In Primary Care -   
Medicare Annual  
PND (post-nasal drip)  
Better with nasal steroid. Will   
continue.  
  
  
Pharyngeal disorder  
Will follow up with ENT as needed.  
  
  
Dizziness  
No real change with PT. She is not   
interested in further workup - will   
focus on BG control and water   
intake. Will continue to monitor.   
Return precautions reviewed.  
  
  
Breast CA (CMS/HCC)  
Scheduled for annual eval at [facility]   
[facility] 2023.  
  
  
  
  
Electronically signed by Tiffanie Callahan DO at 2023 6:52 AM EDT  
documented in this encounter            [facility]  
Work Phone:   
[phone]  
   
                                                    2023 History of   
Present illness Narrative               Formatting of this note might be   
different from the original.  
Images from the original note were   
not included.  
  
Electronically signed by Yuki Broderick MA at 2023 12:56 PM   
EDT  
Formatting of this note is   
different from the original.  
Subjective:  
  
Patient ID: Lise Araon is a 82   
y.o. female.  
  
Chief Complaint  
Patient presents with  
1 year f/u  
2022 Oropharyngeal dysphagia  
  
  
  
HPI patient follows for 1 year   
interval clinical reevaluation on   
base of tongue endoscopic benign   
appearing lesion. She is in the   
company of her daughter and reports   
no new symptoms or changes, other   
than orthostatic dizziness and had   
been followed by Dr. Noyola for   
bilateral hearing amplification and   
Astelin. Briefly, patient was   
followed by Dr. Gross in [facility] for   
suspicious lesion on the base of   
the tongue with biopsy not   
confirming malignancy but regular   
follow-up visits in the last 10   
years. Patient denies current   
localized to the area pain,   
difficulties breathing or   
swallowing, voice change, cough,   
hemoptysis, weight loss or other   
constitutional symptoms. She is   
also concerned about her ear   
discomfort and hearing loss.  
  
The following portions of the   
patient's history were reviewed and   
updated as appropriate: allergies,   
current medications, past family   
history, past medical history, past   
social history, past surgical   
history and problem list.  
  
Review of Systems  
Constitutional: Negative. Negative   
for activity change, appetite   
change, chills, diaphoresis,   
fatigue, fever and unexpected   
weight change.  
HENT: Positive for hearing loss.   
Negative for congestion, dental   
problem, drooling, ear discharge,   
ear pain, facial swelling, mouth   
sores, nosebleeds, postnasal drip,   
rhinorrhea, sinus pressure, sinus   
pain, sneezing, sore throat,   
tinnitus, trouble swallowing and   
voice change.  
Eyes: Negative. Negative for   
photophobia, pain, discharge,   
redness, itching and visual   
disturbance.  
Respiratory: Negative. Negative for   
apnea, cough, choking, chest   
tightness, shortness of breath,   
wheezing and stridor.  
Cardiovascular: Negative. Negative   
for chest pain, palpitations and   
leg swelling.  
Gastrointestinal: Negative.   
Negative for abdominal distention,   
abdominal pain, anal bleeding,   
blood in stool, constipation,   
diarrhea, nausea, rectal pain and   
vomiting.  
Endocrine: Negative for cold   
intolerance and heat intolerance.  
Genitourinary: Negative.  
Musculoskeletal: Negative. Negative   
for arthralgias, gait problem, neck   
pain and neck stiffness.  
Skin: Negative. Negative for color   
change, pallor, rash and wound.  
Allergic/Immunologic: Negative for   
environmental allergies, food   
allergies and immunocompromised   
state.  
Neurological: Negative for   
dizziness, tremors, seizures,   
syncope, facial asymmetry, speech   
difficulty, weakness,   
light-headedness, numbness and   
headaches.  
Hematological: Negative for   
adenopathy. Does not bruise/bleed   
easily.  
Psychiatric/Behavioral: Negative.  
  
  
Objective: BP (!) 156/82 (BP   
Location: Right arm, Patient   
Position: Sitting, BP Cuff Size:   
Adult)   Pulse 89   Resp 18   Wt   
81.8 kg (180 lb 6.4 oz)   SpO2 97%  
  
Physical Exam  
Constitutional:  
General: She is not in acute   
distress.  
Appearance: She is well-developed.   
She is not diaphoretic.  
HENT:  
Head: Normocephalic and atraumatic.   
No abrasion, contusion or   
laceration.  
Jaw: No trismus.  
Right Ear: Tympanic membrane, ear   
canal and external ear normal.   
Decreased hearing noted. No   
drainage, swelling or tenderness.   
No middle ear effusion. No mastoid   
tenderness. Tympanic membrane is   
not perforated. Tympanic membrane   
has normal mobility.  
Left Ear: Tympanic membrane and   
external ear normal. Decreased   
hearing noted. No drainage,   
swelling or tenderness. No middle   
ear effusion. No mastoid   
tenderness. Tympanic membrane is   
not perforated. Tympanic membrane   
has normal mobility.  
Ears:  
Gurrola exam findings: Lateralizes   
right.  
Right Rinne: AC > BC.  
Left Rinne: AC > BC.  
Nose: Septal deviation present. No   
nasal deformity, laceration,   
mucosal edema or rhinorrhea.  
Right Sinus: No maxillary sinus   
tenderness or frontal sinus   
tenderness.  
Left Sinus: No maxillary sinus   
tenderness or frontal sinus   
tenderness.  
Mouth/Throat:  
Mouth: No lacerations or oral   
lesions.  
Dentition: Normal dentition. Does   
not have dentures. No dental caries   
or dental abscesses.  
Pharynx: Uvula midline. No   
oropharyngeal exudate, posterior   
oropharyngeal erythema or uvula   
swelling.  
Tonsils: No tonsillar abscesses.  
Eyes:  
General: No scleral icterus.  
Right eye: No discharge.  
Left eye: No discharge.  
Conjunctiva/sclera: Conjunctivae   
normal.  
Pupils: Pupils are equal, round,   
and reactive to light.  
Neck:  
Thyroid: No thyroid mass or   
thyromegaly.  
Vascular: No JVD.  
Trachea: Trachea and phonation   
normal. No tracheal deviation.  
Cardiovascular:  
Rate and Rhythm: Normal rate and   
regular rhythm.  
Heart sounds: Normal heart sounds.  
Pulmonary:  
Effort: No respiratory distress.  
Breath sounds: No stridor. No   
wheezing or rales.  
Chest:  
Chest wall: No tenderness.  
Abdominal:  
General: Bowel sounds are normal.   
There is no distension.  
Palpations: Abdomen is soft.  
Tenderness: There is no abdominal   
tenderness.  
Musculoskeletal:  
General: Normal range of motion.  
Cervical back: Normal range of   
motion and neck supple.  
Lymphadenopathy:  
Cervical: No cervical adenopathy.  
Right cervical: No superficial,   
deep or posterior cervical   
adenopathy.  
Left cervical: No superficial, deep   
or posterior cervical adenopathy.  
Skin:  
General: Skin is warm and dry.  
Findings: No erythema or rash.  
Neurological:  
Mental Status: She is alert and   
oriented to person, place, and   
time.  
Cranial Nerves: No cranial nerve   
deficit.  
Coordination: Coordination normal.  
Psychiatric:  
Behavior: Behavior normal.  
Thought Content: Thought content   
normal.  
Judgment: Judgment normal.  
  
  
Procedure: Flexible fiberoptic   
nasopharyngoscopy/laryngoscopy, CPT   
code 89861.  
Preoperative diagnosis: History of   
oropharyngeal lesion.  
Postoperative diagnosis: No   
lesions, masses or mobility   
disorder suspicious for malignancy   
in the upper aerodigestive tract.  
Anesthesia: Topical 4% lidocaine   
and 0.5% Paul-Synephrine.  
Complications: None  
Patient's condition: Stable   
throughout the exam  
Indications and consent: Patient   
with need for endoscopic   
documentation, high gag reflex or   
inadequate view with mirror exam.   
After informed discussion of the   
risks, benefits and alternatives   
procedure was recommended for above   
mentioned indications and the   
patient was consented to this.  
Flexible fiberoptic laryngoscopy   
was recommended, all of the risks,   
benefits and potential   
complications were reviewed with   
the patient preoperatively and   
informed consent was obtained.  
Procedure details: The patient was   
seated in a slightly reclined   
position. Topical 4% lidocaine and   
0.5% Paul-Synephrine were instilled   
into both nasal passages. After   
adequate anesthesia had occurred, 4   
mm flexible telescope then was   
introduced and passed initially   
through left and subsequently   
through right nasal cavities. The   
nasal cavity was patent, with some   
narrowing due to nasal septum   
deviation and spur, mucosa pink   
with clear mucus secretions.   
Telescope was advanced to the   
nasopharynx which revealed no   
exophytic lesions or masses around   
both eustachian tube orifices,   
Rosenmuller's fossa's and adenoidal   
pad. No velopalatine insufficiency   
upon elevation of the soft palate.   
The base of tongue, valleculae   
lingual and laryngeal surface of   
the epiglottis were normal with no   
visible lesions or masses. There   
were no signs of pooling of the   
secretions in the piriform sinuses.   
The true vocal folds were normal   
with full range of motion's upon   
phonation and inspiration, no   
atrophy or gap. The glottic closure   
was complete. The mucosal wave was   
present with normal amplitude upon   
phonation. There were no signs of   
subglottic mucosal lesions or   
airway obstructing masses. The   
telescope then was slowly   
withdrawn. Patient tolerated   
procedure well with stable vital   
signs throughout the procedure.  
Manipulated:Amanda Gill MD  
  
Assessment/Plan: Clinical   
evaluation including office-based   
endoscopy revealed no lesions,   
masses or mobility disorder in the   
upper aerodigestive tract   
suspicious for malignancy.   
Reassurance, information and   
instructions to follow-up with me   
as needed upon any changes   
provided. Hearing amplification to   
be followed by Dr. Noyola. All   
patient's questions were answered   
appropriately to the level of her   
satisfaction. She had expressed   
comprehensive understanding was in   
agreement with suggested plan.  
  
Portions of this chart were created   
using Dragon voice recognition   
software. Occasional wrong-word or   
 sound-like  substitutions may have   
occurred due to inherent   
limitations of the voice   
recognition software. Please read   
the chart carefully and recognize,   
using context, where the   
substitutions have occurred.  
  
  
1. Oropharyngeal lesion  
  
2. Sensorineural hearing loss,   
asymmetrical  
  
3. Dizziness  
  
No orders of the defined types were   
placed in this encounter.  
  
  
  
Electronically signed by Amanda Gill MD at   
2023 2:39 PM EDT  
documented in this encounter            [facility]  
   
                                                    2023 History of   
Present illness Narrative               Patient identified by name and date   
of birth.Ms. Aaron is progressing   
poorly through their POC addressing   
dizziness. Pt has attended 7   
sessions since 23. She   
verbalizes worsening symptoms of   
dizziness and demos little to no   
improvements since eval. Slight   
improvements in cervical AROM as   
manual therapy has been being   
performed for potential   
cervicogenic dizziness however this   
does not seem to be effective for   
subjective symptoms. No nystagmus   
detected during BPPV testing   
therefore BPPV ruled out at this   
time. She does have relief with   
alleve as well as high BP and some   
issues with blood sugar, which at   
this time is believed to be the   
main causes of dizziness. She is   
being referred to neurologist for   
further follow up at this time. If   
pt does not return within 30 days   
she will be d/c from skilled PT per   
attendance policy. Pt and pt's   
daughter with agreement and   
understanding.                           [facility]  
Work Phone:   
[phone]  
   
                                                    2022 History of   
Present illness Narrative               Formatting of this note is   
different from the original.  
Nurse Note:  
Review of Systems  
Constitutional: Negative for   
fatigue and unexpected weight   
change.  
Eyes: Negative for visual   
disturbance.  
Respiratory: Negative for cough and   
shortness of breath.  
Cardiovascular: Positive for leg   
swelling. Negative for chest pain.  
Gastrointestinal: Positive for   
constipation. Negative for   
diarrhea, nausea and vomiting.  
Endocrine: Positive for polyuria.   
Negative for polydipsia.  
Skin: Positive for rash.  
Neurological: Negative for   
numbness.  
Psychiatric/Behavioral: Positive   
for sleep disturbance.  
  
Nursing Assessment:  
Physical Exam  
  
Electronically signed by Jana Dobson at 2022 2:10 PM EST  
Formatting of this note is   
different from the original.  
History of Present Illness  
  
Type 2 diabetes: This is her third   
visit to the office. She is here   
with her daughter, Mariah. Patient   
is complaining about dizziness. Has   
type 2 diabetes. Is wondering if it   
is a medication reaction   
(dizziness). Currently taking   
Victoza 1.8 mg daily, glimepiride   
if morning blood sugars are above   
160. Glimepiride was discontinued   
last visit for fears of   
hypoglycemia. Previously took   
metformin, it was discontinued due   
to concerns about contamination.   
She was tolerating 1 500 mg   
extended release metformin daily.   
Last hemoglobin A1c = 8.3%. Good   
renal function. Since discontinuing   
glimepiride, no change in her   
complaints of dizziness. Diet   
appears reasonable, although she is   
having 30-50 grams of carb at   
breakfast, and about 30-40 grams a   
bedtime snack. CGM download shows   
good overnight blood sugars,   
postprandial hyperglycemia after   
all meals with blood sugars rising   
as high as 300 mg/dL.  
  
Back on fruit, she is eating less   
carbs, sensor now demonstrates she   
is 76% time in target range with   
GMI improved to 7.0%. She did not   
tolerate Jardiance secondary to   
dizziness, but is tolerating   
Victoza.  
  
  
Review of Systems  
Nurse Note:  
Review of Systems  
Constitutional: Negative for   
fatigue and unexpected weight   
change.  
Eyes: Negative for visual   
disturbance.  
Respiratory: Negative for cough and   
shortness of breath.  
Cardiovascular: Positive for leg   
swelling. Negative for chest pain.  
Gastrointestinal: Positive for   
constipation. Negative for   
diarrhea, nausea and vomiting.  
Endocrine: Positive for polyuria.   
Negative for polydipsia.  
Skin: Positive for rash.  
Neurological: Negative for   
numbness.  
Psychiatric/Behavioral: Positive   
for sleep disturbance.  
  
Nursing Assessment:  
Physical Exam  
  
Vitals: Blood pressure 132/74,   
pulse 95, weight 83.5 kg (184 lb),   
SpO2 95 %.  
  
Physical Exam  
Constitutional:  
General: She is not in acute   
distress.  
Appearance: She is not diaphoretic.  
HENT:  
Head: Normocephalic.  
Nose: Nose normal.  
Cardiovascular:  
Rate and Rhythm: Normal rate.  
Pulmonary:  
Effort: Pulmonary effort is normal.  
Skin:  
General: Skin is warm and dry.  
Neurological:  
Mental Status: She is alert and   
oriented to person, place, and   
time.  
Psychiatric:  
Judgment: Judgment normal.  
  
Neurological Exam  
Mental Status  
Alert. Oriented to person, place,   
and time.  
  
foot exam: Intact monofilament   
sensation. Bilateral edema. +1   
dorsalis pedis pulses bilaterally.   
elongated toenails, but otherwise   
good foot care  
  
  
  
  
Assessment and Plan  
type 2 diabetes: Significant   
improvement now that she has cut   
back on her fruit intake. Doing   
well with Victoza alone. May   
continue same regimen, we discussed   
the influence of carbs on her   
postprandial hyperglycemia. At this   
point, she would prefer to follow   
with her primary care provider due   
to driving distance. We will be   
happy to see her back in the office   
if needed in the future. She may   
take an occasional glimepiride as   
needed if she is noting   
hyperglycemia due to illness or   
other issues. We did have a lengthy   
discussion regarding the risk of   
hypoglycemia when taking   
glimepiride. GMI predicts her next   
hemoglobin A1c to be about 7%.  
Electronically signed by Madai Lennon MD at 2022 2:10 PM   
EST  
documented in this encounter            [facility]  
   
                                        2022 Procedure note Associated Ord  
er(s): HI CONTINUOUS   
GLUCOSE MONITORING ANALYSIS I&R  
Formatting of this note might be   
different from the original.  
CGM download shows 74% of blood   
sugars at target, 24% high, 2% very   
high, GMI = 7.0%. Overnight time   
frame looks excellent. Mild   
postprandial hyperglycemia at many   
meals with a return to baseline. It   
is apparent she has cut back on   
carbs since last visit as she is   
now 74% time in target range, up   
from 50% time in target.  
Electronically signed by Madai Lennon MD at 2022 2:10 PM   
EST  
                                        [facility]  
   
                                        2022 Procedure note Associated [facility]  
er(s): HI CONTINUOUS   
GLUCOSE MONITORING ANALYSIS I&R  
Formatting of this note might be   
different from the original.  
CGM download shows 74% of blood   
sugars at target, 24% high, 2% very   
high, GMI = 7.0%. Overnight time   
frame looks excellent. Mild   
postprandial hyperglycemia at many   
meals with a return to baseline. It   
is apparent she has cut back on   
carbs since last visit as she is   
now 74% time in target range, up   
from 50% time in target.  
Electronically signed by Madai Lennon MD at 2022 2:10 PM   
EST  
documented in this encounter            [facility]  
   
                                                    2022 History of   
Present illness Narrative               Formatting of this note might be   
different from the original.  
Millie placed on patient's upper   
(Left) arm without difficulty.  
  
FAQ Sheet reviewed and given to   
patient.  
  
Patient instructed on the care and   
risks of the sensor.  
  
Blood glucose log reviewed and   
given to patient.  
  
Patient instructed to continue to   
check blood sugar as directed.  
  
Patient voiced understanding and   
will call the clinic with any   
questions or concerns.  
  
Lot # [id number]  
Serial # [id number]  
Expiration date: 2023  
Electronically signed by Amor Rios   
at 2022 3:49 PM EDT  
documented in this encounter            [facility]  
   
                                                    2022 History of   
Present illness Narrative               Formatting of this note is   
different from the original.  
  
  
FOLLOW UP NOTE FOR [facility]   
SURGICAL ONCOLOGY CLINIC: DR CROWE:  
  
HISTORY OF PRESENT ILLNESS  
  
The patient is well known to me. I   
last saw her on 10/27/2021. I first   
met her on 12/10/2008.  
  
The patient was a previous patient   
of Dr. Kermit Hinton of [facility]   
surgical oncology.  
  
The patient is currently a   
81-year-old white female.  
  
The patient lives in [place], [place].  
  
###################################  
############################  
  
The patient has a MORE REMOTE   
personal history of LEFT breast   
cancer process (ER negative, HI   
negative, HER2 Gunnar negative,   
high-grade invasive ductal   
carcinoma) in  [[AND]] a MORE   
RECENT personal history of   
Recurrent LEFT breast cancer   
process (ER negative, HI negative,   
HER2 Gunnar negative, high-grade   
invasive ductal carcinoma) in .  
  
The patient was previously followed   
by Dr. Enedina Linares of [facility] breast   
medical oncology.  
  
###################################  
############################  
  
The patient was originally   
diagnosed with a left breast cancer   
in , for which she was treated   
by Dr. Kermit Hinton of surgical   
oncology at that time in  with   
breast-conserving surgery with left   
breast lumpectomy, left axillary   
sentinel lymph node biopsy   
procedure, and subsequent   
reexcision to the left breast, and   
for which she received   
postoperative systemic chemotherapy   
and postoperative radiation   
therapy.  
  
In late , she developed a   
recurrence of tumor above her left   
breast lumpectomy site along her   
superior medial aspect of her left   
chest wall region.  
  
On 01/15/2009, I performed a   
modified type of procedure in which   
I performed a left total mastectomy   
along with en bloc resection of the   
skin and soft tissues of the left   
upper medial chest wall region   
along with resection of the   
underlying left pectoralis major   
muscle along the superior medial   
portion to be used as our final   
posterior margin resection. I used   
a skin flap of the central inferior   
pole of her left breast skin, after   
removing the underlying left breast   
tissue, as my rotational flap to   
close this defect. She did fine   
from that. She had some mild   
postoperative wound healing   
problems which eventually resolved.  
  
The patient did not have any   
subsequent additional systemic   
chemotherapy or additional   
radiation therapy thereafter.  
  
The patient's original recurrence   
of her breast cancer along the   
upper inner aspect of her left   
chest wall and left breast site for   
which I took her to the operating   
room on 01/15/2009, was ER   
negative, HI negative, and negative   
for HER-2/gunnar.  
  
Likewise, the patient is also   
followed by Dr. Marcos Lindsey of head   
and neck surgery at the [facility] for abnormalities   
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   
tonsillar region.  
  
I have most recently evaluated the   
patient for a palpable area under   
her left mastectomy and left chest   
wall resection site which I   
presumed was a hematoma. This area   
was classified as BI-RADS category   
3 on imaging. However, the   
patient's medical oncologist, Dr. Enedina Linares of breast medical   
oncology, recommended that we   
biopsy this area.  
  
Therefore, on 2009, I   
performed a left mastectomy site   
ultrasound-guided 8-gauge Mammotome   
vacuum-assisted biopsy to the   
complex fluid collection underneath   
her left mastectomy site. I took a   
total of 12 separate 8-gauge   
Mammotome cores and sampled the   
entire anterior wall of this   
complex fluid collection. Within   
these cores, grossly you could see   
thickened tissue representing the   
wall of the complex fluid   
collection, as well as old   
hematoma. These did not look   
terribly significant. These were   
all sent off for permanent   
pathology. The patient tolerated   
the procedure well. The patient's   
ultrasound-guided 8-gauge Mammotome   
vacuum-assisted biopsy to the   
complex fluid collection of the   
left mastectomy site has been   
reviewed by Dr. Leno Hoyt of   
the Department of Pathology. Dr. Hoyt saw findings consistent with   
prior surgical site representing a   
complex fluid collection which   
appears to be completely benign   
representing the area of the   
previous surgical resection. This   
showed no evidence of invasive   
carcinoma and no evidence of ductal   
carcinoma in situ. Additionally,   
within this area of benign changes,   
he saw scant breast tissue an area   
of sclerosing adenosis. Likewise,   
he did note one area that he said   
that he would describe as cytologic   
atypia, but for which he said he   
did not feel met criteria to be   
called atypical ductal hyperplasia,   
based on the fact that this was   
simply cytologic atypia and did not   
represent architectural changes as   
one would need to define it as   
atypical ductal hyperplasia.   
Likewise, he said that he thought   
that the cytologic atypia could be   
due to her previous history of   
radiation therapy as well as the   
chronic changes within the post   
surgical bed representing chronic   
inflammatory benign changes within   
the tissues.  
  
Right digital unilateral mammogram   
on 2009 was classified as   
stable and BI-RADS category 2 by   
the radiology staff. Therefore, on   
2009, the patient had a   
stable clinical exam at this time   
of her right breast and of her left   
mastectomy site and her left chest   
wall resection site. She had   
nothing at this time which   
signifies evidence of recurrent   
disease in her left mastectomy site   
and left chest wall resection site.  
  
More recently on breast imaging   
performed in 2010, the   
patient was found to have 2 areas   
within her right breast, with one   
in the 9 o'clock axis which was   
classified as BI-RADS category 4,   
and with one in the 9-10 o'clock   
axis which was classified as   
BI-RADS category 3. I recommended   
to the patient that we perform   
ultrasound-guided 8-gauge Mammotome   
vacuum-assisted biopsy to both of   
these areas, especially in light of   
the fact that the patient has a   
history of left breast cancer in   
 and recurrent left breast   
cancer in .  
  
On 2011, I performed right   
breast ultrasound-guided, 8-gauge   
Mammotome, vacuum-assisted biopsy   
to 2 separate lesions in the right   
breast. One was in the 9:00 axis of   
zone 3 of the right and measured   
0.87 x 0.76 x 0.54 cm in size.   
Another was in the 9 o'clock/10   
o'clock axis of zone 2 of the right   
breast and measured 0.75 x 0.66 x   
0.45 cm in size. Both lesions   
within the right breast were very   
vague in their appearance and   
somewhat transitory in the ability   
of the ultrasound tech to well   
visualize these consistently.   
However, we persisted and performed   
ultrasound-guided biopsy by 8-gauge   
Mammotome means to both areas.   
Right breast ultrasound-guided   
8-gauge Mammotome vacuum-assisted   
biopsy of the lesion in the 9:00   
axis of zone 3 showed sclerosing   
adenosis. Right breast   
ultrasound-guided 8-gauge Mammotome   
vacuum-assisted biopsy of the   
lesion in the 9-10 o'clock axis of   
zone 2 showed intraductal papilloma   
with moderate usual ductal   
hyperplasia and focal apocrine   
changes. There was no mention made   
of seeing atypia, or premalignant   
changes, or malignant changes in   
either of the two 8-gauge Mammotome   
vacuum-assisted biopsy sites. Both   
sites were marked with clips.  
  
The patient returns to my surgical   
oncology clinic at this time for   
clinical followup.  
  
Complete review of systems is   
otherwise negative aside from   
anything else which was mentioned   
elsewhere within the current clinic   
note.  
  
PHYSICAL EXAMINATION  
  
Vital Signs: /85   Pulse 85     
Temp 97.5 F (36.4 C)   Resp 16   Wt   
82.1 kg (181 lb)   BMI 32.56 kg/m     
Smoking Status Never  
  
Our Nurse, Katie Bowen and   
Yi Vale, was present for the   
examination.  
  
The patient came to the clinic exam   
room alone.  
  
The patient has a medium-sized   
right breast, at best, for her   
larger body frame. The patient's   
right breast is very pendulous and   
ptotic, especially when she is   
sitting up.  
  
The patient has no new, discrete,   
concerning, palpable masses in her   
right breast. The patient has no   
right nipple abnormalities. The   
patient has no skin changes of her   
right breast.  
  
The patient's left mastectomy site   
and left upper chest wall resection   
site looks well. There is no signs   
of recurrent disease within the   
skin or within the underlying soft   
tissues of her left mastectomy site   
and left upper chest wall resection   
site.  
  
The patient has no cervical,   
supraclavicular, or axillary   
adenopathy bilaterally.  
  
The patient has no significant   
clinical findings and the remainder   
of her clinical examination of her   
chest region or of her clinical   
examination of her abdominal   
region.  
  
BREAST IMAGING  
  
Right breast 3-D digital screening   
mammogram was classified as BI-RADS   
category 2/benign findings/stable   
by the [facility] breast radiology.   
Scattered areas of fibroglandular   
tissue were seen within the right   
breast profile. Two stable biopsy   
clips were noted within the right   
breast from her previous benign   
right breast biopsies. Stable,   
scattered, benign-appearing   
calcifications and vascular   
calcifications were noted within   
her right breast. No new suspicious   
mammographic findings are seen   
within the right breast on right   
breast 3-D digital mammogram  
  
ASSESSMENT AND PLAN  
  
###################################  
############################  
  
The patient has a MORE REMOTE   
personal history of LEFT breast   
cancer process (ER negative, HI   
negative, HER2 Gunnar negative,   
high-grade invasive ductal   
carcinoma) in  [[AND]] a MORE   
RECENT personal history of   
Recurrent LEFT breast cancer   
process (ER negative, HI negative,   
HER2 Gunnar negative, high-grade   
invasive ductal carcinoma) in .  
  
The patient was previously followed   
by Dr. Enedina Linares of [facility] breast   
medical oncology.  
  
###################################  
############################  
  
The patient has a stable clinical   
exam of her left mastectomy site   
and of her right breast.  
  
The patient's right breast 3-D   
digital screening mammogram was   
classified as BI-RADS category   
2/benign findings/stable.  
  
The patient appears to be ENMANUEL from   
her previous history of left breast   
cancer in /recurrent left   
breast cancer in .  
  
We will continue to follow the   
patient annually. Therefore, I will   
next see her back on 2023,   
for repeat clinical exam and right   
breast digital mammogram. She   
should see me back sooner for new   
problems. Otherwise, I will see her   
back on 2023, as is outlined   
above.  
  
I have discussed all of these   
issues with the patient. I have   
answered all of her questions. The   
patient appears to understand what   
has been discussed, and she reports   
that she will follow-through   
accordingly, as is outlined above.  
  
I have reviewed Lise CHADWICK Galen   
medical, surgical and other   
pertinent history in detail, and   
have updated medication and allergy   
information in the computerized   
patient record.  
  
REFERRING/PRIMARY PROVIDER(S)  
  
-Referring Provider for today's   
consult: Tiffanie Callahan,   
-Primary Care Provider: Tiffanie Callahan  
  
  
Electronically signed by Leno Crowe MD at 2022 2:47 PM   
EDT  
documented in this encounter            [facility]  
   
                                                    2022 History of   
Present illness Narrative               Formatting of this note might be   
different from the original.  
Patient offered a medical chaperone   
for sensitive exam. Pt declined  
  
Electronically signed by Miranda Polk at 2022 1:46 PM EDT  
documented in this encounter            [facility]  
   
                                                    2022 History of   
Present illness Narrative               Formatting of this note might be   
different from the original.  
Millie placed on patient's upper   
(Left) arm without difficulty.  
  
FAQ Sheet reviewed and given to   
patient.  
  
Patient instructed on the care and   
risks of the sensor.  
  
Blood glucose log reviewed and   
given to patient.  
  
Patient instructed to continue to   
check blood sugar as directed.  
  
Patient voiced understanding and   
will call the clinic with any   
questions or concerns.  
  
Lot # [id number]  
Serial # [id number]  
Expiration date: 2023  
Electronically signed by Amor Rios   
at 2022 1:34 PM EDT  
documented in this encounter            [facility]  
   
                                                    2022 History of   
Present illness Narrative               Formatting of this note is   
different from the original.  
Nurse Note:  
Review of Systems  
Constitutional: Positive for   
fatigue and unexpected weight   
change (losing weight).  
Eyes: Positive for visual   
disturbance (macular degeneration   
and glaucoma).  
Respiratory: Positive for cough.   
Negative for shortness of breath.  
Cardiovascular: Positive for chest   
pain (scar tissue from massectomy).   
Negative for leg swelling.  
Gastrointestinal: Positive for   
constipation and diarrhea. Negative   
for nausea and vomiting.  
Endocrine: Positive for polydipsia   
and polyuria.  
Skin: Negative for rash.  
Neurological: Positive for   
dizziness and numbness   
(occasionally hands).  
Psychiatric/Behavioral: Negative   
for sleep disturbance.  
  
Pt has seen diabetic educator in   
 to   
Nursing Assessment:  
Physical Exam  
  
Electronically signed by Amor Rios   
at 2022 12:51 PM EDT  
Formatting of this note is   
different from the original.  
History of Present Illness  
  
Type 2 diabetes: This is her first   
visit to the office. She is here   
with her daughter, Mariah. Patient   
is complaining about dizziness. Has   
type 2 diabetes. Is wondering if it   
is a medication reaction   
(dizziness). Currently taking   
Victoza 1.8 mg daily, glimepiride   
if morning blood sugars are above   
160. Previously took metformin, it   
was discontinued due to concerns   
about contamination. She was   
tolerating 1 500 mg extended   
release metformin daily. Last   
hemoglobin A1c = 8.3%. She does not   
have a low-carb diet. Good renal   
function. No logbook today.  
  
  
Review of Systems  
Nurse Note:  
Review of Systems  
Constitutional: Positive for   
fatigue and unexpected weight   
change (losing weight).  
Eyes: Positive for visual   
disturbance (macular degeneration   
and glaucoma).  
Respiratory: Positive for cough.   
Negative for shortness of breath.  
Cardiovascular: Positive for chest   
pain (scar tissue from massectomy).   
Negative for leg swelling.  
Gastrointestinal: Positive for   
constipation and diarrhea. Negative   
for nausea and vomiting.  
Endocrine: Positive for polydipsia   
and polyuria.  
Skin: Negative for rash.  
Neurological: Positive for   
dizziness and numbness   
(occasionally hands).  
Psychiatric/Behavioral: Negative   
for sleep disturbance.  
  
Pt has seen diabetic educator in   
 to   
Nursing Assessment:  
Physical Exam  
  
Vitals: Blood pressure 140/70,   
pulse 99, resp. rate 16, height   
1.588 m (5' 2.5 ), weight 86.1 kg   
(189 lb 12.8 oz).  
  
Physical Exam  
Constitutional:  
General: She is not in acute   
distress.  
Appearance: She is not diaphoretic.  
HENT:  
Head: Normocephalic.  
Nose: Nose normal.  
Cardiovascular:  
Rate and Rhythm: Normal rate.  
Pulmonary:  
Effort: Pulmonary effort is normal.  
Skin:  
General: Skin is warm and dry.  
Neurological:  
Mental Status: She is alert and   
oriented to person, place, and   
time.  
Psychiatric:  
Judgment: Judgment normal.  
  
Neurological Exam  
Mental Status  
Alert. Oriented to person, place,   
and time.  
  
foot exam: Intact monofilament   
sensation. Bilateral edema. +1   
dorsalis pedis pulses bilaterally.   
elongated toenails, but otherwise   
good foot care  
  
  
  
  
Assessment and Plan  
type 2 diabetes: Given her a   
logbook and asked her to check   
blood sugars 4 times a day for one   
week's we can get a basic idea of   
what her blood sugars are running   
like during the day. We will   
arrange for her next available CGM   
device with food diary for deeper   
analysis. I am concerned about the   
sulfonylurea use. We did discuss   
risk of hypoglycemia. Have asked   
for her to taqueria in her log book   
when she feels dizzy so we may   
determine if there is any   
correlation with blood sugars.  
  
  
Electronically signed by Madai Lennon MD at 2022 12:51 PM   
EDT  
documented in this [facility]  
   
                                                    2022 History of   
Present illness Narrative               Formatting of this note might be   
different from the original.  
Images from the original note were   
not included.  
  
Electronically signed by Yuki Broderick MA at 2022 1:19 PM   
EDT  
Formatting of this note is   
different from the original.  
Subjective:  
  
Patient ID: Lise Aaron is a 81   
y.o. female.  
  
Chief Complaint  
Patient presents with  
Pharyngeal disorder  
New pt  
  
  
HPI kind referral for my evaluation   
to establish ENT follow-up due to   
the history of oropharyngeal lesion   
and associated dysphagia.   
Reportedly, patient was followed by   
Dr. Gross in [facility] for suspicious   
lesion on the base of the tongue   
with biopsy not confirming   
malignancy but regular follow-up   
visits in the last 10 years.   
Patient denies current localized to   
the area pain, difficulties   
breathing or swallowing, voice   
change, cough, hemoptysis, weight   
loss or other constitutional   
symptoms. She is also concerned   
about her ear discomfort and   
hearing loss.  
  
The following portions of the   
patient's history were reviewed and   
updated as appropriate: allergies,   
current medications, past family   
history, past medical history, past   
social history, past surgical   
history and problem list.  
  
Review of Systems  
Constitutional: Negative. Negative   
for activity change, appetite   
change, chills, diaphoresis,   
fatigue, fever and unexpected   
weight change.  
HENT: Positive for hearing loss.   
Negative for congestion, dental   
problem, drooling, ear discharge,   
ear pain, facial swelling, mouth   
sores, nosebleeds, postnasal drip,   
rhinorrhea, sinus pressure, sinus   
pain, sneezing, sore throat,   
tinnitus, trouble swallowing and   
voice change.  
Eyes: Negative. Negative for   
photophobia, pain, discharge,   
redness, itching and visual   
disturbance.  
Respiratory: Negative. Negative for   
apnea, cough, choking, chest   
tightness, shortness of breath,   
wheezing and stridor.  
Cardiovascular: Negative. Negative   
for chest pain, palpitations and   
leg swelling.  
Gastrointestinal: Negative.   
Negative for abdominal distention,   
abdominal pain, anal bleeding,   
blood in stool, constipation,   
diarrhea, nausea, rectal pain and   
vomiting.  
Endocrine: Negative for cold   
intolerance and heat intolerance.  
Genitourinary: Negative.  
Musculoskeletal: Negative. Negative   
for arthralgias, gait problem, neck   
pain and neck stiffness.  
Skin: Negative. Negative for color   
change, pallor, rash and wound.  
Allergic/Immunologic: Negative for   
environmental allergies, food   
allergies and immunocompromised   
state.  
Neurological: Negative for   
dizziness, tremors, seizures,   
syncope, facial asymmetry, speech   
difficulty, weakness,   
light-headedness, numbness and   
headaches.  
Hematological: Negative for   
adenopathy. Does not bruise/bleed   
easily.  
Psychiatric/Behavioral: Negative.  
  
  
Objective: BP (!) 160/83   Pulse   
(!) 106   Resp 18   Wt 86.9 kg (191   
lb 9.6 oz)   SpO2 96%  
  
Physical Exam  
Constitutional:  
General: She is not in acute   
distress.  
Appearance: She is well-developed.   
She is not diaphoretic.  
HENT:  
Head: Normocephalic and atraumatic.   
No abrasion, contusion or   
laceration.  
Jaw: No trismus.  
Right Ear: Tympanic membrane, ear   
canal and external ear normal.   
Decreased hearing noted. No   
drainage, swelling or tenderness.   
No middle ear effusion. No mastoid   
tenderness. Tympanic membrane is   
not perforated. Tympanic membrane   
has normal mobility.  
Left Ear: Tympanic membrane and   
external ear normal. Decreased   
hearing noted. No drainage,   
swelling or tenderness. No middle   
ear effusion. No mastoid   
tenderness. Tympanic membrane is   
not perforated. Tympanic membrane   
has normal mobility.  
Ears:  
Gurrola exam findings: lateralizes   
right.  
Right Rinne: AC > BC.  
Left Rinne: AC > BC.  
Nose: Septal deviation present. No   
nasal deformity, laceration,   
mucosal edema or rhinorrhea.  
Right Sinus: No maxillary sinus   
tenderness or frontal sinus   
tenderness.  
Left Sinus: No maxillary sinus   
tenderness or frontal sinus   
tenderness.  
Mouth/Throat:  
Mouth: No lacerations or oral   
lesions.  
Dentition: Normal dentition. Does   
not have dentures. No dental caries   
or dental abscesses.  
Pharynx: Uvula midline. No   
oropharyngeal exudate, posterior   
oropharyngeal erythema or uvula   
swelling.  
Tonsils: No tonsillar abscesses.  
Eyes:  
General: No scleral icterus.  
Right eye: No discharge.  
Left eye: No discharge.  
Conjunctiva/sclera: Conjunctivae   
normal.  
Pupils: Pupils are equal, round,   
and reactive to light.  
Neck:  
Thyroid: No thyroid mass or   
thyromegaly.  
Vascular: No JVD.  
Trachea: Trachea and phonation   
normal. No tracheal deviation.  
Cardiovascular:  
Rate and Rhythm: Normal rate and   
regular rhythm.  
Heart sounds: Normal heart sounds.  
Pulmonary:  
Effort: No respiratory distress.  
Breath sounds: No stridor. No   
wheezing or rales.  
Chest:  
Chest wall: No tenderness.  
Abdominal:  
General: Bowel sounds are normal.   
There is no distension.  
Palpations: Abdomen is soft.  
Tenderness: There is no abdominal   
tenderness.  
Musculoskeletal:  
General: Normal range of motion.  
Cervical back: Normal range of   
motion and neck supple.  
Lymphadenopathy:  
Cervical: No cervical adenopathy.  
Right cervical: No superficial,   
deep or posterior cervical   
adenopathy.  
Left cervical: No superficial, deep   
or posterior cervical adenopathy.  
Skin:  
General: Skin is warm and dry.  
Findings: No erythema or rash.  
Neurological:  
Mental Status: She is alert and   
oriented to person, place, and   
time.  
Cranial Nerves: No cranial nerve   
deficit.  
Coordination: Coordination normal.  
Psychiatric:  
Behavior: Behavior normal.  
Thought Content: Thought content   
normal.  
Judgment: Judgment normal.  
  
  
Procedure: Flexible fiberoptic   
nasopharyngoscopy/laryngoscopy, CPT   
code 00604.  
Preoperative diagnosis: History of   
oropharyngeal lesion.  
Postoperative diagnosis: No   
lesions, masses or mobility   
disorder suspicious for malignancy   
in the upper aerodigestive tract.  
Anesthesia: Topical 4% lidocaine   
and 0.5% Paul-Synephrine.  
Complications: None  
Patient's condition: Stable   
throughout the exam  
Indications and consent: Patient   
with need for endoscopic   
documentation, high gag reflex or   
inadequate view with mirror exam.   
After informed discussion of the   
risks, benefits and alternatives   
procedure was recommended for above   
mentioned indications and the   
patient was consented to this.  
Flexible fiberoptic laryngoscopy   
was recommended, all of the risks,   
benefits and potential   
complications were reviewed with   
the patient preoperatively and   
informed consent was obtained.  
Procedure details: The patient was   
seated in a slightly reclined   
position. Topical 4% lidocaine and   
0.5% Paul-Synephrine were instilled   
into both nasal passages. After   
adequate anesthesia had occurred, 4   
mm flexible telescope then was   
introduced and passed initially   
through left and subsequently   
through right nasal cavities. The   
nasal cavity was patent, with some   
narrowing due to nasal septum   
deviation and spur, mucosa pink   
with clear mucus secretions.   
Telescope was advanced to the   
nasopharynx which revealed no   
exophytic lesions or masses around   
both eustachian tube orifices,   
Rosenmuller's fossa's and adenoidal   
pad. No velopalatine insufficiency   
upon elevation of the soft palate.   
The base of tongue, valleculae   
lingual and laryngeal surface of   
the epiglottis were normal with no   
visible lesions or masses. There   
were no signs of pooling of the   
secretions in the piriform sinuses.   
The true vocal folds were normal   
with full range of motion's upon   
phonation and inspiration, no   
atrophy or gap. The glottic closure   
was complete. The mucosal wave was   
present with normal amplitude upon   
phonation. There were no signs of   
subglottic mucosal lesions or   
airway obstructing masses. The   
telescope then was slowly   
withdrawn. Patient tolerated   
procedure well with stable vital   
signs throughout the procedure.  
Manipulated:Amanda Gill MD  
  
Assessment/Plan: Clinical   
evaluation including office-based   
endoscopy revealed no lesions,   
masses or mobility disorder in the   
upper aerodigestive tract   
suspicious for malignancy.   
Reassurance, information and   
instructions to follow-up with me   
on the annual basis or immediately   
as needed upon any changes   
provided. Bilateral slightly   
asymmetric with the left ear worse   
than right sensorineural hearing   
loss. Medical clearance for   
bilateral hearing amplification   
provided. All patient's questions   
were answered appropriately to the   
level of her satisfaction. She had   
expressed comprehensive   
understanding was in agreement with   
suggested plan.  
  
Portions of this chart were created   
using Dragon voice recognition   
software. Occasional wrong-word or   
 sound-like  substitutions may have   
occurred due to inherent   
limitations of the voice   
recognition software. Please read   
the chart carefully and recognize,   
using context, where the   
substitutions have occurred.  
  
  
1. Oropharyngeal dysphagia  
2. Pharyngeal disorder Ambulatory   
referral to ENT  
3. Oropharyngeal lesion  
4. Sensorineural hearing loss,   
asymmetrical  
No orders of the defined types were   
placed in this encounter.  
  
  
  
Electronically signed by Amanda Gill MD at   
2022 9:06 PM EDT  
documented in this encounter            [facility]  
   
                                                    2022 History of   
Present illness Narrative               Formatting of this note is   
different from the original.  
Subjective:  
  
Patient ID: Lise Aaron is a 81   
y.o. female.  
  
Chief Complaint  
Patient presents with  
Pharyngeal disorder  
New pt  
  
  
HPI kind referral for my evaluation   
to establish ENT follow-up due to   
the history of oropharyngeal lesion   
and associated dysphagia.   
Reportedly, patient was followed by   
Dr. Gross in [facility] for suspicious   
lesion on the base of the tongue   
with biopsy not confirming   
malignancy but regular follow-up   
visits in the last 10 years.   
Patient denies current localized to   
the area pain, difficulties   
breathing or swallowing, voice   
change, cough, hemoptysis, weight   
loss or other constitutional   
symptoms. She is also concerned   
about her ear discomfort and   
hearing loss.  
  
The following portions of the   
patient's history were reviewed and   
updated as appropriate: allergies,   
current medications, past family   
history, past medical history, past   
social history, past surgical   
history and problem list.  
  
Review of Systems  
Constitutional: Negative. Negative   
for activity change, appetite   
change, chills, diaphoresis,   
fatigue, fever and unexpected   
weight change.  
HENT: Positive for hearing loss.   
Negative for congestion, dental   
problem, drooling, ear discharge,   
ear pain, facial swelling, mouth   
sores, nosebleeds, postnasal drip,   
rhinorrhea, sinus pressure, sinus   
pain, sneezing, sore throat,   
tinnitus, trouble swallowing and   
voice change.  
Eyes: Negative. Negative for   
photophobia, pain, discharge,   
redness, itching and visual   
disturbance.  
Respiratory: Negative. Negative for   
apnea, cough, choking, chest   
tightness, shortness of breath,   
wheezing and stridor.  
Cardiovascular: Negative. Negative   
for chest pain, palpitations and   
leg swelling.  
Gastrointestinal: Negative.   
Negative for abdominal distention,   
abdominal pain, anal bleeding,   
blood in stool, constipation,   
diarrhea, nausea, rectal pain and   
vomiting.  
Endocrine: Negative for cold   
intolerance and heat intolerance.  
Genitourinary: Negative.  
Musculoskeletal: Negative. Negative   
for arthralgias, gait problem, neck   
pain and neck stiffness.  
Skin: Negative. Negative for color   
change, pallor, rash and wound.  
Allergic/Immunologic: Negative for   
environmental allergies, food   
allergies and immunocompromised   
state.  
Neurological: Negative for   
dizziness, tremors, seizures,   
syncope, facial asymmetry, speech   
difficulty, weakness,   
light-headedness, numbness and   
headaches.  
Hematological: Negative for   
adenopathy. Does not bruise/bleed   
easily.  
Psychiatric/Behavioral: Negative.  
  
  
Objective: BP (!) 160/83   Pulse   
(!) 106   Resp 18   Wt 86.9 kg (191   
lb 9.6 oz)   SpO2 96%  
  
Physical Exam  
Constitutional:  
General: She is not in acute   
distress.  
Appearance: She is well-developed.   
She is not diaphoretic.  
HENT:  
Head: Normocephalic and atraumatic.   
No abrasion, contusion or   
laceration.  
Jaw: No trismus.  
Right Ear: Tympanic membrane, ear   
canal and external ear normal.   
Decreased hearing noted. No   
drainage, swelling or tenderness.   
No middle ear effusion. No mastoid   
tenderness. Tympanic membrane is   
not perforated. Tympanic membrane   
has normal mobility.  
Left Ear: Tympanic membrane and   
external ear normal. Decreased   
hearing noted. No drainage,   
swelling or tenderness. No middle   
ear effusion. No mastoid   
tenderness. Tympanic membrane is   
not perforated. Tympanic membrane   
has normal mobility.  
Ears:  
Gurrola exam findings: lateralizes   
right.  
Right Rinne: AC > BC.  
Left Rinne: AC > BC.  
Nose: Septal deviation present. No   
nasal deformity, laceration,   
mucosal edema or rhinorrhea.  
Right Sinus: No maxillary sinus   
tenderness or frontal sinus   
tenderness.  
Left Sinus: No maxillary sinus   
tenderness or frontal sinus   
tenderness.  
Mouth/Throat:  
Mouth: No lacerations or oral   
lesions.  
Dentition: Normal dentition. Does   
not have dentures. No dental caries   
or dental abscesses.  
Pharynx: Uvula midline. No   
oropharyngeal exudate, posterior   
oropharyngeal erythema or uvula   
swelling.  
Tonsils: No tonsillar abscesses.  
Eyes:  
General: No scleral icterus.  
Right eye: No discharge.  
Left eye: No discharge.  
Conjunctiva/sclera: Conjunctivae   
normal.  
Pupils: Pupils are equal, round,   
and reactive to light.  
Neck:  
Thyroid: No thyroid mass or   
thyromegaly.  
Vascular: No JVD.  
Trachea: Trachea and phonation   
normal. No tracheal deviation.  
Cardiovascular:  
Rate and Rhythm: Normal rate and   
regular rhythm.  
Heart sounds: Normal heart sounds.  
Pulmonary:  
Effort: No respiratory distress.  
Breath sounds: No stridor. No   
wheezing or rales.  
Chest:  
Chest wall: No tenderness.  
Abdominal:  
General: Bowel sounds are normal.   
There is no distension.  
Palpations: Abdomen is soft.  
Tenderness: There is no abdominal   
tenderness.  
Musculoskeletal:  
General: Normal range of motion.  
Cervical back: Normal range of   
motion and neck supple.  
Lymphadenopathy:  
Cervical: No cervical adenopathy.  
Right cervical: No superficial,   
deep or posterior cervical   
adenopathy.  
Left cervical: No superficial, deep   
or posterior cervical adenopathy.  
Skin:  
General: Skin is warm and dry.  
Findings: No erythema or rash.  
Neurological:  
Mental Status: She is alert and   
oriented to person, place, and   
time.  
Cranial Nerves: No cranial nerve   
deficit.  
Coordination: Coordination normal.  
Psychiatric:  
Behavior: Behavior normal.  
Thought Content: Thought content   
normal.  
Judgment: Judgment normal.  
  
  
Procedure: Flexible fiberoptic   
nasopharyngoscopy/laryngoscopy, CPT   
code 10787.  
Preoperative diagnosis: History of   
oropharyngeal lesion.  
Postoperative diagnosis: No   
lesions, masses or mobility   
disorder suspicious for malignancy   
in the upper aerodigestive tract.  
Anesthesia: Topical 4% lidocaine   
and 0.5% Paul-Synephrine.  
Complications: None  
Patient's condition: Stable   
throughout the exam  
Indications and consent: Patient   
with need for endoscopic   
documentation, high gag reflex or   
inadequate view with mirror exam.   
After informed discussion of the   
risks, benefits and alternatives   
procedure was recommended for above   
mentioned indications and the   
patient was consented to this.  
Flexible fiberoptic laryngoscopy   
was recommended, all of the risks,   
benefits and potential   
complications were reviewed with   
the patient preoperatively and   
informed consent was obtained.  
Procedure details: The patient was   
seated in a slightly reclined   
position. Topical 4% lidocaine and   
0.5% Paul-Synephrine were instilled   
into both nasal passages. After   
adequate anesthesia had occurred, 4   
mm flexible telescope then was   
introduced and passed initially   
through left and subsequently   
through right nasal cavities. The   
nasal cavity was patent, with some   
narrowing due to nasal septum   
deviation and spur, mucosa pink   
with clear mucus secretions.   
Telescope was advanced to the   
nasopharynx which revealed no   
exophytic lesions or masses around   
both eustachian tube orifices,   
Rosenmuller's fossa's and adenoidal   
pad. No velopalatine insufficiency   
upon elevation of the soft palate.   
The base of tongue, valleculae   
lingual and laryngeal surface of   
the epiglottis were normal with no   
visible lesions or masses. There   
were no signs of pooling of the   
secretions in the piriform sinuses.   
The true vocal folds were normal   
with full range of motion's upon   
phonation and inspiration, no   
atrophy or gap. The glottic closure   
was complete. The mucosal wave was   
present with normal amplitude upon   
phonation. There were no signs of   
subglottic mucosal lesions or   
airway obstructing masses. The   
telescope then was slowly   
withdrawn. Patient tolerated   
procedure well with stable vital   
signs throughout the procedure.  
Manipulated:Amanda Gill MD  
  
Assessment/Plan: Clinical   
evaluation including office-based   
endoscopy revealed no lesions,   
masses or mobility disorder in the   
upper aerodigestive tract   
suspicious for malignancy.   
Reassurance, information and   
instructions to follow-up with me   
on the annual basis or immediately   
as needed upon any changes   
provided. Bilateral slightly   
asymmetric with the left ear worse   
than right sensorineural hearing   
loss. Medical clearance for   
bilateral hearing amplification   
provided. All patient's questions   
were answered appropriately to the   
level of her satisfaction. She had   
expressed comprehensive   
understanding was in agreement with   
suggested plan.  
  
Portions of this chart were created   
using Dragon voice recognition   
software. Occasional wrong-word or   
 sound-like  substitutions may have   
occurred due to inherent   
limitations of the voice   
recognition software. Please read   
the chart carefully and recognize,   
using context, where the   
substitutions have occurred.  
  
  
1. Oropharyngeal dysphagia  
2. Pharyngeal disorder Ambulatory   
referral to ENT  
3. Oropharyngeal lesion  
4. Sensorineural hearing loss,   
asymmetrical  
No orders of the defined types were   
placed in this encounter.  
  
  
  
Electronically signed by Amanda Gill MD at   
2022 9:06 PM EDT  
documented in this encounter            [facility]  
   
                                                    2022 History of   
Present illness Narrative               Patient presents today for follow   
up.Regarding dizziness, states that   
symptoms have completely   
resolved.Regarding DM2, is usually   
taking 1.8mg Victoza qd. If BG >170   
in the morning, she tends to take   
2mg glimepiride. Denies dizziness   
with the glimepiride, but is only   
occasionally taking it.Regarding   
OA, states that joints (adriana R knee)   
have been bothering her with   
changes in the weather. Has been   
using Aleve, Voltaren gel,   
Aspercreme with lidocaine, heating   
pad which have all seemed to   
provide relief.Regarding ENT eval,   
states that a region in the L   
pharynx or base of tongue lit up   
during PET scan performed during   
breast CA eval/treatment. Was   
evaluate by ENT in [place] and had   
biopsy which was negative per pt   
report. Since that time, has had   
annual follow up office exam. Would   
like to continue these exams, but   
requests transition to local ENT so   
that she does not have to travel to   
[place]. Due for recheck in   
2022.                                 [facility]  
Work Phone:   
[phone]  
   
                                                    2022 History of   
Present illness Narrative               Patient presents today for follow   
up.Regarding dizziness, states that   
symptoms have completely   
resolved.Regarding DM2, is usually   
taking 1.8mg Victoza qd. If BG >170   
in the morning, she tends to take   
2mg glimepiride. Denies dizziness   
with the glimepiride, but is only   
occasionally taking it.Regarding   
OA, states that joints (adriana R knee)   
have been bothering her with   
changes in the weather. Has been   
using Aleve, Voltaren gel,   
Aspercreme with lidocaine, heating   
pad which have all seemed to   
provide relief.Regarding ENT eval,   
states that a region in the L   
pharynx or base of tongue lit up   
during PET scan performed during   
breast CA eval/treatment. Was   
evaluate by ENT in [place] and had   
biopsy which was negative per pt   
report. Since that time, has had   
annual follow up office exam. Would   
like to continue these exams, but   
requests transition to local ENT so   
that she does not have to travel to   
[place]. Due for recheck in   
2022.                                 [facility]  
Work Phone:   
[phone]  
   
                                                    2022 History of   
Present illness Narrative               Patient presents today for follow   
up.Regarding dizziness, states that   
symptoms have completely   
resolved.Regarding DM2, is usually   
taking 1.8mg Victoza qd. If BG >170   
in the morning, she tends to take   
2mg glimepiride. Denies dizziness   
with the glimepiride, but is only   
occasionally taking it.Regarding   
OA, states that joints (adriana R knee)   
have been bothering her with   
changes in the weather. Has been   
using Aleve, Voltaren gel,   
Aspercreme with lidocaine, heating   
pad which have all seemed to   
provide relief.Regarding ENT eval,   
states that a region in the L   
pharynx or base of tongue lit up   
during PET scan performed during   
breast CA eval/treatment. Was   
evaluate by ENT in [place] and had   
biopsy which was negative per pt   
report. Since that time, has had   
annual follow up office exam. Would   
like to continue these exams, but   
requests transition to local ENT so   
that she does not have to travel to   
[place]. Due for recheck in   
2022.                                 [facility]   
205 DO  
Work Phone:   
[phone]  
   
                                                    10- History of   
Present illness Narrative               10/22/2021: eyes look good, Dr. Rudd Wednesday breast CA   
follow upwill be getting covid-19   
boosterflu shot todayis considering   
pessarydizziness - off balance, no   
room-spinning, better if she stands   
slowly, maybe postural, when she   
doesn't eatvictoza - down to 1.3   
dailyfell 10/ - mechanical, fell   
forward on nose, R shoulder, R   
knee, on carpet, has been fineR   
ankle - ace wrap helpsCervical   
Cancer Screening:Breast Cancer   
Screening:Osteoporosis   
Screening:Prostate Cancer   
Screening:Colon Cancer   
Screening:Tobacco:Alcohol:Recreatio  
nal Drugs:Immunizations:                [facility]   
 DO  
Work Phone:   
[phone]  
   
                                                    10- History of   
Present illness Narrative               10/22/2021: eyes look good, Dr. Rudd Wednesday breast CA   
follow upwill be getting covid-19   
boosterflu shot todayis considering   
pessarydizziness - off balance, no   
room-spinning, better if she stands   
slowly, maybe postural, when she   
doesn't eatvictoza - down to 1.3   
dailyfell 10/9 - mechanical, fell   
forward on nose, R shoulder, R   
knee, on carpet, has been fineR   
ankle - ace wrap helpsCervical   
Cancer Screening:Breast Cancer   
Screening:Osteoporosis   
Screening:Prostate Cancer   
Screening:Colon Cancer   
Screening:Tobacco:Alcohol:Recreatio  
nal Drugs:Immunizations:                [facility]   
205 DO  
Work Phone:   
[phone]  
   
                                                    2021 History of   
Present illness Narrative                 
  
  
Formatting of this note is   
different from the original.  
HPI: Lise Aaron was seen 2021   
in the [facility]   
[facility] for follow up for history of   
left oropharyngeal mild epithelial   
dysplasia treated surgically   
2009 by laser excision of left   
tonsillar and base of tongue   
regions.  
  
History of present illness and   
review of systems are negative for   
changes compared to last visit. No   
new head/neck concerns today.   
Denies throat pain, dysphagia,   
odynophagia, otalgia, hemoptysis,   
voice change, SOB or significant   
weight loss. Has some intermittent   
post-nasal drip 2/2 allergies -   
stable. She is planning a trip to   
[place] with her grandkids   
and great grandchildren.  
  
Nursing documentation reviewed.  
  
Past Medical History:  
Diagnosis Date  
Basal cell cancer -  
cheek/ shoulder  
Breast cancer  
lumpectomy ; completion   
mastectomy 2009  
Cataract 2009  
OD  
Dry eye syndrome  
Glaucoma 2016  
both eyes  
History of chemotherapy   
Left breast cancer  
History of radiation therapy   
Left chest wall  
Hyperlipidemia  
Neoplasm of uncertain behavior of   
lip, oral cavity, and pharynx   
TIA (transient ischemic attack)  
; placed on Plavix  
  
Past Surgical History:  
Procedure Laterality Date  
EYE SURGERY 2019  
laser to relieve glaucoma pressure  
CORE BIOPSY OF THE BREAST Right   
2011  
Sclerosing adenosis  
CORE BIOPSY OF THE BREAST Right   
2011  
Intraductal Papilloma  
EXCISION BASAL CELL 2010  
REMOVAL CATARACT (PEM)   
Bilateral Cataract Surgery  
MASTECTOMY Left 2009  
Recurrent [facility]  
BREAST BIOPSY 09  
left side  
LARYNGOSCOPY DIRECT DIAGNOSTIC   
2009  
Laser surgery of tonsil.  
TONGUE SURGERY 2009  
Base of tongue excised.  
CORE BIOPSY OF THE BREAST Left   
1/15/2009  
[facility]  
CORE BIOPSY OF THE BREAST Left   
2003  
sclerosing  
BREAST LUMPECTOMY Left 2003  
[facility]  
CORE BIOPSY OF THE BREAST Left   
6/3/2003  
[facility]  
FOOT TENDON SURGERY   
BIOPSY  
right breast  
HI BRNCHSC INCL FLUOR GDNCE DX   
W/CELL WASHG SPX  
HI ESOPHAGOSCOPY FLEXIBLE TRANSORAL   
DIAGNOSTIC  
  
Current Outpatient Medications  
Medication Sig Dispense Refill  
acetaminophen (TYLENOL) 500 MG PO   
TABS take 1,000 mg by mouth every 6   
hours as needed.  
B Complex-C (SUPER B   
COMPLEX/VITAMIN C PO) take 1 Tab by   
mouth Every other day. Opposite the   
centrum  
B-D UF III MINI PEN NEEDLES 31G X 5   
MM Misc Inject 1 Each as directed   
daily. 100 Each 3  
Cholecalciferol (VITAMIN D) 1000   
UNITS PO CAPS Take 2,000 Units by   
mouth daily.  
COMBIGAN 0.2-0.5 % Solution 1 drop   
by Both Eyes route 2 times daily..  
Diclofenac Sodium (Voltaren) 1 %   
Gel gel Apply 1 Application   
topically 2 times daily. 100 g 3  
ezetimibe (Zetia) 10 MG tablet Take   
1 tablet by mouth daily. 30 tablet   
11  
faMOTIdine 20 MG Tab tablet Take 20   
mg by mouth every evening at 6 PM.  
gliMEPIride 4 MG tablet Take 2   
tablets by mouth daily. 60 tablet 3  
GLUCOSE TEST STRIPS PRESCRIPTION   
Use twice a day 200 strip 0  
GLUCOSE TEST STRIPS PRESCRIPTION   
Use daily to test blood sugar 100   
strip 3  
Liraglutide (Victoza) 18 MG/3ML   
Solution Pen-injector injection   
Inject 1.8 mg under the skin daily.   
Increased ledezma to 1.8 mg. 9 Syringe   
3  
Multiple Vitamin (MULTI-VITAMIN   
DAILY PO) Take 1 tablet by mouth   
daily. AREDS II  
Naproxen Sodium 220 MG Cap take 220   
mg by mouth daily as needed.  
spironolactone 50 MG tablet Take 1   
tablet by mouth daily every   
morning. 30 tablet 3  
cyclosporin 0.05 % OP EMUL 1 Drop   
by Both Eyes route 2 times daily.   
(Patient not taking: Reported on   
2021)  
  
No current facility-administered   
medications for this visit.  
  
Exam:  
  
/66   Pulse 96   Temp 97.1 F   
(36.2 C) (Temporal)   Resp 18   Wt   
88.6 kg (195 lb 4.8 oz)   SpO2 94%   
  BMI 34.87 kg/m   Smoking Status   
Never Smoker  
  
Documented vital signs above from   
today's visit reviewed. Physical   
exam including head and neck   
examination of the oral cavity,   
oropharynx, larynx, and hypopharynx   
including indirect mirror exam as   
well as inspection and palpation of   
the face, parotid and neck is   
unremarkable for new masses,   
lesions or lymphadenopathy. Airway   
is adequate. No evidence of   
disease.  
  
Impression/Plan:  
Overall I believe patient is doing   
well and appears clinically free of   
disease on exam today. Will   
schedule for surveillance followup   
visit in 12 months or sooner if   
concerns.  
Attending Physician Note  
I independently interviewed,   
examined and formulated the medical   
decision making for Ms. Aaron with   
Biri Acevedo, APRN-CNP. Details   
of my interview, examination   
findings, and medical   
decision-making confirmed the   
findings above. I have personally   
amended the documentation where   
appropriate. Doing well. ENMANUEL based   
on my examination. Fu/ in 12 month.  
  
  
  
  
Electronically signed by Marcos Lindsey MD at 2021 1:45 PM EDT  
  
  
Formatting of this note is   
different from the original.  
HPI: Lise Aaron was seen 2021   
in the [facility]   
[facility]inic for follow up for history of   
left oropharyngeal mild epithelial   
dysplasia treated surgically   
2009 by laser excision of left   
tonsillar and base of tongue   
regions.  
  
History of present illness and   
review of systems are negative for   
changes compared to last visit. No   
new head/neck concerns today.   
Denies throat pain, dysphagia,   
odynophagia, otalgia, hemoptysis,   
voice change, SOB or significant   
weight loss. Has some intermittent   
post-nasal drip 2/2 allergies -   
stable. She is planning a trip to   
[place] with her grandkids   
and great grandchildren.  
  
Nursing documentation reviewed.  
  
Past Medical History:  
Diagnosis Date  
Basal cell cancer -  
cheek/ shoulder  
Breast cancer  
lumpectomy ; completion   
mastectomy 2009  
Cataract 2009  
OD  
Dry eye syndrome  
Glaucoma 2016  
both eyes  
History of chemotherapy 2003  
Left breast cancer  
History of radiation therapy 2004  
Left chest wall  
Hyperlipidemia  
Neoplasm of uncertain behavior of   
lip, oral cavity, and pharynx   
TIA (transient ischemic attack)  
; placed on Plavix  
  
Past Surgical History:  
Procedure Laterality Date  
EYE SURGERY 2019  
laser to relieve glaucoma pressure  
CORE BIOPSY OF THE BREAST Right   
2011  
Sclerosing adenosis  
CORE BIOPSY OF THE BREAST Right   
2011  
Intraductal Papilloma  
EXCISION BASAL CELL 2010  
REMOVAL CATARACT (PEM)   
Bilateral Cataract Surgery  
MASTECTOMY Left 2009  
Recurrent IDC  
BREAST BIOPSY 09  
left side  
LARYNGOSCOPY DIRECT DIAGNOSTIC   
2009  
Laser surgery of tonsil.  
TONGUE SURGERY 2009  
Base of tongue excised.  
CORE BIOPSY OF THE BREAST Left   
1/15/2009  
[facility]  
CORE BIOPSY OF THE BREAST Left   
2003  
sclerosing  
BREAST LUMPECTOMY Left 2003  
[facility]  
CORE BIOPSY OF THE BREAST Left   
6/3/2003  
[facility]  
FOOT TENDON SURGERY   
BIOPSY  
right breast  
HI BRNCHSC INCL FLUOR GDNCE DX   
W/CELL WASHG SPX  
HI ESOPHAGOSCOPY FLEXIBLE TRANSORAL   
DIAGNOSTIC  
  
Current Outpatient Medications  
Medication Sig Dispense Refill  
acetaminophen (TYLENOL) 500 MG PO   
TABS take 1,000 mg by mouth every 6   
hours as needed.  
B Complex-C (SUPER B   
COMPLEX/VITAMIN C PO) take 1 Tab by   
mouth Every other day. Opposite the   
centrum  
B-D UF III MINI PEN NEEDLES 31G X 5   
MM Misc Inject 1 Each as directed   
daily. 100 Each 3  
Cholecalciferol (VITAMIN D) 1000   
UNITS PO CAPS Take 2,000 Units by   
mouth daily.  
COMBIGAN 0.2-0.5 % Solution 1 drop   
by Both Eyes route 2 times daily..  
Diclofenac Sodium (Voltaren) 1 %   
Gel gel Apply 1 Application   
topically 2 times daily. 100 g 3  
ezetimibe (Zetia) 10 MG tablet Take   
1 tablet by mouth daily. 30 tablet   
11  
faMOTIdine 20 MG Tab tablet Take 20   
mg by mouth every evening at 6 PM.  
gliMEPIride 4 MG tablet Take 2   
tablets by mouth daily. 60 tablet 3  
GLUCOSE TEST STRIPS PRESCRIPTION   
Use twice a day 200 strip 0  
GLUCOSE TEST STRIPS PRESCRIPTION   
Use daily to test blood sugar 100   
strip 3  
Liraglutide (Victoza) 18 MG/3ML   
Solution Pen-injector injection   
Inject 1.8 mg under the skin daily.   
Increased ledezma to 1.8 mg. 9 Syringe   
3  
Multiple Vitamin (MULTI-VITAMIN   
DAILY PO) Take 1 tablet by mouth   
daily. AREDS II  
Naproxen Sodium 220 MG Cap take 220   
mg by mouth daily as needed.  
spironolactone 50 MG tablet Take 1   
tablet by mouth daily every   
morning. 30 tablet 3  
cyclosporin 0.05 % OP EMUL 1 Drop   
by Both Eyes route 2 times daily.   
(Patient not taking: Reported on   
2021)  
  
No current facility-administered   
medications for this visit.  
  
Exam:  
  
/66   Pulse 96   Temp 97.1 F   
(36.2 C) (Temporal)   Resp 18   Wt   
88.6 kg (195 lb 4.8 oz)   SpO2 94%   
  BMI 34.87 kg/m   Smoking Status   
Never Smoker  
  
Documented vital signs above from   
today's visit reviewed. Physical   
exam including head and neck   
examination of the oral cavity,   
oropharynx, larynx, and hypopharynx   
including indirect mirror exam as   
well as inspection and palpation of   
the face, parotid and neck is   
unremarkable for new masses,   
lesions or lymphadenopathy. Airway   
is adequate. No evidence of   
disease.  
  
Impression/Plan:  
Overall I believe patient is doing   
well and appears clinically free of   
disease on exam today. Will   
schedule for surveillance followup   
visit in 12 months or sooner if   
concerns.  
  
  
  
Electronically signed by STELLA Melgar at 2021   
1:45 PM EDTdocumented in this   
encounter                               [facility]  
   
                                        2021 Instructions   
  
  
STELLA Melgar -   
2021 1:15 PM EDT  
  
  
Formatting of this note might be   
different from the original.  
Please call Dr. Lindsey's nurse Sintia   
at [phone] if you notice any   
new lumps in head or neck, new   
onset of difficulty with   
swallowing, persistent ear pain,   
hoarseness or new pains in head and   
neck that don't go away for 2   
weeks.  
  
  
  
Electronically signed by STELLA Melgar at 2021   
1:57 PM EDT  
documented in this encounter            [facility]  
   
                                                    2021 History of   
Present illness Narrative               Back hurting a little worse today.   
Took an Aleve just before   
appt.Yanick last week2021   
ultrasoundTried increasing VIctoza   
to 1.8 but was dizzyTrying to   
titrate up1.3 todayThis morning was   
123                                     [facility]   
205 DO  
Work Phone:   
[phone]  
   
                                                    2021 History of   
Present illness Narrative               [facility] Fisher 2021[facility]   
[facility]   
[id number] - diarrhea with metformin   
so refused (tried regular and ER),   
without since winter, victoza daily   
and glimepiride, was on 1.8 and   
switched to 1.3,  this   
morning, never >200Breast CA -   
diagnosed in , then lumpectomy,   
chemo/radiation, 5 years later had   
recurrence, radical mastectomy L   
w/o removal of lymph nodes   
2/15/2009, goes to [facility] to [place]   
yearly, mammogram yearly, PET -   
active tissue in L pharynx, ENT did   
biopsy and said benign, still   
checked yearly to be sureHLD -   
can't tolerate statin (didn't feel   
good), tolerating Zetia   
wellglaucoma - dr. clay, sees   
every 3-4mo, laser surgery about   
3yrs agofoot swelling -   
spironolactoneDizzy - vertigo in   
the past, sometimes has to lean   
against things, started after going   
to op[place] and getting drops, about   
1mo ago, not driving, worse after   
CT of abdheartburn - sp[icy,   
greasy, pepcid as neededhusband   
passed 1 year ago, mother   
yesterday, going down tomorrow   
 Thlives aloneCervical   
Cancer Screening: normal until age   
64, Dr. Azul (removed IUD, said   
has small fibroid), occasionally   
has spotting when she lifts   
something heavy, postmenopausal   
[id number] Cancer Screening: as   
aboveColon Cancer Screening:   
colonoscopy at least 3 years   
agoOsteoporosis Screening: prior to   
Dr. Fisher, was on oral pill   
for 8 yearsTobacco: neverAlcohol:   
neverRecreational Drugs:   
deniesImmunizations: due for for   
Shingrix and TDaP (wants to wait)       [facility]   
[address] DO  
Work Phone:   
[phone]  
   
                                                    2021 History of   
Present illness Narrative               This is an 79yo female with pmhx   
significant for breast CA,   
non-IDDM2, HLD, glaucoma, heartburn   
who presents today to establish and   
discuss as below. Previous PCP was   
Dr. Fisher and was last   
evaluated 2021. Lives at home   
alone.  passed away about   
1yr ago and mother passed away 5   
days. Is travelling 2-3hrs for   
arrangements and  the   
remainder of this week.Regarding   
breast CA, was initially diagnosed   
in  and underwent lumpectomy   
with adjuvant chemo/radiation. Had   
recurrence in  and ultimately   
underwent radical L mastectomy   
2/15/2009. Continues to follow at   
[facility] annually for exam   
and mammograms. Of note, did have   
area of activity in the L pharynx   
on a PET scan in the past per   
patient report. Had biopsy at the   
time which was benign, also per   
patient report. Because of this,   
also follows with ENT   
yearly.Regarding non-IDDM2, current   
medication regimen includes   
glimepiride and Victoza 1.2mg qd.   
Was tried on metformin, but was   
unable to tolerate due to diarrhea.   
States that she was on higher dose   
of Victoza, but decreased on her   
own. FBG averaging 120-150s.   
Follows with Dr. Clay for eye   
checks as below. Last HbA1c was   
2020.Regarding HLD, unable to   
tolerate statins due to feeling   
unwell overall. Is current managed   
on ezetimibe without adverse   
effects. No recent FLP available   
for review.Regarding glaucoma,   
follows with Dr. Clay q3-4mo.   
Did have laser surgery about 3yrs   
ago.Regarding heartburn, reports   
symptoms when she eats spicy or   
greasy foods. Is able to manage   
with prn Pepcid.Regarding   
dizziness, states that she has   
noticed being somewhat off balance   
for the past 1mo (since getting   
drops at ophtho office). States   
that she sometimes has to lean onto   
things when she is walking. Denies   
confusion, room-spinning dizziness,   
syncope, focal neuro deficit,   
speech difficulty. Had CBC and ALT   
checked by previous PCP within the   
past 1-2mos, but otherwise has not   
had basic labs performed in >1yr.   
Of note, is on spironolactone for   
hx of lower extremity swelling.   
Does have history of BPPV in the   
past, but states that current   
episode is not reminiscent.Cervical   
Cancer Screening: reports paps wnl   
until age 64, was diagnosed with   
small fibroid at one time,   
occasionally has spotting when she   
lifts something heavy,   
postmenopausal [id number]   
Cancer Screening: as aboveColon   
Cancer Screening: colonoscopy at   
least 3 years agoOsteoporosis   
Screening: prior to Dr. Fisher,   
was on unspecified oral medication   
for 8 years for   
?osteoporosisTobacco: neverAlcohol:   
deniesRecreational Drugs:   
deniesImmunizations: due for for   
Shingrix and TDaP (wants to wait)       [facility]   
[address] DO  
Work Phone:   
[phone]  
  
  
  
                                          
   
                                          
   
                                          
  
documented in this encounter  
[facility]   
  
                                                    Diagnosis  
   
                                                      
  
  
Pharyngeal disorder- Primary  
  
documented in this encounter  
[facility]   
  
                                                    Diagnosis  
   
                                                      
  
  
Oropharyngeal dysphagia- Primary  
  
  
Dysphagia, oropharyngeal phase  
   
                                                      
  
  
Pharyngeal disorder  
   
                                                      
  
  
Oropharyngeal lesion  
   
                                                      
  
  
Sensorineural hearing loss, asymmetrical  
  
documented in this encounter  
[facility]   
  
                                                    Diagnosis  
   
                                                      
  
  
Oropharyngeal dysphagia- Primary  
  
  
Dysphagia, oropharyngeal phase  
   
                                                      
  
  
Pharyngeal disorder  
   
                                                      
  
  
Oropharyngeal lesion  
   
                                                      
  
  
Sensorineural hearing loss, asymmetrical  
  
documented in this encounter  
[facility]   
  
                                                    Diagnosis  
   
                                                      
  
  
Type 2 diabetes mellitus without complication, without long-term current use of   
insulin- Primary  
  
documented in this encounter  
[facility]   
  
                                                    Diagn[facility]  
   
                                                      
  
  
Type 2 diabetes mellitus without complication, without long-term current use of   
insulin- Primary  
  
documented in this encounter  
[facility]   
  
                                                    Diagnosis  
   
                                                      
  
  
Personal history of malignant neoplasm of breast- Primary  
   
                                                      
  
  
Encounter for screening mammogram for malignant neoplasm of breast  
  
  
Other screening mammogram  
  
documented in this encounter  
[facility]   
  
                                                    Diagnosis  
   
                                                      
  
  
Personal history of malignant neoplasm of breast  
   
                                                      
  
  
Encounter for screening mammogram for malignant neoplasm of breast  
  
  
Other screening mammogram  
  
documented in this encounter  
[facility]   
  
                                                    Diagnosis  
   
                                                      
  
  
Type 2 diabetes mellitus without complication, without long-term current use of   
insulin- Primary  
  
documented in this encounter  
[facility]   
  
                                                    Diagnosis  
   
                                                      
  
  
Type 2 diabetes mellitus without complication, without long-term current use of   
insulin- Primary  
  
documented in this encounter  
[facility]   
  
                                                    Diagnosis  
   
                                                      
  
  
Oropharyngeal lesion- Primary  
   
                                                      
  
  
Sensorineural hearing loss, asymmetrical  
   
                                                      
  
  
Dizziness  
  
  
Dizziness and giddiness  
  
documented in this encounter  
[facility] note*   
  
                                                    Diagnosis  
   
                                                      
  
  
Type 2 diabetes mellitus without complication, without long-term current use of   
insulin (CMS/[facility])- Primary  
   
                                                      
  
  
Malignant neoplasm of female breast, unspecified estrogen receptor status,   
unspecified laterality, unspecified site of breast (CMS/[facility])  
   
                                                      
  
  
Pharyngeal disorder  
   
                                                      
  
  
PND (post-nasal drip)  
  
  
Postnasal drip  
   
                                                      
  
  
Dizziness  
  
  
Dizziness and giddiness  
  
documented in this encounter  
[facility]  
Work Phone: [phone] note*   
  
                                                    Diagnosis  
   
                                                      
  
  
Personal history of malignant neoplasm of breast- Primary  
   
                                                      
  
  
Encounter for screening mammogram for malignant neoplasm of breast  
  
  
Other screening mammogram  
  
documented in this encounter  
[facility]   
  
                                                    Diagnosis  
   
                                                      
  
  
Personal history of malignant neoplasm of breast  
   
                                                      
  
  
Encounter for screening mammogram for malignant neoplasm of breast  
  
  
Other screening mammogram  
  
documented in this encounter  
[facility]   
  
                                                    Diagnosis  
   
                                                      
  
  
Benign paroxysmal positional vertigo, unspecified laterality- Primary  
  
documented in this encounter  
[facility]   
  
                                                    Diagnosis  
   
                                                      
  
  
Closed fracture of occipital bone, unspecified laterality, unspecified occipital
   
fracture type, initial encounter (CMS/[facility])- Primary  
   
                                                      
  
  
Head injury, initial encounter  
  
documented in this encounter  
[facility]  
Work Phone: [phone] note*   
  
                                                    Diagnosis  
   
                                                      
  
  
Fall, initial encounter- Primary  
   
                                                      
  
  
Fall, initial encounter  
   
                                                      
  
  
Closed fracture of occipital bone, unspecified laterality, unspecified occipital
   
fracture type, initial encounter (CMS/[facility])  
   
                                                      
  
  
Dizziness  
  
  
Dizziness and giddiness  
   
                                                      
  
  
Other general symptoms and signs  
   
                                                      
  
  
Other specified symptoms and signs involving the circulatory and respiratory 
systems  
  
documented in this encounter  
[facility]  
Work Phone: [phone] of Present illness Narrative80-year-old 
presents my office from Dr. Callahan's office for concern for postmenopausal 
bleeding is going on for many years. Patient has not been sexually active for 
almost a decade. Patient is additionally a . Patient notes with any heavy
 lifting or physical activity she will have bright red bleeding. Patient notes 
been going on for many years. Patient notes she previously had some postmenopa
usal bleeding after chemotherapy for mastectomy and Dr. Miller removed an IUD.
Eat  
Work Phone: [phone] of Present illness Narrative* Patient presents
   today for follow up. Is planning a trip to [place] with her 
  granddaughter 10/2021.  
* Regarding postmenopausal bleeding, is currently following with Dr. Herndon. Had 
  biopsy of vaginal lesions which is the site of suspected bleeding - pathology 
  pending. She is also scheduled for abd and TVUS 2021.  
* Regarding DM2, recent HbA1c found to be elevated at 8.1%. She tried to 
  increase Victoza to 1.8mg, but was feeling somewhat dizzy and shaky. Has 
  instead tried to titrate slowly and is having much better success. FBG is 
  averaging 120-130.  
* Regarding low back pain, states that she thinks she tweaked her back reaching 
  for a pen under her end table this morning. Has had mild bilateral upper 
  lumbar back pain since that time. Tried taking an Aleve just prior to appt 
  today and hasn't noticed much of a difference yet. Denies numbness, tingling, 
  LE weakness, saddle anesthesia, urinary retention, bowel/bladder incontinence.  
-[facility] 205 DO  
Work Phone: [phone] of Present illness Narrative80-year-old 
presents for follow-up.Eat  
Work Phone: [phone] of Present illness Narrative80-year-old 
presents for 2-week postop status post D&C for postmenopausal bleeding. Patient 
notes a couple days of bleeding but then stopped. Patient notes some dizziness 
which is an acute on chronic issue. Patient noted happened a couple days after 
surgery and has not gotten better.Eat  
Work Phone: [phone] of Present illness Narrative* The patient is 
  being seen for the subsequent annual wellness visit.  
* Past Medical, Surgical and Family History: reviewed and updated in chart.  
* Medications and Supplements: Medications and supplements, including calcium 
  and vitamins reviewed and updated in chart.  
* No, the patient is not using opioids.  
* Patient Self Assessment of Health Status: good.  
* Tobacco use: Non-User  
* Alcohol use: User The patient reports rare alcohol use and no more than 1 
  glass of wine at a time.  
* Illicit drug use: Non-User  
* Current diet: Diabetic Diet and does not consume adequate fluids.  
* Exercise Frequency: the patient does not exercise.  
* Depression/Suicide Screening: .  
* During the past 2 weeks, the patient has not felt down, depressed or hopeless.  
* During the past 2 weeks, the patient has not felt little interest or pleasure 
  in doing things.  
* Hearing Impairment: Patient has slight hearing impairment, on the left.  
* Cognitive Impairment: No cognitive impairment observed, patient or family 
  reported no cognitive impairment.  
* Bathing: performs independently.  
* Dressing: performs independently.  
* Walking: performs independently.  
* Managing Finances: needs assistance.  
* Shopping: performs independently.  
* Managing Medications: performs independently.  
* Housework / Basic Home Maintenance: needs assistance.  
* Falls Risk Screening:. LISE has fallen in the last 6 months. Her fall did not
   result in injury.  
* Fall risk factors: polypharmacy and antihypertensive use.  
* Care Plan Low/Moderate Risk: Regular physical activity such as walking, water 
  aerobics or fay chi to improve strength, balance, coordination and 
  flexibility. Wear appropriate, sensible shoe wear. Remove fall hazards at home
   such as loose rugs, obstacles, use non-slip surface in bath or shower. Keep
  living space well lit.  
* Home safety risk factors: none.  
* Advance directives:. Patient has living will. Patient has healthcare POA.  
* Patient presesnts today for Medicare Wellness and follow up.  
* Regarding dizziness, describes as lightheadedness. Did see Dr. Clay 
  10/22/2021 to ensure it wasn't eye-related, and states that her eyes looked 
  good. Feels to be intermittently off balance. Denies room spinning dizziness, 
  confusion, numbness, tingling, other neuro deficit. Did fall once within the 
  past 1mo due to missing a step. No preceding symptoms and no injury from the 
  fall. Patient does note that her dizziness is better after she eats something.  
* Regarding breast CA, had follow up at [facility] last week. Mammogram wnl.  
* Regarding postmenopausal bleeding, is s/p D&C with Dr. Herndon. Cervix severely 
  stenotic requiring incision to enter per OR report so endometrium unlikely 
  etiology. Prolapse thought to be more likely etiology at this time. Patient 
  continues to consider pessary.  
* Regarding DM2, is administering 1.2mg Victoza daily.  
[facility] 205 DO  
Work Phone: [phone] of Present illness Narrative* Dizziness is 
  better  
* up to 1.4mg Victoza  
* able to drive now without worrying about the dizziness  
* quit eating fast food  
[facility] 205 DO  
Work Phone: [phone] of Present illness Narrative* arthritis pain -
   Aleve has been helping  
* voltaren gel helps a little, aspercreme with lidocaine seems to help more  
* better with heating pad  
* taking PHILLIP occasionally if BG > 170 in AM when fasting, 1.8mg Victoza  
* no more dizzy spells, just taking 1 pill at a time PHILLIP  
* ent - due 2022  
[facility] 205 DO  
Work Phone: [phone] of Present illness Narrative* The patient is 
  being seen for the subsequent annual wellness visit.  
* Past Medical, Surgical and Family History: reviewed and updated in chart.  
* Medications and Supplements: Review of all medications by a prescribing 
  practitioner or clinical pharmacist (such as prescriptions, OTCs, herbal 
  therapies and supplements) documented in the medical record.  
* No, the patient is not using opioids.  
* Patient Self Assessment of Health Status: good.  
* Tobacco use: Non-User  
* Alcohol use: User The patient reports rare alcohol use and no more than 1 
  glass of wine at a time.  
* Illicit drug use: Non-User  
* Current diet: Diabetic Diet and does not consume adequate fluids.  
* Exercise Frequency: the patient does not exercise.  
* Depression/Suicide Screening: .  
* During the past 2 weeks, the patient has not felt down, depressed or hopeless.  
* During the past 2 weeks, the patient has not felt little interest or pleasure 
  in doing things.  
* Hearing Impairment: Patient has slight hearing impairment, on the left.  
* Cognitive Impairment: No cognitive impairment observed, patient or family 
  reported no cognitive impairment.  
* Bathing: performs independently.  
* Dressing: performs independently.  
* Walking: performs independently.  
* Managing Finances: needs assistance.  
* Shopping: performs independently.  
* Managing Medications: performs independently.  
* Housework / Basic Home Maintenance: needs assistance.  
* Falls Risk Screening:. LISE has fallen in the last 6 months. Her fall did not
   result in injury.  
* Fall risk factors: polypharmacy and antihypertensive use.  
* Care Plan Low/Moderate Risk: Regular physical activity such as walking, water 
  aerobics or fay chi to improve strength, balance, coordination and 
  flexibility. Wear appropriate, sensible shoe wear. Remove fall hazards at home
   such as loose rugs, obstacles, use non-slip surface in bath or shower. Keep
  living space well lit.  
* Home safety risk factors: none.  
* Advance directives:. Advance Care Planning discussed and documented in the 
  medical record, patient did not wish or was not able to name a surrogate 
  decision maker or provide an advance care plan. Patient has living will. 
  Patient has healthcare POA.  
* fell a few weeks ago, getting up off cough a few wks ago, twisted to turn off 
  light, twisted and fell back on the cough, hurt when she finally stood up with
   ambulation and weight bearing, R knee, medial, uses can for stability, better
   with sitting, tried diclofenac gel which helps the most, aspercreme with 
  lidocaine doesn't work as well  
* ENT - said she had a  cyst , repeat scope in 1yr, asked about ear and 
  dizziness, said they look fine, thinks some hearing loss in L, wants her to 
  see an audiologist, insurance won't pay [facility]  
* sometimes taking amaryl if sugar high, seems to help with dizziness  
* has appt 7/15 - dr nunez  
* can't tolerate trulicity  
* januvia - made dizziness worse  
* victoza 1.8 qd  
* this morning 145  
* TDaP in future  
-[facility] 205 DO  
Work Phone: [phone] of Present illness Narrative* The patient is 
  being seen for the subsequent annual wellness visit.  
* Past Medical, Surgical and Family History: reviewed and updated in chart.  
* Medications and Supplements: Review of all medications by a prescribing 
  practitioner or clinical pharmacist (such as prescriptions, OTCs, herbal 
  therapies and supplements) documented in the medical record.  
* No, the patient is not using opioids.  
* Patient Self Assessment of Health Status: good.  
* Tobacco use: Non-User  
* Alcohol use: User The patient reports rare alcohol use and no more than 1 
  glass of wine at a time.  
* Illicit drug use: Non-User  
* Current diet: Diabetic Diet and does not consume adequate fluids.  
* Exercise Frequency: the patient does not exercise.  
* Depression/Suicide Screening: .  
* During the past 2 weeks, the patient has not felt down, depressed or hopeless.  
* During the past 2 weeks, the patient has not felt little interest or pleasure 
  in doing things.  
* Hearing Impairment: Patient has slight hearing impairment, on the left.  
* Cognitive Impairment: No cognitive impairment observed, patient or family 
  reported no cognitive impairment.  
* Bathing: performs independently.  
* Dressing: performs independently.  
* Walking: performs independently.  
* Managing Finances: needs assistance.  
* Shopping: performs independently.  
* Managing Medications: performs independently.  
* Housework / Basic Home Maintenance: needs assistance.  
* Falls Risk Screening:. LISE has fallen in the last 6 months. Her fall did not
   result in injury.  
* Fall risk factors: polypharmacy and antihypertensive use.  
* Care Plan Low/Moderate Risk: Regular physical activity such as walking, water 
  aerobics or fay chi to improve strength, balance, coordination and 
  flexibility. Wear appropriate, sensible shoe wear. Remove fall hazards at home
   such as loose rugs, obstacles, use non-slip surface in bath or shower. Keep
  living space well lit.  
* Home safety risk factors: none.  
* Advance directives:. Advance Care Planning discussed and documented in the 
  medical record, patient did not wish or was not able to name a surrogate 
  decision maker or provide an advance care plan. Patient has living will. 
  Patient has healthcare POA.  
* Patient presents today for MCW and follow up.  
* Regarding DM2, currently taking Victoza 1.8mg qd and glimepiride as needed  if
   sugar is too high . Due to dizziness that we thought was related to her 
  glimepiride, the medication was stopped and we tried Trulicity (unable to 
  tolerate due to increased dizziness) and Januvia (unable to tolerate due to 
  increased dizziness and LE swelling). Unable to tolerate metformin and Actos 
  in the past. Subsequently transitioned back Victoza and prn glimepiride. 
  States that her dizziness is best with this regimen - seems to feel dizzy when
   BG is too high and when it's too low. She does have appt with endo scheduled 
  for 7/15/2022. FBG was 145 this morning.  
* Regarding spot in pharynx, is now following with Dr. Murphy locally. Per pt, he 
  saw a  cyst  that he will monitor annually. He noted hearing loss in the L ear
   and recommended formal audiology testing. Pt states that her insurance will 
  not cover [facility] and requests referral to alternate audiologist.  
* Regarding R knee pain, states that she was standing up from her couch to turn 
  off a light a few weeks ago when she twisted to her left with her feet planted
   and had a twinge of pain in her R knee. Sat back down on the couch quickly 
  and was able to stand up, but had the same pain in the medial aspect of the R 
  knee. Pain has been present only with ambulation and weight bearing since that
   time. Denies swelling, redness, stiffness. Has been using a can for 
  stability. Pain is improving with time andalso with Voltaren gel (working 
  better than Aspercreme).  
* Cervical Cancer Screening: not indicated  
* Breast Cancer Screening: due 10/2022  
* Osteoporosis Screening: declines  
* Colon Cancer Screening: not indicated  
* Tobacco: denies  
* Alcohol: rare  
* Recreational Drugs: denies  
* Immunizations: due for covid booster and Shingrix at pharmacy, TDaP in our 
  office in the future  
[facility] 205 DO  
Work Phone: [phone] of Present illness Narrative* Patient 
  presenting today for follow up.  
* Regarding DM2, now following with endocrinology (Dr. Lennon). Is currently 
  wearing CGM and has follow up 2022. Is no longer taking her sulfonylurea.
   Has cut out most sugary snacks (adriana nutty bars) and is instead eating protein
   snack packs.  
* Regarding tiredness, states that she feels tired in the middle of the day. 
  Does not go to bed [id number] and does not wake up until late morning. States 
  that she falls asleep when she go back to her bed, but stays up as a habit. Is
   planning on trying to go to bed earlier to see if that helps her tiredness 
  during the day.  
* Regarding dizziness, continues to have similar symptoms despite discontinuing 
  the sulfonylurea.  
* Wants to wait on immunizations at this time.  
[facility] [address] DO  
Work Phone: [phone] of Present illness Narrative* Ms. Aaron arrives
   to outpatient PT with s/s consistent with c/o dizziness. Dizziness likely d/t
   a combination of low blood sugar, vestibular impairment, and potential 
  involvement from cervical tightness. Pt presents with the following 
  impairments: impaired saccades, impaired smooth pursuit, impairedbalance, 
  impaired gait, limited cervical mobility. These impairments contribute to 
  difficulty in activity limitations and participation restrictions including 
  driving, household duties, grocery shopping, walking, household and community 
  navigation. The pt will benefit from skilled PT services to address the above 
  stated impairments and functional limitations to maximize participation and 
  ease in household, social related activities. The pt has a good prognosis when
   considering positive factors including motivation, family support with 
  barriers such as chronicity of symptoms and diabetes. The pt verbalized 
  understanding and agreement to goals and POC. Thank you for this referral and 
  please call [phone] with any questions or concerns.  
* Clinical Presentation: Stable and/or uncomplicated characteristics.  
* Level of Complexity: low  
* Problem List: activity limitations, ADLs/IADLs/self care skills, balance, 
  decreased functional level, decreased knowledge of HEP, dizziness/vertigo, 
  flexibility, gait/locomotion and participation restrictions.  
 [facility]  
Work Phone: [phone] of Present illness NarrativePatient identified
 by name and date of birth. Patient verbalized increased Sx with SCM stretch 
thatceased after preforming. She reported increased eye fatigue after exercises 
with increased Sx with vertical eye movements and required short rest breaks 
between each set. She presented with palpable tension in SCM and suboccipitals 
that responded well to STW. Patient required seated rest breaks after treatment.
 [facility] [address] [place]  
Work Phone: [phone] of Present illness NarrativePatient identified
 by name and date of birth. Patient required cues to stay on task with 
activitiesthis date. She required visual and tactile cues with review of 
cervical stretches. She presented with palpable tension with STW and muscle 
tension with PROM/manual stretches that responded well with reduction of tension
 and increased ROM after. [facility]  
Work Phone: [phone] of Present illness NarrativePatient identified
 by name and date of birth. Patient demonstrated decreased report of eye fatigue
 with exercises but reported increased dizziness with lateral head movements. 
She demonstrated LOB several times with standing balance activities with 50% 
ability to self-correct. She presented with palpable tension in SCM with 
increase on R verses L. [facility]  
Work Phone: [phone] of Present illness NarrativePatient identified
 by name and date of birth. Patient continues to require cues to stay on task. 
She was able to progress with balance activities with addition of ball toss and 
bwd ambulation. She required several rest breaks due to report of fatigue. 
Reviewed importance of HEP due to report of notpreforming daily.[facility]-[address]  
Work Phone: [phone] for referral (narrative)* Consultation 
  (Routine) - Authorized  
  
                          Specialty    Diagnoses / Procedures Referred By Contac  
t Referred To Contact  
   
                                        Primary Care          
  
  
Diagnoses  
  
  
Type 2 diabetes mellitus   
without complication,   
without long-term current   
use of insulin (CMS/[facility])  
  
  
  
Procedures  
  
  
Follow Up In Primary Care -   
Medicare Annual                           
  
  
Tiffanie Callahan DO  
  
  
[address] [facility] [address]  
  
  
[place] [address]  
  
  
Phone: [phone]  
  
  
Fax: [phone]                         
  
  
  
  
  
                    Referral ID Status    Reason    Start Date Expiration Date V  
isits   
Requested                               Visits   
Authorized  
   
                [id number]  Authorized         2023 3/10/2024 1       1  
  
  
  
  
Electronically signed by Tiffanie Callahan DO at 2023 1:37 PM EDT  
  
  
[facility]  
Work Phone: [phone] for visit Narrative* Initial Evaluation . Dx: 
  R26.81.  
* Referred by: Fernanda  
[facility]  
Work Phone: [phone]  
  
Summary Purpose  
  
  
                                                      
  
  
  
Family History  
                              Unknown Family Member  
  
                                Name            Dates           Details  
   
                                                    Family history of hypertensi  
on: Mother, Father(V17.49, Z82.49)  
                                                    Status:Active  
   
                                                    Family history of cardiac di  
sorder: Mother(V17.49, Z82.49)  
                                                    Status:Active  
  
                              Unknown Family Member  
  
                                Name            Dates           Details  
   
                                                    Family history of hypertensi  
on: Mother, Father(V17.49, Z82.49)  
                                                    Status:Active  
   
                                                    Family history of cardiac di  
sorder: Mother(V17.49, Z82.49)  
                                                    Status:Active  
  
                              Unknown Family Member  
  
                                Name            Dates           Details  
   
                                                    Family history of cardiac di  
sorder: Mother(V17.49, Z82.49)  
                                                    Status:Active  
   
                                                    Family history of hypertensi  
on: Mother, Father(V17.49, Z82.49)  
                                                    Status:Active  
  
                              Unknown Family Member  
  
                                Name            Dates           Details  
   
                                                    Family history of hypertensi  
on: Mother, Father(V17.49, Z82.49)  
                                                    Status:Active  
   
                                                    Family history of cardiac di  
sorder: Mother(V17.49, Z82.49)  
                                                    Status:Active  
  
                              Unknown Family Member  
  
                                Name            Dates           Details  
   
                                                    Family history of hypertensi  
on: Mother, Father(V17.49, Z82.49)  
                                                    Status:Active  
   
                                                    Family history of cardiac di  
sorder: Mother(V17.49, Z82.49)  
                                                    Status:Active  
  
                              Unknown Family Member  
  
                                Name            Dates           Details  
   
                                                    Family history of hypertensi  
on: Mother, Father(V17.49, Z82.49)  
                                                    Status:Active  
   
                                                    Family history of cardiac di  
sorder: Mother(V17.49, Z82.49)  
                                                    Status:Active  
  
                              Unknown Family Member  
  
                                Name            Dates           Details  
   
                                                    Family history of cardiac di  
sorder: Mother(V17.49, Z82.49)  
                                                    Status:Active  
   
                                                    Family history of hypertensi  
on: Mother, Father(V17.49, Z82.49)  
                                                    Status:Active  
  
                              Unknown Family Member  
  
                                Name            Dates           Details  
   
                                                    Family history of hypertensi  
on: Mother, Father(V17.49, Z82.49)  
                                                    Status:Active  
   
                                                    Family history of cardiac di  
sorder: Mother(V17.49, Z82.49)  
                                                    Status:Active  
  
                              Unknown Family Member  
  
                                Name            Dates           Details  
   
                                                    Family history of hypertensi  
on: Mother, Father(V17.49, Z82.49)  
                                                    Status:Active  
   
                                                    Family history of cardiac di  
sorder: Mother(V17.49, Z82.49)  
                                                    Status:Active  
  
                              Unknown Family Member  
  
                                Name            Dates           Details  
   
                                                    Family history of hypertensi  
on: Mother, Father(V17.49, Z82.49)  
                                                    Status:Active  
   
                                                    Family history of cardiac di  
sorder: Mother(V17.49, Z82.49)  
                                                    Status:Active  
  
                              Unknown Family Member  
  
                                Name            Dates           Details  
   
                                                    Family history of hypertensi  
on: Mother, Father(V17.49, Z82.49)  
                                                    Status:Active  
   
                                                    Family history of cardiac di  
sorder: Mother(V17.49, Z82.49)  
                                                    Status:Active  
  
                              Unknown Family Member  
  
                                Name            Dates           Details  
   
                                                    Family history of hypertensi  
on: Mother, Father(V17.49, Z82.49)  
                                                    Status:Active  
   
                                                    Family history of cardiac di  
sorder: Mother(V17.49, Z82.49)  
                                                    Status:Active  
  
                              Unknown Family Member  
  
                                Name            Dates           Details  
   
                                                    Family history of hypertensi  
on: Mother, Father(V17.49, Z82.49)  
                                                    Status:Active  
   
                                                    Family history of cardiac di  
sorder: Mother(V17.49, Z82.49)  
                                                    Status:Active  
  
                              Unknown Family Member  
  
                                Name            Dates           Details  
   
                                                    Family history of hypertensi  
on: Mother, Father(V17.49, Z82.49)  
                                                    Status:Active  
   
                                                    Family history of cardiac di  
sorder: Mother(V17.49, Z82.49)  
                                                    Status:Active  
  
                              Unknown Family Member  
  
                                Name            Dates           Details  
   
                                                    Family history of hypertensi  
on: Mother, Father(V17.49, Z82.49)  
                                                    Status:Active  
   
                                                    Family history of cardiac di  
sorder: Mother(V17.49, Z82.49)  
                                                    Status:Active  
  
                              Unknown Family Member  
  
                                Name            Dates           Details  
   
                                                    Family history of hypertensi  
on: Mother, Father(V17.49, Z82.49)  
                                                    Status:Active  
   
                                                    Family history of cardiac di  
sorder: Mother(V17.49, Z82.49)  
                                                    Status:Active  
  
                              Unknown Family Member  
  
                                Name            Dates           Details  
   
                                                    Family history of hypertensi  
on: Mother, Father(V17.49, Z82.49)  
                                                    Status:Active  
   
                                                    Family history of cardiac di  
sorder: Mother(V17.49, Z82.49)  
                                                    Status:Active  
  
                              Unknown Family Member  
  
                                Name            Dates           Details  
   
                                                    Family history of hypertensi  
on: Mother, Father(V17.49, Z82.49)  
                                                    Status:Active  
   
                                                    Family history of cardiac di  
sorder: Mother(V17.49, Z82.49)  
                                                    Status:Active  
  
                              Unknown Family Member  
  
                                Name            Dates           Details  
   
                                                    Family history of hypertensi  
on: Mother, Father(V17.49, Z82.49)  
                                                    Status:Active  
   
                                                    Family history of cardiac di  
sorder: Mother(V17.49, Z82.49)  
                                                    Status:Active  
  
                              Unknown Family Member  
  
                                Name            Dates           Details  
   
                                                    Family history of hypertensi  
on: Mother, Father(V17.49, Z82.49)  
                                                    Status:Active  
   
                                                    Family history of cardiac di  
sorder: Mother(V17.49, Z82.49)  
                                                    Status:Active  
  
                              Unknown Family Member  
  
                                Name            Dates           Details  
   
                                                    Family history of hypertensi  
on: Mother, Father(V17.49, Z82.49)  
                                                    Status:Active  
   
                                                    Family history of cardiac di  
sorder: Mother(V17.49, Z82.49)  
                                                    Status:Active  
  
                              Unknown Family Member  
  
                                Name            Dates           Details  
   
                                                    Family history of cardiac di  
sorder: Mother(V17.49, Z82.49)  
                                                    Status:Active  
   
                                                    Family history of hypertensi  
on: Mother, Father(V17.49, Z82.49)  
                                                    Status:Active  
  
                              Unknown Family Member  
  
                                Name            Dates           Details  
   
                                                    Family history of cardiac di  
sorder: Mother(V17.49, Z82.49)  
                                                    Status:Active  
   
                                                    Family history of hypertensi  
on: Mother, Father(V17.49, Z82.49)  
                                                    Status:Active  
  
                              Unknown Family Member  
  
                                Name            Dates           Details  
   
                                                    Family history of hypertensi  
on: Mother, Father(V17.49, Z82.49)  
                                                    Status:Active  
   
                                                    Family history of cardiac di  
sorder: Mother(V17.49, Z82.49)  
                                                    Status:Active  
  
                              Unknown Family Member  
  
                                Name            Dates           Details  
   
                                                    Family history of hypertensi  
on: Mother, Father(V17.49, Z82.49)  
                                                    Status:Active  
   
                                                    Family history of cardiac di  
sorder: Mother(V17.49, Z82.49)  
                                                    Status:Active  
  
                              Unknown Family Member  
  
                                Name            Dates           Details  
   
                                                    Family history of hypertensi  
on: Mother, Father(V17.49, Z82.49)  
                                                    Status:Active  
   
                                                    Family history of cardiac di  
sorder: Mother(V17.49, Z82.49)  
                                                    Status:Active  
  
                              Unknown Family Member  
  
                                Name            Dates           Details  
   
                                                    Family history of hypertensi  
on: Mother, Father(V17.49, Z82.49)  
                                                    Status:Active  
   
                                                    Family history of cardiac di  
sorder: Mother(V17.49, Z82.49)  
                                                    Status:Active  
  
                              Unknown Family Member  
  
                                Name            Dates           Details  
   
                                                    Family history of hypertensi  
on: Mother, Father(V17.49, Z82.49)  
                                                    Status:Active  
   
                                                    Family history of cardiac di  
sorder: Mother(V17.49, Z82.49)  
                                                    Status:Active  
  
                              Unknown Family Member  
  
                                Name            Dates           Details  
   
                                                    Family history of hypertensi  
on: Mother, Father(V17.49, Z82.49)  
                                                    Status:Active  
   
                                                    Family history of cardiac di  
sorder: Mother(V17.49, Z82.49)  
                                                    Status:Active  
  
                              Unknown Family Member  
  
                                Name            Dates           Details  
   
                                                    Family history of hypertensi  
on: Mother, Father(V17.49, Z82.49)  
                                                    Status:Active  
   
                                                    Family history of cardiac di  
sorder: Mother(V17.49, Z82.49)  
                                                    Status:Active  
  
                              Unknown Family Member  
  
                                Name            Dates           Details  
   
                                                    Family history of hypertensi  
on: Mother, Father(V17.49, Z82.49)  
                                                    Status:Active  
   
                                                    Family history of cardiac di  
sorder: Mother(V17.49, Z82.49)  
                                                    Status:Active  
  
                              Unknown Family Member  
  
                                Name            Dates           Details  
   
                                                    Family history of cardiac di  
sorder: Mother(V17.49, Z82.49)  
                                                    Status:Active  
   
                                                    Family history of hypertensi  
on: Mother, Father(V17.49, Z82.49)  
                                                    Status:Active  
  
                              Unknown Family Member  
  
                                Name            Dates           Details  
   
                                                    Family history of hypertensi  
on: Mother, Father(V17.49, Z82.49)  
                                                    Status:Active  
   
                                                    Family history of cardiac di  
sorder: Mother(V17.49, Z82.49)  
                                                    Status:Active  
  
                              Unknown Family Member  
  
                                Name            Dates           Details  
   
                                                    Family history of hypertensi  
on: Mother, Father(V17.49, Z82.49)  
                                                    Status:Active  
   
                                                    Family history of cardiac di  
sorder: Mother(V17.49, Z82.49)  
                                                    Status:Active  
  
                              Unknown Family Member  
  
                                Name            Dates           Details  
   
                                                    Family history of hypertensi  
on: Mother, Father(V17.49, Z82.49)  
                                                    Status:Active  
   
                                                    Family history of cardiac di  
sorder: Mother(V17.49, Z82.49)  
                                                    Status:Active  
  
                              Unknown Family Member  
  
                                Name            Dates           Details  
   
                                                    Family history of hypertensi  
on: Mother, Father(V17.49, Z82.49)  
                                                    Status:Active  
   
                                                    Family history of cardiac di  
sorder: Mother(V17.49, Z82.49)  
                                                    Status:Active  
  
                              Unknown Family Member  
  
                                Name            Dates           Details  
   
                                                    Family history of hypertensi  
on: Mother, Father(V17.49, Z82.49)  
                                                    Status:Active  
   
                                                    Family history of cardiac di  
sorder: Mother(V17.49, Z82.49)  
                                                    Status:Active  
  
                              Unknown Family Member  
  
                                Name            Dates           Details  
   
                                                    Family history of hypertensi  
on: Mother, Father(V17.49, Z82.49)  
                                                    Status:Active  
   
                                                    Family history of cardiac di  
sorder: Mother(V17.49, Z82.49)  
                                                    Status:Active  
  
                              Unknown Family Member  
  
                                Name            Dates           Details  
   
                                                    Family history of hypertensi  
on: Mother, Father(V17.49, Z82.49)  
                                                    Status:Active  
   
                                                    Family history of cardiac di  
sorder: Mother(V17.49, Z82.49)  
                                                    Status:Active  
  
                              Unknown Family Member  
  
                                Name            Dates           Details  
   
                                                    Family history of hypertensi  
on: Mother, Father(V17.49, Z82.49)  
                                                    Status:Active  
   
                                                    Family history of cardiac di  
sorder: Mother(V17.49, Z82.49)  
                                                    Status:Active  
  
                              Unknown Family Member  
  
                                Name            Dates           Details  
   
                                                    Family history of hypertensi  
on: Mother, Father(V17.49, Z82.49)  
                                                    Status:Active  
   
                                                    Family history of cardiac di  
sorder: Mother(V17.49, Z82.49)  
                                                    Status:Active  
  
                              Unknown Family Member  
  
                                Name            Dates           Details  
   
                                                    Family history of cardiac di  
sorder: Mother(V17.49, Z82.49)  
                                                    Status:Active  
   
                                                    Family history of hypertensi  
on: Mother, Father(V17.49, Z82.49)  
                                                    Status:Active  
  
                              Unknown Family Member  
  
                                Name            Dates           Details  
   
                                                    Family history of hypertensi  
on: Mother, Father(V17.49, Z82.49)  
                                                    Status:Active  
   
                                                    Family history of cardiac di  
sorder: Mother(V17.49, Z82.49)  
                                                    Status:Active  
  
                              Unknown Family Member  
  
                                Name            Dates           Details  
   
                                                    Family history of hypertensi  
on: Mother, Father(V17.49, Z82.49)  
                                                    Status:Active  
   
                                                    Family history of cardiac di  
sorder: Mother(V17.49, Z82.49)  
                                                    Status:Active  
  
                              Unknown Family Member  
  
                                Name            Dates           Details  
   
                                                    Family history of cardiac di  
sorder: Mother(V17.49, Z82.49)  
                                                    Status:Active  
   
                                                    Family history of hypertensi  
on: Mother, Father(V17.49, Z82.49)  
                                                    Status:Active  
  
                              Unknown Family Member  
  
                                Name            Dates           Details  
   
                                                    Family history of hypertensi  
on: Mother, Father(V17.49, Z82.49)  
                                                    Status:Active  
   
                                                    Family history of cardiac di  
sorder: Mother(V17.49, Z82.49)  
                                                    Status:Active  
  
                              Unknown Family Member  
  
                                Name            Dates           Details  
   
                                                    Family history of hypertensi  
on: Mother, Father(V17.49, Z82.49)  
                                                    Status:Active  
   
                                                    Family history of cardiac di  
sorder: Mother(V17.49, Z82.49)  
                                                    Status:Active  
  
                              Unknown Family Member  
  
                                Name            Dates           Details  
   
                                                    Family history of hypertensi  
on: Mother, Father(V17.49, Z82.49)  
                                                    Status:Active  
   
                                                    Family history of cardiac di  
sorder: Mother(V17.49, Z82.49)  
                                                    Status:Active  
  
                              Unknown Family Member  
  
                                Name            Dates           Details  
   
                                                    Family history of hypertensi  
on: Mother, Father(V17.49, Z82.49)  
                                                    Status:Active  
   
                                                    Family history of cardiac di  
sorder: Mother(V17.49, Z82.49)  
                                                    Status:Active  
  
                              Unknown Family Member  
  
                                Name            Dates           Details  
   
                                                    Family history of hypertensi  
on: Mother, Father(V17.49, Z82.49)  
                                                    Status:Active  
   
                                                    Family history of cardiac di  
sorder: Mother(V17.49, Z82.49)  
                                                    Status:Active  
  
                              Unknown Family Member  
  
                                Name            Dates           Details  
   
                                                    Family history of hypertensi  
on: Mother, Father(V17.49, Z82.49)  
                                                    Status:Active  
   
                                                    Family history of cardiac di  
sorder: Mother(V17.49, Z82.49)  
                                                    Status:Active  
  
                              Unknown Family Member  
  
                                Name            Dates           Details  
   
                                                    Family history of hypertensi  
on: Mother, Father(V17.49, Z82.49)  
                                                    Status:Active  
   
                                                    Family history of cardiac di  
sorder: Mother(V17.49, Z82.49)  
                                                    Status:Active  
  
                              Unknown Family Member  
  
                                Name            Dates           Details  
   
                                                    Family history of hypertensi  
on: Mother, Father(V17.49, Z82.49)  
                                                    Status:Active  
   
                                                    Family history of cardiac di  
sorder: Mother(V17.49, Z82.49)  
                                                    Status:Active  
  
  
  
Advance Directives  
                                Documents on File  
  
                          Type         Date Recorded Patient Representative Expl  
anation  
   
                          Advance Directives and Living Will                      
         
  
                                Documents on File  
  
                          Type         Date Recorded Patient Representative Expl  
anation  
   
                          HealthCare Power of                             
   
   
                          Advance Directives/Living Will                          
     
  
                                Documents on File  
  
                          Type         Date Recorded Patient Representative Expl  
anation  
   
                          Advance Directives and Living Will                      
         
  
                           Latest Code Status on File  
  
                          Code Status  Date Activated Date Inactivated Comments  
   
                          Full Code    3/4/2024 12:33 PM                
   
                                Question        Answer          Comments   
   
                                Plan of Care:   Code Status Discussion Completed  
    
   
                                Decision Maker: Patient            
  
                               Code Status History  
  
                          Code Status  Date Activated Date Inactivated Comments  
   
                          Full Code    2024 6:18 AM 3/4/2024 12:33 PM   
   
                                Question        Answer          Comments   
   
                                Plan of Care:   Code Status Discussion Completed  
    
   
                                Decision Maker: Patient            
  
  
  
Chief Complaint  
Est Care with family doctor, Former Dr. Fisher patientEst Care with family 
doctor, Former Dr. Fisher patientEst Care with family doctor, Former Dr. Fisher patientEst Care with family doctor, Former Dr. Fisher patient
Patient is here for post menopausal bleeding. She states she notices it after 
lifting heavy objects. Patient has a hx of breast cancer, s/p mastectomy.One 
month recheck labs to be reviewed Back is bothering her todayOne month recheck 
labs to be reviewed Back is bothering her todayPT HERE TODAY FOR U/S RESULTS. 
HAS NO NEW CONCERNS. LMP: SHALONDA IS HERE TODAY FOR A 2 WEEK POST OP FROM A 
HYSTEROSCOPY D&C. STATES HAS BEEN FEELING DIZZY SINCE SHE HAD THE D&C. LMP: MURPHY
Medicare Wellness Exam Been having dizzy spells Blood Sugar 126-130One month 
recheck with Labs to be reviewed Had stopped the glimepiride due to her 
dizziness howeverher blood sugar was up so took one yesterday. dizziness still 
comes and goes3 month check. Still taking glimepiride when sugars go high.3 
month check. Still taking glimepiride when sugars go high.3 month check. Still 
taking glimepiride when sugars go high.3 month check. Still taking glimepiride 
when sugars go high.medicare wellness. Fell and twisted right knee about 2 weeks
 [facility]. Fell and twisted right knee about 2 weeks ago3 month 
check. Sugars have been staying about 130s-150s.  
  
Reason for Referral  
  
  
                          Specialty    Diagnoses / Procedures Referred By Contac  
t Referred To Contact  
   
                                        Otolaryngology        
  
  
Diagnoses  
  
  
Pharyngeal disorder  
                                          
  
  
Tiffanie Callahan DO  
  
  
[address] [facility]  
  
  
[address]  
  
  
[place] [address]  
  
  
Phone: [phone]  
  
  
Fax: [phone]                         
  
  
Amanda Gill MD  
  
  
[facility] [address]  
  
  
[address] [place] [address]  
  
  
Phone: [phone]  
  
  
Fax: [phone]  
  
  
  
                    Referral ID Status    Reason    Start Date Expiration Date V  
isits   
Requested                               Visits   
Authorized  
   
                [id number] Authorized         2022 1       1  
  
  
  
                          Specialty    Diagnoses / Procedures Referred By Contac  
t Referred To Contact  
   
                                                              
  
  
Diagnoses  
  
  
Personal history of   
malignant neoplasm of breast  
  
  
Encounter for screening   
mammogram for malignant   
neoplasm of breast  
  
  
  
Procedures  
  
  
MAMMO SCREENING WITH Yi Navarrete, APRN-CNP  
  
  
[address] [place] [address]  
  
  
Phone: [phone]  
  
  
Fax: [phone]                         
  
  
  
  
  
                    Referral ID Status    Reason    Start Date Expiration Date V  
isits   
Requested                               Visits   
Authorized  
   
                [id number] New Request         2023 1       1  
  
  
  
                    Referral ID Status    Reason    Start Date Expiration Date V  
isits   
Requested                               Visits   
Authorized  
   
                [id number] New Request         10/27/2021 2022 1       1  
  
  
  
                          Specialty    Diagnoses / Procedures Referred By Contac  
t Referred To Contact  
   
                                                              
  
  
Diagnoses  
  
  
Personal history of malignant   
neoplasm of breast  
  
  
Encounter for screening   
mammogram for malignant   
neoplasm of breast  
  
  
  
Procedures  
  
  
MAMMO SCREENING WITH CHIVO   
RIGHT                                     
  
  
Aissatou Orlando, APRN-CNP  
  
  
 [facility]  
  
  
[address]  
  
  
[place] [address]  
  
  
Phone: [phone]  
  
  
Fax: [phone]                         
  
  
  
  
  
                    Referral ID Status    Reason    Start Date Expiration Date V  
isits   
Requested                               Visits   
Authorized  
   
                [id number] New Request         2023 1       1  
  
  
  
                          Specialty    Diagnoses / Procedures Referred By Contac  
t Referred To Contact  
   
                                                              
  
  
Diagnoses  
  
  
Personal history of   
malignant neoplasm of breast  
  
  
Encounter for screening   
mammogram for malignant   
neoplasm of breast  
  
  
  
Procedures  
  
  
MAMMO SCREENING WITH CHIVO   
RIGHT                                     
  
  
Yi Vale APRN-CNP  
  
  
[address] [address]  
  
  
Phone: [phone]  
  
  
Fax: [phone]                         
  
  
  
  
  
                Referral ID Status  Reason  Start Date Expiration Date Visits Re  
quested Visits   
Authorized  
   
                [id number] Closed          2023 1       1  
  
  
  
                          Specialty    Diagnoses / Procedures Referred By Contac  
t Referred To Contact  
   
                                        General Surgery       
  
  
Diagnoses  
  
  
Fall, initial encounter  
  
  
  
Procedures  
  
  
Follow up in Trauma   
Surgery                                   
  
  
Kathy Archer MD  
  
  
[address]  
  
  
[place] [address]  
  
  
Phone: [phone]  
  
  
Fax: [phone]                         
  
  
  
  
  
                    Referral ID Status    Reason    Start Date Expiration Date V  
isits   
Requested                               Visits   
Authorized  
   
                [id number] Authorized         3/4/2024 3/4/2025 1       1  
  
  
  
Additional Source Comments  
  
  
  
                                                    INFORMATION SOURCE (unrecogn  
ized section and content)  
   
                                          
  
  
  
                                          
   
                                          
  
  
  
                                DATE CREATED    AUTHOR          AUTHOR'S ORGANIZ  
ATION  
   
                                2019                      [facility]is[facility]owell[facility]  
al  
  
  
  
                                DATE CREATED    AUTHOR          AUTHOR'S ORGANIZ  
ATION  
   
                                2022                      Jan leiva  
  
  
  
                                DATE CREATED    AUTHOR          AUTHOR'S ORGANIZ  
ATION  
   
                                2023                      [facility]  
latory  
  
  
  
                                DATE CREATED    AUTHOR          AUTHOR'S ORGANIZ  
ATION  
   
                                2023                       [facility]  
  
  
  
                                DATE CREATED    AUTHOR          AUTHOR'S ORGANIZ  
ATION  
   
                                05/15/2023                      [facility]  
  
  
  
                                DATE CREATED    AUTHOR          AUTHOR'S ORGANIZ  
ATION  
   
                                12/15/2023                      [facility]  
  
  
  
                                DATE CREATED    AUTHOR          AUTHOR'S ORGANIZ  
ATION  
   
                                2024                      [facility]  
  
  
  
                                DATE CREATED    AUTHOR          AUTHOR'S ORGANIZ  
ATION  
   
                                2024                      [facility]  
spital  
  
  
  
                                DATE CREATED    AUTHOR          AUTHOR'S ORGANIZ  
ATION  
   
                                2024                      [facility]  
  
  
  
                                DATE CREATED    AUTHOR          AUTHOR'S ORGANIZ  
ATION  
   
                                2024                      [facility]  
  
  
  
                                DATE CREATED    AUTHOR          AUTHOR'S ORGANIZ  
ATION  
   
                                2024                      [facility]  
  
  
  
  
  
                                                    Reason for Visit (unrecogniz  
ed section and content)  
   
                                          
  
  
  
                                          
   
                                          
  
  
  
                                        Reason              Comments  
   
                                        Pharyngeal disorder New pt  
  
  
  
                          Specialty    Diagnoses / Procedures Referred By Contac  
t Referred To Contact  
   
                                        Otolaryngology        
  
  
Diagnoses  
  
  
Pharyngeal disorder  
                                          
  
  
Tiffanie Callahan DO  
  
  
[address] [address]  
  
  
Phone: [phone]  
  
  
Fax: [phone]                         
  
  
Amanda Gill MD  
  
  
[address] [place], [place] [address]  
  
  
Phone: [phone]  
  
  
Fax: [phone]  
  
  
  
                Referral ID Status  Reason  Start Date Expiration Date Visits Re  
quested Visits   
Authorized  
   
                [id number] Closed          2022 1       1  
  
  
  
                                        Reason              Comments  
   
                                        Follow-up             
   
                                        Diabetes              
  
  
  
                                        Reason              Comments  
   
                                        Follow-up           Pro Placement  
  
  
  
                                        Reason              Comments  
   
                                        Follow-up           Hx of left mastectom  
y here today for imaging and exam; no breast   
concerns  
  
  
  
                          Specialty    Diagnoses / Procedures Referred By Contac  
t Referred To Contact  
   
                                                              
  
  
Diagnoses  
  
  
Personal history of   
malignant neoplasm of breast  
  
  
Encounter for screening   
mammogram for malignant   
neoplasm of breast  
  
  
  
Procedures  
  
  
MAMMO SCREENING WITH Yi Naavrrete APRN-CNP  
  
  
[address] [address]  
  
  
Phone: [phone]  
  
  
Fax: [phone]                         
  
  
  
  
  
                    Referral ID Status    Reason    Start Date Expiration Date V  
isits   
Requested                               Visits   
Authorized  
   
                [id number] New Request         10/27/2021 2022 1       1  
  
  
  
                                        Reason              Comments  
   
                                        Follow-up           pro  
  
  
  
                                        Reason              Comments  
   
                                        Diabetes              
   
                                        Follow-up             
  
  
  
                                        Reason              Comments  
   
                                        1 year f/u          2022 Oropharyn  
geal dysphagia  
  
  
  
                                        Reason              Comments  
   
                                        Follow-up           Annual exam w/ mammo  
  
  
  
                          Specialty    Diagnoses / Procedures Referred By Contac  
t Referred To Contact  
   
                                                              
  
  
Diagnoses  
  
  
Personal history of   
malignant neoplasm of breast  
  
  
Encounter for screening   
mammogram for malignant   
neoplasm of breast  
  
  
  
Procedures  
  
  
MAMMO SCREENING WITH Yi Nagel APRN-CNP  
  
  
[address] [place] [address]  
  
  
Phone: [phone]  
  
  
Fax: [phone]                         
  
  
  
  
  
                Referral ID Status  Reason  Start Date Expiration Date Visits Re  
quested Visits   
Authorized  
   
                [id number] Closed          2023 1       1  
  
  
  
                                        Reason              Comments  
   
                                        New Patient         Dizziness  
  
  
  
                                        Reason              Comments  
   
                                        Fall                Brought to ED per Ma  
[employer] with C-collar in place from home after a   
fall   
in driveway while bringing in her trash can. She fell backward and hit her   
head. Denies any LOC and is not on any blood thinners. She c/o low back pain   
and lac to back of head.  
  
  
  
                                        Reason              Comments  
   
                                        Fall                  
  
  
  
  
  
                                                    Care Teams (unrecognized sec  
tion and content)  
   
                                          
  
  
  
                                          
   
                                          
   
                                          
   
                                          
   
                                          
  
  
  
                      Team Member Relationship Specialty  Start Date End Date  
   
                                                      
  
  
Tiffanie Callahan,   
  
  
[address] [address]  
  
  
[phone] (Work)  
  
  
[phone] (Fax) PCP - General   Family Medicine 22            
  
  
  
                      Team Member Relationship Specialty  Start Date End Date  
   
                                                      
  
  
Tiffanie Callahan   
  
  
[address] [address]  
  
  
[phone] (Work)  
  
  
[phone] (Fax) PCP - General   Family Medicine 22            
  
  
  
                      Team Member Relationship Specialty  Start Date End Date  
   
                                                      
  
  
Tiffanie Callahan DO  
  
  
[address] [address]  
  
  
[phone] (Work)  
  
  
[phone] (Fax) PCP - General   Family Medicine 22            
  
  
  
                      Team Member Relationship Specialty  Start Date End Date  
   
                                                      
  
  
Tiffanie Callahan,   
  
  
[address] [place] [address]  
  
  
[phone] (Work)  
  
  
[phone] (Fax) PCP - General   Family Medicine 21            
   
                                                      
  
  
Leno Crowe MD  
  
  
[address]  
  
  
3rd Floor, [address]  
  
  
[place] [address] [phone] (Work)  
  
  
[phone] (Fax) Surgeon         Surgical Oncology 2/10/10           
   
                                                      
  
  
Marcos Lindsey MD  
  
  
[address]  
  
  
5th Floor  
  
  
[place] [address]  
  
  
[phone] (Work)  
  
  
[phone] (Fax) Otolaryngologist Otolaryngology  2/10/10           
   
                                                      
  
  
Allan Garnica, MBBS  
  
  
[address]  
  
  
4th Floor, [address]  
  
  
[place] [address] [phone] (Work)  
  
  
[phone] (Fax) Oncologist      Medical Oncology 16            
  
  
  
                      Team Member Relationship Specialty  Start Date End Date  
   
                                                      
  
  
Tiffanie Callahan DO  
  
  
[address] [place] [address]  
  
  
[phone] (Work)  
  
  
[phone] (Fax) PCP - General   Family Medicine 21            
   
                                                      
  
  
Leno Crowe MD  
  
  
[address]  
  
  
3rd Floor, [address]  
  
  
[place] [address] [phone] (Work)  
  
  
[phone] (Fax) Surgeon         Surgical Oncology 2/10/10           
   
                                                      
  
  
Marcos Lindsey MD  
  
  
[address]  
  
  
5th Floor  
  
  
[place] [address]  
  
  
[phone] (Work)  
  
  
[phone] (Fax) Otolaryngologist Otolaryngology  2/10/10           
   
                                                      
  
  
Allan Garnica, GRADY  
  
  
[address]  
  
  
4th Floor, [address]  
  
  
[place] [address] [phone] (Work)  
  
  
[phone] (Fax) Oncologist      Medical Oncology 16            
  
  
  
                      Team Member Relationship Specialty  Start Date End Date  
   
                                                      
  
  
Tiffanie CallahanDO  
  
  
[address] [place] [address]  
  
  
[phone] (Work)  
  
  
[phone] (Fax) PCP - General   Family Medicine 21            
   
                                                      
  
  
Leno Crowe MD  
  
  
[address]  
  
  
3rd Floor, [address]  
  
  
[place] [address] [phone] (Work)  
  
  
[phone] (Fax) Surgeon         Surgical Oncology 2/10/10           
   
                                                      
  
  
Marcos Lindsey MD  
  
  
[address]  
  
  
5th Floor  
  
  
[place] [address]  
  
  
[phone] (Work)  
  
  
[phone] (Fax) Otolaryngologist Otolaryngology  2/10/10           
   
                                                      
  
  
Allan Garnica, GRADY  
  
  
[address]  
  
  
4th Floor, [address]  
  
  
[place] [address] [phone] (Work)  
  
  
[phone] (Fax) Oncologist      Medical Oncology 16            
  
  
  
                      Team Member Relationship Specialty  Start Date End Date  
   
                                                      
  
  
Tiffanie CallahanDO  
  
  
[address] [place] [address]  
  
  
[phone] (Work)  
  
  
[phone] (Fax) PCP - General   Family Medicine 21            
   
                                                      
  
  
Leno Crowe MD  
  
  
[address]  
  
  
3rd Floor, [address]  
  
  
[place] [address] [phone] (Work)  
  
  
[phone] (Fax) Surgeon         Surgical Oncology 2/10/10           
   
                                                      
  
  
Marcos Lindsey MD  
  
  
[address]  
  
  
5th Floor  
  
  
[place] [address]  
  
  
[phone] (Work)  
  
  
[phone] (Fax) Otolaryngologist Otolaryngology  2/10/10           
   
                                                      
  
  
Allan Garnica, GRADY  
  
  
[address]  
  
  
4th Floor, [address]  
  
  
[place] [address] [phone] (Work)  
  
  
[phone] (Fax) Oncologist      Medical Oncology 16            
  
  
  
                      Team Member Relationship Specialty  Start Date End Date  
   
                                                      
  
  
Tiffanie Callahan DO  
  
  
[address] [place] [address]  
  
  
[phone] (Work)  
  
  
[phone] (Fax) PCP - General   Family Medicine 21            
   
                                                      
  
  
Leno Crowe MD  
  
  
[address]  
  
  
3rd Floor, [address]  
  
  
[place] [address] [phone] (Work)  
  
  
[phone] (Fax) Surgeon         Surgical Oncology 2/10/10           
   
                                                      
  
  
Marcos Lindsey MD  
  
  
[address]  
  
  
5th Floor  
  
  
[place] [address]  
  
  
[phone] (Work)  
  
  
[phone] (Fax) Otolaryngologist Otolaryngology  2/10/10           
   
                                                      
  
  
Allan Garnica, MBBS  
  
  
[address]  
  
  
4th Floor, [address]  
  
  
[place] [address] [phone] (Work)  
  
  
[phone] (Fax) Oncologist      Medical Oncology 16            
  
  
  
                      Team Member Relationship Specialty  Start Date End Date  
   
                                                      
  
  
Tiffanie Callahan DO  
  
  
[address] [place], [place] [address] [phone] (Work)  
  
  
[phone] (Fax) PCP - General   Family Medicine 21            
   
                                                      
  
  
Leno Crowe MD  
  
  
[address]  
  
  
3rd Floor, [address]  
  
  
[place] [address] [phone] (Work)  
  
  
[phone] (Fax) Surgeon         Surgical Oncology 2/10/10           
   
                                                      
  
  
Marcos Lindsey MD  
  
  
[address]  
  
  
5th Floor  
  
  
[place] [address] [phone] (Work)  
  
  
[phone] (Fax) Otolaryngologist Otolaryngology  2/10/10           
   
                                                      
  
  
Allan Garnica, GRADY  
  
  
[address]  
  
  
4th Floor, [address]  
  
  
[place] [address] [phone] (Work)  
  
  
[phone] (Fax) Oncologist      Medical Oncology 16            
  
  
  
                      Team Member Relationship Specialty  Start Date End Date  
   
                                                      
  
  
Tiffanie Callahan DO  
  
  
NPI: [id number]  
  
  
[address] [facility]  
  
  
[address]  
  
  
[place], [place] [address] [phone] (Work)  
  
  
[phone] (Fax) PCP - General   Family Medicine 22            
  
  
  
                      Team Member Relationship Specialty  Start Date End Date  
   
                                                      
  
  
Tiffanie Callahan DO  
  
  
NPI: [id number]  
  
  
[address] [facility]  
  
  
[address]  
  
  
[place], [place] [address] [phone] (Work)  
  
  
[phone] (Fax) PCP - General                   6/15/21           
  
  
  
                      Team Member Relationship Specialty  Start Date End Date  
   
                                                      
  
  
Tiffanie Callahan DO  
  
  
NPI: [id number]  
  
  
[address] [facility] [address]  
  
  
[place] [address]  
  
  
[phone] (Work)  
  
  
[phone] (Fax) PCP - General   Family Medicine 21            
   
                                                      
  
  
Leno Crowe MD  
  
  
NPI: [id number]  
  
  
[address]  
  
  
3rd Floor, [address]  
  
  
[place] [address] [phone] (Work)  
  
  
[phone] (Fax) Surgeon         Surgical Oncology 2/10/10           
   
                                                      
  
  
Marcos Lindsey MD  
  
  
NPI: [id number]  
  
  
[address]  
  
  
5th Floor  
  
  
[place] [address] [phone] (Work)  
  
  
[phone] (Fax) Otolaryngologist Otolaryngology  2/10/10           
   
                                                      
  
  
Allan Garnica MBBS  
  
  
NPI: [id number]  
  
  
[address]  
  
  
4th Floor, [address]  
  
  
[place] [address] [phone] (Work)  
  
  
[phone] (Fax) Oncologist      Medical Oncology 16            
  
  
  
                      Team Member Relationship Specialty  Start Date End Date  
   
                                                      
  
  
Tiffanie Callahan DO  
  
  
NPI: [id number]  
  
  
[address] [facility] [address]  
  
  
[place] [address]  
  
  
[phone] (Work)  
  
  
[phone] (Fax) PCP - General   Family Medicine 21            
   
                                                      
  
  
Leno Crowe MD  
  
  
NPI: [id number]  
  
  
[address]  
  
  
3rd Floor, [address]  
  
  
[place] [address] [phone] (Work)  
  
  
[phone] (Fax) Surgeon         Surgical Oncology 2/10/10           
   
                                                      
  
  
Marcos Lindsey MD  
  
  
NPI: [id number]  
  
  
[address]  
  
  
5th Floor  
  
  
[place] [address]  
  
  
[phone] (Work)  
  
  
[phone] (Fax) Otolaryngologist Otolaryngology  2/10/10           
   
                                                      
  
  
Allan Garnica MBBS  
  
  
NPI: [id number]  
  
  
[address]  
  
  
4th Floor, [address]  
  
  
[place] [address] [phone] (Work)  
  
  
[phone] (Fax) Oncologist      Medical Oncology 16            
  
  
  
                      Team Member Relationship Specialty  Start Date End Date  
   
                                                      
  
  
Tiffanie Callahan DO  
  
  
NPI: [id number]  
  
  
[address] [facility] [address]  
  
  
[place] [address]  
  
  
[phone] (Work)  
  
  
[phone] (Fax) PCP - General   Family Medicine 21            
   
                                                      
  
  
Leno Crowe MD  
  
  
NPI: [id number]  
  
  
[address]  
  
  
3rd Floor, [address]  
  
  
[place] [address] [phone] (Work)  
  
  
[phone] (Fax) Surgeon         Surgical Oncology 2/10/10           
   
                                                      
  
  
Marcos Lindsey MD  
  
  
NPI: [id number]  
  
  
[address]  
  
  
5th Floor  
  
  
[place] [address] [phone] (Work)  
  
  
[phone] (Fax) Otolaryngologist Otolaryngology  2/10/10           
   
                                                      
  
  
Allan Garnica MBBS  
  
  
NPI: [id number]  
  
  
[address]  
  
  
4th Floor, [address]  
  
  
[place] [address] [phone] (Work)  
  
  
[phone] (Fax) Oncologist      Medical Oncology 16            
  
  
  
                      Team Member Relationship Specialty  Start Date End Date  
   
                                                      
  
  
Tiffanie Callahan DO  
  
  
NPI: [id number]  
  
  
[address] [facility] [address]  
  
  
[place] [address]  
  
  
[phone] (Work)  
  
  
[phone] (Fax) PCP - General                   6/15/21           
   
                                                      
  
  
Tiffanie Callahan DO  
  
  
NPI: [id number]  
  
  
[address] [facility] [address]  
  
  
[place] [address]  
  
  
[phone] (Work)  
  
  
[phone] (Fax)                      PCP - [facility] Attributed   
Provider                                10/1/23               
  
  
  
                      Team Member Relationship Specialty  Start Date End Date  
   
                                                      
  
  
Tiffanie Callahan DO  
  
  
NPI: [id number]  
  
  
[address] [facility] [address]  
  
  
[place] [address]  
  
  
[phone] (Work)  
  
  
[phone] (Fax) PCP - General                   6/15/21           
   
                                                      
  
  
Tiffanie Callahan DO  
  
  
NPI: [id number]  
  
  
[address] [facility] [address]  
  
  
[place] [address]  
  
  
[phone] (Work)  
  
  
[phone] (Fax)                      PCP - [facility] Attributed   
Provider                                10/1/23               
  
  
  
                      Team Member Relationship Specialty  Start Date End Date  
   
                                                      
  
  
Tiffanie Callahan DO  
  
  
NPI: [id number]  
  
  
[address] [facility]  
  
  
[address]  
  
  
[place] [address]  
  
  
[phone] (Work)  
  
  
[phone] (Fax) PCP - General   Family Medicine 22            
  
  
  
                                    Scheduled  
  
                                                    Active and Recently Administ  
ered Medications (unrecognized section and content)  
   
                                          
  
  
  
                                          
   
                                          
   
                                          
  
                                    Scheduled  
  
                          Medication Order 2024  
   
                                                      
  
  
acetaminophen   
(Tylenol) tablet 975   
mg (CANCELED)  
975 mg, oral, Every 8   
hours, First dose on   
Thu 24 at 0620,   
If ordered PRN for   
pain, nurse is   
permitted to   
administer this   
medication for higher   
pain scores based on   
patient preference?   
Yes  
                                        0809 (Not Given -   
Provider: Shari Kincaid RN   
- Reason:   
Patient/family   
refused)1717 (Given   
- Provider: Shari Kincaid RN)  
                                        0105 (Given - Provider:   
Ariana Mera RN)0822   
(Not Given - Provider:   
Shari Kincaid RN - Reason:   
Patient/family refused)  
                                          
   
                                                      
  
  
aspirin chewable   
tablet 81 mg  
81 mg, oral, Daily,   
First dose on Sun   
3/3/24 at 2100  
                                                    2100 (Not Given -   
Provider: Jessica Puckett, LATOYA - Reason:   
Resident/resident   
representative refused -   
education provided)  
                                        2100 (Due)  
  
   
                                                      
  
  
enoxaparin (Lovenox)   
syringe 30 mg  
30 mg, subcutaneous,   
Every 12 hours, First   
dose on Thu 24 at   
0800  
                                        0809 (Given -   
Provider: Shari Kincaid RN)202 (Given -   
Provider: Ariana Mera RN)  
                                        0822 (Given - Provider:   
Shari Kincaid RN)222 (Given   
- Provider: Jessica Puckett, LATOYA)  
                                        0804 (Given -   
Provider: Jody Arias RN)   
(Due)  
  
   
                                                      
  
  
insulin lispro   
(HumaLOG) injection   
0-10 Units  
0-10 Units,   
subcutaneous, 3 times   
daily with meals,   
First dose on Sat   
3/2/24 at 0800,   
Insulin Lispro   
Corrective Scale #2   
Hypoglycemia protocol   
Call LIP unit(s) if   
Blood Glucose is   
between 0 - 70 mg/dL 0   
unit(s) if Blood   
glucose is between   
 2 unit(s) if   
Blood glucose is   
between 151-200 4   
unit(s) if Blood   
glucose is between   
201-250 6 unit(s) if   
Blood glucose is   
between 251-300 8   
unit(s) if Blood   
glucose is between   
301-350 10 unit(s) if   
Blood glucose is   
between 351-400 Notify   
provider unit(s) if   
Blood Glucose is   
greater than 400 mg/dL  
                                        0800 (Not Given -   
Provider: Shari Kincaid RN   
- Reason: Order   
parameters not   
met)1200 (Not Given   
- Provider: Shari Kincaid RN   
- Reason: Other -   
Comment: pt ate   
lunch already)1700   
(Not Given -   
Provider: Shari Kincaid RN   
- Reason: Other -   
Comment: pt ate   
dinner already)  
                                        0926 (Given - Provider:   
Shari Kincaid RN)1401 (Given   
- Provider: Shari Kincaid RN)1700 (Not Given -   
Provider: Shari Kincaid RN -   
Reason: Order parameters   
not met)  
                                        0949 (Given -   
Provider: Jody Arias RN)1405   
(Given - Provider:   
Jody Arias RN)1700 (Not Given   
- Provider: Jamaica Edwards RN -   
Reason: Other -   
Comment: patient   
discharged with   
ems)  
  
   
                                                      
  
  
lidocaine 4 % patch 1   
patch  
1 patch, transdermal,   
Administer over 12   
Hours, Daily, First   
dose on Thu 24 at   
1715, Apply to mid   
back. Patch will   
remain on for 12   
hours, then removed   
for 12 hours. Do NOT   
place patch directly   
over any surgical   
incisions or wounds.  
                                        08 (Medication   
Applied - Provider:   
Shari Kincaid RN -   
Comment: back)   
(Medication Removed   
- Provider: Ariana Mera RN)  
                                        822 (Medication Applied   
- Provider: Shari Kincaid RN -   
Comment: BACK)   
(Medication Removed -   
Provider: Jessica Puckett, RN)  
                                        805 (Medication   
Applied - Provider:   
Jody Arias,   
RN) (Due:   
Medication Removed   
- Provider: Jody Arias, RN)  
  
   
                                                      
  
  
magnesium chloride   
(MagDelay) EC tablet   
64 mg  
64 mg, oral, Daily,   
First dose on Sun   
3/3/24 at 1130, Do not   
crush, chew, or split.  
                                                    1216 (Given - Provider:   
Shari Kincaid RN)  
                                        08 (Not Given -   
Provider: Jody Arias RN - Reason:   
Patient/family   
refused)08 (Given   
- Provider: Jody Arias, RN)  
  
  
                                       PRN  
  
                          Medication Order 2024  
   
                                                      
  
  
acetaminophen (Tylenol)   
tablet 650 mg  
650 mg, oral, Every 6   
hours PRN, pain mild   
(1-3), first line, pain   
moderate (4-6), first   
line, Starting on Sun   
3/3/24 at 1130, If ordered   
PRN for pain, nurse is   
permitted to administer   
this medication for higher   
pain scores based on   
patient preference? Yes  
                                                    222 (Given - Provider:   
Jessica Puckett RN)  
                                        1551 (Given - Provider:   
Jamaica Edwards RN)  
  
   
                                                      
  
  
dextrose 10 % in water   
(D10W) infusion  
0.3 g/kg/hr 81.2 kg (243.6   
mL/hr), intravenous, Once   
as needed, For blood   
glucose less than 70 mg/dL   
after 30 minutes of   
intervention. Discontinue   
once blood glucose reaches   
100 mg/dL., Starting on   
u 24 at 0658, For 1   
dose, Discontinue once   
blood glucose reaches 100   
mg/dL.  
                                                              
   
                                                      
  
  
dextrose 50 % injection 25   
g  
25 g, intravenous, Every   
15 min PRN, For blood   
glucose less than or equal   
to 40 mg/dL, Starting on   
u 24 at 0658, May   
repeat until blood glucose   
level reaches 100 mg/dL or   
greater. Push 2 - 3   
mL/minute if patient has   
secure IV access.  
                                                              
   
                                                      
  
  
glucagon (Glucagen)   
injection 1 mg  
1 mg, intramuscular, Every   
15 min PRN, low blood   
sugar - see comments, For   
blood glucose less than or   
equal to 70 mg/dL and no   
IV access, Starting on u   
24 at 0658, Give   
until blood glucose is 100   
mg/dL or greater. If   
patient DOES NOT HAVE   
secure IV access & patient   
is unconscious, NPO or is   
unable to eat or drink.  
                                                              
  
  
FOR RECORDS PERTAINING TO PATIENTS WHO ARE OR HAVE BEEN ENROLLED IN A CHEMICAL 
DEPENDENCY/SUBSTANCEABUSE PROGRAM, SOME INFORMATION MAY BE OMITTED. This 
clinical summary was aggregated from multiple sources. Caution should be 
exercised in using it in the provision of clinical care. This summary normalizes
 information from multiple sources, and as a consequence, information in this 
document may materially change the coding, format and clinical context of 
patient data. In addition, data may be omitted in some cases. CLINICAL DECISIONS
 SHOULD BE BASED ON THE PRIMARY CLINICAL RECORDS. [facility] [employer] [facility]. provides
 no warranty or guarantee of the accuracy or completeness of information in this
 document.

## 2024-03-27 ENCOUNTER — APPOINTMENT (OUTPATIENT)
Dept: SURGERY | Facility: CLINIC | Age: 84
End: 2024-03-27
Payer: MEDICARE

## 2024-04-03 ENCOUNTER — APPOINTMENT (OUTPATIENT)
Dept: PRIMARY CARE | Facility: CLINIC | Age: 84
End: 2024-04-03
Payer: MEDICARE

## 2024-04-03 ENCOUNTER — PATIENT OUTREACH (OUTPATIENT)
Dept: CARE COORDINATION | Facility: CLINIC | Age: 84
End: 2024-04-03

## 2024-04-03 NOTE — PROGRESS NOTES
Unable to reach patient for call back after patient's follow up appointment with PCP.   TRAVISM with call back number for patient to call if needed   If no voicemail available call attempts x 2 were made to contact the patient to assist with any questions or concerns patient may have.

## 2024-04-10 ENCOUNTER — OFFICE VISIT (OUTPATIENT)
Dept: PRIMARY CARE | Facility: CLINIC | Age: 84
End: 2024-04-10
Payer: MEDICARE

## 2024-04-10 VITALS
DIASTOLIC BLOOD PRESSURE: 78 MMHG | SYSTOLIC BLOOD PRESSURE: 136 MMHG | HEART RATE: 76 BPM | HEIGHT: 63 IN | WEIGHT: 172.8 LBS | BODY MASS INDEX: 30.62 KG/M2

## 2024-04-10 DIAGNOSIS — C50.919 MALIGNANT NEOPLASM OF FEMALE BREAST, UNSPECIFIED ESTROGEN RECEPTOR STATUS, UNSPECIFIED LATERALITY, UNSPECIFIED SITE OF BREAST (MULTI): ICD-10-CM

## 2024-04-10 DIAGNOSIS — E55.9 VITAMIN D DEFICIENCY: ICD-10-CM

## 2024-04-10 DIAGNOSIS — S02.119S: ICD-10-CM

## 2024-04-10 DIAGNOSIS — E78.5 DYSLIPIDEMIA: ICD-10-CM

## 2024-04-10 DIAGNOSIS — E11.9 TYPE 2 DIABETES MELLITUS WITHOUT COMPLICATION, WITHOUT LONG-TERM CURRENT USE OF INSULIN (MULTI): Primary | ICD-10-CM

## 2024-04-10 DIAGNOSIS — M25.50 MULTIPLE JOINT PAIN: ICD-10-CM

## 2024-04-10 DIAGNOSIS — Z78.0 MENOPAUSE PRESENT: ICD-10-CM

## 2024-04-10 DIAGNOSIS — I65.23 BILATERAL CAROTID ARTERY STENOSIS: ICD-10-CM

## 2024-04-10 DIAGNOSIS — I10 PRIMARY HYPERTENSION: ICD-10-CM

## 2024-04-10 DIAGNOSIS — F32.0 CURRENT MILD EPISODE OF MAJOR DEPRESSIVE DISORDER WITHOUT PRIOR EPISODE (CMS-HCC): ICD-10-CM

## 2024-04-10 PROCEDURE — 3075F SYST BP GE 130 - 139MM HG: CPT | Performed by: STUDENT IN AN ORGANIZED HEALTH CARE EDUCATION/TRAINING PROGRAM

## 2024-04-10 PROCEDURE — 1160F RVW MEDS BY RX/DR IN RCRD: CPT | Performed by: STUDENT IN AN ORGANIZED HEALTH CARE EDUCATION/TRAINING PROGRAM

## 2024-04-10 PROCEDURE — 1036F TOBACCO NON-USER: CPT | Performed by: STUDENT IN AN ORGANIZED HEALTH CARE EDUCATION/TRAINING PROGRAM

## 2024-04-10 PROCEDURE — 99215 OFFICE O/P EST HI 40 MIN: CPT | Performed by: STUDENT IN AN ORGANIZED HEALTH CARE EDUCATION/TRAINING PROGRAM

## 2024-04-10 PROCEDURE — 1159F MED LIST DOCD IN RCRD: CPT | Performed by: STUDENT IN AN ORGANIZED HEALTH CARE EDUCATION/TRAINING PROGRAM

## 2024-04-10 PROCEDURE — 3078F DIAST BP <80 MM HG: CPT | Performed by: STUDENT IN AN ORGANIZED HEALTH CARE EDUCATION/TRAINING PROGRAM

## 2024-04-10 RX ORDER — CELECOXIB 100 MG/1
100 CAPSULE ORAL DAILY
Qty: 90 CAPSULE | Refills: 1 | Status: SHIPPED | OUTPATIENT
Start: 2024-04-10

## 2024-04-10 RX ORDER — CELECOXIB 100 MG/1
100 CAPSULE ORAL DAILY
COMMUNITY
Start: 2024-03-15 | End: 2024-04-10 | Stop reason: SDUPTHER

## 2024-04-10 RX ORDER — SERTRALINE HYDROCHLORIDE 50 MG/1
50 TABLET, FILM COATED ORAL DAILY
Qty: 90 TABLET | Refills: 1 | Status: SHIPPED | OUTPATIENT
Start: 2024-04-10

## 2024-04-10 RX ORDER — L. ACIDOPHILUS/PECTIN, CITRUS 25MM-100MG
1 TABLET ORAL 2 TIMES DAILY
COMMUNITY
Start: 2024-03-15 | End: 2024-04-10 | Stop reason: SDUPTHER

## 2024-04-10 RX ORDER — L. ACIDOPHILUS/PECTIN, CITRUS 25MM-100MG
1 TABLET ORAL 2 TIMES DAILY
Qty: 90 TABLET | Refills: 1 | Status: SHIPPED | OUTPATIENT
Start: 2024-04-10

## 2024-04-10 RX ORDER — FAMOTIDINE 20 MG/1
20 TABLET, FILM COATED ORAL DAILY PRN
Qty: 90 TABLET | Refills: 1 | Status: SHIPPED | OUTPATIENT
Start: 2024-04-10

## 2024-04-10 RX ORDER — CHOLECALCIFEROL (VITAMIN D3) 25 MCG
1000 TABLET ORAL DAILY
Qty: 90 TABLET | Refills: 1 | Status: SHIPPED | OUTPATIENT
Start: 2024-04-10 | End: 2025-04-10

## 2024-04-10 RX ORDER — SERTRALINE HYDROCHLORIDE 50 MG/1
25 TABLET, FILM COATED ORAL DAILY
COMMUNITY
Start: 2024-03-15 | End: 2024-04-10 | Stop reason: SDUPTHER

## 2024-04-10 RX ORDER — LOSARTAN POTASSIUM 25 MG/1
25 TABLET ORAL DAILY
COMMUNITY
Start: 2024-03-15 | End: 2024-04-10 | Stop reason: SDUPTHER

## 2024-04-10 RX ORDER — METFORMIN HYDROCHLORIDE 500 MG/1
500 TABLET ORAL DAILY
COMMUNITY
Start: 2024-03-15 | End: 2024-04-10 | Stop reason: SDUPTHER

## 2024-04-10 RX ORDER — LOSARTAN POTASSIUM 25 MG/1
25 TABLET ORAL DAILY
Qty: 90 TABLET | Refills: 1 | Status: SHIPPED | OUTPATIENT
Start: 2024-04-10

## 2024-04-10 RX ORDER — METFORMIN HYDROCHLORIDE 500 MG/1
500 TABLET ORAL DAILY
Qty: 90 TABLET | Refills: 1 | Status: SHIPPED | OUTPATIENT
Start: 2024-04-10

## 2024-04-10 NOTE — PROGRESS NOTES
"Subjective   Patient ID: Nancie Du is a 83 y.o. female who presents for hospital follow up    HPI  Pt hospitalized [facility] 2/28-3/4/2024 after ground level fall backward when taking out the trash on a windy day. Neighbors thankfully witnessed the fall and helped get her medical care. Of note, she did take meclizine the previous night. During initial eval, was found to have occipital fracture. No acute interventions. Workup also significant for bl carotid artery disease. Was evaluated by vascular during her stay and recommended outpatient follow up. ASA added. She was discharged to SNF per PT/OT recommendation on 3/4. She was transferred to [place] where she stayed until 3/16/2024 for rehabilitation. Since discharge home, her lightheadedness has returned. It was improved at SNF where she was drinking more water. She is currently drinking at most one glass of water per day. PT/OT have already discharged her and ST has plan to discharge after upcoming appt. During her stay at rehab, probiotic was added to assist with diarrhea thought to be due to metformin - has helped. She has remained on Victoza and fbg was 122 today. For her chronic pain, Celebrex was added which seems to be helping. She was also started on Zoloft 25mg every day for her depression. She has not yet noticed a difference but is tolerating.    Review of Systems   Constitutional:  Negative for chills and fever.   Respiratory:  Negative for cough and shortness of breath.    Cardiovascular:  Negative for chest pain and palpitations.   Musculoskeletal:  Positive for arthralgias.   Skin:  Negative for rash.   Neurological:  Positive for light-headedness.   Psychiatric/Behavioral:  Negative for dysphoric mood. The patient is not nervous/anxious.      Objective   /78   Pulse 76   Ht 1.6 m (5' 3\")   Wt 78.4 kg (172 lb 12.8 oz)   BMI 30.61 kg/m²     Physical Exam  Constitutional:       Appearance: Normal appearance.   HENT:      Head: Normocephalic " and atraumatic.   Eyes:      General: No scleral icterus.     Conjunctiva/sclera: Conjunctivae normal.   Cardiovascular:      Rate and Rhythm: Normal rate and regular rhythm.      Heart sounds: No murmur heard.  Pulmonary:      Effort: Pulmonary effort is normal. No respiratory distress.      Breath sounds: Normal breath sounds.   Musculoskeletal:      Cervical back: Normal range of motion and neck supple.   Skin:     General: Skin is warm and dry.      Findings: No rash.   Neurological:      General: No focal deficit present.      Mental Status: She is alert.   Psychiatric:         Mood and Affect: Mood normal.         Behavior: Behavior normal.       Assessment/Plan   Problem List Items Addressed This Visit             ICD-10-CM    Vitamin D deficiency E55.9     Continue supplement.         Relevant Medications    cholecalciferol (Vitamin D3) 25 MCG (1000 UT) tablet    Other Relevant Orders    Follow Up In Primary Care - Medicare Annual    Vitamin D 25-Hydroxy,Total (for eval of Vitamin D levels)    Type 2 diabetes mellitus without complication, without long-term current use of insulin (Multi) - Primary E11.9     Continue Victoza and metformin.         Relevant Medications    metFORMIN (Glucophage) 500 mg tablet    lactobacillus acidophilus (acidophilus-pectin, citrus) tablet tablet    Other Relevant Orders    Albumin , Urine Random    CBC and Auto Differential    Comprehensive Metabolic Panel    Hemoglobin A1C    Lipid Panel    TSH with reflex to Free T4 if abnormal    Vitamin B12    Follow Up In Primary Care - Medicare Annual    Primary hypertension I10     BP at goal. No changes today. Continue losartan, which was started at [facility].         Relevant Medications    losartan (Cozaar) 25 mg tablet    Multiple joint pain M25.50     Improved with Celebrex. We will continue.         Relevant Medications    celecoxib (CeleBREX) 100 mg capsule    famotidine (Pepcid) 20 mg tablet    Other Relevant Orders    Follow Up In  Primary Care - Medicare Annual    Dyslipidemia E78.5     Continue ezetimibe.         Current mild episode of major depressive disorder without prior episode (CMS-[facility]) F32.0     Zoloft added at SNF at 25mg every day. We will increase to 50mg every day as she is tolerating the low dose. Medication dosing and side effects reviewed.          Relevant Medications    sertraline (Zoloft) 50 mg tablet    Other Relevant Orders    Follow Up In Primary Care - Medicare Annual    Closed fracture of occipital bone (Multi) S02.119A     S/p ground level fall with no acute intervention warranted. Hospitalization and rehabilitation stay reviewed.         Breast CA (Multi) C50.919     Mammogram utd.          Other Visit Diagnoses         Codes    Menopause present     Z78.0    Relevant Orders    XR DEXA bone density    Follow Up In Primary Care - Medicare Annual        Follow up in 3mo for recheck, sooner if needed. Labs prior to appt.  Time Spent  Prep time on day of patient encounter: 4 minutes  Time spent directly with patient, family or caregiver: 32 minutes  Additional Time Spent on Patient Care Activities: 0 minutes  Documentation Time: 8 minutes  Other Time Spent: 0 minutes  Total: 44 minutes

## 2024-04-15 PROBLEM — R42 LIGHTHEADEDNESS: Status: ACTIVE | Noted: 2024-04-15

## 2024-04-15 PROBLEM — M25.50 MULTIPLE JOINT PAIN: Status: ACTIVE | Noted: 2024-04-15

## 2024-04-15 PROBLEM — R09.82 PND (POST-NASAL DRIP): Status: RESOLVED | Noted: 2023-09-17 | Resolved: 2024-04-15
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
## 2024-04-15 PROBLEM — R42 DIZZINESS: Status: RESOLVED | Noted: 2023-01-22 | Resolved: 2024-04-15

## 2024-04-15 PROBLEM — I65.23 BILATERAL CAROTID ARTERY STENOSIS: Status: ACTIVE | Noted: 2024-04-15

## 2024-04-15 PROBLEM — F32.0 CURRENT MILD EPISODE OF MAJOR DEPRESSIVE DISORDER WITHOUT PRIOR EPISODE (CMS-HCC): Status: ACTIVE | Noted: 2024-04-15

## 2024-04-15 PROBLEM — S02.119A: Status: ACTIVE | Noted: 2024-02-29

## 2024-04-15 PROBLEM — E55.9 VITAMIN D DEFICIENCY: Status: ACTIVE | Noted: 2024-04-15

## 2024-04-15 PROBLEM — L30.9 DERMATITIS: Status: RESOLVED | Noted: 2024-01-03 | Resolved: 2024-04-15

## 2024-04-15 ASSESSMENT — ENCOUNTER SYMPTOMS
DYSPHORIC MOOD: 0
ARTHRALGIAS: 1
NERVOUS/ANXIOUS: 0
COUGH: 0
FEVER: 0
CHILLS: 0
SHORTNESS OF BREATH: 0
LIGHT-HEADEDNESS: 1
PALPITATIONS: 0

## 2024-04-15 NOTE — ASSESSMENT & PLAN NOTE
S/p ground level fall with no acute intervention warranted. Hospitalization and rehabilitation stay reviewed.

## 2024-04-15 NOTE — ASSESSMENT & PLAN NOTE
Zoloft added at SNF at 25mg every day. We will increase to 50mg every day as she is tolerating the low dose. Medication dosing and side effects reviewed.

## 2024-04-15 NOTE — ASSESSMENT & PLAN NOTE
BP at goal. No changes today. Continue losartan, which was started at [facility].

## 2024-04-22 ENCOUNTER — HOSPITAL ENCOUNTER (OUTPATIENT)
Dept: RADIOLOGY | Facility: CLINIC | Age: 84
End: 2024-04-22
Payer: MEDICARE

## 2024-04-29 ENCOUNTER — APPOINTMENT (OUTPATIENT)
Dept: PRIMARY CARE | Facility: CLINIC | Age: 84
End: 2024-04-29
Payer: MEDICARE

## 2024-04-29 ENCOUNTER — HOSPITAL ENCOUNTER (OUTPATIENT)
Dept: RADIOLOGY | Facility: CLINIC | Age: 84
Discharge: HOME | End: 2024-04-29
Payer: MEDICARE

## 2024-04-29 DIAGNOSIS — Z78.0 MENOPAUSE PRESENT: ICD-10-CM

## 2024-04-29 PROCEDURE — 77080 DXA BONE DENSITY AXIAL: CPT

## 2024-04-29 PROCEDURE — 77080 DXA BONE DENSITY AXIAL: CPT | Performed by: RADIOLOGY

## 2024-05-02 ENCOUNTER — TELEPHONE (OUTPATIENT)
Dept: PRIMARY CARE | Facility: CLINIC | Age: 84
End: 2024-05-02
Payer: MEDICARE

## 2024-05-02 NOTE — TELEPHONE ENCOUNTER
----- Message from Aspen Knapp DO sent at 5/1/2024  7:02 AM EDT -----  Please let patient know that her bone density scan showed osteopenia. She is already taking vitamin D. Recommend 1200mg calcium per day - use supplement if not getting in diet. Also recommend weight bearing exercise as tolerated. We will plan to repeat in 2yrs. Thank you

## 2024-05-06 DIAGNOSIS — M25.59 PAIN IN OTHER JOINT: ICD-10-CM

## 2024-05-06 DIAGNOSIS — E11.9 TYPE 2 DIABETES MELLITUS WITHOUT COMPLICATION, WITHOUT LONG-TERM CURRENT USE OF INSULIN (MULTI): ICD-10-CM

## 2024-05-06 RX ORDER — BLOOD SUGAR DIAGNOSTIC
1 STRIP MISCELLANEOUS 2 TIMES DAILY
Qty: 200 EACH | Refills: 1 | Status: SHIPPED | OUTPATIENT
Start: 2024-05-06

## 2024-05-10 RX ORDER — DICLOFENAC SODIUM 10 MG/G
4 GEL TOPICAL 2 TIMES DAILY
Qty: 100 G | Refills: 1 | Status: SHIPPED | OUTPATIENT
Start: 2024-05-10

## 2024-07-12 ENCOUNTER — APPOINTMENT (OUTPATIENT)
Dept: PRIMARY CARE | Facility: CLINIC | Age: 84
End: 2024-07-12
Payer: MEDICARE

## 2024-07-12 VITALS
HEART RATE: 88 BPM | HEIGHT: 63 IN | DIASTOLIC BLOOD PRESSURE: 66 MMHG | BODY MASS INDEX: 28.88 KG/M2 | SYSTOLIC BLOOD PRESSURE: 126 MMHG | WEIGHT: 163 LBS

## 2024-07-12 DIAGNOSIS — I10 PRIMARY HYPERTENSION: ICD-10-CM

## 2024-07-12 DIAGNOSIS — Z00.00 ROUTINE GENERAL MEDICAL EXAMINATION AT HEALTH CARE FACILITY: Primary | ICD-10-CM

## 2024-07-12 DIAGNOSIS — F32.0 CURRENT MILD EPISODE OF MAJOR DEPRESSIVE DISORDER WITHOUT PRIOR EPISODE (CMS-HCC): ICD-10-CM

## 2024-07-12 DIAGNOSIS — H90.3 SENSORINEURAL HEARING LOSS (SNHL) OF BOTH EARS: ICD-10-CM

## 2024-07-12 DIAGNOSIS — E11.9 TYPE 2 DIABETES MELLITUS WITHOUT COMPLICATION, WITHOUT LONG-TERM CURRENT USE OF INSULIN (MULTI): ICD-10-CM

## 2024-07-12 DIAGNOSIS — R12 HEARTBURN: ICD-10-CM

## 2024-07-12 DIAGNOSIS — E78.5 DYSLIPIDEMIA: ICD-10-CM

## 2024-07-12 PROCEDURE — 1170F FXNL STATUS ASSESSED: CPT | Performed by: STUDENT IN AN ORGANIZED HEALTH CARE EDUCATION/TRAINING PROGRAM

## 2024-07-12 PROCEDURE — 1036F TOBACCO NON-USER: CPT | Performed by: STUDENT IN AN ORGANIZED HEALTH CARE EDUCATION/TRAINING PROGRAM

## 2024-07-12 PROCEDURE — 1159F MED LIST DOCD IN RCRD: CPT | Performed by: STUDENT IN AN ORGANIZED HEALTH CARE EDUCATION/TRAINING PROGRAM

## 2024-07-12 PROCEDURE — 3078F DIAST BP <80 MM HG: CPT | Performed by: STUDENT IN AN ORGANIZED HEALTH CARE EDUCATION/TRAINING PROGRAM

## 2024-07-12 PROCEDURE — 3074F SYST BP LT 130 MM HG: CPT | Performed by: STUDENT IN AN ORGANIZED HEALTH CARE EDUCATION/TRAINING PROGRAM

## 2024-07-12 PROCEDURE — 99214 OFFICE O/P EST MOD 30 MIN: CPT | Performed by: STUDENT IN AN ORGANIZED HEALTH CARE EDUCATION/TRAINING PROGRAM

## 2024-07-12 PROCEDURE — 1123F ACP DISCUSS/DSCN MKR DOCD: CPT | Performed by: STUDENT IN AN ORGANIZED HEALTH CARE EDUCATION/TRAINING PROGRAM

## 2024-07-12 PROCEDURE — 1158F ADVNC CARE PLAN TLK DOCD: CPT | Performed by: STUDENT IN AN ORGANIZED HEALTH CARE EDUCATION/TRAINING PROGRAM

## 2024-07-12 PROCEDURE — G0439 PPPS, SUBSEQ VISIT: HCPCS | Performed by: STUDENT IN AN ORGANIZED HEALTH CARE EDUCATION/TRAINING PROGRAM

## 2024-07-12 PROCEDURE — 1160F RVW MEDS BY RX/DR IN RCRD: CPT | Performed by: STUDENT IN AN ORGANIZED HEALTH CARE EDUCATION/TRAINING PROGRAM

## 2024-07-12 ASSESSMENT — ENCOUNTER SYMPTOMS
CHILLS: 0
FEVER: 0
PALPITATIONS: 0
SHORTNESS OF BREATH: 0
DYSPHORIC MOOD: 0
COUGH: 0
NERVOUS/ANXIOUS: 0

## 2024-07-12 NOTE — PROGRESS NOTES
Subjective   Reason for Visit: Nancie Du is an 83 y.o. female here for a Medicare Wellness visit.     Past Medical, Surgical, and Family History reviewed and updated in chart.    Reviewed all medications by prescribing practitioner or clinical pharmacist (such as prescriptions, OTCs, herbal therapies and supplements) and documented in the medical record.    HPI  DM2 - currently managed on metformin 500mg every day and Victoza 1.8mg daily, she has had some trouble getting Victoza at the pharmacy due to backorder,  today but states has been 149 the past few days so is planning to replace battery on glucometer, she has not yet done labs but plans to in the near future    Hearing loss - scheduled to meet with audiology at Dr. Cordero's office later today    Abd discomfort - notes some occasional epigastric discomfort which is worse after eating a greasy meal, states that she takes famotidine as needed which seems to help, has lost 9lb since last eval and attributes to this abd discomfort    Cervical Cancer Screening: not indicated  Breast Cancer Screening: [facility], due 12/2024  Osteoporosis Screening: due 4/2026  Colon Cancer Screening: not indicated  Tobacco: denies  Alcohol: rare  Recreational Drugs: denies  Immunizations: due for Shingrix at pharmacy, TDaP in our office in the future    Patient Care Team:  Aspen Knapp DO as PCP - General  Aspen Knapp DO as PCP - [facility] Attributed Provider      Living Will: Not Received    Healthcare Power of Atty: Not Received    Advance directives: Advanced Care Planning discussed and documented. Advance care plan or surrogate decision maker documented in the medical record. Patient has living will. Patient has healthcare POA.     Review of Systems   Constitutional:  Negative for chills and fever.   HENT:  Positive for hearing loss.    Respiratory:  Negative for cough and shortness of breath.    Cardiovascular:  Negative for chest pain and palpitations.  "  Gastrointestinal:  Positive for abdominal pain.   Skin:  Negative for rash.   Psychiatric/Behavioral:  Negative for dysphoric mood. The patient is not nervous/anxious.      Objective   Vitals:  /66   Pulse 88   Ht 1.6 m (5' 3\")   Wt 73.9 kg (163 lb)   BMI 28.87 kg/m²       Physical Exam  Constitutional:       Appearance: Normal appearance.   HENT:      Head: Normocephalic and atraumatic.   Eyes:      General: No scleral icterus.     Conjunctiva/sclera: Conjunctivae normal.   Cardiovascular:      Rate and Rhythm: Normal rate and regular rhythm.   Pulmonary:      Effort: Pulmonary effort is normal. No respiratory distress.      Breath sounds: Normal breath sounds.   Musculoskeletal:      Cervical back: Normal range of motion and neck supple.   Skin:     General: Skin is warm and dry.      Findings: No rash.   Neurological:      General: No focal deficit present.      Mental Status: She is alert.   Psychiatric:         Mood and Affect: Mood normal.         Behavior: Behavior normal.       Assessment/Plan   Problem List Items Addressed This Visit       Type 2 diabetes mellitus without complication, without long-term current use of insulin (Multi)    Overview     Unable to tolerate metformin, Actos, Trulicity, Januvia, Jardiance  Eye Check: Dr. Moyer, 5/2021, 10/2021, 8/2023  HbA1c: 8.1% (6/2021), 8.1% (11/2021), 8.3% (2/2022), 8.3% (6/2022), 7.4% (3/2023), 7.3% (9/2023)         Current Assessment & Plan     Will follow up with lab results when available. No changes today.         Relevant Orders    Follow Up In Primary Care - Established    Primary hypertension    Current Assessment & Plan     BP at goal. No changes today.         Heartburn    Current Assessment & Plan     Recommend trial of daily famotidine to hopefully help with abd discomfort. Medication dosing and side effects reviewed. Return precautions reviewed.          Hearing loss    Current Assessment & Plan     Seeing audiology later today.   "       Dyslipidemia    Current Assessment & Plan     Will follow up with lab results when available. Continue ezetimibe.         Current mild episode of major depressive disorder without prior episode (CMS-HCC)    Current Assessment & Plan     We discussed that low mood can also contribute to decreased appetite/weight loss. Will trial daily famotidine first. Pending clinical course, may consider increasing her Zoloft.         Relevant Orders    Follow Up In Primary Care - Established     Other Visit Diagnoses       Routine general medical examination at health care facility    -  Primary        Follow up in 3mo for recheck, sooner if needed.

## 2024-07-15 PROBLEM — E66.3 OVERWEIGHT WITH BODY MASS INDEX (BMI) OF 28 TO 28.9 IN ADULT: Status: ACTIVE | Noted: 2023-01-22

## 2024-07-15 ASSESSMENT — PATIENT HEALTH QUESTIONNAIRE - PHQ9
SUM OF ALL RESPONSES TO PHQ9 QUESTIONS 1 AND 2: 0
1. LITTLE INTEREST OR PLEASURE IN DOING THINGS: NOT AT ALL
2. FEELING DOWN, DEPRESSED OR HOPELESS: NOT AT ALL

## 2024-07-15 ASSESSMENT — ACTIVITIES OF DAILY LIVING (ADL)
MANAGING_FINANCES: NEEDS ASSISTANCE
DOING_HOUSEWORK: NEEDS ASSISTANCE
BATHING: INDEPENDENT
GROCERY_SHOPPING: NEEDS ASSISTANCE
TAKING_MEDICATION: NEEDS ASSISTANCE
DRESSING: INDEPENDENT

## 2024-07-15 ASSESSMENT — ENCOUNTER SYMPTOMS: ABDOMINAL PAIN: 1

## 2024-07-15 NOTE — ASSESSMENT & PLAN NOTE
Recommend trial of daily famotidine to hopefully help with abd discomfort. Medication dosing and side effects reviewed. Return precautions reviewed.

## 2024-07-15 NOTE — ASSESSMENT & PLAN NOTE
We discussed that low mood can also contribute to decreased appetite/weight loss. Will trial daily famotidine first. Pending clinical course, may consider increasing her Zoloft.

## 2024-09-19 DIAGNOSIS — M25.59 PAIN IN OTHER JOINT: ICD-10-CM

## 2024-09-19 RX ORDER — DICLOFENAC SODIUM 10 MG/G
4 GEL TOPICAL 2 TIMES DAILY
Qty: 100 G | Refills: 1 | Status: SHIPPED | OUTPATIENT
Start: 2024-09-19

## 2024-10-21 ENCOUNTER — APPOINTMENT (OUTPATIENT)
Dept: PRIMARY CARE | Facility: CLINIC | Age: 84
End: 2024-10-21
Payer: MEDICARE

## 2024-10-22 ENCOUNTER — APPOINTMENT (OUTPATIENT)
Dept: PRIMARY CARE | Facility: CLINIC | Age: 84
End: 2024-10-22
Payer: MEDICARE

## 2024-10-23 DIAGNOSIS — M25.50 MULTIPLE JOINT PAIN: ICD-10-CM

## 2024-10-23 DIAGNOSIS — I10 PRIMARY HYPERTENSION: ICD-10-CM

## 2024-10-23 DIAGNOSIS — F32.0 CURRENT MILD EPISODE OF MAJOR DEPRESSIVE DISORDER WITHOUT PRIOR EPISODE (CMS-HCC): ICD-10-CM

## 2024-10-23 DIAGNOSIS — E11.9 TYPE 2 DIABETES MELLITUS WITHOUT COMPLICATION, WITHOUT LONG-TERM CURRENT USE OF INSULIN (MULTI): ICD-10-CM

## 2024-10-23 RX ORDER — METFORMIN HYDROCHLORIDE 500 MG/1
500 TABLET ORAL DAILY
Qty: 90 TABLET | Refills: 1 | Status: SHIPPED | OUTPATIENT
Start: 2024-10-23

## 2024-10-23 RX ORDER — SERTRALINE HYDROCHLORIDE 50 MG/1
50 TABLET, FILM COATED ORAL DAILY
Qty: 90 TABLET | Refills: 1 | Status: SHIPPED | OUTPATIENT
Start: 2024-10-23

## 2024-10-23 RX ORDER — FAMOTIDINE 20 MG/1
20 TABLET, FILM COATED ORAL DAILY PRN
Qty: 90 TABLET | Refills: 1 | Status: SHIPPED | OUTPATIENT
Start: 2024-10-23

## 2024-10-23 RX ORDER — LOSARTAN POTASSIUM 25 MG/1
25 TABLET ORAL DAILY
Qty: 90 TABLET | Refills: 1 | Status: SHIPPED | OUTPATIENT
Start: 2024-10-23

## 2024-10-31 ENCOUNTER — LAB (OUTPATIENT)
Dept: LAB | Facility: LAB | Age: 84
End: 2024-10-31
Payer: MEDICARE

## 2024-10-31 DIAGNOSIS — E11.9 TYPE 2 DIABETES MELLITUS WITHOUT COMPLICATION, WITHOUT LONG-TERM CURRENT USE OF INSULIN (MULTI): ICD-10-CM

## 2024-10-31 PROCEDURE — 83036 HEMOGLOBIN GLYCOSYLATED A1C: CPT

## 2024-10-31 PROCEDURE — 36415 COLL VENOUS BLD VENIPUNCTURE: CPT

## 2024-11-01 LAB
EST. AVERAGE GLUCOSE BLD GHB EST-MCNC: 157 MG/DL
HBA1C MFR BLD: 7.1 %

## 2024-11-04 ENCOUNTER — APPOINTMENT (OUTPATIENT)
Dept: PRIMARY CARE | Facility: CLINIC | Age: 84
End: 2024-11-04
Payer: MEDICARE

## 2024-11-04 VITALS
BODY MASS INDEX: 28.74 KG/M2 | DIASTOLIC BLOOD PRESSURE: 66 MMHG | SYSTOLIC BLOOD PRESSURE: 136 MMHG | HEIGHT: 63 IN | HEART RATE: 80 BPM | WEIGHT: 162.2 LBS

## 2024-11-04 DIAGNOSIS — E78.5 DYSLIPIDEMIA: ICD-10-CM

## 2024-11-04 DIAGNOSIS — F32.0 CURRENT MILD EPISODE OF MAJOR DEPRESSIVE DISORDER WITHOUT PRIOR EPISODE (CMS-HCC): ICD-10-CM

## 2024-11-04 DIAGNOSIS — R12 HEARTBURN: ICD-10-CM

## 2024-11-04 DIAGNOSIS — I10 PRIMARY HYPERTENSION: ICD-10-CM

## 2024-11-04 DIAGNOSIS — E11.9 TYPE 2 DIABETES MELLITUS WITHOUT COMPLICATION, WITHOUT LONG-TERM CURRENT USE OF INSULIN (MULTI): ICD-10-CM

## 2024-11-04 DIAGNOSIS — Z85.3 HISTORY OF BREAST CANCER: ICD-10-CM

## 2024-11-04 DIAGNOSIS — Z23 ENCOUNTER FOR IMMUNIZATION: Primary | ICD-10-CM

## 2024-11-04 DIAGNOSIS — I65.23 BILATERAL CAROTID ARTERY STENOSIS: ICD-10-CM

## 2024-11-04 PROBLEM — E66.3 OVERWEIGHT WITH BODY MASS INDEX (BMI) OF 28 TO 28.9 IN ADULT: Status: RESOLVED | Noted: 2023-01-22 | Resolved: 2024-11-04

## 2024-11-04 PROCEDURE — 1159F MED LIST DOCD IN RCRD: CPT | Performed by: STUDENT IN AN ORGANIZED HEALTH CARE EDUCATION/TRAINING PROGRAM

## 2024-11-04 PROCEDURE — 3078F DIAST BP <80 MM HG: CPT | Performed by: STUDENT IN AN ORGANIZED HEALTH CARE EDUCATION/TRAINING PROGRAM

## 2024-11-04 PROCEDURE — 1036F TOBACCO NON-USER: CPT | Performed by: STUDENT IN AN ORGANIZED HEALTH CARE EDUCATION/TRAINING PROGRAM

## 2024-11-04 PROCEDURE — G0008 ADMIN INFLUENZA VIRUS VAC: HCPCS | Performed by: STUDENT IN AN ORGANIZED HEALTH CARE EDUCATION/TRAINING PROGRAM

## 2024-11-04 PROCEDURE — 90673 RIV3 VACCINE NO PRESERV IM: CPT | Performed by: STUDENT IN AN ORGANIZED HEALTH CARE EDUCATION/TRAINING PROGRAM

## 2024-11-04 PROCEDURE — G2211 COMPLEX E/M VISIT ADD ON: HCPCS | Performed by: STUDENT IN AN ORGANIZED HEALTH CARE EDUCATION/TRAINING PROGRAM

## 2024-11-04 PROCEDURE — 99215 OFFICE O/P EST HI 40 MIN: CPT | Performed by: STUDENT IN AN ORGANIZED HEALTH CARE EDUCATION/TRAINING PROGRAM

## 2024-11-04 PROCEDURE — 1160F RVW MEDS BY RX/DR IN RCRD: CPT | Performed by: STUDENT IN AN ORGANIZED HEALTH CARE EDUCATION/TRAINING PROGRAM

## 2024-11-04 PROCEDURE — 3075F SYST BP GE 130 - 139MM HG: CPT | Performed by: STUDENT IN AN ORGANIZED HEALTH CARE EDUCATION/TRAINING PROGRAM

## 2024-11-04 RX ORDER — EZETIMIBE 10 MG/1
10 TABLET ORAL DAILY
Qty: 90 TABLET | Refills: 3 | Status: SHIPPED | OUTPATIENT
Start: 2024-11-04

## 2024-11-04 NOTE — PROGRESS NOTES
"Subjective   Patient ID: Nancie Du is a 83 y.o. female who presents for 3 month check with A1C    HPI  DM2 - HbA1c improved to 7.1%, she is currently managed on metformin 500mg every day, ran out of Victoza about a month ago, no longer taking sulfonylurea, weight is stable, she has tried to be mindful of diet, eats mostly soups and sandwiches,  yesterday morning, has regular eye checks    HLD - managed on ezetimibe which she tolerates    GI upset/heartburn - taking famotidine as needed, helps when she takes it, she does note loose stool x2 this morning which she attributes to fried egg sandwich last night, feeling better since taking Pepto    Cervical Cancer Screening: not indicated  Breast Cancer Screening: [facility], scheduled 12/2024  Osteoporosis Screening: due 4/2026  Colon Cancer Screening: not indicated  Tobacco: denies  Alcohol: rare  Recreational Drugs: denies  Immunizations: due for Shingrix at pharmacy, influenza today    Review of Systems   Constitutional:  Negative for chills and fever.   Respiratory:  Negative for cough and shortness of breath.    Cardiovascular:  Negative for chest pain and palpitations.   Gastrointestinal:  Positive for diarrhea. Negative for abdominal pain, blood in stool, constipation, nausea and vomiting.   Skin:  Negative for rash.   Psychiatric/Behavioral:  Negative for dysphoric mood. The patient is not nervous/anxious.      Objective   /66   Pulse 80   Ht 1.6 m (5' 3\")   Wt 73.6 kg (162 lb 3.2 oz)   BMI 28.73 kg/m²     Physical Exam  Constitutional:       Appearance: Normal appearance.   HENT:      Head: Normocephalic and atraumatic.   Eyes:      General: No scleral icterus.     Conjunctiva/sclera: Conjunctivae normal.   Cardiovascular:      Rate and Rhythm: Normal rate and regular rhythm.   Pulmonary:      Effort: Pulmonary effort is normal. No respiratory distress.      Breath sounds: Normal breath sounds.   Musculoskeletal:         General: Swelling " (trace pitting edema bl LE to the mid calf) present.      Cervical back: Normal range of motion and neck supple.   Skin:     General: Skin is warm and dry.      Findings: No rash.   Neurological:      General: No focal deficit present.      Mental Status: She is alert.   Psychiatric:         Mood and Affect: Mood normal.         Behavior: Behavior normal.       Assessment/Plan   Problem List Items Addressed This Visit             ICD-10-CM    Type 2 diabetes mellitus without complication, without long-term current use of insulin (Multi) E11.9     HbA1c at goal on metformin alone. Okay to continue off the GLP1. Encouraged her to continue to monitor BG at home and let us know if she notices an increase. Return precautions reviewed.          Relevant Orders    Follow Up In Primary Care - Established    Primary hypertension I10     BP acceptable. No changes today.         History of breast cancer Z85.3     Scheduled for mammo/exam at [facility] next month.         Heartburn R12     Feels subjectively improved when she takes the famotidine. Okay to take daily. Return precautions reviewed.          Dyslipidemia E78.5     Continue ezetimibe.         Relevant Medications    ezetimibe (Zetia) 10 mg tablet    Other Relevant Orders    Follow Up In Primary Care - Established    Current mild episode of major depressive disorder without prior episode (CMS-HCC) F32.0     Stable and doing well at this time on current dose of Zoloft. Will continue.         Relevant Orders    Follow Up In Primary Care - Established    Bilateral carotid artery stenosis I65.23     Repeat duplex due 3/2025.          Other Visit Diagnoses         Codes    Encounter for immunization    -  Primary Z23    Relevant Orders    Flu vaccine, trivalent, preservative free, no egg protein, age 18y+ (Flublok) (Completed)        Follow up in 4mo for recheck, sooner if needed.   Time Spent  Prep time on day of patient encounter: 2 minutes  Time spent directly with  patient, family or caregiver: 39 minutes  Additional Time Spent on Patient Care Activities: 0 minutes  Documentation Time: 5 minutes  Other Time Spent: 0 minutes  Total: 46 minutes

## 2024-11-05 PROBLEM — Z85.3 HISTORY OF BREAST CANCER: Status: ACTIVE | Noted: 2023-01-22

## 2024-11-05 ASSESSMENT — ENCOUNTER SYMPTOMS
NERVOUS/ANXIOUS: 0
VOMITING: 0
CHILLS: 0
NAUSEA: 0
CONSTIPATION: 0
SHORTNESS OF BREATH: 0
FEVER: 0
PALPITATIONS: 0
ABDOMINAL PAIN: 0
COUGH: 0
DIARRHEA: 1
DYSPHORIC MOOD: 0
BLOOD IN STOOL: 0

## 2024-11-05 NOTE — ASSESSMENT & PLAN NOTE
HbA1c at goal on metformin alone. Okay to continue off the GLP1. Encouraged her to continue to monitor BG at home and let us know if she notices an increase. Return precautions reviewed.

## 2024-11-05 NOTE — ASSESSMENT & PLAN NOTE
Feels subjectively improved when she takes the famotidine. Okay to take daily. Return precautions reviewed.

## 2024-11-08 ENCOUNTER — TELEPHONE (OUTPATIENT)
Dept: PRIMARY CARE | Facility: CLINIC | Age: 84
End: 2024-11-08
Payer: MEDICARE

## 2024-11-08 NOTE — TELEPHONE ENCOUNTER
She wanted to let you know that her stomach is a lot better and feels fine. Also the diarrhea is gone and she had a formed bowel movement this morning.

## 2024-11-21 ENCOUNTER — LAB (OUTPATIENT)
Dept: LAB | Facility: LAB | Age: 84
End: 2024-11-21
Payer: MEDICARE

## 2024-11-21 DIAGNOSIS — E11.9 TYPE 2 DIABETES MELLITUS WITHOUT COMPLICATION, WITHOUT LONG-TERM CURRENT USE OF INSULIN (MULTI): ICD-10-CM

## 2024-11-21 DIAGNOSIS — E55.9 VITAMIN D DEFICIENCY: ICD-10-CM

## 2024-11-21 LAB
25(OH)D3 SERPL-MCNC: 32 NG/ML (ref 30–100)
ALBUMIN SERPL BCP-MCNC: 4.3 G/DL (ref 3.4–5)
ALP SERPL-CCNC: 49 U/L (ref 33–136)
ALT SERPL W P-5'-P-CCNC: 14 U/L (ref 7–45)
ANION GAP SERPL CALC-SCNC: 13 MMOL/L (ref 10–20)
AST SERPL W P-5'-P-CCNC: 15 U/L (ref 9–39)
BASOPHILS # BLD AUTO: 0.06 X10*3/UL (ref 0–0.1)
BASOPHILS NFR BLD AUTO: 1 %
BILIRUB SERPL-MCNC: 0.8 MG/DL (ref 0–1.2)
BUN SERPL-MCNC: 9 MG/DL (ref 6–23)
CALCIUM SERPL-MCNC: 9.8 MG/DL (ref 8.6–10.3)
CHLORIDE SERPL-SCNC: 102 MMOL/L (ref 98–107)
CHOLEST SERPL-MCNC: 208 MG/DL (ref 0–199)
CHOLESTEROL/HDL RATIO: 3.1
CO2 SERPL-SCNC: 29 MMOL/L (ref 21–32)
CREAT SERPL-MCNC: 0.57 MG/DL (ref 0.5–1.05)
EGFRCR SERPLBLD CKD-EPI 2021: 90 ML/MIN/1.73M*2
EOSINOPHIL # BLD AUTO: 0.32 X10*3/UL (ref 0–0.4)
EOSINOPHIL NFR BLD AUTO: 5.3 %
ERYTHROCYTE [DISTWIDTH] IN BLOOD BY AUTOMATED COUNT: 12.7 % (ref 11.5–14.5)
GLUCOSE SERPL-MCNC: 143 MG/DL (ref 74–99)
HCT VFR BLD AUTO: 45.4 % (ref 36–46)
HDLC SERPL-MCNC: 67 MG/DL
HGB BLD-MCNC: 14.4 G/DL (ref 12–16)
IMM GRANULOCYTES # BLD AUTO: 0.02 X10*3/UL (ref 0–0.5)
IMM GRANULOCYTES NFR BLD AUTO: 0.3 % (ref 0–0.9)
LDLC SERPL CALC-MCNC: 114 MG/DL
LYMPHOCYTES # BLD AUTO: 1.31 X10*3/UL (ref 0.8–3)
LYMPHOCYTES NFR BLD AUTO: 21.8 %
MCH RBC QN AUTO: 29.8 PG (ref 26–34)
MCHC RBC AUTO-ENTMCNC: 31.7 G/DL (ref 32–36)
MCV RBC AUTO: 94 FL (ref 80–100)
MONOCYTES # BLD AUTO: 0.6 X10*3/UL (ref 0.05–0.8)
MONOCYTES NFR BLD AUTO: 10 %
NEUTROPHILS # BLD AUTO: 3.71 X10*3/UL (ref 1.6–5.5)
NEUTROPHILS NFR BLD AUTO: 61.6 %
NON HDL CHOLESTEROL: 141 MG/DL (ref 0–149)
NRBC BLD-RTO: 0 /100 WBCS (ref 0–0)
PLATELET # BLD AUTO: 201 X10*3/UL (ref 150–450)
POTASSIUM SERPL-SCNC: 4.3 MMOL/L (ref 3.5–5.3)
PROT SERPL-MCNC: 6.8 G/DL (ref 6.4–8.2)
RBC # BLD AUTO: 4.84 X10*6/UL (ref 4–5.2)
SODIUM SERPL-SCNC: 140 MMOL/L (ref 136–145)
TRIGL SERPL-MCNC: 137 MG/DL (ref 0–149)
TSH SERPL-ACNC: 3.39 MIU/L (ref 0.44–3.98)
VIT B12 SERPL-MCNC: 331 PG/ML (ref 211–911)
VLDL: 27 MG/DL (ref 0–40)
WBC # BLD AUTO: 6 X10*3/UL (ref 4.4–11.3)

## 2024-11-21 PROCEDURE — 80053 COMPREHEN METABOLIC PANEL: CPT

## 2024-11-21 PROCEDURE — 85025 COMPLETE CBC W/AUTO DIFF WBC: CPT

## 2024-11-21 PROCEDURE — 84443 ASSAY THYROID STIM HORMONE: CPT

## 2024-11-21 PROCEDURE — 80061 LIPID PANEL: CPT

## 2024-11-21 PROCEDURE — 36415 COLL VENOUS BLD VENIPUNCTURE: CPT

## 2024-11-21 PROCEDURE — 82306 VITAMIN D 25 HYDROXY: CPT

## 2024-11-21 PROCEDURE — 82607 VITAMIN B-12: CPT

## 2024-12-04 ENCOUNTER — APPOINTMENT (OUTPATIENT)
Dept: RADIOLOGY | Facility: HOSPITAL | Age: 84
DRG: 558 | End: 2024-12-04
Payer: MEDICARE

## 2024-12-04 ENCOUNTER — TELEPHONE (OUTPATIENT)
Dept: PRIMARY CARE | Facility: CLINIC | Age: 84
End: 2024-12-04
Payer: MEDICARE

## 2024-12-04 ENCOUNTER — APPOINTMENT (OUTPATIENT)
Dept: CARDIOLOGY | Facility: HOSPITAL | Age: 84
DRG: 558 | End: 2024-12-04
Payer: MEDICARE

## 2024-12-04 ENCOUNTER — HOSPITAL ENCOUNTER (INPATIENT)
Facility: HOSPITAL | Age: 84
End: 2024-12-04
Attending: EMERGENCY MEDICINE | Admitting: STUDENT IN AN ORGANIZED HEALTH CARE EDUCATION/TRAINING PROGRAM
Payer: MEDICARE

## 2024-12-04 DIAGNOSIS — R52 PAIN: ICD-10-CM

## 2024-12-04 DIAGNOSIS — R79.89 ELEVATED TROPONIN: ICD-10-CM

## 2024-12-04 DIAGNOSIS — T68.XXXA HYPOTHERMIA, INITIAL ENCOUNTER: Primary | ICD-10-CM

## 2024-12-04 DIAGNOSIS — R74.8 ELEVATED CK: ICD-10-CM

## 2024-12-04 LAB
ALBUMIN SERPL BCP-MCNC: 4 G/DL (ref 3.4–5)
ALP SERPL-CCNC: 47 U/L (ref 33–136)
ALT SERPL W P-5'-P-CCNC: 25 U/L (ref 7–45)
ANION GAP SERPL CALC-SCNC: 19 MMOL/L (ref 10–20)
AST SERPL W P-5'-P-CCNC: 59 U/L (ref 9–39)
BASOPHILS # BLD AUTO: 0.04 X10*3/UL (ref 0–0.1)
BASOPHILS NFR BLD AUTO: 0.2 %
BILIRUB SERPL-MCNC: 1.4 MG/DL (ref 0–1.2)
BUN SERPL-MCNC: 31 MG/DL (ref 6–23)
CALCIUM SERPL-MCNC: 9.1 MG/DL (ref 8.6–10.3)
CARDIAC TROPONIN I PNL SERPL HS: 127 NG/L (ref 0–13)
CARDIAC TROPONIN I PNL SERPL HS: 131 NG/L (ref 0–13)
CARDIAC TROPONIN I PNL SERPL HS: 156 NG/L (ref 0–13)
CHLORIDE SERPL-SCNC: 95 MMOL/L (ref 98–107)
CK SERPL-CCNC: 3344 U/L (ref 0–215)
CO2 SERPL-SCNC: 23 MMOL/L (ref 21–32)
CREAT SERPL-MCNC: 0.76 MG/DL (ref 0.5–1.05)
EGFRCR SERPLBLD CKD-EPI 2021: 77 ML/MIN/1.73M*2
EOSINOPHIL # BLD AUTO: 0 X10*3/UL (ref 0–0.4)
EOSINOPHIL NFR BLD AUTO: 0 %
ERYTHROCYTE [DISTWIDTH] IN BLOOD BY AUTOMATED COUNT: 12 % (ref 11.5–14.5)
GLUCOSE BLD MANUAL STRIP-MCNC: 158 MG/DL (ref 74–99)
GLUCOSE SERPL-MCNC: 218 MG/DL (ref 74–99)
HCT VFR BLD AUTO: 42.1 % (ref 36–46)
HGB BLD-MCNC: 14.2 G/DL (ref 12–16)
IMM GRANULOCYTES # BLD AUTO: 0.07 X10*3/UL (ref 0–0.5)
IMM GRANULOCYTES NFR BLD AUTO: 0.3 % (ref 0–0.9)
LACTATE SERPL-SCNC: 1.7 MMOL/L (ref 0.4–2)
LACTATE SERPL-SCNC: 4.6 MMOL/L (ref 0.4–2)
LYMPHOCYTES # BLD AUTO: 1.07 X10*3/UL (ref 0.8–3)
LYMPHOCYTES NFR BLD AUTO: 5.1 %
MAGNESIUM SERPL-MCNC: 1.79 MG/DL (ref 1.6–2.4)
MCH RBC QN AUTO: 30.1 PG (ref 26–34)
MCHC RBC AUTO-ENTMCNC: 33.7 G/DL (ref 32–36)
MCV RBC AUTO: 89 FL (ref 80–100)
MONOCYTES # BLD AUTO: 2.11 X10*3/UL (ref 0.05–0.8)
MONOCYTES NFR BLD AUTO: 10 %
NEUTROPHILS # BLD AUTO: 17.78 X10*3/UL (ref 1.6–5.5)
NEUTROPHILS NFR BLD AUTO: 84.4 %
NRBC BLD-RTO: 0 /100 WBCS (ref 0–0)
PLATELET # BLD AUTO: 177 X10*3/UL (ref 150–450)
POTASSIUM SERPL-SCNC: 4.3 MMOL/L (ref 3.5–5.3)
PROT SERPL-MCNC: 6.5 G/DL (ref 6.4–8.2)
RBC # BLD AUTO: 4.72 X10*6/UL (ref 4–5.2)
SODIUM SERPL-SCNC: 133 MMOL/L (ref 136–145)
TSH SERPL-ACNC: 1.24 MIU/L (ref 0.44–3.98)
WBC # BLD AUTO: 21.1 X10*3/UL (ref 4.4–11.3)

## 2024-12-04 PROCEDURE — 83735 ASSAY OF MAGNESIUM: CPT | Performed by: EMERGENCY MEDICINE

## 2024-12-04 PROCEDURE — 83605 ASSAY OF LACTIC ACID: CPT | Performed by: EMERGENCY MEDICINE

## 2024-12-04 PROCEDURE — 93005 ELECTROCARDIOGRAM TRACING: CPT

## 2024-12-04 PROCEDURE — 99291 CRITICAL CARE FIRST HOUR: CPT | Performed by: EMERGENCY MEDICINE

## 2024-12-04 PROCEDURE — 82652 VIT D 1 25-DIHYDROXY: CPT | Performed by: STUDENT IN AN ORGANIZED HEALTH CARE EDUCATION/TRAINING PROGRAM

## 2024-12-04 PROCEDURE — 70450 CT HEAD/BRAIN W/O DYE: CPT | Performed by: RADIOLOGY

## 2024-12-04 PROCEDURE — 84443 ASSAY THYROID STIM HORMONE: CPT | Performed by: STUDENT IN AN ORGANIZED HEALTH CARE EDUCATION/TRAINING PROGRAM

## 2024-12-04 PROCEDURE — 99285 EMERGENCY DEPT VISIT HI MDM: CPT | Mod: 25 | Performed by: EMERGENCY MEDICINE

## 2024-12-04 PROCEDURE — 72125 CT NECK SPINE W/O DYE: CPT

## 2024-12-04 PROCEDURE — 84484 ASSAY OF TROPONIN QUANT: CPT | Performed by: STUDENT IN AN ORGANIZED HEALTH CARE EDUCATION/TRAINING PROGRAM

## 2024-12-04 PROCEDURE — 73590 X-RAY EXAM OF LOWER LEG: CPT | Mod: RT

## 2024-12-04 PROCEDURE — 99223 1ST HOSP IP/OBS HIGH 75: CPT | Performed by: STUDENT IN AN ORGANIZED HEALTH CARE EDUCATION/TRAINING PROGRAM

## 2024-12-04 PROCEDURE — 72125 CT NECK SPINE W/O DYE: CPT | Performed by: RADIOLOGY

## 2024-12-04 PROCEDURE — 73080 X-RAY EXAM OF ELBOW: CPT | Mod: RIGHT SIDE | Performed by: RADIOLOGY

## 2024-12-04 PROCEDURE — 84484 ASSAY OF TROPONIN QUANT: CPT | Performed by: EMERGENCY MEDICINE

## 2024-12-04 PROCEDURE — 84075 ASSAY ALKALINE PHOSPHATASE: CPT | Performed by: EMERGENCY MEDICINE

## 2024-12-04 PROCEDURE — 96360 HYDRATION IV INFUSION INIT: CPT | Mod: 59

## 2024-12-04 PROCEDURE — 85025 COMPLETE CBC W/AUTO DIFF WBC: CPT | Performed by: EMERGENCY MEDICINE

## 2024-12-04 PROCEDURE — 83605 ASSAY OF LACTIC ACID: CPT | Performed by: STUDENT IN AN ORGANIZED HEALTH CARE EDUCATION/TRAINING PROGRAM

## 2024-12-04 PROCEDURE — 82947 ASSAY GLUCOSE BLOOD QUANT: CPT

## 2024-12-04 PROCEDURE — 2500000004 HC RX 250 GENERAL PHARMACY W/ HCPCS (ALT 636 FOR OP/ED): Performed by: STUDENT IN AN ORGANIZED HEALTH CARE EDUCATION/TRAINING PROGRAM

## 2024-12-04 PROCEDURE — 96372 THER/PROPH/DIAG INJ SC/IM: CPT | Performed by: STUDENT IN AN ORGANIZED HEALTH CARE EDUCATION/TRAINING PROGRAM

## 2024-12-04 PROCEDURE — 36415 COLL VENOUS BLD VENIPUNCTURE: CPT | Performed by: EMERGENCY MEDICINE

## 2024-12-04 PROCEDURE — 73522 X-RAY EXAM HIPS BI 3-4 VIEWS: CPT

## 2024-12-04 PROCEDURE — 73564 X-RAY EXAM KNEE 4 OR MORE: CPT | Mod: 50

## 2024-12-04 PROCEDURE — 73590 X-RAY EXAM OF LOWER LEG: CPT | Mod: RIGHT SIDE | Performed by: RADIOLOGY

## 2024-12-04 PROCEDURE — 73080 X-RAY EXAM OF ELBOW: CPT | Mod: RT

## 2024-12-04 PROCEDURE — 71045 X-RAY EXAM CHEST 1 VIEW: CPT | Performed by: RADIOLOGY

## 2024-12-04 PROCEDURE — 71045 X-RAY EXAM CHEST 1 VIEW: CPT

## 2024-12-04 PROCEDURE — 82550 ASSAY OF CK (CPK): CPT | Performed by: EMERGENCY MEDICINE

## 2024-12-04 PROCEDURE — 70450 CT HEAD/BRAIN W/O DYE: CPT

## 2024-12-04 PROCEDURE — 73564 X-RAY EXAM KNEE 4 OR MORE: CPT | Mod: BILATERAL PROCEDURE | Performed by: RADIOLOGY

## 2024-12-04 PROCEDURE — 87040 BLOOD CULTURE FOR BACTERIA: CPT | Mod: SAMLAB | Performed by: EMERGENCY MEDICINE

## 2024-12-04 PROCEDURE — 2500000004 HC RX 250 GENERAL PHARMACY W/ HCPCS (ALT 636 FOR OP/ED): Performed by: EMERGENCY MEDICINE

## 2024-12-04 PROCEDURE — G0378 HOSPITAL OBSERVATION PER HR: HCPCS

## 2024-12-04 RX ORDER — EZETIMIBE 10 MG/1
10 TABLET ORAL DAILY
Status: DISPENSED | OUTPATIENT
Start: 2024-12-05

## 2024-12-04 RX ORDER — DEXTROSE 50 % IN WATER (D50W) INTRAVENOUS SYRINGE
25
Status: ACTIVE | OUTPATIENT
Start: 2024-12-04

## 2024-12-04 RX ORDER — ACETAMINOPHEN 325 MG/1
650 TABLET ORAL EVERY 4 HOURS PRN
Status: DISCONTINUED | OUTPATIENT
Start: 2024-12-04 | End: 2024-12-06

## 2024-12-04 RX ORDER — CHOLECALCIFEROL (VITAMIN D3) 25 MCG
1000 TABLET ORAL DAILY
Status: DISPENSED | OUTPATIENT
Start: 2024-12-05

## 2024-12-04 RX ORDER — SODIUM CHLORIDE 9 MG/ML
125 INJECTION, SOLUTION INTRAVENOUS CONTINUOUS
Status: ACTIVE | OUTPATIENT
Start: 2024-12-04 | End: 2024-12-05

## 2024-12-04 RX ORDER — BRIMONIDINE TARTRATE AND TIMOLOL MALEATE 2; 5 MG/ML; MG/ML
1 SOLUTION OPHTHALMIC 2 TIMES DAILY
Status: DISCONTINUED | OUTPATIENT
Start: 2024-12-04 | End: 2024-12-05 | Stop reason: CLARIF

## 2024-12-04 RX ORDER — ACETAMINOPHEN 650 MG/1
650 SUPPOSITORY RECTAL EVERY 4 HOURS PRN
Status: DISCONTINUED | OUTPATIENT
Start: 2024-12-04 | End: 2024-12-06

## 2024-12-04 RX ORDER — INSULIN LISPRO 100 [IU]/ML
0-10 INJECTION, SOLUTION INTRAVENOUS; SUBCUTANEOUS
Status: DISPENSED | OUTPATIENT
Start: 2024-12-05

## 2024-12-04 RX ORDER — SERTRALINE HYDROCHLORIDE 50 MG/1
50 TABLET, FILM COATED ORAL DAILY
Status: DISPENSED | OUTPATIENT
Start: 2024-12-05

## 2024-12-04 RX ORDER — DEXTROSE 50 % IN WATER (D50W) INTRAVENOUS SYRINGE
12.5
Status: ACTIVE | OUTPATIENT
Start: 2024-12-04

## 2024-12-04 RX ORDER — CELECOXIB 100 MG/1
100 CAPSULE ORAL DAILY
Status: DISPENSED | OUTPATIENT
Start: 2024-12-05

## 2024-12-04 RX ORDER — LOSARTAN POTASSIUM 25 MG/1
25 TABLET ORAL DAILY
Status: DISPENSED | OUTPATIENT
Start: 2024-12-05

## 2024-12-04 RX ORDER — DICLOFENAC SODIUM 10 MG/G
4 GEL TOPICAL 2 TIMES DAILY
Status: DISPENSED | OUTPATIENT
Start: 2024-12-05

## 2024-12-04 RX ORDER — ACETAMINOPHEN 160 MG/5ML
650 SOLUTION ORAL EVERY 4 HOURS PRN
Status: DISCONTINUED | OUTPATIENT
Start: 2024-12-04 | End: 2024-12-06

## 2024-12-04 RX ORDER — ENOXAPARIN SODIUM 100 MG/ML
40 INJECTION SUBCUTANEOUS EVERY 24 HOURS
Status: DISPENSED | OUTPATIENT
Start: 2024-12-04

## 2024-12-04 RX ORDER — FAMOTIDINE 20 MG/1
20 TABLET, FILM COATED ORAL DAILY PRN
Status: ACTIVE | OUTPATIENT
Start: 2024-12-04

## 2024-12-04 SDOH — SOCIAL STABILITY: SOCIAL INSECURITY: DO YOU FEEL ANYONE HAS EXPLOITED OR TAKEN ADVANTAGE OF YOU FINANCIALLY OR OF YOUR PERSONAL PROPERTY?: NO

## 2024-12-04 SDOH — ECONOMIC STABILITY: FOOD INSECURITY: WITHIN THE PAST 12 MONTHS, YOU WORRIED THAT YOUR FOOD WOULD RUN OUT BEFORE YOU GOT THE MONEY TO BUY MORE.: NEVER TRUE

## 2024-12-04 SDOH — SOCIAL STABILITY: SOCIAL INSECURITY
WITHIN THE LAST YEAR, HAVE YOU BEEN RAPED OR FORCED TO HAVE ANY KIND OF SEXUAL ACTIVITY BY YOUR PARTNER OR EX-PARTNER?: NO

## 2024-12-04 SDOH — SOCIAL STABILITY: SOCIAL INSECURITY: ABUSE: ADULT

## 2024-12-04 SDOH — ECONOMIC STABILITY: FOOD INSECURITY: HOW HARD IS IT FOR YOU TO PAY FOR THE VERY BASICS LIKE FOOD, HOUSING, MEDICAL CARE, AND HEATING?: NOT HARD AT ALL

## 2024-12-04 SDOH — ECONOMIC STABILITY: INCOME INSECURITY: IN THE PAST 12 MONTHS HAS THE ELECTRIC, GAS, OIL, OR WATER COMPANY THREATENED TO SHUT OFF SERVICES IN YOUR HOME?: NO

## 2024-12-04 SDOH — ECONOMIC STABILITY: HOUSING INSECURITY: IN THE PAST 12 MONTHS, HOW MANY TIMES HAVE YOU MOVED WHERE YOU WERE LIVING?: 0

## 2024-12-04 SDOH — ECONOMIC STABILITY: HOUSING INSECURITY: AT ANY TIME IN THE PAST 12 MONTHS, WERE YOU HOMELESS OR LIVING IN A SHELTER (INCLUDING NOW)?: NO

## 2024-12-04 SDOH — SOCIAL STABILITY: SOCIAL INSECURITY: DO YOU FEEL UNSAFE GOING BACK TO THE PLACE WHERE YOU ARE LIVING?: NO

## 2024-12-04 SDOH — SOCIAL STABILITY: SOCIAL INSECURITY: WITHIN THE LAST YEAR, HAVE YOU BEEN HUMILIATED OR EMOTIONALLY ABUSED IN OTHER WAYS BY YOUR PARTNER OR EX-PARTNER?: NO

## 2024-12-04 SDOH — ECONOMIC STABILITY: FOOD INSECURITY: WITHIN THE PAST 12 MONTHS, THE FOOD YOU BOUGHT JUST DIDN'T LAST AND YOU DIDN'T HAVE MONEY TO GET MORE.: NEVER TRUE

## 2024-12-04 SDOH — SOCIAL STABILITY: SOCIAL INSECURITY: ARE YOU OR HAVE YOU BEEN THREATENED OR ABUSED PHYSICALLY, EMOTIONALLY, OR SEXUALLY BY ANYONE?: NO

## 2024-12-04 SDOH — SOCIAL STABILITY: SOCIAL INSECURITY: HAS ANYONE EVER THREATENED TO HURT YOUR FAMILY OR YOUR PETS?: NO

## 2024-12-04 SDOH — ECONOMIC STABILITY: HOUSING INSECURITY: IN THE LAST 12 MONTHS, WAS THERE A TIME WHEN YOU WERE NOT ABLE TO PAY THE MORTGAGE OR RENT ON TIME?: NO

## 2024-12-04 SDOH — SOCIAL STABILITY: SOCIAL INSECURITY: WITHIN THE LAST YEAR, HAVE YOU BEEN AFRAID OF YOUR PARTNER OR EX-PARTNER?: NO

## 2024-12-04 SDOH — ECONOMIC STABILITY: TRANSPORTATION INSECURITY: IN THE PAST 12 MONTHS, HAS LACK OF TRANSPORTATION KEPT YOU FROM MEDICAL APPOINTMENTS OR FROM GETTING MEDICATIONS?: NO

## 2024-12-04 SDOH — SOCIAL STABILITY: SOCIAL INSECURITY: HAVE YOU HAD THOUGHTS OF HARMING ANYONE ELSE?: NO

## 2024-12-04 SDOH — SOCIAL STABILITY: SOCIAL INSECURITY: DOES ANYONE TRY TO KEEP YOU FROM HAVING/CONTACTING OTHER FRIENDS OR DOING THINGS OUTSIDE YOUR HOME?: NO

## 2024-12-04 SDOH — SOCIAL STABILITY: SOCIAL INSECURITY
WITHIN THE LAST YEAR, HAVE YOU BEEN KICKED, HIT, SLAPPED, OR OTHERWISE PHYSICALLY HURT BY YOUR PARTNER OR EX-PARTNER?: NO

## 2024-12-04 SDOH — SOCIAL STABILITY: SOCIAL INSECURITY: HAVE YOU HAD ANY THOUGHTS OF HARMING ANYONE ELSE?: NO

## 2024-12-04 SDOH — SOCIAL STABILITY: SOCIAL INSECURITY: ARE THERE ANY APPARENT SIGNS OF INJURIES/BEHAVIORS THAT COULD BE RELATED TO ABUSE/NEGLECT?: NO

## 2024-12-04 SDOH — SOCIAL STABILITY: SOCIAL INSECURITY: WERE YOU ABLE TO COMPLETE ALL THE BEHAVIORAL HEALTH SCREENINGS?: YES

## 2024-12-04 ASSESSMENT — ACTIVITIES OF DAILY LIVING (ADL)
HEARING - RIGHT EAR: HEARING AID
PATIENT'S MEMORY ADEQUATE TO SAFELY COMPLETE DAILY ACTIVITIES?: YES
WALKS IN HOME: INDEPENDENT
FEEDING YOURSELF: INDEPENDENT
TOILETING: INDEPENDENT
LACK_OF_TRANSPORTATION: NO
BATHING: INDEPENDENT
GROOMING: INDEPENDENT
JUDGMENT_ADEQUATE_SAFELY_COMPLETE_DAILY_ACTIVITIES: NO
HEARING - LEFT EAR: HEARING AID
DRESSING YOURSELF: INDEPENDENT
LACK_OF_TRANSPORTATION: PATIENT DECLINED
ADEQUATE_TO_COMPLETE_ADL: YES

## 2024-12-04 ASSESSMENT — COLUMBIA-SUICIDE SEVERITY RATING SCALE - C-SSRS
6. HAVE YOU EVER DONE ANYTHING, STARTED TO DO ANYTHING, OR PREPARED TO DO ANYTHING TO END YOUR LIFE?: NO
1. IN THE PAST MONTH, HAVE YOU WISHED YOU WERE DEAD OR WISHED YOU COULD GO TO SLEEP AND NOT WAKE UP?: NO
2. HAVE YOU ACTUALLY HAD ANY THOUGHTS OF KILLING YOURSELF?: NO

## 2024-12-04 ASSESSMENT — COGNITIVE AND FUNCTIONAL STATUS - GENERAL
PERSONAL GROOMING: A LITTLE
TOILETING: A LITTLE
MOVING TO AND FROM BED TO CHAIR: A LITTLE
EATING MEALS: A LITTLE
HELP NEEDED FOR BATHING: A LITTLE
TURNING FROM BACK TO SIDE WHILE IN FLAT BAD: A LITTLE
CLIMB 3 TO 5 STEPS WITH RAILING: A LITTLE
WALKING IN HOSPITAL ROOM: A LITTLE
DRESSING REGULAR UPPER BODY CLOTHING: A LITTLE
MOBILITY SCORE: 18
STANDING UP FROM CHAIR USING ARMS: A LITTLE
PATIENT BASELINE BEDBOUND: NO
MOVING FROM LYING ON BACK TO SITTING ON SIDE OF FLAT BED WITH BEDRAILS: A LITTLE
DRESSING REGULAR LOWER BODY CLOTHING: A LITTLE
DAILY ACTIVITIY SCORE: 18

## 2024-12-04 ASSESSMENT — PAIN SCALES - GENERAL
PAINLEVEL_OUTOF10: 0 - NO PAIN

## 2024-12-04 ASSESSMENT — LIFESTYLE VARIABLES
HOW OFTEN DO YOU HAVE A DRINK CONTAINING ALCOHOL: NEVER
HOW OFTEN DO YOU HAVE 6 OR MORE DRINKS ON ONE OCCASION: NEVER
SKIP TO QUESTIONS 9-10: 1
AUDIT-C TOTAL SCORE: 0
AUDIT-C TOTAL SCORE: 0
HOW MANY STANDARD DRINKS CONTAINING ALCOHOL DO YOU HAVE ON A TYPICAL DAY: PATIENT DOES NOT DRINK

## 2024-12-04 ASSESSMENT — PAIN - FUNCTIONAL ASSESSMENT
PAIN_FUNCTIONAL_ASSESSMENT: 0-10
PAIN_FUNCTIONAL_ASSESSMENT: 0-10

## 2024-12-04 ASSESSMENT — PATIENT HEALTH QUESTIONNAIRE - PHQ9
2. FEELING DOWN, DEPRESSED OR HOPELESS: NOT AT ALL
SUM OF ALL RESPONSES TO PHQ9 QUESTIONS 1 & 2: 0
1. LITTLE INTEREST OR PLEASURE IN DOING THINGS: NOT AT ALL

## 2024-12-04 NOTE — TELEPHONE ENCOUNTER
----- Message from Aspen Knapp sent at 12/3/2024  9:15 PM EST -----  Please let Nancie know that her labs overall look good. Thyroid, blood counts, metabolic panel look great. Cholesterol is mildly elevated but stable. Vitamin D is low end of normal. Is she taking 1000iu every day? If so, would recommend doubling to 2000iu every day. Thank you

## 2024-12-04 NOTE — ED PROVIDER NOTES
HPI   No chief complaint on file.      Limitations to History: Poor hearing    HPI: 84-year-old female found down by her son in the garage.  Patient states she fell in the driveway and crawled in the garage.  Unclear when this happened but may have happened last night.  Patient complaining of being cold.  Has pain in both of her hips as well as knees.  Unclear if she struck her head.  Denies any chest pain, shortness of breath, nausea, vomiting, abdominal pain, urinary symptoms.    Additional History Obtained from: Son at the bedside.    ------------------------------------------------------------------------------------------------------------------------------------------  Physical Exam:    VS: As documented in the triage note and EMR flowsheet from this visit were reviewed.    Appearance: Alert. cooperative,  in no acute distress.   Skin: Excoriated and ecchymotic skin to the bilateral knees.  Eyes: PERRLA, EOMs intact,  Conjunctiva pink with no redness or exudates.   HENT: Normocephalic, atraumatic. Nares patent. No intraoral lesions.   Neck: Supple, without meningismus. Trachea at midline. No lymphadenopathy.  Pulmonary: Clear bilaterally with good chest wall excursion. No rales, rhonchi or wheezing. No accessory muscle use or stridor.  Cardiac: Regular rate and rhythm, no rubs, murmurs, or gallops.   Abdomen: Abdomen is soft, nontender, and nondistended.  No palpable organomegaly.  No rebound or guarding.  No CVA tenderness. Nonsurgical abdomen.  Genitourinary: Exam deferred.  Musculoskeletal: Full range of motion.  Pulses full and equal. No cyanosis, clubbing, or edema.  Neurological:  Cranial nerves are grossly intact, grossly normal sensation, no weakness, no focal findings identified.  Psychiatric: Appropriate mood and affect.                Patient History   Past Medical History:   Diagnosis Date    Diabetes mellitus (Multi)     Encounter for full-term uncomplicated delivery     Normal vaginal delivery     Other conditions influencing health status     Menstruation     Past Surgical History:   Procedure Laterality Date    OTHER SURGICAL HISTORY  06/15/2021    Ankle surgery    OTHER SURGICAL HISTORY  06/15/2021    Left mastectomy    OTHER SURGICAL HISTORY  09/17/2021    Hysteroscopy     Family History   Problem Relation Name Age of Onset    Heart disease Mother      Hypertension Mother      Hypertension Father       Social History     Tobacco Use    Smoking status: Never    Smokeless tobacco: Never   Vaping Use    Vaping status: Never Used   Substance Use Topics    Alcohol use: Not Currently    Drug use: Never       Physical Exam   ED Triage Vitals   Temp Pulse Resp BP   -- -- -- --      SpO2 Temp src Heart Rate Source Patient Position   -- -- -- --      BP Location FiO2 (%)     -- --       Physical Exam      ED Course & MDM   Diagnoses as of 12/05/24 0739   Hypothermia, initial encounter   Elevated troponin   Elevated CK                 No data recorded                                 Medical Decision Making  Labs Reviewed  CBC WITH AUTO DIFFERENTIAL - Abnormal     WBC                           21.1 (*)               nRBC                          0.0                    RBC                           4.72                   Hemoglobin                    14.2                   Hematocrit                    42.1                   MCV                           89                     MCH                           30.1                   MCHC                          33.7                   RDW                           12.0                   Platelets                     177                    Neutrophils %                 84.4                   Immature Granulocytes %, Automated   0.3                    Lymphocytes %                 5.1                    Monocytes %                   10.0                   Eosinophils %                 0.0                    Basophils %                   0.2                    Neutrophils  Absolute          17.78 (*)               Immature Granulocytes Absolute, Au*   0.07                   Lymphocytes Absolute          1.07                   Monocytes Absolute            2.11 (*)               Eosinophils Absolute          0.00                   Basophils Absolute            0.04                COMPREHENSIVE METABOLIC PANEL - Abnormal     Glucose                       218 (*)                Sodium                        133 (*)                Potassium                     4.3                    Chloride                      95 (*)                 Bicarbonate                   23                     Anion Gap                     19                     Urea Nitrogen                 31 (*)                 Creatinine                    0.76                   eGFR                          77                     Calcium                       9.1                    Albumin                       4.0                    Alkaline Phosphatase          47                     Total Protein                 6.5                    AST                           59 (*)                 Bilirubin, Total              1.4 (*)                ALT                           25                  TROPONIN I, HIGH SENSITIVITY - Abnormal     Troponin I, High Sensitivity   127 (*)                    Narrative: Less than 99th percentile of normal range cutoff-                  Female and children under 18 years old <14 ng/L; Male <21 ng/L: Negative                  Repeat testing should be performed if clinically indicated.                                     Female and children under 18 years old 14-50 ng/L; Male 21-50 ng/L:                  Consistent with possible cardiac damage and possible increased clinical                   risk. Serial measurements may help to assess extent of myocardial damage.                                     >50 ng/L: Consistent with cardiac damage, increased clinical risk and                   myocardial infarction. Serial measurements may help assess extent of                   myocardial damage.                                      NOTE: Children less than 1 year old may have higher baseline troponin                   levels and results should be interpreted in conjunction with the overall                   clinical context.                                     NOTE: Troponin I testing is performed using a different                   testing methodology at [facility] than at other                   [facility]. Direct result comparisons should only                   be made within the same method.  CREATINE KINASE - Abnormal     Creatine Kinase               3,344 (*)            LACTATE - Abnormal     Lactate                       4.6 (*)                    Narrative: Venipuncture immediately after or during the administration of Metamizole may lead to falsely low results. Testing should be performed immediately prior to Metamizole dosing.  TROPONIN I, HIGH SENSITIVITY - Abnormal     Troponin I, High Sensitivity   131 (*)                    Narrative: Less than 99th percentile of normal range cutoff-                  Female and children under 18 years old <14 ng/L; Male <21 ng/L: Negative                  Repeat testing should be performed if clinically indicated.                                     Female and children under 18 years old 14-50 ng/L; Male 21-50 ng/L:                  Consistent with possible cardiac damage and possible increased clinical                   risk. Serial measurements may help to assess extent of myocardial damage.                                     >50 ng/L: Consistent with cardiac damage, increased clinical risk and                  myocardial infarction. Serial measurements may help assess extent of                   myocardial damage.                                      NOTE: Children less than 1 year old may have higher baseline troponin                    levels and results should be interpreted in conjunction with the overall                   clinical context.                                     NOTE: Troponin I testing is performed using a different                   testing methodology at [facility] than at other                   [facility]. Direct result comparisons should only                   be made within the same method.  TROPONIN I, HIGH SENSITIVITY - Abnormal     Troponin I, High Sensitivity   156 (*)                    Narrative: Less than 99th percentile of normal range cutoff-                  Female and children under 18 years old <14 ng/L; Male <21 ng/L: Negative                  Repeat testing should be performed if clinically indicated.                                     Female and children under 18 years old 14-50 ng/L; Male 21-50 ng/L:                  Consistent with possible cardiac damage and possible increased clinical                   risk. Serial measurements may help to assess extent of myocardial damage.                                     >50 ng/L: Consistent with cardiac damage, increased clinical risk and                  myocardial infarction. Serial measurements may help assess extent of                   myocardial damage.                                      NOTE: Children less than 1 year old may have higher baseline troponin                   levels and results should be interpreted in conjunction with the overall                   clinical context.                                     NOTE: Troponin I testing is performed using a different                   testing methodology at [facility] than at other                   [facility]. Direct result comparisons should only                   be made within the same method.  POCT GLUCOSE - Abnormal     POCT Glucose                  158 (*)             POCT GLUCOSE - Abnormal     POCT Glucose                  222 (*)             BLOOD  CULTURE - Normal     Blood Culture                                     BLOOD CULTURE - Normal     Blood Culture                                     MAGNESIUM - Normal     Magnesium                     1.79                TSH - Normal     Thyroid Stimulating Hormone   1.24                       Narrative: TSH testing is performed using different testing methodology at [facility] than at other [facility]. Direct result comparisons should only be made within the same method.                    LACTATE - Normal     Lactate                       1.7                        Narrative: Venipuncture immediately after or during the administration of Metamizole may lead to falsely low results. Testing should be performed immediately prior to Metamizole dosing.  URINALYSIS WITH REFLEX CULTURE AND MICROSCOPIC         Narrative: The following orders were created for panel order Urinalysis with Reflex Culture and Microscopic.                  Procedure                               Abnormality         Status                                     ---------                               -----------         ------                                     Urinalysis with Reflex C...[[id number]]                                                                 Extra Urine Gray Tube[[id number]]                                                                                         Please view results for these tests on the individual orders.  URINALYSIS WITH REFLEX CULTURE AND MICROSCOPIC  EXTRA URINE GRAY TUBE  VITAMIN D 1,25 DIHYDROXY  POCT GLUCOSE METER  POCT GLUCOSE METER  POCT GLUCOSE METER  POCT GLUCOSE METER  POCT GLUCOSE METER  CT head wo IV contrast   Final Result    No CT evidence of acute intracranial injury.                      MACRO:    None                Signed by: Farhat Weinstein 12/4/2024 5:16 PM    Dictation workstation:   [id number]     CT cervical spine wo IV contrast   Final Result    No evidence for an  acute fracture or subluxation of the cervical    spine.          MACRO:    None          Signed by: Farhat Weinstein 12/4/2024 5:18 PM    Dictation workstation:   [id number]     XR chest 1 view   Final Result    Mild cardiomegaly.  Currently without radiographic evidence of CHF or    pneumonia.          MACRO:    None          Signed by: Lucien Mijares 12/4/2024 4:57 PM    Dictation workstation:   [id number]     XR hips bilateral 3 or 4 VW w pelvis when performed   Final Result    DJD as described.          No acute fracture or dislocation evident in this exam.          MACRO:    None          Signed by: Lucien Mijares 12/4/2024 4:58 PM    Dictation workstation:   [id number]     XR tibia fibula right 2 views   Final Result    Right knee DJD as described.          Small vessel arterial calcifications in the ankle.          No acute fracture or dislocation.          MACRO:    None          Signed by: Lucien Mijares 12/4/2024 4:59 PM    Dictation workstation:   [id number]     XR knee 4+ views bilateral   Final Result    Mild arthritic changes in both knees as described.          No acute fracture or dislocation on either side.          MACRO:    None          Signed by: Lucien Mijares 12/4/2024 5:03 PM    Dictation workstation:   [id number]     XR elbow right 3+ views   Final Result    No acute fracture or dislocation or gross effusion.          Mild arthritic changes as described.          Tendon calcification versus calcified loose body adjacent to the    anterolateral aspect of the distal humerus.          MACRO:    None          Signed by: Lucien Mijares 12/4/2024 5:01 PM    Dictation workstation:   [id number]     Medical Decision Making:    Patient hypothermic.  Placed under Flavia hugger.  White blood cell count of 21,000.  Troponin troponin increased from 127-1 31.  Lactate of 4.6.  Patient treated with 1 L of normal saline.  Elevated CK.  Imaging including CT brain and cervical spine negative.  Patient temperature  improving.  Patient lactate elevation likely from volume depletion as well as being down for an extended period of time.  As well as patient's white blood cell count.  Low concern for sepsis at this time.  Patient will be admitted for further treatment and evaluation.  Stable time of admission.    Differential Diagnoses Considered: Hypothermia, electrolyte abnormality, volume depletion, UTI, pneumonia, closed head injury, intracranial hemorrhage, contusion versus fracture    Independent Interpretation of Studies:  I independently interpreted: CT brain shows no intracranial hemorrhage.  CT cervical spine without acute fracture.  Bilateral hip x-rays without fracture.  Bilateral knee x-rays without fracture.  Chest x-ray shows no evidence of pneumonia or pneumothorax.    Escalation of Care:  Appropriate for admission for further treatment and evaluation.    Discussion of Management with Other Providers:   I discussed the patient/results with: Hospitalist.          Procedure  ECG 12 lead    Performed by: Siddhartha Calhoun DO  Authorized by: Siddhartha Calhoun DO    ECG interpreted by ED Physician in the absence of a cardiologist: yes    Comments:      EKG interpreted by Dr. Siddhartha Calhoun: Sinus tachycardia 101 bpm.  NV interval 210 ms.  With first-degree AV block.  QTc of 562 ms.  Nonspecific ST changes.  Artifact.  Critical Care    Performed by: Siddhartha Calhoun DO  Authorized by: Siddhartha Calhoun DO    Critical care provider statement:     Critical care time (minutes):  37    Critical care time was exclusive of:  Separately billable procedures and treating other patients    Critical care was time spent personally by me on the following activities:  Development of treatment plan with patient or surrogate, evaluation of patient's response to treatment, ordering and performing treatments and interventions, ordering and review of laboratory studies, ordering and review of radiographic studies and  re-evaluation of patient's condition    Care discussed with: admitting provider         Siddhartha Cornelius DO  12/05/24 0752

## 2024-12-05 ENCOUNTER — APPOINTMENT (OUTPATIENT)
Dept: RADIOLOGY | Facility: HOSPITAL | Age: 84
DRG: 558 | End: 2024-12-05
Payer: MEDICARE

## 2024-12-05 PROBLEM — T68.XXXA HYPOTHERMIA, INITIAL ENCOUNTER: Status: ACTIVE | Noted: 2024-12-05

## 2024-12-05 LAB
ALBUMIN SERPL BCP-MCNC: 3.5 G/DL (ref 3.4–5)
ALP SERPL-CCNC: 36 U/L (ref 33–136)
ALT SERPL W P-5'-P-CCNC: 31 U/L (ref 7–45)
ANION GAP SERPL CALC-SCNC: 11 MMOL/L (ref 10–20)
APPEARANCE UR: CLEAR
AST SERPL W P-5'-P-CCNC: 55 U/L (ref 9–39)
BACTERIA #/AREA URNS AUTO: ABNORMAL /HPF
BILIRUB SERPL-MCNC: 0.9 MG/DL (ref 0–1.2)
BILIRUB UR STRIP.AUTO-MCNC: NEGATIVE MG/DL
BUN SERPL-MCNC: 31 MG/DL (ref 6–23)
CALCIUM SERPL-MCNC: 8.6 MG/DL (ref 8.6–10.3)
CARDIAC TROPONIN I PNL SERPL HS: 65 NG/L (ref 0–13)
CHLORIDE SERPL-SCNC: 102 MMOL/L (ref 98–107)
CK SERPL-CCNC: 2794 U/L (ref 0–215)
CO2 SERPL-SCNC: 23 MMOL/L (ref 21–32)
COLOR UR: YELLOW
CREAT SERPL-MCNC: 0.55 MG/DL (ref 0.5–1.05)
EGFRCR SERPLBLD CKD-EPI 2021: 90 ML/MIN/1.73M*2
ERYTHROCYTE [DISTWIDTH] IN BLOOD BY AUTOMATED COUNT: 12.6 % (ref 11.5–14.5)
GLUCOSE BLD MANUAL STRIP-MCNC: 156 MG/DL (ref 74–99)
GLUCOSE BLD MANUAL STRIP-MCNC: 222 MG/DL (ref 74–99)
GLUCOSE BLD MANUAL STRIP-MCNC: 314 MG/DL (ref 74–99)
GLUCOSE SERPL-MCNC: 182 MG/DL (ref 74–99)
GLUCOSE UR STRIP.AUTO-MCNC: ABNORMAL MG/DL
HCT VFR BLD AUTO: 35.8 % (ref 36–46)
HGB BLD-MCNC: 12.1 G/DL (ref 12–16)
HOLD SPECIMEN: NORMAL
HYALINE CASTS #/AREA URNS AUTO: ABNORMAL /LPF
KETONES UR STRIP.AUTO-MCNC: ABNORMAL MG/DL
LEUKOCYTE ESTERASE UR QL STRIP.AUTO: ABNORMAL
MAGNESIUM SERPL-MCNC: 1.91 MG/DL (ref 1.6–2.4)
MCH RBC QN AUTO: 30 PG (ref 26–34)
MCHC RBC AUTO-ENTMCNC: 33.8 G/DL (ref 32–36)
MCV RBC AUTO: 89 FL (ref 80–100)
MUCOUS THREADS #/AREA URNS AUTO: ABNORMAL /LPF
NITRITE UR QL STRIP.AUTO: NEGATIVE
NRBC BLD-RTO: 0 /100 WBCS (ref 0–0)
PH UR STRIP.AUTO: 6.5 [PH]
PHOSPHATE SERPL-MCNC: 2 MG/DL (ref 2.5–4.9)
PLATELET # BLD AUTO: 118 X10*3/UL (ref 150–450)
POTASSIUM SERPL-SCNC: 3.9 MMOL/L (ref 3.5–5.3)
PROT SERPL-MCNC: 5.5 G/DL (ref 6.4–8.2)
PROT UR STRIP.AUTO-MCNC: ABNORMAL MG/DL
RBC # BLD AUTO: 4.04 X10*6/UL (ref 4–5.2)
RBC # UR STRIP.AUTO: ABNORMAL /UL
RBC #/AREA URNS AUTO: ABNORMAL /HPF
SODIUM SERPL-SCNC: 132 MMOL/L (ref 136–145)
SP GR UR STRIP.AUTO: 1.02
SQUAMOUS #/AREA URNS AUTO: ABNORMAL /HPF
TRANS CELLS #/AREA UR COMP ASSIST: ABNORMAL /HPF
UROBILINOGEN UR STRIP.AUTO-MCNC: NORMAL MG/DL
WBC # BLD AUTO: 14.9 X10*3/UL (ref 4.4–11.3)
WBC #/AREA URNS AUTO: ABNORMAL /HPF

## 2024-12-05 PROCEDURE — 70450 CT HEAD/BRAIN W/O DYE: CPT | Performed by: STUDENT IN AN ORGANIZED HEALTH CARE EDUCATION/TRAINING PROGRAM

## 2024-12-05 PROCEDURE — 82550 ASSAY OF CK (CPK): CPT | Performed by: STUDENT IN AN ORGANIZED HEALTH CARE EDUCATION/TRAINING PROGRAM

## 2024-12-05 PROCEDURE — 36415 COLL VENOUS BLD VENIPUNCTURE: CPT | Performed by: STUDENT IN AN ORGANIZED HEALTH CARE EDUCATION/TRAINING PROGRAM

## 2024-12-05 PROCEDURE — 81001 URINALYSIS AUTO W/SCOPE: CPT | Performed by: EMERGENCY MEDICINE

## 2024-12-05 PROCEDURE — 87086 URINE CULTURE/COLONY COUNT: CPT | Mod: SAMLAB | Performed by: EMERGENCY MEDICINE

## 2024-12-05 PROCEDURE — 97530 THERAPEUTIC ACTIVITIES: CPT | Mod: GP | Performed by: PHYSICAL THERAPIST

## 2024-12-05 PROCEDURE — 84484 ASSAY OF TROPONIN QUANT: CPT | Performed by: STUDENT IN AN ORGANIZED HEALTH CARE EDUCATION/TRAINING PROGRAM

## 2024-12-05 PROCEDURE — 2500000004 HC RX 250 GENERAL PHARMACY W/ HCPCS (ALT 636 FOR OP/ED): Performed by: STUDENT IN AN ORGANIZED HEALTH CARE EDUCATION/TRAINING PROGRAM

## 2024-12-05 PROCEDURE — 99232 SBSQ HOSP IP/OBS MODERATE 35: CPT | Performed by: STUDENT IN AN ORGANIZED HEALTH CARE EDUCATION/TRAINING PROGRAM

## 2024-12-05 PROCEDURE — 2500000005 HC RX 250 GENERAL PHARMACY W/O HCPCS: Performed by: STUDENT IN AN ORGANIZED HEALTH CARE EDUCATION/TRAINING PROGRAM

## 2024-12-05 PROCEDURE — 2500000002 HC RX 250 W HCPCS SELF ADMINISTERED DRUGS (ALT 637 FOR MEDICARE OP, ALT 636 FOR OP/ED): Performed by: STUDENT IN AN ORGANIZED HEALTH CARE EDUCATION/TRAINING PROGRAM

## 2024-12-05 PROCEDURE — 70450 CT HEAD/BRAIN W/O DYE: CPT

## 2024-12-05 PROCEDURE — 80053 COMPREHEN METABOLIC PANEL: CPT | Performed by: STUDENT IN AN ORGANIZED HEALTH CARE EDUCATION/TRAINING PROGRAM

## 2024-12-05 PROCEDURE — 83735 ASSAY OF MAGNESIUM: CPT | Performed by: STUDENT IN AN ORGANIZED HEALTH CARE EDUCATION/TRAINING PROGRAM

## 2024-12-05 PROCEDURE — 1200000002 HC GENERAL ROOM WITH TELEMETRY DAILY

## 2024-12-05 PROCEDURE — 97161 PT EVAL LOW COMPLEX 20 MIN: CPT | Mod: GP | Performed by: PHYSICAL THERAPIST

## 2024-12-05 PROCEDURE — 2500000001 HC RX 250 WO HCPCS SELF ADMINISTERED DRUGS (ALT 637 FOR MEDICARE OP): Performed by: STUDENT IN AN ORGANIZED HEALTH CARE EDUCATION/TRAINING PROGRAM

## 2024-12-05 PROCEDURE — 82947 ASSAY GLUCOSE BLOOD QUANT: CPT

## 2024-12-05 PROCEDURE — 85027 COMPLETE CBC AUTOMATED: CPT | Performed by: STUDENT IN AN ORGANIZED HEALTH CARE EDUCATION/TRAINING PROGRAM

## 2024-12-05 PROCEDURE — 97165 OT EVAL LOW COMPLEX 30 MIN: CPT | Mod: GO

## 2024-12-05 PROCEDURE — 84100 ASSAY OF PHOSPHORUS: CPT | Performed by: STUDENT IN AN ORGANIZED HEALTH CARE EDUCATION/TRAINING PROGRAM

## 2024-12-05 RX ORDER — SODIUM CHLORIDE 9 MG/ML
125 INJECTION, SOLUTION INTRAVENOUS CONTINUOUS
Status: ACTIVE | OUTPATIENT
Start: 2024-12-05 | End: 2024-12-06

## 2024-12-05 RX ORDER — BRIMONIDINE TARTRATE 2 MG/ML
1 SOLUTION/ DROPS OPHTHALMIC 2 TIMES DAILY
Status: DISPENSED | OUTPATIENT
Start: 2024-12-05

## 2024-12-05 RX ORDER — TIMOLOL MALEATE 5 MG/ML
1 SOLUTION/ DROPS OPHTHALMIC 2 TIMES DAILY
Status: DISPENSED | OUTPATIENT
Start: 2024-12-05

## 2024-12-05 ASSESSMENT — COGNITIVE AND FUNCTIONAL STATUS - GENERAL
MOVING TO AND FROM BED TO CHAIR: A LITTLE
DAILY ACTIVITIY SCORE: 15
EATING MEALS: A LITTLE
HELP NEEDED FOR BATHING: A LOT
STANDING UP FROM CHAIR USING ARMS: A LITTLE
CLIMB 3 TO 5 STEPS WITH RAILING: TOTAL
PERSONAL GROOMING: A LITTLE
DRESSING REGULAR UPPER BODY CLOTHING: A LITTLE
TURNING FROM BACK TO SIDE WHILE IN FLAT BAD: A LOT
WALKING IN HOSPITAL ROOM: A LITTLE
TOILETING: A LOT
DRESSING REGULAR LOWER BODY CLOTHING: A LOT
MOBILITY SCORE: 14
MOVING FROM LYING ON BACK TO SITTING ON SIDE OF FLAT BED WITH BEDRAILS: A LOT

## 2024-12-05 ASSESSMENT — PAIN - FUNCTIONAL ASSESSMENT
PAIN_FUNCTIONAL_ASSESSMENT: 0-10

## 2024-12-05 ASSESSMENT — PAIN SCALES - GENERAL
PAINLEVEL_OUTOF10: 5 - MODERATE PAIN
PAINLEVEL_OUTOF10: 0 - NO PAIN
PAINLEVEL_OUTOF10: 3
PAINLEVEL_OUTOF10: 0 - NO PAIN
PAINLEVEL_OUTOF10: 0 - NO PAIN
PAINLEVEL_OUTOF10: 1

## 2024-12-05 ASSESSMENT — ACTIVITIES OF DAILY LIVING (ADL)
LACK_OF_TRANSPORTATION: NO
BATHING_ASSISTANCE: MAXIMAL
ADL_ASSISTANCE: INDEPENDENT
ADL_ASSISTANCE: INDEPENDENT

## 2024-12-05 ASSESSMENT — PAIN DESCRIPTION - LOCATION
LOCATION: KNEE
LOCATION: GENERALIZED

## 2024-12-05 ASSESSMENT — PAIN DESCRIPTION - ORIENTATION: ORIENTATION: RIGHT;LEFT

## 2024-12-05 ASSESSMENT — PAIN SCALES - PAIN ASSESSMENT IN ADVANCED DEMENTIA (PAINAD): TOTALSCORE: MEDICATION (SEE MAR)

## 2024-12-05 NOTE — PROGRESS NOTES
Occupational Therapy    Evaluation    Patient Name: Nancie Du  MRN: [id number]  Today's Date: 12/5/2024  Time Calculation  Start Time: 0819  Stop Time: 0838  Time Calculation (min): 19 min  331/331-A    Assessment  IP OT Assessment  OT Assessment: pt needing assist for all aspects of ADL due to deficits in strength, balance, activity tolerance, safety/judgement.  recommend OT services at moderate intensity at discharge.  Pt needs cues for safety and correct technique during ADLs  Prognosis: Good  Barriers to Discharge: None  Evaluation/Treatment Tolerance: Patient limited by pain  Medical Staff Made Aware: Yes  End of Session Communication: Bedside nurse  End of Session Patient Position: Up in chair, Alarm on (nurse present.)    Plan:  Treatment Interventions: ADL retraining, Functional transfer training, Endurance training, Neuromuscular reeducation, Compensatory technique education, Equipment evaluation/education, Patient/family training  OT Frequency: 3 times per week  OT Discharge Recommendations: Moderate intensity level of continued care  OT Recommended Transfer Status: Assist of 1  OT - OK to Discharge: Yes (once medically stable)    Subjective     Current Problem:  1. Hypothermia, initial encounter        2. Elevated troponin        3. Elevated CK            General:  General  Reason for Referral: 84 year old female admitted sp mechanical fall and found down in garage- hypothermia, rhabdomyolosis.   all imaging negative for fractures: B hips, B knees, R elbow, CT cspine.. abrasions B knees  Referred By: Martin  Past Medical History Relevant to Rehab: Diabetes mellitus (Multi)      Encounter for full-term uncomplicated delivery      Normal vaginal delivery   Other conditions influencing health status      Menstruation  Family/Caregiver Present: No  Prior to Session Communication: Bedside nurse (nurse asking for assist to get pt to BSC)  Patient Position Received: Alarm on, Up in chair  General Comment: pt  agreable to assessment.  pt is very [place], pleasant, cooperative    Precautions:  Medical Precautions: Fall precautions    Vital Signs:  Vital Signs Comment: no concerns on ICU monitor    Pain:  Pain Assessment  Pain Assessment: 0-10  0-10 (Numeric) Pain Score: 5 - Moderate pain (B knees)    Objective     Cognition:  Orientation Level: Disoriented to time, Disoriented to place  Safety/Judgement: Exceptions to WFL  Novel Situations: Moderate  Impulsive: Mildly             Home Living:  Type of Home: House  Lives With: Alone  Home Adaptive Equipment: Walker rolling or standard, Cane  Home Layout: One level, Laundry main level  Home Access: Stairs to enter with rails  Entrance Stairs-Number of Steps: 2     Prior Function:  ADL Assistance: Independent  Homemaking Assistance: Needs assistance (family gets groceries.  pt has cleaning lady)  Ambulatory Assistance: Independent (with use of walker)    ADL:  Eating Assistance: Stand by  Grooming Assistance: Stand by  Bathing Assistance: Maximal  UE Dressing Assistance: Minimal  LE Dressing Assistance: Maximal  Toileting Assistance with Device: Maximal    Activity Tolerance:  Endurance: Decreased tolerance for upright activites    Bed Mobility/Transfers:   Bed Mobility  Bed Mobility: No  Transfers  Transfer: Yes  Transfer 1  Technique 1: Sit to stand, Stand to sit  Transfer Device 1: Gait belt, Walker  Transfer Level of Assistance 1: Minimum assistance, Moderate verbal cues  Trials/Comments 1: cues for hand placement  Transfers 2  Transfer From 2: Bed to  Transfer to 2: Commode-standard (to and from [facility])  Technique 2: Stand pivot  Transfer Device 2: Gait belt, Walker  Transfer Level of Assistance 2: Contact guard, Maximum verbal cues  Trials/Comments 2: cues on safety and technique.    Ambulation/Gait Training:  Functional Mobility  Functional Mobility Performed: No    Vision: Vision - Basic Assessment  Current Vision: Wears glasses all the time    Sensation:  Light Touch: No  apparent deficits    Strength:  Strength Comments: BUE Fair+      Outcome Measures: [facility] Daily Activity  Putting on and taking off regular lower body clothing: A lot  Bathing (including washing, rinsing, drying): A lot  Putting on and taking off regular upper body clothing: A little  Toileting, which includes using toilet, bedpan or urinal: A lot  Taking care of personal grooming such as brushing teeth: A little  Eating Meals: A little  Daily Activity - Total Score: 15                       EDUCATION:     Education Documentation  Precautions, taught by Adali May OT at 12/5/2024 11:21 AM.  Learner: Patient  Readiness: Acceptance  Method: Explanation, Demonstration  Response: Demonstrated Understanding, Verbalizes Understanding, Needs Reinforcement  Comment: safety during ADL    ADL Training, taught by Adali May OT at 12/5/2024 11:21 AM.  Learner: Patient  Readiness: Acceptance  Method: Explanation, Demonstration  Response: Demonstrated Understanding, Verbalizes Understanding, Needs Reinforcement  Comment: safety during ADL    Education Comments  No comments found.        Goals:   Encounter Problems       Encounter Problems (Active)       ADLs       Patient will perform UB and LB bathing  with minimal assist level of assistance. (Progressing)       Start:  12/05/24    Expected End:  12/19/24            Patient with complete lower body dressing with minimal assist  level of assistance (Progressing)       Start:  12/05/24    Expected End:  12/19/24            Patient will complete toileting including hygiene clothing management/hygiene with minimal assist  level of assistance. (Progressing)       Start:  12/05/24    Expected End:  12/19/24               BALANCE       Pt will maintain dynamic standing balance during ADL task with stand by assist level of assistance in order to demonstrate decreased risk of falling and improved postural control. (Progressing)       Start:  12/05/24    Expected End:  12/19/24                MOBILITY       Patient will perform Functional mobility  to and from bathroom with contact guard assist level of assistance in order to improve safety and functional mobility. (Progressing)       Start:  12/05/24    Expected End:  12/19/24

## 2024-12-05 NOTE — CARE PLAN
The patient's goals for the shift include  rest comfortably while in bed     The clinical goals for the shift include no falls  Pt. In bed with eyes closed for most of the shift.  Hard of hearing.   Problem: Pain - Adult  Goal: Verbalizes/displays adequate comfort level or baseline comfort level  Outcome: Progressing     Problem: Safety - Adult  Goal: Free from fall injury  Outcome: Progressing     Problem: Discharge Planning  Goal: Discharge to home or other facility with appropriate resources  Outcome: Progressing

## 2024-12-05 NOTE — PROGRESS NOTES
12/05/24 1210   Discharge Planning   Living Arrangements Alone   Support Systems Children   Assistance Needed Walker, 1 Assist   Type of Residence Skilled nursing facility   Do you have animals or pets at home? Yes   Type of Animals or Pets 1 cat   Who is requesting discharge planning? Provider   Home or Post Acute Services Post acute facilities (Rehab/SNF/etc)   Type of Post Acute Facility Services Rehab   Expected Discharge Disposition SNF   Does the patient need discharge transport arranged? Yes   RoundTrip coordination needed? Yes   Has discharge transport been arranged? No   Financial Resource Strain   How hard is it for you to pay for the very basics like food, housing, medical care, and heating? Not hard   Housing Stability   In the last 12 months, was there a time when you were not able to pay the mortgage or rent on time? N   In the past 12 months, how many times have you moved where you were living? 0   At any time in the past 12 months, were you homeless or living in a shelter (including now)? N   Transportation Needs   In the past 12 months, has lack of transportation kept you from medical appointments or from getting medications? no   In the past 12 months, has lack of transportation kept you from meetings, work, or from getting things needed for daily living? No     Care Transitions: Patient reviewed in care round meeting this AM and is not medically ready for discharge today. Met with patient at bedside. Role of TCC explained. Pleasant, hard of hearing. Demographics and contacts verified. She resides alone in a 1 story home. States she has 3 adult children that all work and are unable to assist her at home daily. PCP is Dr. Knapp. Her preferred pharmacy is [email or web address] on [facility] in [place]. Denies any difficulty obtaining/affording medications. States She has been independent at home with ADL's and uses a walker mostly for ambulation. She also has a cane at home. States she is able to cook simple  things for herself or make a sandwich, and one of her daughters helps with meals. Her daughter is her mode of transportation. She has had a frequent fall at home and will likely need SNF placement for rehab. Patient states she was in a SNF in [place] last spring for rehab. Discussed facility of choice to send referrals to. She is unsure and gave permission to speak to her daughter Orquidea to get facility choices. [facility] 18/18 per nursing. PT/OT evals and recommendations pending. Patient meets criteria for inpatient admission status and will require 3 midnights from today. Medicare IMM reviewed, patient signed, copy provided, original placed in chart. Voiced understanding. Care tem to follow. Charisse Fregoso RN/TCC    [phone] Message left for patient milton Heard @ [phone]. Requested a return call to discuss SNF facility choices. Charisse Fregoso RN/TCC    -[phone] Care team  received a call from patient milton Heard with SNF choices and requested DSC team build and send referral via [facility]. Charisse Fregoso RN/TCC

## 2024-12-05 NOTE — PROGRESS NOTES
Physical Therapy    Physical Therapy Evaluation    Patient Name: Nancie Du  MRN: [id number]  Today's Date: 12/5/2024   Time Calculation  Start Time: 1157  Stop Time: 1221  Time Calculation (min): 24 min    Assessment/Plan   Skilled PT intervention is indicated due to patient presents with deficits in bed mobility, transfers, gait, stair negotiation, strength, activity tolerance, and safety awareness.   Patient globally weak and deconditioned.  She has poor safety awareness and is at high risk for falling  Recommend continued physical therapy intervention at a  moderate intensity to improve strength, balance, mobility and reduce fall risk.  Continue ambulation with FWW.    PT Assessment  PT Assessment Results: Decreased strength, Decreased endurance, Impaired balance, Decreased mobility, Decreased safety awareness, Impaired judgement, Impaired hearing, Decreased skin integrity, Obesity, Pain  Rehab Prognosis: Good  Barriers to Discharge: patient needing assist for mobility; poor safety awareness  Evaluation/Treatment Tolerance: Patient limited by pain (knees)  Medical Staff Made Aware: Yes  End of Session Communication: Bedside nurse  End of Session Patient Position: Alarm on, Up in chair (call light in reach, lunch tray set up, nurse present)  IP OR SWING BED PT PLAN  Inpatient or Swing Bed: Inpatient  PT Plan  Treatment/Interventions: Bed mobility, Transfer training, Gait training, Balance training, Strengthening, Endurance training, Therapeutic exercise, Therapeutic activity, Home exercise program  PT Plan: Ongoing PT  PT Frequency: 4 times per week  PT Discharge Recommendations: Moderate intensity level of continued care  PT Recommended Transfer Status: Assist x1  PT - OK to Discharge: Yes (once medically appropriate and safety DC plan in place)    Subjective     Current Problem:  Patient Active Problem List   Diagnosis    History of breast cancer    Type 2 diabetes mellitus without complication, without  long-term current use of insulin (Multi)    Dyslipidemia    Vaginal prolapse    Vaginal lesion    Urinary incontinence    Seasonal allergies    Pharyngeal disorder    Primary hypertension    Heartburn    Hearing loss    Glaucoma    Unsteady gait    Closed fracture of occipital bone (Multi)    Multiple joint pain    Vitamin D deficiency    Current mild episode of major depressive disorder without prior episode (CMS-HCC)    Lightheadedness    Bilateral carotid artery stenosis    Hypothermia associated with environmental change    Hypothermia, initial encounter       General Visit Information:  General  Reason for Referral: impaired mobility; 84 year old female admitted sp mechanical fall and found down in garage- hypothermia, rhabdomyolosis.   all imaging negative for fractures: B hips, B knees, R elbow, CT cspine.. abrasions B knees  Referred By: Martin  Past Medical History Relevant to Rehab: Diabetes mellitus (Multi)      Encounter for full-term uncomplicated delivery      Normal vaginal delivery   Other conditions influencing health status      Menstruation  Family/Caregiver Present: No  Patient Position Received: Up in chair, Alarm on  General Comment: patient asleep in chair but easily awakens, agreeable to assessment.    Home Living:  Home Living  Type of Home: House  Lives With: Alone  Home Adaptive Equipment: Walker rolling or standard, Cane  Home Layout: One level, Laundry main level  Home Access: Stairs to enter with rails  Entrance Stairs-Number of Steps: 2    Prior Level of Function:  Prior Function Per Pt/Caregiver Report  ADL Assistance: Independent  Homemaking Assistance:  (family gets grocery, has cleaning lady)  Ambulatory Assistance: Independent (mod indep with FWW)    Precautions:  Precautions  Medical Precautions: Fall precautions (significant wounds B knees, [place])    Vital Signs:  Vital Signs  Vital Signs Comment: no concerns on telemetry  Objective     Pain:  Pain Assessment  Pain Assessment:  0-10  0-10 (Numeric) Pain Score: 5 - Moderate pain  Pain Type: Acute pain  Pain Location: Knee  Pain Orientation: Right, Left  Pain Interventions: Medication (See MAR)    Cognition:  Cognition  Orientation Level: Oriented X4  Safety/Judgement: Exceptions to WFL  Insight: Moderate  Impulsive: Moderately    General Assessments:  General Observation  General Observation: very [place],cooperative   Activity Tolerance  Endurance: Decreased tolerance for upright activites (due to pain in knees)     Strength  Strength Comments: LEs grossly >/=3+/5     Coordination  Movements are Fluid and Coordinated: Yes     Static Sitting Balance  Static Sitting-Balance Support: Feet supported, Bilateral upper extremity supported  Static Sitting-Level of Assistance: Independent  Dynamic Sitting Balance  Dynamic Sitting-Balance Support: Feet supported  Dynamic Sitting-Level of Assistance: Independent  Static Standing Balance  Static Standing-Balance Support: Bilateral upper extremity supported  Static Standing-Level of Assistance: Contact guard  Static Standing-Comment/Number of Minutes: FWW, gait belt  Dynamic Standing Balance  Dynamic Standing-Balance Support: Bilateral upper extremity supported  Dynamic Standing-Level of Assistance: Contact guard  Dynamic Standing-Balance: Turning  Dynamic Standing-Comments: FWW, gait belt    Functional Assessments:     Bed Mobility  Bed Mobility: No  Transfers  Transfer: Yes  Transfer 1  Technique 1: Sit to stand, Stand to sit  Transfer Device 1: Gait belt, Walker  Transfer Level of Assistance 1: Contact guard, Minimum assistance, Moderate verbal cues  Trials/Comments 1: cues for hand placement and safety wit h FWW; scooting back in chair with verbal cues  Transfers 2  Technique 2: Stand pivot  Transfer Device 2: Gait belt (FWW)  Transfer Level of Assistance 2: Contact guard, Minimum assistance, Moderate verbal cues  Trials/Comments 2: recliner to/from [facility]; cues for safety, hand placement, use of device;  total assist for wiping after urinating; tryimng to sit down before transferring fully and safely  Ambulation/Gait Training  Ambulation/Gait Training Performed: Yes  Ambulation/Gait Training 1  Surface 1: Level tile  Device 1: Rolling walker  Gait Support Devices: Gait belt  Assistance 1: Contact guard, Moderate verbal cues  Quality of Gait 1: Diminished heel strike, Decreased step length, Shuffling gait  Comments/Distance (ft) 1: 2' forward and c/o knee pain and wanting to sit back down;  PT informed her she would have to step back to chair and then many cues for safety with transfers/hand placement          Outcome Measures:  [facility] Basic Mobility  Turning from your back to your side while in a flat bed without using bedrails: A lot  Moving from lying on your back to sitting on the side of a flat bed without using bedrails: A lot  Moving to and from bed to chair (including a wheelchair): A little  Standing up from a chair using your arms (e.g. wheelchair or bedside chair): A little  To walk in hospital room: A little  Climbing 3-5 steps with railing: Total  Basic Mobility - Total Score: 14                Goals:  Encounter Problems       Encounter Problems (Active)       PT Problem       PT Goal 1       Start:  12/05/24    Expected End:  12/19/24       Nancie Du will perform bed mobility for supine to and from sitting EOB without use of rail with Mumtaz           PT Goal 2       Start:  12/05/24    Expected End:  12/19/24       Nancie Du will transfer sit to and from stand using least restrictive assistive device SBA            PT Goal 3       Start:  12/05/24    Expected End:  12/19/24       Nancie Du will demonstrate good safety awareness with transfers and mobility and with use of assistive device (proper hand placement).            PT Goal 4       Start:  12/05/24    Expected End:  12/19/24       Nancie Du will ambulate 50 ft with assistive device , level surface, good balance, steady CGA               Pain - Adult            Education Documentation  Mobility Training, taught by Bhumika Salazar PT at 12/5/2024 12:44 PM.  Learner: Patient  Readiness: Acceptance  Method: Explanation, Demonstration  Response: Verbalizes Understanding, Demonstrated Understanding, Needs Reinforcement  Comment: safety with transfers and use of device; safety with mobility    Education Comments  No comments found.

## 2024-12-05 NOTE — PROGRESS NOTES
"Subjective   Patient states to be feeling well and did not have concerns. She does admit to having a hoarse voice today.    Overnight Events: None  Objective   Vital Signs:  Blood pressure 130/56, pulse 72, temperature 37.1 °C (98.8 °F), temperature source Temporal, resp. rate 21, height 1.619 m (5' 3.74\"), weight 70.9 kg (156 lb 4.9 oz), SpO2 98%.    Physical Exam  Vitals and nursing note reviewed.   Constitutional:       General: She is not in acute distress.  HENT:      Mouth/Throat:      Mouth: Mucous membranes are moist.   Eyes:      Extraocular Movements: Extraocular movements intact.   Cardiovascular:      Rate and Rhythm: Normal rate and regular rhythm.      Pulses: Normal pulses.      Heart sounds: Normal heart sounds.   Pulmonary:      Effort: Pulmonary effort is normal.      Breath sounds: Normal breath sounds.   Abdominal:      General: Abdomen is flat. Bowel sounds are normal.      Palpations: Abdomen is soft.   Musculoskeletal:         General: Tenderness and signs of injury present.      Right lower leg: Edema present.      Left lower leg: Edema present.   Skin:     General: Skin is warm.      Findings: Bruising and erythema present.   Neurological:      General: No focal deficit present.      Mental Status: She is alert and oriented to person, place, and time.      Motor: Weakness present.   Psychiatric:         Mood and Affect: Mood normal.         Wt Readings from Last 6 Encounters:   12/04/24 70.9 kg (156 lb 4.9 oz)   11/04/24 73.6 kg (162 lb 3.2 oz)   07/12/24 73.9 kg (163 lb)   04/10/24 78.4 kg (172 lb 12.8 oz)   02/29/24 81.2 kg (179 lb 0.2 oz)   02/28/24 81.2 kg (179 lb)       I/Os    Intake/Output Summary (Last 24 hours) at 12/5/2024 1726  Last data filed at 12/5/2024 1000  Gross per 24 hour   Intake 1842.08 ml   Output 700 ml   Net 1142.08 ml       Labs:   Results for orders placed or performed during the hospital encounter of 12/04/24 (from the past 24 hours)   Blood Culture    Specimen: " Peripheral Venipuncture; Blood culture   Result Value Ref Range    Blood Culture Loaded on Instrument - Culture in progress    Lactate   Result Value Ref Range    Lactate 4.6 (HH) 0.4 - 2.0 mmol/L   Troponin I, High Sensitivity   Result Value Ref Range    Troponin I, High Sensitivity 131 (HH) 0 - 13 ng/L   Blood Culture    Specimen: Peripheral Venipuncture; Blood culture   Result Value Ref Range    Blood Culture Loaded on Instrument - Culture in progress    POCT GLUCOSE   Result Value Ref Range    POCT Glucose 158 (H) 74 - 99 mg/dL   Troponin I, High Sensitivity   Result Value Ref Range    Troponin I, High Sensitivity 156 (HH) 0 - 13 ng/L   Lactate   Result Value Ref Range    Lactate 1.7 0.4 - 2.0 mmol/L   POCT GLUCOSE   Result Value Ref Range    POCT Glucose 222 (H) 74 - 99 mg/dL   Urinalysis with Reflex Culture and Microscopic   Result Value Ref Range    Color, Urine Yellow Light-Yellow, Yellow, Dark-Yellow    Appearance, Urine Clear Clear    Specific Gravity, Urine 1.023 1.005 - 1.035    pH, Urine 6.5 5.0, 5.5, 6.0, 6.5, 7.0, 7.5, 8.0    Protein, Urine 100 (2+) (A) NEGATIVE, 10 (TRACE), 20 (TRACE) mg/dL    Glucose, Urine 200 (2+) (A) Normal mg/dL    Blood, Urine 0.1 (1+) (A) NEGATIVE    Ketones, Urine 20 (1+) (A) NEGATIVE mg/dL    Bilirubin, Urine NEGATIVE NEGATIVE    Urobilinogen, Urine Normal Normal mg/dL    Nitrite, Urine NEGATIVE NEGATIVE    Leukocyte Esterase, Urine 75 Baudilio/µL (A) NEGATIVE   Microscopic Only, Urine   Result Value Ref Range    WBC, Urine 21-50 (A) 1-5, NONE /HPF    RBC, Urine 1-2 NONE, 1-2, 3-5 /HPF    Squamous Epithelial Cells, Urine 1-9 (SPARSE) Reference range not established. /HPF    Transitional Epithelial Cells, Urine 1-2 (FEW) Reference range not established. /HPF    Bacteria, Urine 1+ (A) NONE SEEN /HPF    Mucus, Urine FEW Reference range not established. /LPF    Hyaline Casts, Urine 1+ (A) NONE /LPF   POCT GLUCOSE   Result Value Ref Range    POCT Glucose 314 (H) 74 - 99 mg/dL   POCT  GLUCOSE   Result Value Ref Range    POCT Glucose 156 (H) 74 - 99 mg/dL       Imaging:  ECG 12 lead    Result Date: 12/5/2024  Sinus tachycardia with 1st degree AV block with Premature supraventricular complexes Possible Inferior infarct , age undetermined ST & T wave abnormality, consider lateral ischemia Prolonged QT Abnormal ECG When compared with ECG of 29-FEB-2024 07:58, Premature supraventricular complexes are now Present Questionable change in QRS duration Borderline criteria for Inferior infarct are now Present    CT cervical spine wo IV contrast    Result Date: 12/4/2024  Interpreted By:  Farhat Weinstein, STUDY: CT CERVICAL SPINE WO IV CONTRAST;  12/4/2024 5:02 pm   INDICATION: Signs/Symptoms:fall found down.     COMPARISON: CT cervical spine 02/29/2024   ACCESSION NUMBER(S): [id number]   ORDERING CLINICIAN: ROOSEVELT CHAMPION   TECHNIQUE: Axial CT images of the cervical spine are obtained. Axial, coronal and sagittal reconstructions are provided for review.   FINDINGS:     Fractures: There is no evidence for an acute fracture of the cervical spine.   Vertebral Alignment: No posttraumatic malalignment.   Craniocervical Junction: The odontoid process and craniocervical junction are intact.   Vertebrae/Disc Spaces:  Multilevel disc space narrowing. Multilevel facet arthropathy Multilevel uncovertebral hypertrophy.Multilevel osteophyte formation.   Prevertebral/Paraspinal Soft Tissues: The prevertebral and paraspinal soft tissues are unremarkable.         No evidence for an acute fracture or subluxation of the cervical spine.   MACRO: None   Signed by: Farhat Weinstein 12/4/2024 5:18 PM Dictation workstation:   [id number]    CT head wo IV contrast    Result Date: 12/4/2024  Interpreted By:  Farhat Weinstein, STUDY: CT HEAD WO IV CONTRAST;  12/4/2024 5:02 pm   INDICATION: Signs/Symptoms:fall. found down.   COMPARISON: Head CT 03/01/2021   ACCESSION NUMBER(S): [id number]   ORDERING CLINICIAN: ROOSEVELT CHAMPION    TECHNIQUE: Noncontrast axial CT scan of head was performed. Angled reformats in brain and bone windows were generated. The images were reviewed in bone, brain, blood and soft tissue windows.   FINDINGS: CSF Spaces: The ventricles, sulci and basal cisterns are within normal limits. There is no extraaxial fluid collection.   Parenchyma:  The grey-white differentiation is intact. There is no mass effect or midline shift.  There is no intracranial hemorrhage.   Calvarium: No acute displaced calvarial fracture. Hyperostosis frontalis interna.   Paranasal sinuses and mastoids: Minimal opacification of the posterior right ethmoid sinus. Mastoid air cells appear clear.       No CT evidence of acute intracranial injury.       MACRO: None     Signed by: Farhat Weinstein 12/4/2024 5:16 PM Dictation workstation:   [id number]    XR knee 4+ views bilateral    Result Date: 12/4/2024  Interpreted By:  Lucien Mijares, STUDY: XR KNEE 4+ VIEWS BILATERAL;  12/4/2024 4:40 pm   INDICATION: Signs/Symptoms:fall with pain.     COMPARISON: None.   ACCESSION NUMBER(S): [id number]   ORDERING CLINICIAN: ROOSEVELT CHAMPION   TECHNIQUE: 4 views each of each knee were obtained..   FINDINGS: On the right, there is sharpening of the medial tibial spine. There is a moderate-sized patellar osteophyte at the insertion of the quadriceps tendon. Mild patellofemoral spur formation. No gross effusion.  No lytic or blastic destructive bone lesion.  No acute fracture or dislocation..     On the left, there is sharpening of the medial tibial spine. There is patellofemoral joint space loss with osteophyte formation. No gross effusion. Small patellar osteophyte at the insertion of the quadriceps tendon.  No lytic or blastic destructive bone lesion.  No acute fracture or dislocation..       Mild arthritic changes in both knees as described.   No acute fracture or dislocation on either side.   MACRO: None   Signed by: Lucien Mijares 12/4/2024 5:03 PM Dictation  workstation:   [id number]    XR elbow right 3+ views    Result Date: 12/4/2024  Interpreted By:  Lucien Mijares, STUDY: XR ELBOW RIGHT 3+ VIEWS;  12/4/2024 4:40 pm   INDICATION: Signs/Symptoms:fall with pain.   COMPARISON: None.   ACCESSION NUMBER(S): [id number]   ORDERING CLINICIAN: ROOSEVELT CHAMPION   TECHNIQUE: 5 views  of the  right elbow were obtained.   FINDINGS: Small distal medial and  lateral humeral epicondylar osteophytes. There is a tendon calcification versus calcified loose body adjacent to the anterolateral aspect of the distal humerus. There is an incidental antecubital fossa peripheral venous cannula in place. No gross effusion. No lytic or blastic destructive bone lesion. No acute fracture or dislocation. No opaque soft tissue foreign body. No periosteal reaction or erosion.       No acute fracture or dislocation or gross effusion.   Mild arthritic changes as described.   Tendon calcification versus calcified loose body adjacent to the anterolateral aspect of the distal humerus.   MACRO: None   Signed by: Lucien Mijares 12/4/2024 5:01 PM Dictation workstation:   [id number]    XR tibia fibula right 2 views    Result Date: 12/4/2024  Interpreted By:  Lucien Mijares, STUDY: XR TIBIA FIBULA RIGHT 2 VIEWS;  12/4/2024 4:40 pm   INDICATION: Signs/Symptoms:fall with pain.   COMPARISON: None.   ACCESSION NUMBER(S): [id number]   ORDERING CLINICIAN: ROOSEVELT CHAMPION   TECHNIQUE: AP and lateral views  of the  right lower leg were obtained.   FINDINGS: Sharpening of the medial tibial spine. Mild patellofemoral joint space narrowing and spur formation. No gross suprapatellar effusion. Moderate-sized patellar osteophyte at the insertion of the quadriceps tendon.  Ankle mortise and talar dome are intact. There are small vessel arterial calcifications in the ankle and foot. No lytic or blastic destructive bone lesion. No acute fracture or dislocation. No opaque soft tissue foreign body. No periosteal reaction  or erosion.       Right knee DJD as described.   Small vessel arterial calcifications in the ankle.   No acute fracture or dislocation.   MACRO: None   Signed by: Lucien Mijares 12/4/2024 4:59 PM Dictation workstation:   [id number]    XR hips bilateral 3 or 4 VW w pelvis when performed    Result Date: 12/4/2024  Interpreted By:  Lucien Mijares, STUDY: XR HIPS BILATERAL 3 OR 4 VW WITH PELVIS WHEN PERFORMED;  12/4/2024 4:40 pm   INDICATION: Signs/Symptoms:fall with pain.   COMPARISON: None.   ACCESSION NUMBER(S): [id number]   ORDERING CLINICIAN: ROOSEVELT CHAMPION   TECHNIQUE: AP view pelvis and two views each of each hip were obtained.   FINDINGS: Moderate stool in the imaged lower colon and rectum. Distal lumbar spine disc space narrowing with endplate osteophytosis. Bilateral hip joint spaces are preserved. Very mild bilateral acetabular roof spur formation. Very mild sclerotic arthritic changes in both SI joints. Mild bilateral greater trochanteric spur formation. No lytic or blastic destructive bone lesion. No acute fracture or dislocation. No opaque soft tissue foreign body. No periosteal reaction or erosion.       DJD as described.   No acute fracture or dislocation evident in this exam.   MACRO: None   Signed by: Lucien Mijares 12/4/2024 4:58 PM Dictation workstation:   [id number]    XR chest 1 view    Result Date: 12/4/2024  Interpreted By:  Lucien Mijares, STUDY: XR CHEST 1 VIEW;  12/4/2024 4:40 pm   INDICATION: Signs/Symptoms:fall. weakness.   COMPARISON: Chest x-ray from 02/29/2024. CT scan chest from 02/29/2024.   ACCESSION NUMBER(S): [id number]   ORDERING CLINICIAN: ROOSEVELT CHAMPION   TECHNIQUE: Single AP portable view of the chest was obtained.   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 Mild cardiomegaly, currently without vascular congestion, or pleural effusion. No abnormal opacity in either lung worrisome for tumor or pneumonia.  No pneumothorax. No tracheal deviation. No abnormal hilar fullness or gross mass on either side.   BONES: No lytic or blastic destructive bone lesion.  There is moderate disc space narrowing and endplate osteophytosis throughout the thoracic spine.   UPPER ABDOMEN: Grossly intact.       Mild cardiomegaly.  Currently without radiographic evidence of CHF or pneumonia.   MACRO: None   Signed by: Lucien Mijares 12/4/2024 4:57 PM Dictation workstation:   [id number]     Medications:    Current Facility-Administered Medications:     acetaminophen (Tylenol) tablet 650 mg, 650 mg, oral, q4h PRN, 650 mg at 12/05/24 1219 **OR** acetaminophen (Tylenol) oral liquid 650 mg, 650 mg, nasogastric tube, q4h PRN **OR** acetaminophen (Tylenol) suppository 650 mg, 650 mg, rectal, q4h PRN, Carolina Salazar MD    brimonidine (AlphaGAN) 0.2 % ophthalmic solution 1 drop, 1 drop, Both Eyes, BID, 1 drop at 12/05/24 0923 **AND** timolol (Timoptic) 0.5 % ophthalmic solution 1 drop, 1 drop, Both Eyes, BID, Carolina Salazar MD, 1 drop at 12/05/24 0923    celecoxib (CeleBREX) capsule 100 mg, 100 mg, oral, Daily, Carolina Salazar MD, 100 mg at 12/05/24 0923    cholecalciferol (Vitamin D-3) tablet 1,000 Units, 1,000 Units, oral, Daily, Carolina Salazar MD, 1,000 Units at 12/05/24 0923    dextrose 50 % injection 12.5 g, 12.5 g, intravenous, q15 min PRN, Carolina Salazar MD    dextrose 50 % injection 25 g, 25 g, intravenous, q15 min PRN, Carolina Salazar MD    diclofenac sodium (Voltaren) 1 % gel 4 g, 4 g, Topical, BID, Carolina Salazar MD, 4 g at 12/05/24 0923    enoxaparin (Lovenox) syringe 40 mg, 40 mg, subcutaneous, q24h, Carolina Salazar MD, 40 mg at 12/04/24 2111    ezetimibe (Zetia) tablet 10 mg, 10 mg, oral, Daily, Carolina Salazar MD    famotidine (Pepcid) tablet 20 mg, 20 mg, oral, Daily PRN, Carolina Salazar MD    glucagon (Glucagen) injection 1 mg, 1 mg, intramuscular, q15 min PRN, Carolina Salazar MD    glucagon  (Glucagen) injection 1 mg, 1 mg, intramuscular, q15 min PRN, Carolina Salazar MD    insulin lispro injection 0-10 Units, 0-10 Units, subcutaneous, TID AC, Carolina Salazar MD, 8 Units at 12/05/24 1219    losartan (Cozaar) tablet 25 mg, 25 mg, oral, Daily, Carolina Salazar MD, 25 mg at 12/05/24 0923    sertraline (Zoloft) tablet 50 mg, 50 mg, oral, Daily, Carolina Salazar MD, 50 mg at 12/05/24 0923    sodium chloride 0.9% infusion, 125 mL/hr, intravenous, Continuous, Carolina Salazar MD, Last Rate: 125 mL/hr at 12/05/24 0527, 125 mL/hr at 12/05/24 0527    Assessment & Plan    Nancie Du is a 84 y.o. female admitted to [facility] for management of:    S/p fall  Rhabdo  Hypothermia 2/2 cold exposure: resolved  Dehydration  -IV fluids  -warming blanket  -PT/OT following  -tylenol for pain  -trend trops until down trending  - repeat CK and CMP this evening    Slurred speech: Hoarse voice and slurred speech per family members. I did not appreciate the slurred speech during my evaluation. She is AA&Ox3 and otherwise does not have any a[aren't neurological deficits. I will repeat a CT head this evening.     DM2  HTN  HLD  -hold home metformin  -SSI for now  -continue home meds as able    Disposition: Not medically ready for discharge. Will need repeat CT head and placement.    Moderate level of MDM    Ramsey Longoria, DO  Internal Medicine

## 2024-12-05 NOTE — H&P
History Of Present Illness  Nancie Du is a 84 y.o. female presenting with being found down at home. Patient is stating that she was on the ground in her garage throughout the night. Was hypothermic upon arrival at 92 °F. Following Flavia hugger patient's temperature is improved to approximately 97. CT brain and all imaging of the lower extremities showed no acute traumatic injury. Cervical spine also negative.   Sig labs: CK elevated. Creatinine normal. High-sensitivity troponin of 127 as well as 131. Likely secondary to being down for extended period. Lactate elevated above 4.   Patient states fall was mechanical. Per patient children at bedside, she has a lot of difficulty walking at baseline.  Chief Complaint   Patient presents with    Fall     Pt to ED with family. Pt fell at some point possibly last night, made her way into the garage. Family found her cane outside and patient in the garage. Pt remembers going to put her trash out using her cane, does not remember time wise when this happened. Pt has large wounds to bilateral knees and bruising to right anterior foot.           HPI obtained from pt. Additional hx obtained from son and daughter at bedside acting as independent historians.   Notes from ED physician and nurse reviewed. Case discussed with attending ED physician.     Past Medical History  Past Medical History:   Diagnosis Date    Diabetes mellitus (Multi)     Encounter for full-term uncomplicated delivery     Normal vaginal delivery    Other conditions influencing health status     Menstruation        Surgical History  Past Surgical History:   Procedure Laterality Date    OTHER SURGICAL HISTORY  06/15/2021    Ankle surgery    OTHER SURGICAL HISTORY  06/15/2021    Left mastectomy    OTHER SURGICAL HISTORY  09/17/2021    Hysteroscopy      Recent Surgeries in Hospitalist            No cases to display           Past Surgical History:   Procedure Laterality Date    OTHER SURGICAL HISTORY  06/15/2021     Ankle surgery    OTHER SURGICAL HISTORY  06/15/2021    Left mastectomy    OTHER SURGICAL HISTORY  09/17/2021    Hysteroscopy         Social History  Social History     Substance and Sexual Activity   Alcohol Use Not Currently      Social History     Substance and Sexual Activity   Drug Use Never      Social History     Substance and Sexual Activity   Sexual Activity Not on file      Social History     Tobacco Use   Smoking Status Never   Smokeless Tobacco Never      Food Insecurity: No Food Insecurity (10/27/2021)    [facility]    Hunger Vital Sign     Worried About Running Out of Food in the Last Year: Never true     Ran Out of Food in the Last Year: Never true      Financial Resource Strain: Low Risk  (3/1/2024)    Overall Financial Resource Strain (CARDIA)     Difficulty of Paying Living Expenses: Not hard at all      Intimate Partner Violence: Not on file      Transportation Needs: No Transportation Needs (3/1/2024)    PRAPARE - Transportation     Lack of Transportation (Medical): No     Lack of Transportation (Non-Medical): No        Family History  Family History   Problem Relation Name Age of Onset    Heart disease Mother      Hypertension Mother      Hypertension Father           Allergies  Allergies   Allergen Reactions    Adhesive Tape-Silicones Unknown and Hives     Steri strips    Fosamax [Alendronate] Unknown    Statins-Hmg-Coa Reductase Inhibitors Unknown     Muscle weakness and pain      Allergies   Allergen Reactions    Adhesive Tape-Silicones Unknown and Hives     Steri strips    Fosamax [Alendronate] Unknown    Statins-Hmg-Coa Reductase Inhibitors Unknown     Muscle weakness and pain        Review of Systems  Review of Systems   Limited ability to participate    Physical Exam   Physical Exam  Constitutional:       General: She is not in acute distress.     Appearance: She is ill-appearing.   HENT:       Head: Normocephalic and atraumatic.      Right Ear: Ear canal and external ear normal.      Left Ear: Ear canal and external ear normal.      Nose: Nose normal.      Mouth/Throat:      Mouth: Mucous membranes are moist.      Pharynx: Oropharynx is clear.   Eyes:      General:         Right eye: No discharge.         Left eye: No discharge.      Extraocular Movements: Extraocular movements intact.      Conjunctiva/sclera: Conjunctivae normal.   Cardiovascular:      Rate and Rhythm: Normal rate and regular rhythm.      Pulses: Normal pulses.   Pulmonary:      Effort: Pulmonary effort is normal.      Breath sounds: Normal breath sounds.   Abdominal:      General: Abdomen is flat. Bowel sounds are normal. There is no distension.      Tenderness: There is no abdominal tenderness.   Musculoskeletal:         General: Swelling and tenderness present.      Cervical back: Normal range of motion. No rigidity.      Right lower leg: Edema present.      Left lower leg: Edema present.      Comments: Knees w significant swelling and ecchymosis   Skin:     General: Skin is warm and dry.      Capillary Refill: Capillary refill takes less than 2 seconds.   Neurological:      General: No focal deficit present.      Mental Status: She is alert and oriented to person, place, and time.   Psychiatric:         Mood and Affect: Mood normal.         Behavior: Behavior normal.          Last Recorded Vitals  Visit Vitals  /87   Pulse 93   Temp 36.5 °C (97.7 °F) (Oral)   Resp 18      Visit Vitals  /87   Pulse 93   Temp 36.5 °C (97.7 °F) (Oral)   Resp 18   Wt 73.5 kg (162 lb)   SpO2 94%   BMI 28.70 kg/m²   Smoking Status Never   BSA 1.81 m²        Relevant Results      Results for orders placed or performed during the hospital encounter of 12/04/24 (from the past 24 hours)   CBC and Auto Differential   Result Value Ref Range    WBC 21.1 (H) 4.4 - 11.3 x10*3/uL    nRBC 0.0 0.0 - 0.0 /100 WBCs    RBC 4.72 4.00 - 5.20 x10*6/uL     Hemoglobin 14.2 12.0 - 16.0 g/dL    Hematocrit 42.1 36.0 - 46.0 %    MCV 89 80 - 100 fL    MCH 30.1 26.0 - 34.0 pg    MCHC 33.7 32.0 - 36.0 g/dL    RDW 12.0 11.5 - 14.5 %    Platelets 177 150 - 450 x10*3/uL    Neutrophils % 84.4 40.0 - 80.0 %    Immature Granulocytes %, Automated 0.3 0.0 - 0.9 %    Lymphocytes % 5.1 13.0 - 44.0 %    Monocytes % 10.0 2.0 - 10.0 %    Eosinophils % 0.0 0.0 - 6.0 %    Basophils % 0.2 0.0 - 2.0 %    Neutrophils Absolute 17.78 (H) 1.60 - 5.50 x10*3/uL    Immature Granulocytes Absolute, Automated 0.07 0.00 - 0.50 x10*3/uL    Lymphocytes Absolute 1.07 0.80 - 3.00 x10*3/uL    Monocytes Absolute 2.11 (H) 0.05 - 0.80 x10*3/uL    Eosinophils Absolute 0.00 0.00 - 0.40 x10*3/uL    Basophils Absolute 0.04 0.00 - 0.10 x10*3/uL   Comprehensive metabolic panel   Result Value Ref Range    Glucose 218 (H) 74 - 99 mg/dL    Sodium 133 (L) 136 - 145 mmol/L    Potassium 4.3 3.5 - 5.3 mmol/L    Chloride 95 (L) 98 - 107 mmol/L    Bicarbonate 23 21 - 32 mmol/L    Anion Gap 19 10 - 20 mmol/L    Urea Nitrogen 31 (H) 6 - 23 mg/dL    Creatinine 0.76 0.50 - 1.05 mg/dL    eGFR 77 >60 mL/min/1.73m*2    Calcium 9.1 8.6 - 10.3 mg/dL    Albumin 4.0 3.4 - 5.0 g/dL    Alkaline Phosphatase 47 33 - 136 U/L    Total Protein 6.5 6.4 - 8.2 g/dL    AST 59 (H) 9 - 39 U/L    Bilirubin, Total 1.4 (H) 0.0 - 1.2 mg/dL    ALT 25 7 - 45 U/L   Magnesium   Result Value Ref Range    Magnesium 1.79 1.60 - 2.40 mg/dL   Troponin I, High Sensitivity   Result Value Ref Range    Troponin I, High Sensitivity 127 (HH) 0 - 13 ng/L   Creatine Kinase   Result Value Ref Range    Creatine Kinase 3,344 (H) 0 - 215 U/L   Lactate   Result Value Ref Range    Lactate 4.6 (HH) 0.4 - 2.0 mmol/L   Troponin I, High Sensitivity   Result Value Ref Range    Troponin I, High Sensitivity 131 (HH) 0 - 13 ng/L      CT cervical spine wo IV contrast    Result Date: 12/4/2024  Interpreted By:  Farhat Weinstein, STUDY: CT CERVICAL SPINE WO IV CONTRAST;  12/4/2024 5:02 pm    INDICATION: Signs/Symptoms:fall found down.     COMPARISON: CT cervical spine 02/29/2024   ACCESSION NUMBER(S): [id number]   ORDERING CLINICIAN: ROOSEVELT CHAMPION   TECHNIQUE: Axial CT images of the cervical spine are obtained. Axial, coronal and sagittal reconstructions are provided for review.   FINDINGS:     Fractures: There is no evidence for an acute fracture of the cervical spine.   Vertebral Alignment: No posttraumatic malalignment.   Craniocervical Junction: The odontoid process and craniocervical junction are intact.   Vertebrae/Disc Spaces:  Multilevel disc space narrowing. Multilevel facet arthropathy Multilevel uncovertebral hypertrophy.Multilevel osteophyte formation.   Prevertebral/Paraspinal Soft Tissues: The prevertebral and paraspinal soft tissues are unremarkable.         No evidence for an acute fracture or subluxation of the cervical spine.   MACRO: None   Signed by: Farhat Weinstein 12/4/2024 5:18 PM Dictation workstation:   [id number]    CT head wo IV contrast    Result Date: 12/4/2024  Interpreted By:  Farhat Weinstein, STUDY: CT HEAD WO IV CONTRAST;  12/4/2024 5:02 pm   INDICATION: Signs/Symptoms:fall. found down.   COMPARISON: Head CT 03/01/2021   ACCESSION NUMBER(S): [id number]   ORDERING CLINICIAN: ROOSEVELT CHAMPION   TECHNIQUE: Noncontrast axial CT scan of head was performed. Angled reformats in brain and bone windows were generated. The images were reviewed in bone, brain, blood and soft tissue windows.   FINDINGS: CSF Spaces: The ventricles, sulci and basal cisterns are within normal limits. There is no extraaxial fluid collection.   Parenchyma:  The grey-white differentiation is intact. There is no mass effect or midline shift.  There is no intracranial hemorrhage.   Calvarium: No acute displaced calvarial fracture. Hyperostosis frontalis interna.   Paranasal sinuses and mastoids: Minimal opacification of the posterior right ethmoid sinus. Mastoid air cells appear clear.       No CT  evidence of acute intracranial injury.       MACRO: None     Signed by: Farhat Weinstein 12/4/2024 5:16 PM Dictation workstation:   [id number]    XR knee 4+ views bilateral    Result Date: 12/4/2024  Interpreted By:  Lucien Mijares, STUDY: XR KNEE 4+ VIEWS BILATERAL;  12/4/2024 4:40 pm   INDICATION: Signs/Symptoms:fall with pain.     COMPARISON: None.   ACCESSION NUMBER(S): [id number]   ORDERING CLINICIAN: ROOSEVELT CHAMPION   TECHNIQUE: 4 views each of each knee were obtained..   FINDINGS: On the right, there is sharpening of the medial tibial spine. There is a moderate-sized patellar osteophyte at the insertion of the quadriceps tendon. Mild patellofemoral spur formation. No gross effusion.  No lytic or blastic destructive bone lesion.  No acute fracture or dislocation..     On the left, there is sharpening of the medial tibial spine. There is patellofemoral joint space loss with osteophyte formation. No gross effusion. Small patellar osteophyte at the insertion of the quadriceps tendon.  No lytic or blastic destructive bone lesion.  No acute fracture or dislocation..       Mild arthritic changes in both knees as described.   No acute fracture or dislocation on either side.   MACRO: None   Signed by: Lucien Mijares 12/4/2024 5:03 PM Dictation workstation:   [id number]    XR elbow right 3+ views    Result Date: 12/4/2024  Interpreted By:  Lucien Mijares, STUDY: XR ELBOW RIGHT 3+ VIEWS;  12/4/2024 4:40 pm   INDICATION: Signs/Symptoms:fall with pain.   COMPARISON: None.   ACCESSION NUMBER(S): [id number]   ORDERING CLINICIAN: ROOSEVELT CHAMPION   TECHNIQUE: 5 views  of the  right elbow were obtained.   FINDINGS: Small distal medial and  lateral humeral epicondylar osteophytes. There is a tendon calcification versus calcified loose body adjacent to the anterolateral aspect of the distal humerus. There is an incidental antecubital fossa peripheral venous cannula in place. No gross effusion. No lytic or blastic  destructive bone lesion. No acute fracture or dislocation. No opaque soft tissue foreign body. No periosteal reaction or erosion.       No acute fracture or dislocation or gross effusion.   Mild arthritic changes as described.   Tendon calcification versus calcified loose body adjacent to the anterolateral aspect of the distal humerus.   MACRO: None   Signed by: Lucien Mijares 12/4/2024 5:01 PM Dictation workstation:   [id number]    XR tibia fibula right 2 views    Result Date: 12/4/2024  Interpreted By:  Lucien Mijares, STUDY: XR TIBIA FIBULA RIGHT 2 VIEWS;  12/4/2024 4:40 pm   INDICATION: Signs/Symptoms:fall with pain.   COMPARISON: None.   ACCESSION NUMBER(S): [id number]   ORDERING CLINICIAN: ROOSEVELT CHAMPION   TECHNIQUE: AP and lateral views  of the  right lower leg were obtained.   FINDINGS: Sharpening of the medial tibial spine. Mild patellofemoral joint space narrowing and spur formation. No gross suprapatellar effusion. Moderate-sized patellar osteophyte at the insertion of the quadriceps tendon.  Ankle mortise and talar dome are intact. There are small vessel arterial calcifications in the ankle and foot. No lytic or blastic destructive bone lesion. No acute fracture or dislocation. No opaque soft tissue foreign body. No periosteal reaction or erosion.       Right knee DJD as described.   Small vessel arterial calcifications in the ankle.   No acute fracture or dislocation.   MACRO: None   Signed by: Lucien Mijares 12/4/2024 4:59 PM Dictation workstation:   [id number]    XR hips bilateral 3 or 4 VW w pelvis when performed    Result Date: 12/4/2024  Interpreted By:  Lucien Mijares, STUDY: XR HIPS BILATERAL 3 OR 4 VW WITH PELVIS WHEN PERFORMED;  12/4/2024 4:40 pm   INDICATION: Signs/Symptoms:fall with pain.   COMPARISON: None.   ACCESSION NUMBER(S): [id number]   ORDERING CLINICIAN: ROOSEVELT CHAMPION   TECHNIQUE: AP view pelvis and two views each of each hip were obtained.   FINDINGS: Moderate stool in the  imaged lower colon and rectum. Distal lumbar spine disc space narrowing with endplate osteophytosis. Bilateral hip joint spaces are preserved. Very mild bilateral acetabular roof spur formation. Very mild sclerotic arthritic changes in both SI joints. Mild bilateral greater trochanteric spur formation. No lytic or blastic destructive bone lesion. No acute fracture or dislocation. No opaque soft tissue foreign body. No periosteal reaction or erosion.       DJD as described.   No acute fracture or dislocation evident in this exam.   MACRO: None   Signed by: Lucien Mijares 12/4/2024 4:58 PM Dictation workstation:   [id number]    XR chest 1 view    Result Date: 12/4/2024  Interpreted By:  Lucien Mijares, STUDY: XR CHEST 1 VIEW;  12/4/2024 4:40 pm   INDICATION: Signs/Symptoms:fall. weakness.   COMPARISON: Chest x-ray from 02/29/2024. CT scan chest from 02/29/2024.   ACCESSION NUMBER(S): [id number]   ORDERING CLINICIAN: ROOSEVELT CHAMPION   TECHNIQUE: Single AP portable view of the chest was obtained.   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 Mild cardiomegaly, currently without vascular congestion, or pleural effusion. No abnormal opacity in either lung worrisome for tumor or pneumonia. No pneumothorax. No tracheal deviation. No abnormal hilar fullness or gross mass on either side.   BONES: No lytic or blastic destructive bone lesion.  There is moderate disc space narrowing and endplate osteophytosis throughout the thoracic spine.   UPPER ABDOMEN: Grossly intact.       Mild cardiomegaly.  Currently without radiographic evidence of CHF or pneumonia.   MACRO: None   Signed by: Lucien Mijares 12/4/2024 4:57 PM Dictation workstation:   [id number]      I personally reviewed and interpreted the above results, multiple of which contributed to my medical making decisions.        Assessment/Plan     S/p fall  Rhabdo  Hypothermia  Dehydration  -IV  fluids  -warming blanket  -PT/OT  -tylenol for pain  -trend trops until down trending  -consider placement    DM2  HTN  HLD  -hold home metformin  -SSI for now  -continue home meds as able      Fluids: ns 125/hour  Electrolytes: replace PRN  Nutrition: general  Bowel prophylaxis: n/a  DVT prophylaxis:  lovenox        Carolina Salazar MD

## 2024-12-06 LAB
1,25(OH)2D SERPL-MCNC: 74.6 PG/ML (ref 19.9–79.3)
ALBUMIN SERPL BCP-MCNC: 3 G/DL (ref 3.4–5)
ANION GAP SERPL CALC-SCNC: 8 MMOL/L (ref 10–20)
BACTERIA UR CULT: NORMAL
BUN SERPL-MCNC: 21 MG/DL (ref 6–23)
CALCIUM SERPL-MCNC: 7.8 MG/DL (ref 8.6–10.3)
CHLORIDE SERPL-SCNC: 107 MMOL/L (ref 98–107)
CO2 SERPL-SCNC: 23 MMOL/L (ref 21–32)
CREAT SERPL-MCNC: 0.37 MG/DL (ref 0.5–1.05)
EGFRCR SERPLBLD CKD-EPI 2021: >90 ML/MIN/1.73M*2
ERYTHROCYTE [DISTWIDTH] IN BLOOD BY AUTOMATED COUNT: 12.7 % (ref 11.5–14.5)
GLUCOSE BLD MANUAL STRIP-MCNC: 146 MG/DL (ref 74–99)
GLUCOSE BLD MANUAL STRIP-MCNC: 186 MG/DL (ref 74–99)
GLUCOSE BLD MANUAL STRIP-MCNC: 187 MG/DL (ref 74–99)
GLUCOSE BLD MANUAL STRIP-MCNC: 220 MG/DL (ref 74–99)
GLUCOSE SERPL-MCNC: 157 MG/DL (ref 74–99)
HCT VFR BLD AUTO: 32.7 % (ref 36–46)
HGB BLD-MCNC: 11.3 G/DL (ref 12–16)
MAGNESIUM SERPL-MCNC: 1.75 MG/DL (ref 1.6–2.4)
MCH RBC QN AUTO: 30 PG (ref 26–34)
MCHC RBC AUTO-ENTMCNC: 34.6 G/DL (ref 32–36)
MCV RBC AUTO: 87 FL (ref 80–100)
NRBC BLD-RTO: 0 /100 WBCS (ref 0–0)
PHOSPHATE SERPL-MCNC: 1.5 MG/DL (ref 2.5–4.9)
PLATELET # BLD AUTO: 117 X10*3/UL (ref 150–450)
POTASSIUM SERPL-SCNC: 4 MMOL/L (ref 3.5–5.3)
RBC # BLD AUTO: 3.77 X10*6/UL (ref 4–5.2)
SODIUM SERPL-SCNC: 134 MMOL/L (ref 136–145)
WBC # BLD AUTO: 13.2 X10*3/UL (ref 4.4–11.3)

## 2024-12-06 PROCEDURE — 2500000004 HC RX 250 GENERAL PHARMACY W/ HCPCS (ALT 636 FOR OP/ED): Performed by: STUDENT IN AN ORGANIZED HEALTH CARE EDUCATION/TRAINING PROGRAM

## 2024-12-06 PROCEDURE — 2500000001 HC RX 250 WO HCPCS SELF ADMINISTERED DRUGS (ALT 637 FOR MEDICARE OP): Performed by: STUDENT IN AN ORGANIZED HEALTH CARE EDUCATION/TRAINING PROGRAM

## 2024-12-06 PROCEDURE — 82947 ASSAY GLUCOSE BLOOD QUANT: CPT

## 2024-12-06 PROCEDURE — 83735 ASSAY OF MAGNESIUM: CPT | Performed by: STUDENT IN AN ORGANIZED HEALTH CARE EDUCATION/TRAINING PROGRAM

## 2024-12-06 PROCEDURE — 85027 COMPLETE CBC AUTOMATED: CPT | Performed by: STUDENT IN AN ORGANIZED HEALTH CARE EDUCATION/TRAINING PROGRAM

## 2024-12-06 PROCEDURE — 80069 RENAL FUNCTION PANEL: CPT | Performed by: STUDENT IN AN ORGANIZED HEALTH CARE EDUCATION/TRAINING PROGRAM

## 2024-12-06 PROCEDURE — 99232 SBSQ HOSP IP/OBS MODERATE 35: CPT | Performed by: STUDENT IN AN ORGANIZED HEALTH CARE EDUCATION/TRAINING PROGRAM

## 2024-12-06 PROCEDURE — 1200000002 HC GENERAL ROOM WITH TELEMETRY DAILY

## 2024-12-06 PROCEDURE — 36415 COLL VENOUS BLD VENIPUNCTURE: CPT | Performed by: STUDENT IN AN ORGANIZED HEALTH CARE EDUCATION/TRAINING PROGRAM

## 2024-12-06 PROCEDURE — 2500000002 HC RX 250 W HCPCS SELF ADMINISTERED DRUGS (ALT 637 FOR MEDICARE OP, ALT 636 FOR OP/ED): Performed by: STUDENT IN AN ORGANIZED HEALTH CARE EDUCATION/TRAINING PROGRAM

## 2024-12-06 RX ORDER — ACETAMINOPHEN 325 MG/1
975 TABLET ORAL 3 TIMES DAILY
Status: DISPENSED | OUTPATIENT
Start: 2024-12-06

## 2024-12-06 RX ORDER — SODIUM CHLORIDE 9 MG/ML
50 INJECTION, SOLUTION INTRAVENOUS CONTINUOUS
Status: ACTIVE | OUTPATIENT
Start: 2024-12-06 | End: 2024-12-07

## 2024-12-06 ASSESSMENT — COGNITIVE AND FUNCTIONAL STATUS - GENERAL
MOBILITY SCORE: 16
STANDING UP FROM CHAIR USING ARMS: A LITTLE
TURNING FROM BACK TO SIDE WHILE IN FLAT BAD: A LITTLE
DAILY ACTIVITIY SCORE: 19
PERSONAL GROOMING: A LITTLE
DAILY ACTIVITIY SCORE: 19
WALKING IN HOSPITAL ROOM: A LITTLE
DRESSING REGULAR UPPER BODY CLOTHING: A LITTLE
TOILETING: A LITTLE
CLIMB 3 TO 5 STEPS WITH RAILING: A LOT
MOVING TO AND FROM BED TO CHAIR: A LITTLE
CLIMB 3 TO 5 STEPS WITH RAILING: TOTAL
MOBILITY SCORE: 17
TURNING FROM BACK TO SIDE WHILE IN FLAT BAD: A LITTLE
HELP NEEDED FOR BATHING: A LITTLE
HELP NEEDED FOR BATHING: A LITTLE
MOVING FROM LYING ON BACK TO SITTING ON SIDE OF FLAT BED WITH BEDRAILS: A LITTLE
DRESSING REGULAR LOWER BODY CLOTHING: A LITTLE
TOILETING: A LITTLE
STANDING UP FROM CHAIR USING ARMS: A LITTLE
DRESSING REGULAR LOWER BODY CLOTHING: A LITTLE
PERSONAL GROOMING: A LITTLE
MOVING FROM LYING ON BACK TO SITTING ON SIDE OF FLAT BED WITH BEDRAILS: A LITTLE
MOVING TO AND FROM BED TO CHAIR: A LITTLE
DRESSING REGULAR UPPER BODY CLOTHING: A LITTLE
WALKING IN HOSPITAL ROOM: A LITTLE

## 2024-12-06 ASSESSMENT — PAIN - FUNCTIONAL ASSESSMENT
PAIN_FUNCTIONAL_ASSESSMENT: 0-10
PAIN_FUNCTIONAL_ASSESSMENT: 0-10

## 2024-12-06 ASSESSMENT — PAIN SCALES - GENERAL
PAINLEVEL_OUTOF10: 0 - NO PAIN
PAINLEVEL_OUTOF10: 0 - NO PAIN

## 2024-12-06 NOTE — CARE PLAN
Problem: Pain - Adult  Goal: Verbalizes/displays adequate comfort level or baseline comfort level  Outcome: Progressing     Problem: Safety - Adult  Goal: Free from fall injury  Outcome: Progressing     Problem: Discharge Planning  Goal: Discharge to home or other facility with appropriate resources  Outcome: Progressing     Problem: Chronic Conditions and Co-morbidities  Goal: Patient's chronic conditions and co-morbidity symptoms are monitored and maintained or improved  Outcome: Progressing     Problem: Skin  Goal: Decreased wound size/increased tissue granulation at next dressing change  Outcome: Progressing  Goal: Participates in plan/prevention/treatment measures  Outcome: Progressing  Goal: Prevent/manage excess moisture  Outcome: Progressing  Goal: Prevent/minimize sheer/friction injuries  Outcome: Progressing  Goal: Promote/optimize nutrition  Outcome: Progressing  Goal: Promote skin healing  Outcome: Progressing     Problem: Diabetes  Goal: Increase stability of blood glucose readings by end of shift  Outcome: Progressing  Goal: Decrease in ketones present in urine by end of shift  Outcome: Progressing  Goal: Maintain electrolyte levels within acceptable range throughout shift  Outcome: Progressing  Goal: Maintain glucose levels >70mg/dl to <250mg/dl throughout shift  Outcome: Progressing  Goal: No changes in neurological exam by end of shift  Outcome: Progressing  Goal: Learn about and adhere to nutrition recommendations by end of shift  Outcome: Progressing  Goal: Vital signs within normal range for age by end of shift  Outcome: Progressing  Goal: Increase self care and/or family involovement by end of shift  Outcome: Progressing  Goal: Receive DSME education by end of shift  Outcome: Progressing     Problem: Fall/Injury  Goal: Not fall by end of shift  Outcome: Progressing  Goal: Be free from injury by end of the shift  Outcome: Progressing  Goal: Verbalize understanding of personal risk factors for  fall in the hospital  Outcome: Progressing  Goal: Verbalize understanding of risk factor reduction measures to prevent injury from fall in the home  Outcome: Progressing  Goal: Use assistive devices by end of the shift  Outcome: Progressing  Goal: Pace activities to prevent fatigue by end of the shift  Outcome: Progressing    The clinical goals for the shift include pt will remain fall free

## 2024-12-06 NOTE — PROGRESS NOTES
Care Transitions: Patient reviewed in care round meeting this AM. ADOD 48 hours. Reviewed SNF referral status per [facility]. [facility] unable to accept; no bed. [facility]. Message left for milton Heard @ [phone] to return call regarding facility of choice of the two accepting. Charisse Fregoso RN/TCC    [phone] Received a call/message from milton Heard. She would prefer patient discharge to [facility] because this is closer for patient family. Will update SNF on FOC via [facility]. [facility] can accept Monday; will need a negative Covid and ANTONIO. Care team to follow. Charisse Fregoso RN/TCC

## 2024-12-06 NOTE — CARE PLAN
The clinical goals for the shift include pt will remain free from falls this shift    Over the shift, the patient remained free from falls, used call light appropriately, and rested well throughout the shift.

## 2024-12-06 NOTE — PROGRESS NOTES
"Subjective   Patient states to be feeling well but has a lot of b/l knee pain as a result of her fall    Overnight Events: None  Objective   Vital Signs:  Blood pressure 143/71, pulse 70, temperature 36.3 °C (97.4 °F), temperature source Oral, resp. rate 16, height 1.619 m (5' 3.74\"), weight 72.2 kg (159 lb 2.8 oz), SpO2 95%.    Physical Exam  Vitals and nursing note reviewed.   Constitutional:       General: She is not in acute distress.  HENT:      Mouth/Throat:      Mouth: Mucous membranes are moist.   Eyes:      Extraocular Movements: Extraocular movements intact.   Cardiovascular:      Rate and Rhythm: Normal rate and regular rhythm.      Pulses: Normal pulses.      Heart sounds: Normal heart sounds.   Pulmonary:      Effort: Pulmonary effort is normal.      Breath sounds: Normal breath sounds.   Abdominal:      General: Abdomen is flat. Bowel sounds are normal.      Palpations: Abdomen is soft.   Musculoskeletal:         General: Tenderness and signs of injury present.      Right lower leg: Edema present.      Left lower leg: Edema present.   Skin:     General: Skin is warm.      Findings: Bruising and erythema present.   Neurological:      General: No focal deficit present.      Mental Status: She is alert and oriented to person, place, and time.      Motor: Weakness present.   Psychiatric:         Mood and Affect: Mood normal.         Wt Readings from Last 6 Encounters:   12/06/24 72.2 kg (159 lb 2.8 oz)   11/04/24 73.6 kg (162 lb 3.2 oz)   07/12/24 73.9 kg (163 lb)   04/10/24 78.4 kg (172 lb 12.8 oz)   02/29/24 81.2 kg (179 lb 0.2 oz)   02/28/24 81.2 kg (179 lb)       I/Os    Intake/Output Summary (Last 24 hours) at 12/6/2024 1621  Last data filed at 12/6/2024 1504  Gross per 24 hour   Intake 3420.25 ml   Output --   Net 3420.25 ml       Labs:   Results for orders placed or performed during the hospital encounter of 12/04/24 (from the past 24 hours)   Creatine Kinase   Result Value Ref Range    Creatine " Kinase 2,794 (H) 0 - 215 U/L   Comprehensive metabolic panel   Result Value Ref Range    Glucose 182 (H) 74 - 99 mg/dL    Sodium 132 (L) 136 - 145 mmol/L    Potassium 3.9 3.5 - 5.3 mmol/L    Chloride 102 98 - 107 mmol/L    Bicarbonate 23 21 - 32 mmol/L    Anion Gap 11 10 - 20 mmol/L    Urea Nitrogen 31 (H) 6 - 23 mg/dL    Creatinine 0.55 0.50 - 1.05 mg/dL    eGFR 90 >60 mL/min/1.73m*2    Calcium 8.6 8.6 - 10.3 mg/dL    Albumin 3.5 3.4 - 5.0 g/dL    Alkaline Phosphatase 36 33 - 136 U/L    Total Protein 5.5 (L) 6.4 - 8.2 g/dL    AST 55 (H) 9 - 39 U/L    Bilirubin, Total 0.9 0.0 - 1.2 mg/dL    ALT 31 7 - 45 U/L   Magnesium   Result Value Ref Range    Magnesium 1.91 1.60 - 2.40 mg/dL   Phosphorus   Result Value Ref Range    Phosphorus 2.0 (L) 2.5 - 4.9 mg/dL   CBC   Result Value Ref Range    WBC 14.9 (H) 4.4 - 11.3 x10*3/uL    nRBC 0.0 0.0 - 0.0 /100 WBCs    RBC 4.04 4.00 - 5.20 x10*6/uL    Hemoglobin 12.1 12.0 - 16.0 g/dL    Hematocrit 35.8 (L) 36.0 - 46.0 %    MCV 89 80 - 100 fL    MCH 30.0 26.0 - 34.0 pg    MCHC 33.8 32.0 - 36.0 g/dL    RDW 12.6 11.5 - 14.5 %    Platelets 118 (L) 150 - 450 x10*3/uL   Troponin I, High Sensitivity   Result Value Ref Range    Troponin I, High Sensitivity 65 (HH) 0 - 13 ng/L   CBC   Result Value Ref Range    WBC 13.2 (H) 4.4 - 11.3 x10*3/uL    nRBC 0.0 0.0 - 0.0 /100 WBCs    RBC 3.77 (L) 4.00 - 5.20 x10*6/uL    Hemoglobin 11.3 (L) 12.0 - 16.0 g/dL    Hematocrit 32.7 (L) 36.0 - 46.0 %    MCV 87 80 - 100 fL    MCH 30.0 26.0 - 34.0 pg    MCHC 34.6 32.0 - 36.0 g/dL    RDW 12.7 11.5 - 14.5 %    Platelets 117 (L) 150 - 450 x10*3/uL   Renal Function Panel   Result Value Ref Range    Glucose 157 (H) 74 - 99 mg/dL    Sodium 134 (L) 136 - 145 mmol/L    Potassium 4.0 3.5 - 5.3 mmol/L    Chloride 107 98 - 107 mmol/L    Bicarbonate 23 21 - 32 mmol/L    Anion Gap 8 (L) 10 - 20 mmol/L    Urea Nitrogen 21 6 - 23 mg/dL    Creatinine 0.37 (L) 0.50 - 1.05 mg/dL    eGFR >90 >60 mL/min/1.73m*2    Calcium 7.8  (L) 8.6 - 10.3 mg/dL    Phosphorus 1.5 (L) 2.5 - 4.9 mg/dL    Albumin 3.0 (L) 3.4 - 5.0 g/dL   Magnesium   Result Value Ref Range    Magnesium 1.75 1.60 - 2.40 mg/dL   POCT GLUCOSE   Result Value Ref Range    POCT Glucose 146 (H) 74 - 99 mg/dL   POCT GLUCOSE   Result Value Ref Range    POCT Glucose 220 (H) 74 - 99 mg/dL   POCT GLUCOSE   Result Value Ref Range    POCT Glucose 187 (H) 74 - 99 mg/dL       Imaging:  CT head wo IV contrast    Result Date: 12/5/2024  Interpreted By:  Houston Barrios, STUDY: CT HEAD WO IV CONTRAST;  12/5/2024 6:22 pm   INDICATION: Signs/Symptoms:Follow up imaging to rule out stroke.     COMPARISON: Head CT 12/04/2024   ACCESSION NUMBER(S): [id number]   ORDERING CLINICIAN: ZANDER ROMERO   TECHNIQUE: Noncontrast axial CT scan of head was performed. Angled reformats in brain and bone windows were generated. The images were reviewed in bone, brain, blood and soft tissue windows.   FINDINGS: CSF Spaces: The ventricles, sulci and basal cisterns are within normal limits. There is no extraaxial fluid collection.   Parenchyma:  The grey-white differentiation is intact. There is no mass effect or midline shift.  There is no intracranial hemorrhage.   Calvarium: The calvarium is unremarkable.   Paranasal sinuses and mastoids: Visualized paranasal sinuses and mastoids are clear.       No acute intracranial abnormality.   MACRO: None   Signed by: Houston Barrios 12/5/2024 6:37 PM Dictation workstation:   [id number]    CT cervical spine wo IV contrast    Result Date: 12/4/2024  Interpreted By:  Farhat Weinstein, STUDY: CT CERVICAL SPINE WO IV CONTRAST;  12/4/2024 5:02 pm   INDICATION: Signs/Symptoms:fall found down.     COMPARISON: CT cervical spine 02/29/2024   ACCESSION NUMBER(S): [id number]   ORDERING CLINICIAN: ROOSEVELT CHAMPION   TECHNIQUE: Axial CT images of the cervical spine are obtained. Axial, coronal and sagittal reconstructions are provided for review.   FINDINGS:     Fractures: There  is no evidence for an acute fracture of the cervical spine.   Vertebral Alignment: No posttraumatic malalignment.   Craniocervical Junction: The odontoid process and craniocervical junction are intact.   Vertebrae/Disc Spaces:  Multilevel disc space narrowing. Multilevel facet arthropathy Multilevel uncovertebral hypertrophy.Multilevel osteophyte formation.   Prevertebral/Paraspinal Soft Tissues: The prevertebral and paraspinal soft tissues are unremarkable.         No evidence for an acute fracture or subluxation of the cervical spine.   MACRO: None   Signed by: Farhat Weinstein 12/4/2024 5:18 PM Dictation workstation:   [id number]    CT head wo IV contrast    Result Date: 12/4/2024  Interpreted By:  Farhat Weinstein, STUDY: CT HEAD WO IV CONTRAST;  12/4/2024 5:02 pm   INDICATION: Signs/Symptoms:fall. found down.   COMPARISON: Head CT 03/01/2021   ACCESSION NUMBER(S): [id number]   ORDERING CLINICIAN: ROOSEVELT CHAMPION   TECHNIQUE: Noncontrast axial CT scan of head was performed. Angled reformats in brain and bone windows were generated. The images were reviewed in bone, brain, blood and soft tissue windows.   FINDINGS: CSF Spaces: The ventricles, sulci and basal cisterns are within normal limits. There is no extraaxial fluid collection.   Parenchyma:  The grey-white differentiation is intact. There is no mass effect or midline shift.  There is no intracranial hemorrhage.   Calvarium: No acute displaced calvarial fracture. Hyperostosis frontalis interna.   Paranasal sinuses and mastoids: Minimal opacification of the posterior right ethmoid sinus. Mastoid air cells appear clear.       No CT evidence of acute intracranial injury.       MACRO: None     Signed by: Farhat Weinstein 12/4/2024 5:16 PM Dictation workstation:   [id number]    XR knee 4+ views bilateral    Result Date: 12/4/2024  Interpreted By:  Lucien Mijares, STUDY: XR KNEE 4+ VIEWS BILATERAL;  12/4/2024 4:40 pm   INDICATION: Signs/Symptoms:fall with pain.      COMPARISON: None.   ACCESSION NUMBER(S): [id number]   ORDERING CLINICIAN: ROOSEVELT CHAMPION   TECHNIQUE: 4 views each of each knee were obtained..   FINDINGS: On the right, there is sharpening of the medial tibial spine. There is a moderate-sized patellar osteophyte at the insertion of the quadriceps tendon. Mild patellofemoral spur formation. No gross effusion.  No lytic or blastic destructive bone lesion.  No acute fracture or dislocation..     On the left, there is sharpening of the medial tibial spine. There is patellofemoral joint space loss with osteophyte formation. No gross effusion. Small patellar osteophyte at the insertion of the quadriceps tendon.  No lytic or blastic destructive bone lesion.  No acute fracture or dislocation..       Mild arthritic changes in both knees as described.   No acute fracture or dislocation on either side.   MACRO: None   Signed by: Lucien Mijares 12/4/2024 5:03 PM Dictation workstation:   [id number]    XR elbow right 3+ views    Result Date: 12/4/2024  Interpreted By:  Lucien Mijares, STUDY: XR ELBOW RIGHT 3+ VIEWS;  12/4/2024 4:40 pm   INDICATION: Signs/Symptoms:fall with pain.   COMPARISON: None.   ACCESSION NUMBER(S): [id number]   ORDERING CLINICIAN: ROOSEVELT CHAMPION   TECHNIQUE: 5 views  of the  right elbow were obtained.   FINDINGS: Small distal medial and  lateral humeral epicondylar osteophytes. There is a tendon calcification versus calcified loose body adjacent to the anterolateral aspect of the distal humerus. There is an incidental antecubital fossa peripheral venous cannula in place. No gross effusion. No lytic or blastic destructive bone lesion. No acute fracture or dislocation. No opaque soft tissue foreign body. No periosteal reaction or erosion.       No acute fracture or dislocation or gross effusion.   Mild arthritic changes as described.   Tendon calcification versus calcified loose body adjacent to the anterolateral aspect of the distal humerus.    MACRO: None   Signed by: Lucien Mijares 12/4/2024 5:01 PM Dictation workstation:   [id number]    XR tibia fibula right 2 views    Result Date: 12/4/2024  Interpreted By:  Lucien Mijares, STUDY: XR TIBIA FIBULA RIGHT 2 VIEWS;  12/4/2024 4:40 pm   INDICATION: Signs/Symptoms:fall with pain.   COMPARISON: None.   ACCESSION NUMBER(S): [id number]   ORDERING CLINICIAN: ROOSEVELT CHAMPION   TECHNIQUE: AP and lateral views  of the  right lower leg were obtained.   FINDINGS: Sharpening of the medial tibial spine. Mild patellofemoral joint space narrowing and spur formation. No gross suprapatellar effusion. Moderate-sized patellar osteophyte at the insertion of the quadriceps tendon.  Ankle mortise and talar dome are intact. There are small vessel arterial calcifications in the ankle and foot. No lytic or blastic destructive bone lesion. No acute fracture or dislocation. No opaque soft tissue foreign body. No periosteal reaction or erosion.       Right knee DJD as described.   Small vessel arterial calcifications in the ankle.   No acute fracture or dislocation.   MACRO: None   Signed by: Lucien Mijares 12/4/2024 4:59 PM Dictation workstation:   [id number]    XR hips bilateral 3 or 4 VW w pelvis when performed    Result Date: 12/4/2024  Interpreted By:  Lucien Mijares, STUDY: XR HIPS BILATERAL 3 OR 4 VW WITH PELVIS WHEN PERFORMED;  12/4/2024 4:40 pm   INDICATION: Signs/Symptoms:fall with pain.   COMPARISON: None.   ACCESSION NUMBER(S): [id number]   ORDERING CLINICIAN: ROOSEVELT CHAMPION   TECHNIQUE: AP view pelvis and two views each of each hip were obtained.   FINDINGS: Moderate stool in the imaged lower colon and rectum. Distal lumbar spine disc space narrowing with endplate osteophytosis. Bilateral hip joint spaces are preserved. Very mild bilateral acetabular roof spur formation. Very mild sclerotic arthritic changes in both SI joints. Mild bilateral greater trochanteric spur formation. No lytic or blastic destructive bone  lesion. No acute fracture or dislocation. No opaque soft tissue foreign body. No periosteal reaction or erosion.       DJD as described.   No acute fracture or dislocation evident in this exam.   MACRO: None   Signed by: Lucien Mijares 12/4/2024 4:58 PM Dictation workstation:   [id number]    XR chest 1 view    Result Date: 12/4/2024  Interpreted By:  Lucien Mijares, STUDY: XR CHEST 1 VIEW;  12/4/2024 4:40 pm   INDICATION: Signs/Symptoms:fall. weakness.   COMPARISON: Chest x-ray from 02/29/2024. CT scan chest from 02/29/2024.   ACCESSION NUMBER(S): [id number]   ORDERING CLINICIAN: ROOSEVELT CHAMPION   TECHNIQUE: Single AP portable view of the chest was obtained.   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 Mild cardiomegaly, currently without vascular congestion, or pleural effusion. No abnormal opacity in either lung worrisome for tumor or pneumonia. No pneumothorax. No tracheal deviation. No abnormal hilar fullness or gross mass on either side.   BONES: No lytic or blastic destructive bone lesion.  There is moderate disc space narrowing and endplate osteophytosis throughout the thoracic spine.   UPPER ABDOMEN: Grossly intact.       Mild cardiomegaly.  Currently without radiographic evidence of CHF or pneumonia.   MACRO: None   Signed by: Lucien Mijares 12/4/2024 4:57 PM Dictation workstation:   [id number]     Medications:    Current Facility-Administered Medications:     acetaminophen (Tylenol) tablet 975 mg, 975 mg, oral, TID, Ramsey Longoria DO    brimonidine (AlphaGAN) 0.2 % ophthalmic solution 1 drop, 1 drop, Both Eyes, BID, 1 drop at 12/06/24 0930 **AND** timolol (Timoptic) 0.5 % ophthalmic solution 1 drop, 1 drop, Both Eyes, BID, Carolina Salazar MD, 1 drop at 12/06/24 0940    celecoxib (CeleBREX) capsule 100 mg, 100 mg, oral, Daily, Carolina Salazar MD, 100 mg at 12/06/24 0930    cholecalciferol (Vitamin D-3) tablet 1,000 Units, 1,000  Units, oral, Daily, Carolina Salazar MD, 1,000 Units at 12/06/24 0929    dextrose 50 % injection 12.5 g, 12.5 g, intravenous, q15 min PRN, Craolina Salazar MD    dextrose 50 % injection 25 g, 25 g, intravenous, q15 min PRN, Carolina Salazar MD    diclofenac sodium (Voltaren) 1 % gel 4 g, 4 g, Topical, BID, Carolina Salazar MD, 4 g at 12/06/24 0930    enoxaparin (Lovenox) syringe 40 mg, 40 mg, subcutaneous, q24h, Carolina Salazar MD, 40 mg at 12/04/24 2111    ezetimibe (Zetia) tablet 10 mg, 10 mg, oral, Daily, Carolina Salazar MD, 10 mg at 12/05/24 2057    famotidine (Pepcid) tablet 20 mg, 20 mg, oral, Daily PRN, Carolina Salazar MD    glucagon (Glucagen) injection 1 mg, 1 mg, intramuscular, q15 min PRN, Carolina Salazar MD    glucagon (Glucagen) injection 1 mg, 1 mg, intramuscular, q15 min PRN, Carolina Salazar MD    insulin lispro injection 0-10 Units, 0-10 Units, subcutaneous, TID AC, Carolina Salazar MD, 4 Units at 12/06/24 1229    losartan (Cozaar) tablet 25 mg, 25 mg, oral, Daily, Carolina Salazar MD, 25 mg at 12/06/24 0930    sertraline (Zoloft) tablet 50 mg, 50 mg, oral, Daily, Carolina Salazar MD, 50 mg at 12/06/24 0930    sodium chloride 0.9% infusion, 125 mL/hr, intravenous, Continuous, Ramsey Longoria DO, Last Rate: 125 mL/hr at 12/06/24 1058, 125 mL/hr at 12/06/24 1058    Assessment & Plan    Nancie Du is a 84 y.o. female admitted to [facility] for management of:    S/p fall  Rhabdo  Hypothermia 2/2 cold exposure: resolved  Dehydration  -IV fluids  -warming blanket  -PT/OT following  -tylenol for pain  -trend trops until down trending  -Creatine kinase is trending down, will continue IV fluids    Slurred speech: Hoarse voice and slurred speech per family members. I did not appreciate the slurred speech during my evaluation. She is AA&Ox3 and otherwise does not have any neurological deficits. I will repeat a CT head yesterday was negative for acute intracranial abnormality.      DM2  HTN  HLD  -hold home metformin  -SSI for now  -continue home meds as able    Disposition: Medically cleared for discharge, pending SNF placement.    Moderate level of MDM    Ramsey Longoria, DO  Internal Medicine

## 2024-12-07 LAB
ALBUMIN SERPL BCP-MCNC: 2.8 G/DL (ref 3.4–5)
ANION GAP SERPL CALC-SCNC: 8 MMOL/L (ref 10–20)
ATRIAL RATE: 101 BPM
BUN SERPL-MCNC: 12 MG/DL (ref 6–23)
CALCIUM SERPL-MCNC: 7.4 MG/DL (ref 8.6–10.3)
CHLORIDE SERPL-SCNC: 106 MMOL/L (ref 98–107)
CK SERPL-CCNC: 1124 U/L (ref 0–215)
CO2 SERPL-SCNC: 24 MMOL/L (ref 21–32)
CREAT SERPL-MCNC: 0.32 MG/DL (ref 0.5–1.05)
EGFRCR SERPLBLD CKD-EPI 2021: >90 ML/MIN/1.73M*2
ERYTHROCYTE [DISTWIDTH] IN BLOOD BY AUTOMATED COUNT: 12.9 % (ref 11.5–14.5)
GLUCOSE BLD MANUAL STRIP-MCNC: 164 MG/DL (ref 74–99)
GLUCOSE BLD MANUAL STRIP-MCNC: 164 MG/DL (ref 74–99)
GLUCOSE BLD MANUAL STRIP-MCNC: 168 MG/DL (ref 74–99)
GLUCOSE BLD MANUAL STRIP-MCNC: 174 MG/DL (ref 74–99)
GLUCOSE SERPL-MCNC: 169 MG/DL (ref 74–99)
HCT VFR BLD AUTO: 32.8 % (ref 36–46)
HGB BLD-MCNC: 10.6 G/DL (ref 12–16)
MAGNESIUM SERPL-MCNC: 1.59 MG/DL (ref 1.6–2.4)
MCH RBC QN AUTO: 29.8 PG (ref 26–34)
MCHC RBC AUTO-ENTMCNC: 32.3 G/DL (ref 32–36)
MCV RBC AUTO: 92 FL (ref 80–100)
NRBC BLD-RTO: 0 /100 WBCS (ref 0–0)
P OFFSET: 147 MS
P ONSET: 115 MS
PHOSPHATE SERPL-MCNC: 1 MG/DL (ref 2.5–4.9)
PLATELET # BLD AUTO: 99 X10*3/UL (ref 150–450)
POTASSIUM SERPL-SCNC: 3.7 MMOL/L (ref 3.5–5.3)
PR INTERVAL: 210 MS
Q ONSET: 220 MS
QRS COUNT: 17 BEATS
QRS DURATION: 62 MS
QT INTERVAL: 434 MS
QTC CALCULATION(BAZETT): 562 MS
QTC FREDERICIA: 516 MS
R AXIS: -9 DEGREES
RBC # BLD AUTO: 3.56 X10*6/UL (ref 4–5.2)
SODIUM SERPL-SCNC: 134 MMOL/L (ref 136–145)
T AXIS: 240 DEGREES
T OFFSET: 437 MS
VENTRICULAR RATE: 101 BPM
WBC # BLD AUTO: 10.7 X10*3/UL (ref 4.4–11.3)

## 2024-12-07 PROCEDURE — 2500000004 HC RX 250 GENERAL PHARMACY W/ HCPCS (ALT 636 FOR OP/ED): Performed by: STUDENT IN AN ORGANIZED HEALTH CARE EDUCATION/TRAINING PROGRAM

## 2024-12-07 PROCEDURE — 82550 ASSAY OF CK (CPK): CPT | Performed by: STUDENT IN AN ORGANIZED HEALTH CARE EDUCATION/TRAINING PROGRAM

## 2024-12-07 PROCEDURE — 97110 THERAPEUTIC EXERCISES: CPT | Mod: GP,CQ

## 2024-12-07 PROCEDURE — 82947 ASSAY GLUCOSE BLOOD QUANT: CPT

## 2024-12-07 PROCEDURE — 99232 SBSQ HOSP IP/OBS MODERATE 35: CPT | Performed by: STUDENT IN AN ORGANIZED HEALTH CARE EDUCATION/TRAINING PROGRAM

## 2024-12-07 PROCEDURE — 85027 COMPLETE CBC AUTOMATED: CPT | Performed by: STUDENT IN AN ORGANIZED HEALTH CARE EDUCATION/TRAINING PROGRAM

## 2024-12-07 PROCEDURE — 36415 COLL VENOUS BLD VENIPUNCTURE: CPT | Performed by: STUDENT IN AN ORGANIZED HEALTH CARE EDUCATION/TRAINING PROGRAM

## 2024-12-07 PROCEDURE — 1200000002 HC GENERAL ROOM WITH TELEMETRY DAILY

## 2024-12-07 PROCEDURE — 2500000002 HC RX 250 W HCPCS SELF ADMINISTERED DRUGS (ALT 637 FOR MEDICARE OP, ALT 636 FOR OP/ED): Performed by: STUDENT IN AN ORGANIZED HEALTH CARE EDUCATION/TRAINING PROGRAM

## 2024-12-07 PROCEDURE — 80069 RENAL FUNCTION PANEL: CPT | Performed by: STUDENT IN AN ORGANIZED HEALTH CARE EDUCATION/TRAINING PROGRAM

## 2024-12-07 PROCEDURE — 2500000001 HC RX 250 WO HCPCS SELF ADMINISTERED DRUGS (ALT 637 FOR MEDICARE OP): Performed by: STUDENT IN AN ORGANIZED HEALTH CARE EDUCATION/TRAINING PROGRAM

## 2024-12-07 PROCEDURE — 83735 ASSAY OF MAGNESIUM: CPT | Performed by: STUDENT IN AN ORGANIZED HEALTH CARE EDUCATION/TRAINING PROGRAM

## 2024-12-07 RX ORDER — LANOLIN ALCOHOL/MO/W.PET/CERES
400 CREAM (GRAM) TOPICAL 2 TIMES DAILY
Status: COMPLETED | OUTPATIENT
Start: 2024-12-07 | End: 2024-12-08

## 2024-12-07 ASSESSMENT — COGNITIVE AND FUNCTIONAL STATUS - GENERAL
WALKING IN HOSPITAL ROOM: A LITTLE
TOILETING: A LOT
MOVING TO AND FROM BED TO CHAIR: A LOT
TURNING FROM BACK TO SIDE WHILE IN FLAT BAD: A LITTLE
PERSONAL GROOMING: A LITTLE
DAILY ACTIVITIY SCORE: 17
TURNING FROM BACK TO SIDE WHILE IN FLAT BAD: A LOT
DRESSING REGULAR UPPER BODY CLOTHING: A LITTLE
MOBILITY SCORE: 13
MOBILITY SCORE: 14
MOVING TO AND FROM BED TO CHAIR: A LITTLE
MOVING FROM LYING ON BACK TO SITTING ON SIDE OF FLAT BED WITH BEDRAILS: A LOT
EATING MEALS: A LITTLE
MOVING FROM LYING ON BACK TO SITTING ON SIDE OF FLAT BED WITH BEDRAILS: A LITTLE
WALKING IN HOSPITAL ROOM: A LOT
DRESSING REGULAR LOWER BODY CLOTHING: A LITTLE
HELP NEEDED FOR BATHING: A LITTLE
CLIMB 3 TO 5 STEPS WITH RAILING: TOTAL
STANDING UP FROM CHAIR USING ARMS: A LITTLE
STANDING UP FROM CHAIR USING ARMS: A LOT
CLIMB 3 TO 5 STEPS WITH RAILING: TOTAL

## 2024-12-07 ASSESSMENT — PAIN SCALES - GENERAL
PAINLEVEL_OUTOF10: 0 - NO PAIN
PAINLEVEL_OUTOF10: 5 - MODERATE PAIN
PAINLEVEL_OUTOF10: 0 - NO PAIN

## 2024-12-07 ASSESSMENT — PAIN - FUNCTIONAL ASSESSMENT
PAIN_FUNCTIONAL_ASSESSMENT: 0-10

## 2024-12-07 NOTE — CARE PLAN
The patient's goals for the shift include      The clinical goals for the shift include monitor v/s and labs    Over the shift, the patient did not make progress toward the following goals. Barriers to progression include n/a. Recommendations to address these barriers include continue plan of care.       Problem: Pain - Adult  Goal: Verbalizes/displays adequate comfort level or baseline comfort level  Outcome: Progressing  Flowsheets (Taken 12/7/2024 0219)  Verbalizes/displays adequate comfort level or baseline comfort level: Encourage patient to monitor pain and request assistance     Problem: Safety - Adult  Goal: Free from fall injury  Outcome: Progressing  Flowsheets (Taken 12/7/2024 0219)  Free from fall injury: Instruct family/caregiver on patient safety     Problem: Discharge Planning  Goal: Discharge to home or other facility with appropriate resources  Outcome: Progressing  Flowsheets (Taken 12/7/2024 0219)  Discharge to home or other facility with appropriate resources: Identify barriers to discharge with patient and caregiver     Problem: Chronic Conditions and Co-morbidities  Goal: Patient's chronic conditions and co-morbidity symptoms are monitored and maintained or improved  Outcome: Progressing     Problem: Skin  Goal: Decreased wound size/increased tissue granulation at next dressing change  Outcome: Progressing  Flowsheets (Taken 12/7/2024 0219)  Decreased wound size/increased tissue granulation at next dressing change: Promote sleep for wound healing  Goal: Participates in plan/prevention/treatment measures  Outcome: Progressing  Goal: Prevent/manage excess moisture  Outcome: Progressing  Goal: Prevent/minimize sheer/friction injuries  Outcome: Progressing  Goal: Promote/optimize nutrition  Outcome: Progressing  Goal: Promote skin healing  Outcome: Progressing     Problem: Diabetes  Goal: Increase stability of blood glucose readings by end of shift  Outcome: Progressing  Flowsheets (Taken 12/7/2024  0219)  Increase stability of blood glucose readings by end of shift: Med administration/monitoring of effect  Goal: Decrease in ketones present in urine by end of shift  Outcome: Progressing  Goal: Maintain electrolyte levels within acceptable range throughout shift  Outcome: Progressing  Goal: Maintain glucose levels >70mg/dl to <250mg/dl throughout shift  Outcome: Progressing  Goal: No changes in neurological exam by end of shift  Outcome: Progressing  Goal: Learn about and adhere to nutrition recommendations by end of shift  Outcome: Progressing  Goal: Vital signs within normal range for age by end of shift  Outcome: Progressing  Goal: Increase self care and/or family involovement by end of shift  Outcome: Progressing  Goal: Receive DSME education by end of shift  Outcome: Progressing

## 2024-12-07 NOTE — PROGRESS NOTES
Nancie Du is a 84 y.o. female on day 2 of admission presenting with Hypothermia associated with environmental change.      Subjective   Patient seen and examined at bedside.  No acute events overnight.  This is the first date I am seeing the patient.  She has been managed in the hospital for fall and rhabdomyolysis.  CK level has decreased to 1124.  She appears to be tolerating a diet.       Objective   General:     Well appearing  HENT:      Head: Normocephalic and atraumatic.      Mouth: Mucous membranes are moist.   Eyes:      Pupils are equal, round, and reactive to light.   Cardiovascular:      Normal rate and regular rhythm. Normal S1, S2.  No murmurs, clicks, gallops.   Pulmonary:      Clear to auscultation bilaterally.  No wheezing, rhonchi, or crackles heard.   Abdominal:       Bowel sounds are normal.      Abdomen is soft, nontender, nondistended  Skin:      Black eschars on bilateral knees  Musculoskeletal:         Extremities: No edema present. No deformities with no abnormal range of motion     Neck supple.   Neurological:      Mental Status: He is alert and oriented X3     CN II-XII intact.  No focal neurologic deficits appreciated.    Last Recorded Vitals  /74   Pulse 71   Temp 36.6 °C (97.9 °F)   Resp 16   Wt 72.2 kg (159 lb 2.8 oz)   SpO2 96%   Intake/Output last 3 Shifts:    Intake/Output Summary (Last 24 hours) at 12/7/2024 1625  Last data filed at 12/7/2024 1245  Gross per 24 hour   Intake 2475.49 ml   Output --   Net 2475.49 ml       Admission Weight  Weight: 73.5 kg (162 lb) (12/04/24 1553)    Daily Weight  12/06/24 : 72.2 kg (159 lb 2.8 oz)    Image Results  ECG 12 lead  Sinus tachycardia with 1st degree AV block with Premature supraventricular complexes  Possible Inferior infarct , age undetermined  ST & T wave abnormality, consider lateral ischemia  Prolonged QT  Abnormal ECG  When compared with ECG of 29-FEB-2024 07:58,  Premature supraventricular complexes are now  Present  Questionable change in QRS duration  Borderline criteria for Inferior infarct are now Present  See ED provider note for full interpretation and clinical correlation  Confirmed by Ramona Burgos ([id number]) on 12/7/2024 10:45:45 AM          Relevant Results               Assessment/Plan                  Assessment & Plan  Hypothermia associated with environmental change    Hypothermia, initial encounter    1.  Rhabdomyolysis: Related to muscle breakdown from fall and extended time on the ground.  This has improved with fluids.  Will decrease normal saline to 50 mL/h and stop fluids altogether this evening as the patient is tolerating a diet.  Follow-up CK level in the morning.  Follow-up BMP and phosphorus in the morning.  Continue monitoring on telemetry.    2.  Fall: Patient's initial fall led to extended time on the ground leading to significant weakness.  PT OT consulted.  Patient would benefit from placement.    3.  Hypothermia: Resolved    4.  Diabetes: Blood sugars appear stable at this time.  Continue sliding scale insulin.    5.  Hypomagnesemia: Magnesium levels 1.59 today.  Will start magnesium oxide 400 mg twice daily for 2 days.  Follow-up magnesium level in the morning.    6.  Hypertension    7.  Hyperlipidemia    Disposition: Continue fluids and discontinue this evening.  Follow-up labs in the morning.  If CK levels continue to downtrend, we will medically clear patient for discharge tomorrow.  Discharge disposition is to go to SNF once medically stable.  This likely will occur on Monday.              Ever Sanhi DO

## 2024-12-07 NOTE — PROGRESS NOTES
Physical Therapy    Physical Therapy    Physical Therapy Treatment    Patient Name: Nancie Du  MRN: [id number]  Today's Date: 12/7/2024  Time Calculation  Start Time: 1218  Stop Time: 1234  Time Calculation (min): 16 min     315/315-A    Assessment/Plan   PT Assessment  PT Assessment Results: Decreased strength, Decreased endurance, Impaired balance, Decreased mobility, Decreased safety awareness, Impaired judgement, Impaired hearing, Decreased skin integrity, Obesity, Pain  Rehab Prognosis: Good  Barriers to Discharge: patient needing assist for mobility; poor safety awareness  Evaluation/Treatment Tolerance: Patient limited by pain (knees)  Medical Staff Made Aware: Yes  End of Session Communication: Bedside nurse  Assessment Comment:  (Patient tolerated therex fair. Cuing/encouragement needed for patient participation. Patient refused gt training, stating her knees are too sore and her lunch is arriving.)  End of Session Patient Position: Up in chair, Alarm on     PT Plan  Treatment/Interventions: Bed mobility, Transfer training, Gait training, Balance training, Strengthening, Endurance training, Therapeutic exercise, Therapeutic activity, Home exercise program  PT Plan: Ongoing PT  PT Frequency: 4 times per week  PT Discharge Recommendations: Moderate intensity level of continued care  PT Recommended Transfer Status: Assist x1  PT - OK to Discharge: Yes (once medically appropriate and safety DC plan in place)    Current Problem:  Patient Active Problem List   Diagnosis    History of breast cancer    Type 2 diabetes mellitus without complication, without long-term current use of insulin (Multi)    Dyslipidemia    Vaginal prolapse    Vaginal lesion    Urinary incontinence    Seasonal allergies    Pharyngeal disorder    Primary hypertension    Heartburn    Hearing loss    Glaucoma    Unsteady gait    Closed fracture of occipital bone (Multi)    Multiple joint pain    Vitamin D deficiency    Current mild episode of  major depressive disorder without prior episode (CMS-HCC)    Lightheadedness    Bilateral carotid artery stenosis    Hypothermia associated with environmental change    Hypothermia, initial encounter       General Visit Information:         Subjective   Patient stated her knees are sore today. Stated she sat on commode chair for a long time which may have caused some additional pain. Tired after doing the ex's.     Precautions:  Precautions  Medical Precautions: Fall precautions (significant wounds B knees, [place])    Vital Signs:     Objective     Pain:  Pain Assessment  Pain Assessment: 0-10  0-10 (Numeric) Pain Score: 5 - Moderate pain  Pain Type: Acute pain  Pain Location: Knee  Pain Orientation: Right, Left    Cognition:  Cognition  Orientation Level: Oriented X4      Treatments:  Therapeutic Exercise  Therapeutic Exercise Performed: Yes  Therapeutic Exercise Activity 1: AP x10 B  Therapeutic Exercise Activity 2: hip abd/add x8 B  Therapeutic Exercise Activity 3: LAQ x8 B  Therapeutic Exercise Activity 4: seated marches x8 B  Therapeutic Exercise Activity 5: SLR with assist x5 B  Therapeutic Exercise Activity 6: SAQ x10 B          Outcome Measures:     [facility] Basic Mobility  Turning from your back to your side while in a flat bed without using bedrails: A lot  Moving from lying on your back to sitting on the side of a flat bed without using bedrails: A lot  Moving to and from bed to chair (including a wheelchair): A little  Standing up from a chair using your arms (e.g. wheelchair or bedside chair): A little  To walk in hospital room: A little  Climbing 3-5 steps with railing: Total  Basic Mobility - Total Score: 14            Education Documentation  Precautions, taught by Bernard Giraldo PTA at 12/7/2024 12:43 PM.  Learner: Patient  Readiness: Acceptance  Method: Explanation  Response: Verbalizes Understanding, Needs Reinforcement    Home Exercise Program, taught by Bernard Giraldo PTA at 12/7/2024 12:43  PM.  Learner: Patient  Readiness: Acceptance  Method: Explanation  Response: Verbalizes Understanding, Needs Reinforcement    Mobility Training, taught by Bernard Giraldo PTA at 12/7/2024 12:43 PM.  Learner: Patient  Readiness: Acceptance  Method: Explanation  Response: Verbalizes Understanding, Needs Reinforcement    Education Comments  No comments found.           EDUCATION:     Encounter Problems       Encounter Problems (Active)       PT Problem       PT Goal 1 (Progressing)       Start:  12/05/24    Expected End:  12/19/24       Nancie Du will perform bed mobility for supine to and from sitting EOB without use of rail with Mumtaz           PT Goal 2 (Progressing)       Start:  12/05/24    Expected End:  12/19/24       Nancie Du will transfer sit to and from stand using least restrictive assistive device SBA            PT Goal 3 (Progressing)       Start:  12/05/24    Expected End:  12/19/24       Nancie Du will demonstrate good safety awareness with transfers and mobility and with use of assistive device (proper hand placement).            PT Goal 4 (Progressing)       Start:  12/05/24    Expected End:  12/19/24       Nancie Du will ambulate 50 ft with assistive device , level surface, good balance, steady CGA              Pain - Adult

## 2024-12-08 VITALS
DIASTOLIC BLOOD PRESSURE: 79 MMHG | HEIGHT: 64 IN | OXYGEN SATURATION: 94 % | RESPIRATION RATE: 20 BRPM | SYSTOLIC BLOOD PRESSURE: 159 MMHG | TEMPERATURE: 98.4 F | HEART RATE: 93 BPM | BODY MASS INDEX: 27.17 KG/M2 | WEIGHT: 159.17 LBS

## 2024-12-08 LAB
BACTERIA BLD CULT: NORMAL
BACTERIA BLD CULT: NORMAL
GLUCOSE BLD MANUAL STRIP-MCNC: 146 MG/DL (ref 74–99)
GLUCOSE BLD MANUAL STRIP-MCNC: 157 MG/DL (ref 74–99)
GLUCOSE BLD MANUAL STRIP-MCNC: 169 MG/DL (ref 74–99)
GLUCOSE BLD MANUAL STRIP-MCNC: 197 MG/DL (ref 74–99)

## 2024-12-08 PROCEDURE — 97116 GAIT TRAINING THERAPY: CPT | Mod: GP,CQ

## 2024-12-08 PROCEDURE — 82947 ASSAY GLUCOSE BLOOD QUANT: CPT

## 2024-12-08 PROCEDURE — 2500000001 HC RX 250 WO HCPCS SELF ADMINISTERED DRUGS (ALT 637 FOR MEDICARE OP): Performed by: STUDENT IN AN ORGANIZED HEALTH CARE EDUCATION/TRAINING PROGRAM

## 2024-12-08 PROCEDURE — 99232 SBSQ HOSP IP/OBS MODERATE 35: CPT | Performed by: STUDENT IN AN ORGANIZED HEALTH CARE EDUCATION/TRAINING PROGRAM

## 2024-12-08 PROCEDURE — 2500000004 HC RX 250 GENERAL PHARMACY W/ HCPCS (ALT 636 FOR OP/ED): Performed by: STUDENT IN AN ORGANIZED HEALTH CARE EDUCATION/TRAINING PROGRAM

## 2024-12-08 PROCEDURE — 1200000002 HC GENERAL ROOM WITH TELEMETRY DAILY

## 2024-12-08 PROCEDURE — 2500000002 HC RX 250 W HCPCS SELF ADMINISTERED DRUGS (ALT 637 FOR MEDICARE OP, ALT 636 FOR OP/ED): Performed by: STUDENT IN AN ORGANIZED HEALTH CARE EDUCATION/TRAINING PROGRAM

## 2024-12-08 ASSESSMENT — COGNITIVE AND FUNCTIONAL STATUS - GENERAL
TURNING FROM BACK TO SIDE WHILE IN FLAT BAD: A LOT
TOILETING: A LOT
TURNING FROM BACK TO SIDE WHILE IN FLAT BAD: A LITTLE
DRESSING REGULAR LOWER BODY CLOTHING: A LITTLE
CLIMB 3 TO 5 STEPS WITH RAILING: TOTAL
HELP NEEDED FOR BATHING: A LITTLE
DAILY ACTIVITIY SCORE: 17
MOVING FROM LYING ON BACK TO SITTING ON SIDE OF FLAT BED WITH BEDRAILS: A LITTLE
MOVING TO AND FROM BED TO CHAIR: A LITTLE
WALKING IN HOSPITAL ROOM: A LITTLE
MOVING TO AND FROM BED TO CHAIR: A LOT
PERSONAL GROOMING: A LITTLE
MOVING FROM LYING ON BACK TO SITTING ON SIDE OF FLAT BED WITH BEDRAILS: A LOT
WALKING IN HOSPITAL ROOM: A LOT
DRESSING REGULAR UPPER BODY CLOTHING: A LITTLE
STANDING UP FROM CHAIR USING ARMS: A LOT
EATING MEALS: A LITTLE
MOBILITY SCORE: 14
STANDING UP FROM CHAIR USING ARMS: A LITTLE
MOBILITY SCORE: 13
CLIMB 3 TO 5 STEPS WITH RAILING: TOTAL

## 2024-12-08 ASSESSMENT — PAIN SCALES - GENERAL
PAINLEVEL_OUTOF10: 0 - NO PAIN

## 2024-12-08 ASSESSMENT — PAIN - FUNCTIONAL ASSESSMENT
PAIN_FUNCTIONAL_ASSESSMENT: 0-10

## 2024-12-08 NOTE — PROGRESS NOTES
Physical Therapy    Physical Therapy    Physical Therapy Treatment    Patient Name: Nancie Du  MRN: [id number]  Today's Date: 12/8/2024  Time Calculation  Start Time: 0950  Stop Time: 1010  Time Calculation (min): 20 min     315/315-A    Assessment/Plan   PT Assessment  PT Assessment Results: Decreased strength, Decreased endurance, Impaired balance, Decreased mobility, Decreased safety awareness, Impaired judgement, Impaired hearing, Decreased skin integrity, Obesity, Pain  Rehab Prognosis: Good  Barriers to Discharge: patient needing assist for mobility; poor safety awareness  Evaluation/Treatment Tolerance: Patient limited by pain (knees)  Medical Staff Made Aware: Yes  End of Session Communication: Bedside nurse  Assessment Comment:  (Patient tolerated session better today. Ambulated in room with CGA - patient fearful of falling but did not lose balance. Very fatigued following treatment. PT goals in progress.)  End of Session Patient Position: Up in chair, Alarm on     PT Plan  Treatment/Interventions: Bed mobility, Transfer training, Gait training, Balance training, Strengthening, Endurance training, Therapeutic exercise, Therapeutic activity, Home exercise program  PT Plan: Ongoing PT  PT Frequency: 4 times per week  PT Discharge Recommendations: Moderate intensity level of continued care  PT Recommended Transfer Status: Assist x1  PT - OK to Discharge: Yes (once medically appropriate and safety DC plan in place)    Current Problem:  Patient Active Problem List   Diagnosis    History of breast cancer    Type 2 diabetes mellitus without complication, without long-term current use of insulin (Multi)    Dyslipidemia    Vaginal prolapse    Vaginal lesion    Urinary incontinence    Seasonal allergies    Pharyngeal disorder    Primary hypertension    Heartburn    Hearing loss    Glaucoma    Unsteady gait    Closed fracture of occipital bone (Multi)    Multiple joint pain    Vitamin D deficiency    Current mild  episode of major depressive disorder without prior episode (CMS-HCC)    Lightheadedness    Bilateral carotid artery stenosis    Hypothermia associated with environmental change    Hypothermia, initial encounter       General Visit Information:         Subjective   Patient indicated she has more energy today compared to yesterday but very tired after walking and exercising.     Precautions:  Precautions  Medical Precautions: Fall precautions (significant wounds B knees, [place])    Vital Signs:     Objective     Pain:  Pain Assessment  Pain Assessment: 0-10  0-10 (Numeric) Pain Score: 0 - No pain  Pain Type: Acute pain  Pain Location: Knee  Pain Orientation: Right, Left    Cognition:  Cognition  Orientation Level: Oriented X4    Treatments:       Ambulation/Gait Training  Ambulation/Gait Training Performed: Yes  Ambulation/Gait Training 1  Device 1: Rolling walker  Gait Support Devices: Gait belt  Assistance 1: Contact guard  Quality of Gait 1: Diminished heel strike, Inconsistent stride length  Comments/Distance (ft) 1: 25'  Transfer 1  Technique 1: Sit to stand, Stand to sit  Transfer Device 1: Gait belt  Transfer Level of Assistance 1: Contact guard          Outcome Measures:     [facility] Basic Mobility  Turning from your back to your side while in a flat bed without using bedrails: A lot  Moving from lying on your back to sitting on the side of a flat bed without using bedrails: A lot  Moving to and from bed to chair (including a wheelchair): A little  Standing up from a chair using your arms (e.g. wheelchair or bedside chair): A little  To walk in hospital room: A little  Climbing 3-5 steps with railing: Total  Basic Mobility - Total Score: 14           Education Documentation  Precautions, taught by Bernard Giraldo PTA at 12/8/2024 11:23 AM.  Learner: Patient  Readiness: Acceptance  Method: Explanation, Demonstration  Response: Verbalizes Understanding, Needs Reinforcement    Home Exercise Program, taught by Bernard NIX  BRE Giraldo at 12/8/2024 11:23 AM.  Learner: Patient  Readiness: Acceptance  Method: Explanation, Demonstration  Response: Verbalizes Understanding, Needs Reinforcement    Mobility Training, taught by Bernard Giraldo PTA at 12/8/2024 11:23 AM.  Learner: Patient  Readiness: Acceptance  Method: Explanation, Demonstration  Response: Verbalizes Understanding, Needs Reinforcement    Precautions, taught by Bernard Giraldo PTA at 12/7/2024 12:43 PM.  Learner: Patient  Readiness: Acceptance  Method: Explanation  Response: Verbalizes Understanding, Needs Reinforcement    Home Exercise Program, taught by Bernard Giraldo PTA at 12/7/2024 12:43 PM.  Learner: Patient  Readiness: Acceptance  Method: Explanation  Response: Verbalizes Understanding, Needs Reinforcement    Mobility Training, taught by Bernard Giraldo PTA at 12/7/2024 12:43 PM.  Learner: Patient  Readiness: Acceptance  Method: Explanation  Response: Verbalizes Understanding, Needs Reinforcement    Education Comments  No comments found.           EDUCATION:     Encounter Problems       Encounter Problems (Active)       PT Problem       PT Goal 1 (Progressing)       Start:  12/05/24    Expected End:  12/19/24       Nancie Du will perform bed mobility for supine to and from sitting EOB without use of rail with Mumtaz           PT Goal 2 (Progressing)       Start:  12/05/24    Expected End:  12/19/24       Nancie Du will transfer sit to and from stand using least restrictive assistive device SBA            PT Goal 3 (Progressing)       Start:  12/05/24    Expected End:  12/19/24       Nancie Du will demonstrate good safety awareness with transfers and mobility and with use of assistive device (proper hand placement).            PT Goal 4 (Progressing)       Start:  12/05/24    Expected End:  12/19/24       Nancie Du will ambulate 50 ft with assistive device , level surface, good balance, steady CGA              Pain - Adult

## 2024-12-08 NOTE — PROGRESS NOTES
Nancie Du is a 84 y.o. female on day 3 of admission presenting with Hypothermia associated with environmental change.      Subjective   Patient seen and examined at bedside.  No acute events overnight.  She refused labs this morning due to concern of blood draws in her right hand that are swollen.  She also did not want blood draws in her left hand due to having a history of mastectomy.  I discussed with her the importance of following up on her CK level in order to clear her medically to be discharged to her SNF tomorrow.  We had agreed we will try to get labs again tomorrow.  Patient is tolerating a diet.  She did work with physical therapy.  We had discussed therapy goals and the likely duration of her SNF placement.       Objective   General:     Well appearing  HENT:      Head: Normocephalic and atraumatic.      Mouth: Mucous membranes are moist.   Eyes:      Pupils are equal, round, and reactive to light.   Cardiovascular:      Normal rate and regular rhythm. Normal S1, S2.  No murmurs, clicks, gallops.   Pulmonary:      Clear to auscultation bilaterally.  No wheezing, rhonchi, or crackles heard.   Abdominal:       Bowel sounds are normal.      Abdomen is soft, nontender, nondistended  Skin:      Black eschars on bilateral knees  Musculoskeletal:         Extremities: No edema present. No deformities with no abnormal range of motion     Neck supple.   Neurological:      Mental Status: He is alert and oriented X3     CN II-XII intact.  No focal neurologic deficits appreciated.    Last Recorded Vitals  /74   Pulse 79   Temp 36.6 °C (97.9 °F)   Resp 20   Wt 72.2 kg (159 lb 2.8 oz)   SpO2 97%   Intake/Output last 3 Shifts:    Intake/Output Summary (Last 24 hours) at 12/8/2024 1604  Last data filed at 12/8/2024 0800  Gross per 24 hour   Intake 592.5 ml   Output 775 ml   Net -182.5 ml       Admission Weight  Weight: 73.5 kg (162 lb) (12/04/24 1553)    Daily Weight  12/06/24 : 72.2 kg (159 lb 2.8  oz)    Image Results  ECG 12 lead  Sinus tachycardia with 1st degree AV block with Premature supraventricular complexes  Possible Inferior infarct , age undetermined  ST & T wave abnormality, consider lateral ischemia  Prolonged QT  Abnormal ECG  When compared with ECG of 29-FEB-2024 07:58,  Premature supraventricular complexes are now Present  Questionable change in QRS duration  Borderline criteria for Inferior infarct are now Present  See ED provider note for full interpretation and clinical correlation  Confirmed by Ramona Burgos ([id number]) on 12/7/2024 10:45:45 AM          Relevant Results               Assessment/Plan                  Assessment & Plan  Hypothermia associated with environmental change    Hypothermia, initial encounter    1.  Rhabdomyolysis: Related to muscle breakdown from fall and extended time on the ground.  This has improved with fluids.  Patient is tolerating a diet.  Monitor off of fluids.  Follow-up CK level in the morning.  Follow-up BMP and phosphorus in the morning.  Continue monitoring on telemetry.    2.  Fall: Patient's initial fall led to extended time on the ground leading to significant weakness.  PT OT consulted.  Patient would benefit from placement.    3.  Hypothermia: Resolved    4.  Diabetes: Blood sugars appear stable at this time.  Continue sliding scale insulin.    5.  Hypomagnesemia: Magnesium levels 1.59 on last draw.on.   Continue start magnesium oxide 400 mg twice daily for 2 days.  Follow-up magnesium level in the morning.    6.  Hypertension    7.  Hyperlipidemia    Disposition: Follow-up CK, BMP, phosphorus, magnesium in the morning.  If CK levels continue to decrease with no significant electrolyte abnormalities, we will clear the patient for discharge tomorrow.  Discharge disposition is to go to SNF once medically stable.  This likely will occur on Monday.              Ever Sahni DO

## 2024-12-09 VITALS
OXYGEN SATURATION: 95 % | RESPIRATION RATE: 18 BRPM | SYSTOLIC BLOOD PRESSURE: 145 MMHG | WEIGHT: 159.17 LBS | HEIGHT: 64 IN | BODY MASS INDEX: 27.17 KG/M2 | HEART RATE: 80 BPM | DIASTOLIC BLOOD PRESSURE: 74 MMHG | TEMPERATURE: 97.5 F

## 2024-12-09 PROBLEM — T68.XXXA HYPOTHERMIA, INITIAL ENCOUNTER: Status: RESOLVED | Noted: 2024-12-05 | Resolved: 2024-12-09

## 2024-12-09 PROBLEM — T68.XXXA HYPOTHERMIA ASSOCIATED WITH ENVIRONMENTAL CHANGE: Status: RESOLVED | Noted: 2024-12-04 | Resolved: 2024-12-09

## 2024-12-09 LAB
ALBUMIN SERPL BCP-MCNC: 2.7 G/DL (ref 3.4–5)
ANION GAP SERPL CALC-SCNC: 8 MMOL/L (ref 10–20)
BACTERIA BLD CULT: NORMAL
BACTERIA BLD CULT: NORMAL
BUN SERPL-MCNC: 12 MG/DL (ref 6–23)
CALCIUM SERPL-MCNC: 7.8 MG/DL (ref 8.6–10.3)
CHLORIDE SERPL-SCNC: 101 MMOL/L (ref 98–107)
CK SERPL-CCNC: 388 U/L (ref 0–215)
CO2 SERPL-SCNC: 27 MMOL/L (ref 21–32)
CREAT SERPL-MCNC: 0.29 MG/DL (ref 0.5–1.05)
EGFRCR SERPLBLD CKD-EPI 2021: >90 ML/MIN/1.73M*2
ERYTHROCYTE [DISTWIDTH] IN BLOOD BY AUTOMATED COUNT: 13.2 % (ref 11.5–14.5)
GLUCOSE BLD MANUAL STRIP-MCNC: 149 MG/DL (ref 74–99)
GLUCOSE BLD MANUAL STRIP-MCNC: 198 MG/DL (ref 74–99)
GLUCOSE SERPL-MCNC: 199 MG/DL (ref 74–99)
HCT VFR BLD AUTO: 33.1 % (ref 36–46)
HGB BLD-MCNC: 10.8 G/DL (ref 12–16)
MAGNESIUM SERPL-MCNC: 1.69 MG/DL (ref 1.6–2.4)
MCH RBC QN AUTO: 30.3 PG (ref 26–34)
MCHC RBC AUTO-ENTMCNC: 32.6 G/DL (ref 32–36)
MCV RBC AUTO: 93 FL (ref 80–100)
NRBC BLD-RTO: 0 /100 WBCS (ref 0–0)
PHOSPHATE SERPL-MCNC: 2.1 MG/DL (ref 2.5–4.9)
PLATELET # BLD AUTO: 158 X10*3/UL (ref 150–450)
POTASSIUM SERPL-SCNC: 3.7 MMOL/L (ref 3.5–5.3)
RBC # BLD AUTO: 3.57 X10*6/UL (ref 4–5.2)
SARS-COV-2 RNA RESP QL NAA+PROBE: NOT DETECTED
SODIUM SERPL-SCNC: 132 MMOL/L (ref 136–145)
WBC # BLD AUTO: 9.4 X10*3/UL (ref 4.4–11.3)

## 2024-12-09 PROCEDURE — 99239 HOSP IP/OBS DSCHRG MGMT >30: CPT | Performed by: STUDENT IN AN ORGANIZED HEALTH CARE EDUCATION/TRAINING PROGRAM

## 2024-12-09 PROCEDURE — 97530 THERAPEUTIC ACTIVITIES: CPT | Mod: GP,CQ | Performed by: PHYSICAL THERAPIST

## 2024-12-09 PROCEDURE — 83735 ASSAY OF MAGNESIUM: CPT | Performed by: STUDENT IN AN ORGANIZED HEALTH CARE EDUCATION/TRAINING PROGRAM

## 2024-12-09 PROCEDURE — 87635 SARS-COV-2 COVID-19 AMP PRB: CPT | Performed by: STUDENT IN AN ORGANIZED HEALTH CARE EDUCATION/TRAINING PROGRAM

## 2024-12-09 PROCEDURE — 82947 ASSAY GLUCOSE BLOOD QUANT: CPT

## 2024-12-09 PROCEDURE — 2500000001 HC RX 250 WO HCPCS SELF ADMINISTERED DRUGS (ALT 637 FOR MEDICARE OP): Performed by: STUDENT IN AN ORGANIZED HEALTH CARE EDUCATION/TRAINING PROGRAM

## 2024-12-09 PROCEDURE — 2500000002 HC RX 250 W HCPCS SELF ADMINISTERED DRUGS (ALT 637 FOR MEDICARE OP, ALT 636 FOR OP/ED): Performed by: STUDENT IN AN ORGANIZED HEALTH CARE EDUCATION/TRAINING PROGRAM

## 2024-12-09 PROCEDURE — 80069 RENAL FUNCTION PANEL: CPT | Performed by: STUDENT IN AN ORGANIZED HEALTH CARE EDUCATION/TRAINING PROGRAM

## 2024-12-09 PROCEDURE — 36415 COLL VENOUS BLD VENIPUNCTURE: CPT | Performed by: STUDENT IN AN ORGANIZED HEALTH CARE EDUCATION/TRAINING PROGRAM

## 2024-12-09 PROCEDURE — 82550 ASSAY OF CK (CPK): CPT | Performed by: STUDENT IN AN ORGANIZED HEALTH CARE EDUCATION/TRAINING PROGRAM

## 2024-12-09 PROCEDURE — 97116 GAIT TRAINING THERAPY: CPT | Mod: GP,CQ | Performed by: PHYSICAL THERAPIST

## 2024-12-09 PROCEDURE — 85027 COMPLETE CBC AUTOMATED: CPT | Performed by: STUDENT IN AN ORGANIZED HEALTH CARE EDUCATION/TRAINING PROGRAM

## 2024-12-09 RX ORDER — ACETAMINOPHEN 325 MG/1
650 TABLET ORAL EVERY 4 HOURS PRN
Start: 2024-12-09

## 2024-12-09 ASSESSMENT — COGNITIVE AND FUNCTIONAL STATUS - GENERAL
DRESSING REGULAR UPPER BODY CLOTHING: A LITTLE
CLIMB 3 TO 5 STEPS WITH RAILING: A LOT
DRESSING REGULAR LOWER BODY CLOTHING: A LOT
WALKING IN HOSPITAL ROOM: A LITTLE
WALKING IN HOSPITAL ROOM: A LITTLE
MOVING FROM LYING ON BACK TO SITTING ON SIDE OF FLAT BED WITH BEDRAILS: A LOT
MOVING TO AND FROM BED TO CHAIR: A LITTLE
MOBILITY SCORE: 14
TOILETING: A LOT
HELP NEEDED FOR BATHING: A LOT
MOVING TO AND FROM BED TO CHAIR: A LOT
STANDING UP FROM CHAIR USING ARMS: A LITTLE
TURNING FROM BACK TO SIDE WHILE IN FLAT BAD: A LITTLE
TURNING FROM BACK TO SIDE WHILE IN FLAT BAD: A LOT
MOBILITY SCORE: 16
CLIMB 3 TO 5 STEPS WITH RAILING: TOTAL
STANDING UP FROM CHAIR USING ARMS: A LOT
DAILY ACTIVITIY SCORE: 17

## 2024-12-09 ASSESSMENT — PAIN DESCRIPTION - DESCRIPTORS
DESCRIPTORS: ACHING
DESCRIPTORS: BURNING;DISCOMFORT

## 2024-12-09 ASSESSMENT — PAIN - FUNCTIONAL ASSESSMENT
PAIN_FUNCTIONAL_ASSESSMENT: 0-10

## 2024-12-09 ASSESSMENT — PAIN SCALES - GENERAL
PAINLEVEL_OUTOF10: 5 - MODERATE PAIN
PAINLEVEL_OUTOF10: 5 - MODERATE PAIN
PAINLEVEL_OUTOF10: 0 - NO PAIN

## 2024-12-09 NOTE — PROGRESS NOTES
Pt reviewed during Care Rounds today and she is ready for discharge. SW met with pt to review her plan and she confirms her desires to discharge to [facility].  We reviewed the IM with no questions/concerns- copy added to pt's chart, another copy provided to pt.  Pt is requesting her daughter transport which was approved by pt's bedside nurse. SW spoke to daughter/Orquidea Francis via telephone with an update  [facility] was completed by KEO/Radha Paris. No further needs identified.       NERIS Aguilar

## 2024-12-09 NOTE — CARE PLAN
The patient's goals for the shift include      The clinical goals for the shift include prevention of no falls      Problem: Safety - Adult  Goal: Free from fall injury  Outcome: Progressing     Problem: Discharge Planning  Goal: Discharge to home or other facility with appropriate resources  Outcome: Progressing     Problem: Chronic Conditions and Co-morbidities  Goal: Patient's chronic conditions and co-morbidity symptoms are monitored and maintained or improved  Outcome: Progressing

## 2024-12-09 NOTE — DISCHARGE SUMMARY
"Discharge Diagnosis  Hypothermia associated with environmental change    Issues Requiring Follow-Up  Fall, weakness    Discharge Meds     Medication List      START taking these medications     acetaminophen 325 mg tablet; Commonly known as: Tylenol; Take 2 tablets   (650 mg) by mouth every 4 hours if needed for mild pain (1 - 3).     CONTINUE taking these medications     Accu-Chek Sheree Plus test strp strip; Generic drug: blood sugar   diagnostic; 1 strip 2 times a day. In vitro; Use 1 strip twice a day   brimonidine-timoloL 0.2-0.5 % ophthalmic solution; Commonly known as:   Combigan   celecoxib 100 mg capsule; Commonly known as: CeleBREX; Take 1 capsule   (100 mg) by mouth once daily.   cholecalciferol 25 MCG (1000 UT) tablet; Commonly known as: Vitamin D3;   Take 1 tablet (1,000 Units) by mouth once daily.   diclofenac sodium 1 % gel; Commonly known as: Voltaren; Apply 4.5 inches   (4 g) topically 2 times a day. Apply to knee   ezetimibe 10 mg tablet; Commonly known as: Zetia; Take 1 tablet (10 mg)   by mouth once daily.   famotidine 20 mg tablet; Commonly known as: Pepcid; Take 1 tablet (20   mg) by mouth once daily as needed for heartburn or indigestion.   lactobacillus acidophilus tablet tablet; Take 1 tablet by mouth 2 times   a day.   losartan 25 mg tablet; Commonly known as: Cozaar; Take 1 tablet (25 mg)   by mouth once daily.   metFORMIN 500 mg tablet; Commonly known as: Glucophage; Take 1 tablet   (500 mg) by mouth once daily.   multivitamin tablet   pen needle, diabetic 31 gauge x 3/16\" needle; Commonly known as: BD   Ultra-Fine Mini Pen Needle; 1 each once daily. 31G x 5mm; Inject 1 each as   directed daily   sertraline 50 mg tablet; Commonly known as: Zoloft; Take 1 tablet (50   mg) by mouth once daily.     STOP taking these medications     triamcinolone 0.1 % cream; Commonly known as: Kenalog   Victoza 2-Christian 0.6 mg/0.1 mL (18 mg/3 mL) injection; Generic drug:   liraglutide       Test Results Pending " At Discharge  Pending Labs       Order Current Status    CBC Collected (12/09/24 1144)    Creatine Kinase Collected (12/09/24 1144)    Magnesium Collected (12/09/24 1144)    Renal Function Panel Collected (12/09/24 1144)            Hospital Course    84 y.o. female presenting with being found down at home. Patient is stating that she was on the ground in her garage throughout the night. Was hypothermic upon arrival at 92 °F. Following Flavia hugger patient's temperature is improved to approximately 97. CT brain and all imaging of the lower extremities showed no acute traumatic injury. Cervical spine also negative.   Sig labs: CK elevated. Creatinine normal. High-sensitivity troponin of 127 as well as 131. Likely secondary to being down for extended period. Lactate elevated above 4.   Patient states fall was mechanical. Per patient children at bedside, she has a lot of difficulty walking at baseline.    During hospital stay, patient's CK level would slowly decrease with fluids.  She would be able to tolerate a diet by discharge.  Kidney function would remain stable during hospitalization.  PT/OT would evaluate the patient and recommended discharge to SNF.  Patient would agree to this plan.  No medication changes were made at discharge. Follow up with PCP in one week.  Vital signs were stable at discharge.  Patient agreed to discharge plan.    35 minutes total was spent on discharge planning including chart review, medication ordering, and discussion with family and hospital staff.      Pertinent Physical Exam At Time of Discharge  General:     Well appearing  HENT:      Head: Normocephalic and atraumatic.      Mouth: Mucous membranes are moist.   Eyes:      Pupils are equal, round, and reactive to light.   Cardiovascular:      Normal rate and regular rhythm. Normal S1, S2.  No murmurs, clicks, gallops.   Pulmonary:      Clear to auscultation bilaterally.  No wheezing, rhonchi, or crackles heard.   Abdominal:        Bowel sounds are normal.      Abdomen is soft, nontender, nondistended  Skin:      Black eschars on bilateral knees  Musculoskeletal:         Extremities: No edema present. No deformities with no abnormal range of motion     Neck supple.   Neurological:      Mental Status: He is alert and oriented X3     CN II-XII intact.  No focal neurologic deficits appreciated.    Outpatient Follow-Up  Future Appointments   Date Time Provider Department Center   3/10/2025  1:20 PM Aspen Knapp DO [id number]         Ever Sahni DO

## 2024-12-09 NOTE — PROGRESS NOTES
"Physical Therapy    Physical Therapy Treatment    Patient Name: Nancie Du  MRN: [id number]  Department: [facility]  Room: [address] [place] [address]  Today's Date: 12/9/2024  Time Calculation  Start Time: 1025  Stop Time: 1105  Time Calculation (min): 40 min    Assessment/Plan   PT Assessment  PT Assessment Results: Decreased strength, Decreased endurance, Decreased mobility  Medical Staff Made Aware: Yes  End of Session Communication: Bedside nurse  Assessment Comment: Pt required increased recovery peroids between activities per fatigue. Pt required min cues for safety w/ tranfers and correct body mechanics.  End of Session Patient Position: Up in chair, Alarm on     PT Plan  Treatment/Interventions: Bed mobility, Transfer training, Gait training, Balance training, Strengthening, Endurance training, Therapeutic exercise, Therapeutic activity, Home exercise program  PT Plan: Ongoing PT  PT Frequency: 4 times per week  PT Discharge Recommendations: Moderate intensity level of continued care  PT Recommended Transfer Status: Assist x1  PT - OK to Discharge: Yes (once medically appropriate and safety DC plan in place)      General Visit Information:   PT  Visit  PT Received On: 12/09/24  General  Prior to Session Communication: Bedside nurse  Patient Position Received: Up in chair, Alarm on    Subjective   \"I don't want to do anything.\"    Precautions:   Medical Precautions: Fall precautions (significant wounds B knees, [place])     Objective   Pain:  Pain Assessment  Pain Assessment: 0-10  0-10 (Numeric) Pain Score: 5 - Moderate pain  Pain Type: Acute pain  Pain Location: Knee  Pain Orientation: Other (Comment) (bilat, superficial wounds)  Pain Descriptors: Burning, Discomfort  Pain Frequency: Constant/continuous  Pain Interventions: Ambulation/increased activity  Response to Interventions: No change in pain  Cognition:  Cognition  Orientation Level: Oriented X4    Activity Tolerance:  Activity Tolerance  Endurance: Tolerates less than 10 min " exercise with changes in vital signs  Treatments:  Therapeutic Exercise  Therapeutic Exercise Performed: Yes  Therapeutic Exercise Activity 1: hip flexion  Therapeutic Exercise Activity 2: knee extension  Therapeutic Exercise Activity 3: hip abdcution  Therapeutic Exercise Activity 4: hip adduction  Therapeutic Exercise Activity 5: HS curls  Therapeutic Exercise Activity 6: AP    Ambulation/Gait Training 1  Surface 1: Level tile  Device 1: Rolling walker  Gait Support Devices: Gait belt  Assistance 1: Contact guard  Quality of Gait 1: Decreased step length  Comments/Distance (ft) 1: 25', limimted by fatigue  Transfer 1  Transfer From 1: Sit to  Transfer to 1: Stand  Technique 1: Sit to stand  Transfer Device 1: Walker  Transfer Level of Assistance 1: Contact guard  Trials/Comments 1: bilat Ue suppport required or mod w/o UE support  Transfers 2  Transfer to 2: Toilet  Technique 2: Sit to stand, Stand to sit    Outcome Measures:    [facility] Basic Mobility  Turning from your back to your side while in a flat bed without using bedrails: A lot  Moving from lying on your back to sitting on the side of a flat bed without using bedrails: A lot  Moving to and from bed to chair (including a wheelchair): A little  Standing up from a chair using your arms (e.g. wheelchair or bedside chair): A little  To walk in hospital room: A little  Climbing 3-5 steps with railing: Total  Basic Mobility - Total Score: 14    Education Documentation  Precautions, taught by Adriano May PTA at 12/9/2024 11:33 AM.  Learner: Patient  Readiness: Acceptance  Method: Explanation  Response: Verbalizes Understanding  Comment: Education is necessary to reduce the pts risk of falls    Mobility Training, taught by Adriano May PTA at 12/9/2024 11:33 AM.  Learner: Patient  Readiness: Acceptance  Method: Explanation  Response: Verbalizes Understanding  Comment: Education is necessary to reduce the pts risk of falls    Education Comments  No comments  found.        OP EDUCATION:       Encounter Problems       Encounter Problems (Active)       PT Problem       PT Goal 1 (Progressing)       Start:  12/05/24    Expected End:  12/19/24       Nancie Du will perform bed mobility for supine to and from sitting EOB without use of rail with Mumtaz           PT Goal 2 (Progressing)       Start:  12/05/24    Expected End:  12/19/24       Nancie Du will transfer sit to and from stand using least restrictive assistive device SBA            PT Goal 3 (Progressing)       Start:  12/05/24    Expected End:  12/19/24       Nancie Du will demonstrate good safety awareness with transfers and mobility and with use of assistive device (proper hand placement).            PT Goal 4 (Progressing)       Start:  12/05/24    Expected End:  12/19/24       Nancie Du will ambulate 50 ft with assistive device , level surface, good balance, steady CGA              Pain - Adult

## 2024-12-15 ENCOUNTER — NURSING HOME VISIT (OUTPATIENT)
Dept: POST ACUTE CARE | Facility: EXTERNAL LOCATION | Age: 84
End: 2024-12-15
Payer: MEDICARE
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
## 2024-12-15 DIAGNOSIS — T79.6XXD TRAUMATIC RHABDOMYOLYSIS, SUBSEQUENT ENCOUNTER: Primary | ICD-10-CM

## 2024-12-15 PROCEDURE — 99304 1ST NF CARE SF/LOW MDM 25: CPT | Performed by: INTERNAL MEDICINE

## 2024-12-15 NOTE — LETTER
Patient: Nancie Du  : 1940    Encounter Date: 12/15/2024    Pt was seen in the NH.  83 yo f with PMH DM2 HTN HLD here for rehab after fall and rhabdomyolysis feels better  Pt is in usual state , no complaint  General appearance: Comfortable, no distress  ROS: No SOB  Medications reviewed  Head: Normal  Neck: Soft  Heart: Regular  Lungs: Clear  Abdomen: soft  Ext both knee resolving bruises    Plan:   1)clinically doing better  2) To continue PT OT    Problem List Items Addressed This Visit    None  Visit Diagnoses       Traumatic rhabdomyolysis, subsequent encounter    -  Primary    Fall, subsequent encounter                   Electronically Signed By: Jeromy Leal MD   12/15/24  7:23 PM

## 2024-12-16 NOTE — PROGRESS NOTES
Pt was seen in the NH.  83 yo f with PMH DM2 HTN HLD here for rehab after fall and rhabdomyolysis feels better  Pt is in usual state , no complaint  General appearance: Comfortable, no distress  ROS: No SOB  Medications reviewed  Head: Normal  Neck: Soft  Heart: Regular  Lungs: Clear  Abdomen: soft  Ext both knee resolving bruises    Plan:   1)clinically doing better  2) To continue PT OT    Problem List Items Addressed This Visit    None  Visit Diagnoses       Traumatic rhabdomyolysis, subsequent encounter    -  Primary    Fall, subsequent encounter

## 2025-01-07 ENCOUNTER — TELEPHONE (OUTPATIENT)
Dept: PRIMARY CARE | Facility: CLINIC | Age: 85
End: 2025-01-07
Payer: MEDICARE

## 2025-01-07 NOTE — TELEPHONE ENCOUNTER
Daughter called in to let you know that Nancie is going to an assisted living ( [facility]) when she gets discharged from [facility] later this week. She will be following the  At the assisted living facility so you will be receiving papers to sign for transfer of Care to the new provider.

## 2025-01-23 ENCOUNTER — TELEPHONE (OUTPATIENT)
Dept: PRIMARY CARE | Facility: CLINIC | Age: 85
End: 2025-01-23
Payer: MEDICARE

## 2025-01-23 ENCOUNTER — PHARMACY VISIT (OUTPATIENT)
Dept: PHARMACY | Facility: CLINIC | Age: 85
End: 2025-01-23
Payer: MEDICARE

## 2025-01-23 DIAGNOSIS — L03.90 CELLULITIS, UNSPECIFIED CELLULITIS SITE: ICD-10-CM

## 2025-01-23 DIAGNOSIS — L03.90 CELLULITIS, UNSPECIFIED CELLULITIS SITE: Primary | ICD-10-CM

## 2025-01-23 PROCEDURE — RXMED WILLOW AMBULATORY MEDICATION CHARGE

## 2025-01-23 RX ORDER — SULFAMETHOXAZOLE AND TRIMETHOPRIM 800; 160 MG/1; MG/1
1 TABLET ORAL 2 TIMES DAILY
Qty: 14 TABLET | Refills: 0 | Status: SHIPPED | OUTPATIENT
Start: 2025-01-23 | End: 2025-01-23 | Stop reason: SDUPTHER

## 2025-01-23 RX ORDER — SULFAMETHOXAZOLE AND TRIMETHOPRIM 800; 160 MG/1; MG/1
1 TABLET ORAL 2 TIMES DAILY
Qty: 14 TABLET | Refills: 0 | Status: SHIPPED | OUTPATIENT
Start: 2025-01-23 | End: 2025-01-30

## 2025-01-23 NOTE — TELEPHONE ENCOUNTER
Kaia was doing wound care on bilateral knees. There are thick bandages on both knees but the edges are peeling up and there is some drainage and redness. She concerned what she can see around the bandage may be infection. Wondering if she may need an antibiotic?

## 2025-01-23 NOTE — TELEPHONE ENCOUNTER
Shola DEANNA just wanted to report she had a fall when she was trying to get into bed yesterday (Wednesday). She reported increased pain to right knee.

## 2025-01-24 ENCOUNTER — TELEPHONE (OUTPATIENT)
Dept: PODIATRY | Facility: HOSPITAL | Age: 85
End: 2025-01-24
Payer: MEDICARE

## 2025-01-28 ENCOUNTER — APPOINTMENT (OUTPATIENT)
Dept: PRIMARY CARE | Facility: CLINIC | Age: 85
End: 2025-01-28
Payer: MEDICARE

## 2025-01-28 VITALS
HEIGHT: 63 IN | HEART RATE: 92 BPM | DIASTOLIC BLOOD PRESSURE: 64 MMHG | WEIGHT: 155 LBS | SYSTOLIC BLOOD PRESSURE: 136 MMHG | BODY MASS INDEX: 27.46 KG/M2

## 2025-01-28 DIAGNOSIS — I10 PRIMARY HYPERTENSION: ICD-10-CM

## 2025-01-28 DIAGNOSIS — E11.9 TYPE 2 DIABETES MELLITUS WITHOUT COMPLICATION, WITHOUT LONG-TERM CURRENT USE OF INSULIN (MULTI): Primary | ICD-10-CM

## 2025-01-28 DIAGNOSIS — Z85.3 HISTORY OF BREAST CANCER: ICD-10-CM

## 2025-01-28 DIAGNOSIS — E78.5 DYSLIPIDEMIA: ICD-10-CM

## 2025-01-28 DIAGNOSIS — F32.0 CURRENT MILD EPISODE OF MAJOR DEPRESSIVE DISORDER WITHOUT PRIOR EPISODE (CMS-HCC): ICD-10-CM

## 2025-01-28 PROCEDURE — 3078F DIAST BP <80 MM HG: CPT | Performed by: STUDENT IN AN ORGANIZED HEALTH CARE EDUCATION/TRAINING PROGRAM

## 2025-01-28 PROCEDURE — 1159F MED LIST DOCD IN RCRD: CPT | Performed by: STUDENT IN AN ORGANIZED HEALTH CARE EDUCATION/TRAINING PROGRAM

## 2025-01-28 PROCEDURE — 1160F RVW MEDS BY RX/DR IN RCRD: CPT | Performed by: STUDENT IN AN ORGANIZED HEALTH CARE EDUCATION/TRAINING PROGRAM

## 2025-01-28 PROCEDURE — 99214 OFFICE O/P EST MOD 30 MIN: CPT | Performed by: STUDENT IN AN ORGANIZED HEALTH CARE EDUCATION/TRAINING PROGRAM

## 2025-01-28 PROCEDURE — G2211 COMPLEX E/M VISIT ADD ON: HCPCS | Performed by: STUDENT IN AN ORGANIZED HEALTH CARE EDUCATION/TRAINING PROGRAM

## 2025-01-28 PROCEDURE — 3075F SYST BP GE 130 - 139MM HG: CPT | Performed by: STUDENT IN AN ORGANIZED HEALTH CARE EDUCATION/TRAINING PROGRAM

## 2025-01-28 NOTE — PROGRESS NOTES
"Subjective   Patient ID: Nancie Du is a 84 y.o. female who presents for hospital follow up. Going to [facility] next week     HPI  Presenting today with her daughter for hospital follow up. Hospitalized at [facility] 12/4-12/9/2024 after being found down at home in garage after being there overnight. She describes a mechanical fall. Was hypothermic on arrival with temp 92F. Imaging workup unrevealing. CK, troponin, and lactate elevated initially. Labs gradually improved throughout her stay with IV hydration. Kidney function remained stable. PT/OT rec discharge to SNF. She was eventually transferred to [facility] 12/9/2024. She is now residing at her daughter's home in anticipation of a move to [facility] next week. She is overall feeling improved. C coming to her daughter's house to assist with wound care on her knees bl. Bactrim was started 1/23/2025 due to concern for infectious process given purulent drainage from R knee wound. She is tolerating the medication and purulent discharge has resolved. She is feeling a little down about her new living situation and expresses that she wants to return to her home.    Review of Systems   Constitutional:  Negative for chills and fever.   Respiratory:  Negative for cough and shortness of breath.    Cardiovascular:  Negative for chest pain and palpitations.   Skin:  Positive for wound (bl knees). Negative for rash.   Psychiatric/Behavioral:  Positive for dysphoric mood. The patient is not nervous/anxious.      Objective   /64   Pulse 92   Ht 1.6 m (5' 3\")   Wt 70.3 kg (155 lb)   BMI 27.46 kg/m²     Physical Exam  Constitutional:       Appearance: Normal appearance.   HENT:      Head: Normocephalic and atraumatic.   Eyes:      General: No scleral icterus.     Conjunctiva/sclera: Conjunctivae normal.   Cardiovascular:      Rate and Rhythm: Normal rate and regular rhythm.      Heart sounds: No murmur heard.  Pulmonary:      Effort: " Pulmonary effort is normal. No respiratory distress.      Breath sounds: Normal breath sounds.   Musculoskeletal:         General: Swelling (trace nonpitting edema bl LE to mid calf) present. Normal range of motion.      Cervical back: Normal range of motion and neck supple.   Skin:     General: Skin is warm and dry.      Findings: No rash.      Comments: 4cm wounds bl knees with dressing in place, minimal surrounding erythema, no drainage noted   Neurological:      General: No focal deficit present.      Mental Status: She is alert.   Psychiatric:         Mood and Affect: Mood normal.         Behavior: Behavior normal.       Assessment/Plan   Problem List Items Addressed This Visit             ICD-10-CM    Type 2 diabetes mellitus without complication, without long-term current use of insulin (Multi) - Primary E11.9     Currently managed on metformin alone. Will update labs and will follow up with results.         Relevant Medications    metFORMIN (Glucophage) 500 mg tablet    Other Relevant Orders    Comprehensive Metabolic Panel (Completed)    CBC and Auto Differential (Completed)    Hemoglobin A1C (Completed)    Primary hypertension I10     BP acceptable. No changes today.         History of breast cancer Z85.3    Dyslipidemia E78.5     Continue ezetimibe.         Current mild episode of major depressive disorder without prior episode (CMS-HCC) F32.0     Feeling a little low in anticipation of next week's move. Using shared decision making, we decided to restart Zoloft as she did feel better in the past when taking the medication. Medication dosing and side effects reviewed. Encouraged her to try to keep an open mind next week. I suspect that she will thrive in the AL setting and hope that she will make new friends and enjoy the activities provided to her.

## 2025-01-29 LAB
ALBUMIN SERPL-MCNC: 4 G/DL (ref 3.6–5.1)
ALP SERPL-CCNC: 59 U/L (ref 37–153)
ALT SERPL-CCNC: 9 U/L (ref 6–29)
ANION GAP SERPL CALCULATED.4IONS-SCNC: 9 MMOL/L (CALC) (ref 7–17)
AST SERPL-CCNC: 12 U/L (ref 10–35)
BASOPHILS # BLD AUTO: 73 CELLS/UL (ref 0–200)
BASOPHILS NFR BLD AUTO: 1.1 %
BILIRUB SERPL-MCNC: 0.6 MG/DL (ref 0.2–1.2)
BUN SERPL-MCNC: 17 MG/DL (ref 7–25)
CALCIUM SERPL-MCNC: 9.8 MG/DL (ref 8.6–10.4)
CHLORIDE SERPL-SCNC: 101 MMOL/L (ref 98–110)
CO2 SERPL-SCNC: 24 MMOL/L (ref 20–32)
CREAT SERPL-MCNC: 0.74 MG/DL (ref 0.6–0.95)
EGFRCR SERPLBLD CKD-EPI 2021: 79 ML/MIN/1.73M2
EOSINOPHIL # BLD AUTO: 205 CELLS/UL (ref 15–500)
EOSINOPHIL NFR BLD AUTO: 3.1 %
ERYTHROCYTE [DISTWIDTH] IN BLOOD BY AUTOMATED COUNT: 12 % (ref 11–15)
EST. AVERAGE GLUCOSE BLD GHB EST-MCNC: 171 MG/DL
EST. AVERAGE GLUCOSE BLD GHB EST-SCNC: 9.5 MMOL/L
GLUCOSE SERPL-MCNC: 228 MG/DL (ref 65–139)
HBA1C MFR BLD: 7.6 % OF TOTAL HGB
HCT VFR BLD AUTO: 38 % (ref 35–45)
HGB BLD-MCNC: 12.3 G/DL (ref 11.7–15.5)
LYMPHOCYTES # BLD AUTO: 1531 CELLS/UL (ref 850–3900)
LYMPHOCYTES NFR BLD AUTO: 23.2 %
MCH RBC QN AUTO: 29.8 PG (ref 27–33)
MCHC RBC AUTO-ENTMCNC: 32.4 G/DL (ref 32–36)
MCV RBC AUTO: 92 FL (ref 80–100)
MONOCYTES # BLD AUTO: 667 CELLS/UL (ref 200–950)
MONOCYTES NFR BLD AUTO: 10.1 %
NEUTROPHILS # BLD AUTO: 4125 CELLS/UL (ref 1500–7800)
NEUTROPHILS NFR BLD AUTO: 62.5 %
PLATELET # BLD AUTO: 261 THOUSAND/UL (ref 140–400)
PMV BLD REES-ECKER: 11.7 FL (ref 7.5–12.5)
POTASSIUM SERPL-SCNC: 4.9 MMOL/L (ref 3.5–5.3)
PROT SERPL-MCNC: 6.3 G/DL (ref 6.1–8.1)
RBC # BLD AUTO: 4.13 MILLION/UL (ref 3.8–5.1)
SODIUM SERPL-SCNC: 134 MMOL/L (ref 135–146)
WBC # BLD AUTO: 6.6 THOUSAND/UL (ref 3.8–10.8)

## 2025-02-05 ENCOUNTER — TELEPHONE (OUTPATIENT)
Dept: PRIMARY CARE | Facility: CLINIC | Age: 85
End: 2025-02-05
Payer: MEDICARE

## 2025-02-05 RX ORDER — METFORMIN HYDROCHLORIDE 500 MG/1
1000 TABLET ORAL DAILY
Qty: 180 TABLET | Refills: 1 | Status: SHIPPED | OUTPATIENT
Start: 2025-02-05

## 2025-02-05 NOTE — TELEPHONE ENCOUNTER
----- Message from Aspen Knapp sent at 2/2/2025 11:27 PM EST -----  Please let Nancie know that her glucose was elevated at 228 and HbA1c is up a little to 7.6%, but remainder of things look great. Blood counts, liver, kidneys, electrolytes are in normal range. Thank you

## 2025-02-09 PROBLEM — S02.119A: Status: RESOLVED | Noted: 2024-02-29 | Resolved: 2025-02-09

## 2025-02-09 PROBLEM — C50.919 MALIGNANT NEOPLASM OF FEMALE BREAST, UNSPECIFIED ESTROGEN RECEPTOR STATUS, UNSPECIFIED LATERALITY, UNSPECIFIED SITE OF BREAST: Status: RESOLVED | Noted: 2025-02-09 | Resolved: 2025-02-09

## 2025-02-09 PROBLEM — C50.919 MALIGNANT NEOPLASM OF FEMALE BREAST, UNSPECIFIED ESTROGEN RECEPTOR STATUS, UNSPECIFIED LATERALITY, UNSPECIFIED SITE OF BREAST: Status: ACTIVE | Noted: 2025-02-09

## 2025-02-09 ASSESSMENT — ENCOUNTER SYMPTOMS
PALPITATIONS: 0
COUGH: 0
FEVER: 0
NERVOUS/ANXIOUS: 0
CHILLS: 0
SHORTNESS OF BREATH: 0
DYSPHORIC MOOD: 1
WOUND: 1

## 2025-02-10 NOTE — ASSESSMENT & PLAN NOTE
Feeling a little low in anticipation of next week's move. Using shared decision making, we decided to restart Zoloft as she did feel better in the past when taking the medication. Medication dosing and side effects reviewed. Encouraged her to try to keep an open mind next week. I suspect that she will thrive in the AL setting and hope that she will make new friends and enjoy the activities provided to her.

## 2025-02-14 ENCOUNTER — TELEPHONE (OUTPATIENT)
Dept: PRIMARY CARE | Facility: CLINIC | Age: 85
End: 2025-02-14
Payer: MEDICARE

## 2025-02-14 DIAGNOSIS — Z12.31 ENCOUNTER FOR SCREENING MAMMOGRAM FOR BREAST CANCER: Primary | ICD-10-CM

## 2025-02-14 NOTE — TELEPHONE ENCOUNTER
Orquidea called and said Nancie is at [facility] and it will be a couple weeks before a doctor takes over her medications. They are asking if she should be put back on Victoza if she is on the 1000 mg of Metformin?    Also she is asking for an order for a mammogram to be done at [facility]? They missed her appointment at [facility] while she was in the hospital.

## 2025-03-06 ENCOUNTER — TELEPHONE (OUTPATIENT)
Dept: PRIMARY CARE | Facility: CLINIC | Age: 85
End: 2025-03-06
Payer: MEDICARE

## 2025-03-06 NOTE — TELEPHONE ENCOUNTER
She called to see if you would continue to follow. Also she said the scab on her R knee came off. Some hyper granulated tissue and some drainage. Wondering if they should just continue putting guaze on it like they have been or something different?

## 2025-03-10 ENCOUNTER — APPOINTMENT (OUTPATIENT)
Dept: PRIMARY CARE | Facility: CLINIC | Age: 85
End: 2025-03-10
Payer: MEDICARE

## 2025-06-26 ENCOUNTER — APPOINTMENT (OUTPATIENT)
Dept: PRIMARY CARE | Facility: CLINIC | Age: 85
End: 2025-06-26
Payer: MEDICARE

## 2025-06-26 VITALS
BODY MASS INDEX: 27.89 KG/M2 | WEIGHT: 157.4 LBS | HEART RATE: 72 BPM | SYSTOLIC BLOOD PRESSURE: 128 MMHG | HEIGHT: 63 IN | DIASTOLIC BLOOD PRESSURE: 76 MMHG

## 2025-06-26 DIAGNOSIS — E11.9 TYPE 2 DIABETES MELLITUS WITHOUT COMPLICATION, WITHOUT LONG-TERM CURRENT USE OF INSULIN: ICD-10-CM

## 2025-06-26 DIAGNOSIS — Z85.3 HISTORY OF BREAST CANCER: ICD-10-CM

## 2025-06-26 DIAGNOSIS — I10 PRIMARY HYPERTENSION: ICD-10-CM

## 2025-06-26 DIAGNOSIS — E78.5 DYSLIPIDEMIA: ICD-10-CM

## 2025-06-26 DIAGNOSIS — F32.0 CURRENT MILD EPISODE OF MAJOR DEPRESSIVE DISORDER WITHOUT PRIOR EPISODE: ICD-10-CM

## 2025-06-26 DIAGNOSIS — I65.23 BILATERAL CAROTID ARTERY STENOSIS: Primary | ICD-10-CM

## 2025-06-26 PROCEDURE — 1159F MED LIST DOCD IN RCRD: CPT | Performed by: STUDENT IN AN ORGANIZED HEALTH CARE EDUCATION/TRAINING PROGRAM

## 2025-06-26 PROCEDURE — 99214 OFFICE O/P EST MOD 30 MIN: CPT | Performed by: STUDENT IN AN ORGANIZED HEALTH CARE EDUCATION/TRAINING PROGRAM

## 2025-06-26 PROCEDURE — G2211 COMPLEX E/M VISIT ADD ON: HCPCS | Performed by: STUDENT IN AN ORGANIZED HEALTH CARE EDUCATION/TRAINING PROGRAM

## 2025-06-26 PROCEDURE — 1036F TOBACCO NON-USER: CPT | Performed by: STUDENT IN AN ORGANIZED HEALTH CARE EDUCATION/TRAINING PROGRAM

## 2025-06-26 PROCEDURE — 1160F RVW MEDS BY RX/DR IN RCRD: CPT | Performed by: STUDENT IN AN ORGANIZED HEALTH CARE EDUCATION/TRAINING PROGRAM

## 2025-06-26 PROCEDURE — 3078F DIAST BP <80 MM HG: CPT | Performed by: STUDENT IN AN ORGANIZED HEALTH CARE EDUCATION/TRAINING PROGRAM

## 2025-06-26 PROCEDURE — 3074F SYST BP LT 130 MM HG: CPT | Performed by: STUDENT IN AN ORGANIZED HEALTH CARE EDUCATION/TRAINING PROGRAM

## 2025-06-26 RX ORDER — PROMETHAZINE HYDROCHLORIDE 25 MG/1
25 TABLET ORAL EVERY 8 HOURS PRN
COMMUNITY

## 2025-06-26 RX ORDER — MECLIZINE HYDROCHLORIDE 25 MG/1
12.5 TABLET ORAL AS NEEDED
COMMUNITY

## 2025-06-26 RX ORDER — LOPERAMIDE HYDROCHLORIDE 2 MG/1
CAPSULE ORAL
COMMUNITY
Start: 2025-02-07

## 2025-06-26 RX ORDER — ALUMINUM HYDROXIDE, MAGNESIUM HYDROXIDE, AND SIMETHICONE 2400; 240; 2400 MG/30ML; MG/30ML; MG/30ML
30 SUSPENSION ORAL EVERY 4 HOURS PRN
COMMUNITY

## 2025-06-26 NOTE — PROGRESS NOTES
"Subjective   Patient ID: Nancie Du is a 84 y.o. female who presents for 3 month check.    HPI  Depression - she is now residing in assisted living facility ( the [facility]), making friends, participating in activities, her Zoloft was decreased to 25mg due to fatigue which has seemed to help    DM2 - managed on metformin 1000mg every day, FBG averaging 130s when she checks, she notes that she has diarrhea 4-5x/d which she thinks is related to the metformin    Cervical Cancer Screening: not indicated  Breast Cancer Screening: [facility], due 5/2026  Osteoporosis Screening: due 4/2026  Colon Cancer Screening: not indicated  Tobacco: denies  Alcohol: rare  Recreational Drugs: denies  Immunizations: due for Shingrix at pharmacy    Review of Systems   Constitutional:  Negative for chills and fever.   Respiratory:  Negative for cough and shortness of breath.    Cardiovascular:  Negative for chest pain and palpitations.   Gastrointestinal:  Positive for diarrhea. Negative for abdominal pain, blood in stool and constipation.   Genitourinary:  Negative for dysuria.   Skin:  Negative for rash.   Psychiatric/Behavioral:  Negative for dysphoric mood. The patient is not nervous/anxious.      Objective   /76   Pulse 72   Ht 1.6 m (5' 3\")   Wt 71.4 kg (157 lb 6.4 oz)   BMI 27.88 kg/m²     Physical Exam  Constitutional:       Appearance: Normal appearance.   HENT:      Head: Normocephalic and atraumatic.   Eyes:      General: No scleral icterus.     Conjunctiva/sclera: Conjunctivae normal.   Cardiovascular:      Rate and Rhythm: Normal rate and regular rhythm.   Pulmonary:      Effort: Pulmonary effort is normal. No respiratory distress.      Breath sounds: Normal breath sounds.   Musculoskeletal:         General: Swelling (trace blle) present.      Cervical back: Normal range of motion and neck supple.   Skin:     General: Skin is warm.   Neurological:      General: No focal deficit present.      Mental " Status: She is alert.   Psychiatric:         Mood and Affect: Mood normal.         Behavior: Behavior normal.       Assessment/Plan   Problem List Items Addressed This Visit           ICD-10-CM    Type 2 diabetes mellitus without complication, without long-term current use of insulin E11.9    - Managed on metformin 1000mg every day  - Using shared decision making, we decided to decrease to 500mg every day due to diarrhea  - Medication dosing and side effects reviewed.  - She was encouraged to reach out if BG increases significantly when she checks it         Relevant Orders    Albumin-Creatinine Ratio, Urine Random    CBC and Auto Differential    Comprehensive Metabolic Panel    Hemoglobin A1C    Lipid Panel    TSH with reflex to Free T4 if abnormal    Vitamin B12    Follow Up In Primary Care - Medicare Annual    Primary hypertension I10    - Managed on losartan 25mg every day  - BP at goal  - No changes today         History of breast cancer Z85.3    - Mammo utd         Dyslipidemia E78.5    - Managed on ezetimibe 10mg every day  - Will continue         Relevant Medications    meclizine (Antivert) 25 mg tablet    Current mild episode of major depressive disorder without prior episode F32.0    - Managed on Zoloft 25mg every day  - Decreased from 50mg every day due to fatigue  - Overall feeling well from a mental health standpoint         Relevant Medications    meclizine (Antivert) 25 mg tablet    Bilateral carotid artery stenosis - Primary I65.23    - Due for repeat duplex         Relevant Medications    meclizine (Antivert) 25 mg tablet    Other Relevant Orders    Vascular US Carotid Artery Duplex Bilateral (Completed)    Follow Up In Primary Care - Medicare Annual   Follow up in 3mo for recheck, sooner if needed. Labs prior to appt.

## 2025-07-10 ASSESSMENT — ENCOUNTER SYMPTOMS
PALPITATIONS: 0
CONSTIPATION: 0
DYSURIA: 0
DYSPHORIC MOOD: 0
DIARRHEA: 1
ABDOMINAL PAIN: 0
BLOOD IN STOOL: 0
CHILLS: 0
COUGH: 0
SHORTNESS OF BREATH: 0
NERVOUS/ANXIOUS: 0
FEVER: 0

## 2025-07-11 NOTE — ASSESSMENT & PLAN NOTE
- Managed on Zoloft 25mg every day  - Decreased from 50mg every day due to fatigue  - Overall feeling well from a mental health standpoint

## 2025-07-11 NOTE — ASSESSMENT & PLAN NOTE
- Managed on metformin 1000mg every day  - Using shared decision making, we decided to decrease to 500mg every day due to diarrhea  - Medication dosing and side effects reviewed.  - She was encouraged to reach out if BG increases significantly when she checks it